# Patient Record
Sex: FEMALE | Race: WHITE | NOT HISPANIC OR LATINO | Employment: FULL TIME | URBAN - METROPOLITAN AREA
[De-identification: names, ages, dates, MRNs, and addresses within clinical notes are randomized per-mention and may not be internally consistent; named-entity substitution may affect disease eponyms.]

---

## 2017-02-02 ENCOUNTER — OFFICE VISIT (OUTPATIENT)
Dept: ORTHOPEDIC SURGERY | Facility: CLINIC | Age: 57
End: 2017-02-02

## 2017-02-02 VITALS — HEIGHT: 66 IN | TEMPERATURE: 98.1 F | WEIGHT: 150 LBS | BODY MASS INDEX: 24.11 KG/M2

## 2017-02-02 DIAGNOSIS — M25.551 HIP PAIN, BILATERAL: Primary | ICD-10-CM

## 2017-02-02 DIAGNOSIS — M70.70 BURSITIS, HIP, UNSPECIFIED LATERALITY: ICD-10-CM

## 2017-02-02 DIAGNOSIS — M25.552 HIP PAIN, BILATERAL: Primary | ICD-10-CM

## 2017-02-02 PROCEDURE — 73521 X-RAY EXAM HIPS BI 2 VIEWS: CPT | Performed by: ORTHOPAEDIC SURGERY

## 2017-02-02 PROCEDURE — 99203 OFFICE O/P NEW LOW 30 MIN: CPT | Performed by: ORTHOPAEDIC SURGERY

## 2017-02-02 NOTE — PROGRESS NOTES
Patient: Jess Razo  YOB: 1960 56 y.o. female  Medical Record Number: [id number]    Chief Complaints:   Chief Complaint   Patient presents with   • Left Hip - Pain   • Right Hip - Pain       History of Present Illness:Jess Razo is a 56 y.o. female who presents with  complaints of bilateral lateral hip pain.  This is been ongoing for a couple years.  She has been suffering from breast cancer and is undergone chemotherapy.  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  Other than that she doesn't do any specific exercise.  She denies any history of trauma to her hips.  She complains of an aching pain from the lateral aspect that hip to just above the knee worse in the left than the right side.    Allergies:   Allergies   Allergen Reactions   • Adhesive Tape    • Ampicillin    • Ceclor [Cefaclor]    • Ciprofloxacin    • Codeine Phosphate    • Erythromycin    • Ketek [Telithromycin]    • Levaquin [Levofloxacin]    • Penicillins    • Tetracyclines & Related    • Ultram [Tramadol]        Medications:   Current Outpatient Prescriptions   Medication Sig Dispense Refill   • albuterol (PROVENTIL HFA;VENTOLIN HFA) 108 (90 BASE) MCG/ACT inhaler Inhale 2 puffs Every 4 (Four) Hours As Needed for wheezing. 1 inhaler 11   • ALPRAZolam (XANAX) 1 MG tablet Take 1 tablet by mouth 3 (Three) Times a Day As Needed for anxiety. 60 tablet 2   • atorvastatin (LIPITOR) 20 MG tablet Take 1 tablet by mouth Daily. 30 tablet 5   • bisoprolol (ZEBeta) 5 MG tablet Take 1 tablet by mouth Daily. 30 tablet 5   • calcium carbonate (OS-YOON) 1250 (500 CA) MG tablet Take 1 tablet by mouth.     • cetirizine (ZyrTEC) 10 MG tablet Take 10 mg by mouth daily.     • Cholecalciferol (VITAMIN D3) 400 UNITS capsule Take  by mouth.     • digoxin (LANOXIN) 250 MCG tablet Take 250 mcg by mouth every day.     • diltiaZEM (TIAZAC) 360 MG 24 hr capsule Take 1 capsule by mouth Daily. 30 capsule 5   • fenofibrate 160  "MG tablet Take 1 tablet by mouth Daily. 30 tablet 5   • Flaxseed, Linseed, (FLAXSEED OIL) 1000 MG capsule Take  by mouth.     • lansoprazole (PREVACID) 15 MG capsule Take 15 mg by mouth.     • levothyroxine (SYNTHROID, LEVOTHROID) 75 MCG tablet Take 1 tablet by mouth Daily. 90 tablet 1   • metFORMIN (GLUCOPHAGE) 500 MG tablet Take 1 tablet by mouth 2 (Two) Times a Day With Meals. 180 tablet 1   • Multiple Vitamin (MULTIVITAMIN) tablet Take 1 tablet by mouth.     • Omega-3 Fatty Acids (FISH OIL CONCENTRATE) 300 MG capsule Take  by mouth 2 (two) times a day.     • tamoxifen (NOLVADEX) 20 MG chemo tablet Take 20 mg by mouth.     • traMADol (ULTRAM) 50 MG tablet Take 1 tablet by mouth Every 6 (Six) Hours As Needed for moderate pain (4-6). 60 tablet 5     No current facility-administered medications for this visit.          The following portions of the patient's history were reviewed and updated as appropriate: allergies, current medications, past family history, past medical history, past social history, past surgical history and problem list.    Review of Systems:   A 14 point review of systems was performed. All systems negative except pertinent positives/negative listed in HPI above    Physical Exam:   Vitals:    02/02/17 0917   Temp: 98.1 °F (36.7 °C)   Weight: 150 lb (68 kg)   Height: 66\" (167.6 cm)       General: A and O x 3, ASA, NAD    SCLERA:    Normal    DENTITION:   Normal  Hip:  bilateral    LEG ALIGNMENT:     Neutral   ,    equal leg lengths    GAIT:     Nonantalgic    SKIN:     No abnormality    RANGE OF MOTION:      Full without joint irritability    STRENGTH:     5 / 5    hip flexion and abduction    DISTAL PULSES:    Paplable    DISTAL SENSATION :   Intact    LYMPHATICS:     No   lymphadenopathy    OTHER:          - Negative Stinchfeld test      - Negative log roll      +Tenderness to palpation trochanteric bursa          Radiology:  Xrays 2views both hips (AP bilateral hips, and lateral of the hip) " ordered and reviewed for evaluation of hip pain  demonstrating  normal appearing bilateral hip joints without evidence of arthritis or any pathologic bone process.  On the AP view of the pelvis there is evidence of lumbar degenerative changes throughout the lower lumbar spine.  There are no previous films for comparison    Assessment/Plan: Bilateral hip trochanteric bursitis.  I will send her to physical therapy for hamstring and IT band stretching.  We'll also have them work on some lumbar core strengthening exercises.  If her pain should progress despite this we would consider cortisone injections into the hip bursa at a later date.      Antonio Taveras MD  2/2/2017

## 2017-03-13 ENCOUNTER — TELEPHONE (OUTPATIENT)
Dept: FAMILY MEDICINE CLINIC | Facility: CLINIC | Age: 57
End: 2017-03-13

## 2017-03-13 ENCOUNTER — OFFICE VISIT (OUTPATIENT)
Dept: FAMILY MEDICINE CLINIC | Facility: CLINIC | Age: 57
End: 2017-03-13

## 2017-03-13 VITALS
OXYGEN SATURATION: 97 % | BODY MASS INDEX: 24.27 KG/M2 | RESPIRATION RATE: 16 BRPM | WEIGHT: 151 LBS | TEMPERATURE: 98.4 F | HEART RATE: 74 BPM | DIASTOLIC BLOOD PRESSURE: 62 MMHG | HEIGHT: 66 IN | SYSTOLIC BLOOD PRESSURE: 134 MMHG

## 2017-03-13 DIAGNOSIS — E78.2 MIXED HYPERLIPIDEMIA: ICD-10-CM

## 2017-03-13 DIAGNOSIS — K21.9 GASTROESOPHAGEAL REFLUX DISEASE WITHOUT ESOPHAGITIS: ICD-10-CM

## 2017-03-13 DIAGNOSIS — E55.9 VITAMIN D DEFICIENCY: ICD-10-CM

## 2017-03-13 DIAGNOSIS — E78.5 HYPERLIPIDEMIA, UNSPECIFIED HYPERLIPIDEMIA TYPE: ICD-10-CM

## 2017-03-13 DIAGNOSIS — J45.30 MILD PERSISTENT ASTHMA WITHOUT COMPLICATION: ICD-10-CM

## 2017-03-13 DIAGNOSIS — M15.3 POST-TRAUMATIC OSTEOARTHRITIS OF MULTIPLE JOINTS: ICD-10-CM

## 2017-03-13 DIAGNOSIS — Z85.3 HISTORY OF BREAST CANCER: ICD-10-CM

## 2017-03-13 DIAGNOSIS — I10 HYPERTENSION, ESSENTIAL: ICD-10-CM

## 2017-03-13 DIAGNOSIS — E03.9 HYPOTHYROIDISM (ACQUIRED): ICD-10-CM

## 2017-03-13 DIAGNOSIS — E11.9 CONTROLLED TYPE 2 DIABETES MELLITUS WITHOUT COMPLICATION, WITHOUT LONG-TERM CURRENT USE OF INSULIN (HCC): Primary | ICD-10-CM

## 2017-03-13 DIAGNOSIS — I47.9 PAROXYSMAL TACHYCARDIA (HCC): ICD-10-CM

## 2017-03-13 DIAGNOSIS — F41.1 GENERALIZED ANXIETY DISORDER: ICD-10-CM

## 2017-03-13 PROCEDURE — 99214 OFFICE O/P EST MOD 30 MIN: CPT | Performed by: PHYSICIAN ASSISTANT

## 2017-03-13 RX ORDER — FENOFIBRATE 160 MG/1
160 TABLET ORAL DAILY
Qty: 30 TABLET | Refills: 11 | Status: SHIPPED | OUTPATIENT
Start: 2017-03-13 | End: 2018-03-15

## 2017-03-13 RX ORDER — ATORVASTATIN CALCIUM 20 MG/1
20 TABLET, FILM COATED ORAL DAILY
Qty: 30 TABLET | Refills: 11 | Status: SHIPPED | OUTPATIENT
Start: 2017-03-13 | End: 2018-03-15

## 2017-03-13 RX ORDER — LEVOTHYROXINE SODIUM 0.07 MG/1
75 TABLET ORAL DAILY
Qty: 90 TABLET | Refills: 1 | Status: SHIPPED | OUTPATIENT
Start: 2017-03-13 | End: 2018-03-08 | Stop reason: SDUPTHER

## 2017-03-13 RX ORDER — ALPRAZOLAM 1 MG/1
1 TABLET ORAL 2 TIMES DAILY PRN
Qty: 30 TABLET | Refills: 2
Start: 2017-03-13 | End: 2018-03-15

## 2017-03-13 RX ORDER — ALBUTEROL SULFATE 90 UG/1
2 AEROSOL, METERED RESPIRATORY (INHALATION) EVERY 4 HOURS PRN
Qty: 1 INHALER | Refills: 11 | Status: SHIPPED | OUTPATIENT
Start: 2017-03-13 | End: 2018-10-30 | Stop reason: SDUPTHER

## 2017-03-13 NOTE — PATIENT INSTRUCTIONS
Xanax is a controlled substance.  I only want you to take it as needed.  I do not want you to take it routinely.  Use the lowest effective dose.  It can be habit forming.  It can make you feel drowsy.   This could effect how you operate machinery, including a car.  Caution is advised.  I would avoid taking it and then driving.  Do not take this with alcohol.  Low glycemic index diet  Exercise 30 minutes most days of the week  Make sure you get results on any labs or tests we ordered today  We discussed medications and how to take them as prescribed  Sleep 6-8 hours each night if possible  If you have not signed up for [employer], please activate your code ASAP from your After Visit Summary today    LDL goal <100  LDL goal if heart disease <70  HDL goal >60  Triglyceride goal <150  BP goal =<130/80  Fasting glucose <100    Stop smoking

## 2017-03-13 NOTE — PROGRESS NOTES
Subjective   Jess Razo is a 56 y.o. female.     History of Present Illness   Jess Razo 56 y.o. female who presents today for routine follow up check and medication refills.  she has a history of   Patient Active Problem List   Diagnosis   • Asthma   • Paroxysmal tachycardia   • Osteoarthritis, multiple sites   • Malignant neoplasm of female breast   • Lymphedema   • Hyperlipidemia   • Generalized anxiety disorder   • Esophageal reflux   • Diabetes type 2, controlled   • History of breast cancer   .  Since the last visit, she has overall felt tired.  She has DMII and is well controlled on medication, GERD and is well controlled on PPI medication, Hyperlipidemia and is well controlled on medication and hypothyroidism and will need to update labs for continued treatment.  she has been compliant with current medications have reviewed them.  The patient has occas diarrhea and is on Metformin  She has lost 50lbs and no longer on bp med; on rate control meds from cardio'  I will update dig level and all labs;   Lab Results   Component Value Date    HGBA1C 5.9 (H) 11/19/2016     She does not use Albuterol but rarely  Itching all over if does not take Zyrtec.  The patient has read and signed the [facility] Controlled Substance Contract.  I will continue to see patient for regular follow up appointments.  They are well controlled on their medication.  LESLIE has been reviewed by me and is updated every 3 months. The patient is aware of the potential for addiction and dependence.    Jess Razo female 56 y.o. who presents today for follow up of Anxiety.  She reports medication is working well, patient desires to continue on Rx, and needs refill. Onset of symptoms was approximately several years ago.  She denies current suicidal and homicidal ideation. Risk factors are lifestyle of multiple roles.  Previous treatment includes current Rx and was on Zoloft in past.  She complains of the following medication side  effects: none.  The patient has had no previous counseling..    Patient comes in today to see me for the first time. Is a long term patient of Dr. Brand. Seeing me today due to Dr. Brand no longer working here. Discussed with pt today that I will not be writing pain medications or benzodiazepines for them past a 90 day window and that we will be aggressively decreasing doses along the way. They have a choice of being sent to pain management for any narcotics taken, if they wish. I will be getting them off of any benzos.they are taking, or they can see a psychiatrist to discuss this further (list of names and phone numbers given to pt today). They are also aware that they are free to transfer their care to a different facility before the 90 days are up if they wish. After that point, they are aware that they will no longer be seen in this facility by our doctors. Again, reiterated the fact that those types of medications will not be written here past 90 days, and pt voices understanding.    The following portions of the patient's history were reviewed and updated as appropriate: allergies, current medications, past family history, past medical history, past social history, past surgical history and problem list.    Review of Systems    Objective   Physical Exam   Constitutional: She is oriented to person, place, and time. She appears well-developed and well-nourished. No distress.   HENT:   Head: Normocephalic and atraumatic.   Eyes: Conjunctivae and EOM are normal. Pupils are equal, round, and reactive to light.   Neck: Normal range of motion. Neck supple. No thyromegaly present.   Cardiovascular: Normal rate, regular rhythm, normal heart sounds, intact distal pulses and normal pulses.  Exam reveals no gallop and no friction rub.    No murmur heard.  Pulmonary/Chest: Effort normal and breath sounds normal.   Musculoskeletal: Normal range of motion.    Jess had a diabetic foot exam performed today.   During the  foot exam she had a monofilament test performed.    Neurological Sensory Findings - Unaltered hot/cold right ankle/foot discrimination and unaltered hot/cold left ankle/foot discrimination. Unaltered sharp/dull right ankle/foot discrimination and unaltered sharp/dull left ankle/foot discrimination. No right ankle/foot altered proprioception and no left ankle/foot altered proprioception    Vascular Status -  Her exam exhibits right foot vasculature abnormal and no right foot edema. Her exam exhibits left foot vasculature abnormal and no left foot edema.   Skin Integrity  -  Her right foot skin is not intact.   Jess's left foot skin is not intact. .     Neurological: She is alert and oriented to person, place, and time.   Sensation intact but decreased on toes since she had chemo   Skin: Skin is warm. No rash noted.   Psychiatric: She has a normal mood and affect. Her behavior is normal. Judgment and thought content normal.   Nursing note and vitals reviewed.      Assessment/Plan   Problems Addressed this Visit        Cardiovascular and Mediastinum    Paroxysmal tachycardia    Relevant Orders    Comprehensive metabolic panel    Lipid panel    CBC and Differential    TSH    Hemoglobin A1c    MicroAlbumin, Urine, Random    T4, Free    Vitamin B12    Folate    Vitamin D 25 Hydroxy    Digoxin level    Hyperlipidemia    Relevant Medications    atorvastatin (LIPITOR) 20 MG tablet    fenofibrate 160 MG tablet    Other Relevant Orders    Comprehensive metabolic panel    Lipid panel    CBC and Differential    TSH    Hemoglobin A1c    MicroAlbumin, Urine, Random    T4, Free    Vitamin B12    Folate    Vitamin D 25 Hydroxy    Digoxin level    Comprehensive metabolic panel    Lipid panel    CBC and Differential    TSH    Hemoglobin A1c    MicroAlbumin, Urine, Random    T4, Free    Vitamin B12    Folate    Vitamin D 25 Hydroxy    Digoxin level    RESOLVED: Hypertension, essential    Relevant Orders    Comprehensive metabolic  panel    Lipid panel    CBC and Differential    TSH    Hemoglobin A1c    MicroAlbumin, Urine, Random    T4, Free    Vitamin B12    Folate    Vitamin D 25 Hydroxy    Digoxin level       Respiratory    Asthma    Relevant Medications    albuterol (PROVENTIL HFA;VENTOLIN HFA) 108 (90 BASE) MCG/ACT inhaler    Other Relevant Orders    Comprehensive metabolic panel    Lipid panel    CBC and Differential    TSH    Hemoglobin A1c    MicroAlbumin, Urine, Random    T4, Free    Vitamin B12    Folate    Vitamin D 25 Hydroxy    Digoxin level       Digestive    Esophageal reflux    Relevant Orders    Comprehensive metabolic panel    Lipid panel    CBC and Differential    TSH    Hemoglobin A1c    MicroAlbumin, Urine, Random    T4, Free    Vitamin B12    Folate    Vitamin D 25 Hydroxy    Digoxin level       Endocrine    Diabetes type 2, controlled - Primary    Relevant Medications    metFORMIN (GLUCOPHAGE) 500 MG tablet    Other Relevant Orders    Comprehensive metabolic panel    Lipid panel    CBC and Differential    TSH    Hemoglobin A1c    MicroAlbumin, Urine, Random    T4, Free    Vitamin B12    Folate    Vitamin D 25 Hydroxy    Digoxin level    RESOLVED: Hypothyroidism (acquired)    Relevant Medications    levothyroxine (SYNTHROID, LEVOTHROID) 75 MCG tablet    Other Relevant Orders    Comprehensive metabolic panel    Lipid panel    CBC and Differential    TSH    Hemoglobin A1c    MicroAlbumin, Urine, Random    T4, Free    Vitamin B12    Folate    Vitamin D 25 Hydroxy    Digoxin level       Musculoskeletal and Integument    Osteoarthritis, multiple sites    Relevant Orders    Comprehensive metabolic panel    Lipid panel    CBC and Differential    TSH    Hemoglobin A1c    MicroAlbumin, Urine, Random    T4, Free    Vitamin B12    Folate    Vitamin D 25 Hydroxy    Digoxin level       Other    Generalized anxiety disorder    Relevant Medications    ALPRAZolam (XANAX) 1 MG tablet    Other Relevant Orders    Comprehensive metabolic  panel    Lipid panel    CBC and Differential    TSH    Hemoglobin A1c    MicroAlbumin, Urine, Random    T4, Free    Vitamin B12    Folate    Vitamin D 25 Hydroxy    Digoxin level    History of breast cancer    Relevant Orders    Comprehensive metabolic panel    Lipid panel    CBC and Differential    TSH    Hemoglobin A1c    MicroAlbumin, Urine, Random    T4, Free    Vitamin B12    Folate    Vitamin D 25 Hydroxy    Digoxin level      Other Visit Diagnoses     Vitamin D deficiency        Relevant Orders    Comprehensive metabolic panel    Lipid panel    CBC and Differential    TSH    Hemoglobin A1c    MicroAlbumin, Urine, Random    T4, Free    Vitamin B12    Folate    Vitamin D 25 Hydroxy    Digoxin level                I have reviewed the notes, assessments, and/or procedures performed by Olamide Cobos PA-C, I concur with her/his documentation of Jess Razo.

## 2017-03-13 NOTE — TELEPHONE ENCOUNTER
[facility] is call about lipitor and fenofibrate. They are want to make sure it is okay to give both stains do to increase risk of muscle aches

## 2017-03-20 LAB
25(OH)D3+25(OH)D2 SERPL-MCNC: 48.8 NG/ML (ref 30–100)
ALBUMIN SERPL-MCNC: 4.4 G/DL (ref 3.5–5.5)
ALBUMIN/GLOB SERPL: 1.8 {RATIO} (ref 1.2–2.2)
ALP SERPL-CCNC: 48 IU/L (ref 39–117)
ALT SERPL-CCNC: 36 IU/L (ref 0–32)
AST SERPL-CCNC: 57 IU/L (ref 0–40)
BASOPHILS # BLD AUTO: 0 X10E3/UL (ref 0–0.2)
BASOPHILS NFR BLD AUTO: 1 %
BILIRUB SERPL-MCNC: 0.2 MG/DL (ref 0–1.2)
BUN SERPL-MCNC: 10 MG/DL (ref 6–24)
BUN/CREAT SERPL: 16 (ref 9–23)
CALCIUM SERPL-MCNC: 9.4 MG/DL (ref 8.7–10.2)
CHLORIDE SERPL-SCNC: 101 MMOL/L (ref 96–106)
CHOLEST SERPL-MCNC: 181 MG/DL (ref 100–199)
CO2 SERPL-SCNC: 23 MMOL/L (ref 18–29)
CREAT SERPL-MCNC: 0.61 MG/DL (ref 0.57–1)
DIGOXIN SERPL-MCNC: 0.6 NG/ML (ref 0.5–0.9)
EOSINOPHIL # BLD AUTO: 0.1 X10E3/UL (ref 0–0.4)
EOSINOPHIL NFR BLD AUTO: 2 %
ERYTHROCYTE [DISTWIDTH] IN BLOOD BY AUTOMATED COUNT: 13.4 % (ref 12.3–15.4)
FOLATE SERPL-MCNC: >20 NG/ML
GLOBULIN SER CALC-MCNC: 2.5 G/DL (ref 1.5–4.5)
GLUCOSE SERPL-MCNC: 92 MG/DL (ref 65–99)
HBA1C MFR BLD: 5.8 % (ref 4.8–5.6)
HCT VFR BLD AUTO: 41.9 % (ref 34–46.6)
HDLC SERPL-MCNC: 38 MG/DL
HGB BLD-MCNC: 13.8 G/DL (ref 11.1–15.9)
IMM GRANULOCYTES # BLD: 0 X10E3/UL (ref 0–0.1)
IMM GRANULOCYTES NFR BLD: 0 %
LDLC SERPL CALC-MCNC: 80 MG/DL (ref 0–99)
LYMPHOCYTES # BLD AUTO: 1.7 X10E3/UL (ref 0.7–3.1)
LYMPHOCYTES NFR BLD AUTO: 38 %
MCH RBC QN AUTO: 31.4 PG (ref 26.6–33)
MCHC RBC AUTO-ENTMCNC: 32.9 G/DL (ref 31.5–35.7)
MCV RBC AUTO: 95 FL (ref 79–97)
MICROALBUMIN UR-MCNC: <3 UG/ML
MONOCYTES # BLD AUTO: 0.3 X10E3/UL (ref 0.1–0.9)
MONOCYTES NFR BLD AUTO: 7 %
NEUTROPHILS # BLD AUTO: 2.4 X10E3/UL (ref 1.4–7)
NEUTROPHILS NFR BLD AUTO: 52 %
PLATELET # BLD AUTO: 204 X10E3/UL (ref 150–379)
POTASSIUM SERPL-SCNC: 4.3 MMOL/L (ref 3.5–5.2)
PROT SERPL-MCNC: 6.9 G/DL (ref 6–8.5)
RBC # BLD AUTO: 4.39 X10E6/UL (ref 3.77–5.28)
SODIUM SERPL-SCNC: 142 MMOL/L (ref 134–144)
T4 FREE SERPL-MCNC: 1.44 NG/DL (ref 0.82–1.77)
TRIGL SERPL-MCNC: 315 MG/DL (ref 0–149)
TSH SERPL DL<=0.005 MIU/L-ACNC: 1.55 UIU/ML (ref 0.45–4.5)
VIT B12 SERPL-MCNC: 573 PG/ML (ref 211–946)
VLDLC SERPL CALC-MCNC: 63 MG/DL (ref 5–40)
WBC # BLD AUTO: 4.6 X10E3/UL (ref 3.4–10.8)

## 2017-06-20 ENCOUNTER — RESULTS ENCOUNTER (OUTPATIENT)
Dept: FAMILY MEDICINE CLINIC | Facility: CLINIC | Age: 57
End: 2017-06-20

## 2017-06-20 DIAGNOSIS — Z85.3 HISTORY OF BREAST CANCER: ICD-10-CM

## 2017-06-20 DIAGNOSIS — E78.5 HYPERLIPIDEMIA, UNSPECIFIED HYPERLIPIDEMIA TYPE: ICD-10-CM

## 2017-06-20 DIAGNOSIS — E03.9 HYPOTHYROIDISM (ACQUIRED): ICD-10-CM

## 2017-06-20 DIAGNOSIS — E11.9 CONTROLLED TYPE 2 DIABETES MELLITUS WITHOUT COMPLICATION, WITHOUT LONG-TERM CURRENT USE OF INSULIN (HCC): ICD-10-CM

## 2017-06-20 DIAGNOSIS — E78.2 MIXED HYPERLIPIDEMIA: ICD-10-CM

## 2017-06-20 DIAGNOSIS — K21.9 GASTROESOPHAGEAL REFLUX DISEASE WITHOUT ESOPHAGITIS: ICD-10-CM

## 2017-06-20 DIAGNOSIS — F41.1 GENERALIZED ANXIETY DISORDER: ICD-10-CM

## 2017-06-20 DIAGNOSIS — M15.3 POST-TRAUMATIC OSTEOARTHRITIS OF MULTIPLE JOINTS: ICD-10-CM

## 2017-06-20 DIAGNOSIS — I47.9 PAROXYSMAL TACHYCARDIA (HCC): ICD-10-CM

## 2017-06-20 DIAGNOSIS — J45.30 MILD PERSISTENT ASTHMA WITHOUT COMPLICATION: ICD-10-CM

## 2017-06-20 DIAGNOSIS — E55.9 VITAMIN D DEFICIENCY: ICD-10-CM

## 2017-06-20 DIAGNOSIS — I10 HYPERTENSION, ESSENTIAL: ICD-10-CM

## 2017-08-31 DIAGNOSIS — I10 HYPERTENSION, ESSENTIAL: ICD-10-CM

## 2017-08-31 RX ORDER — DILTIAZEM HYDROCHLORIDE 360 MG/1
360 CAPSULE, EXTENDED RELEASE ORAL DAILY
Qty: 30 CAPSULE | Refills: 5 | Status: SHIPPED | OUTPATIENT
Start: 2017-08-31 | End: 2018-02-22 | Stop reason: SDUPTHER

## 2017-09-06 ENCOUNTER — OFFICE VISIT (OUTPATIENT)
Dept: FAMILY MEDICINE CLINIC | Facility: CLINIC | Age: 57
End: 2017-09-06

## 2017-09-06 VITALS
DIASTOLIC BLOOD PRESSURE: 70 MMHG | OXYGEN SATURATION: 97 % | BODY MASS INDEX: 24.91 KG/M2 | HEIGHT: 66 IN | SYSTOLIC BLOOD PRESSURE: 120 MMHG | TEMPERATURE: 98.6 F | RESPIRATION RATE: 16 BRPM | HEART RATE: 88 BPM | WEIGHT: 155 LBS

## 2017-09-06 DIAGNOSIS — Z78.0 POST-MENOPAUSAL: ICD-10-CM

## 2017-09-06 DIAGNOSIS — E78.2 MIXED HYPERLIPIDEMIA: ICD-10-CM

## 2017-09-06 DIAGNOSIS — K21.9 GASTROESOPHAGEAL REFLUX DISEASE WITHOUT ESOPHAGITIS: ICD-10-CM

## 2017-09-06 DIAGNOSIS — Z85.3 HISTORY OF BREAST CANCER: ICD-10-CM

## 2017-09-06 DIAGNOSIS — G47.62 NOCTURNAL LEG CRAMPS: ICD-10-CM

## 2017-09-06 DIAGNOSIS — E11.9 CONTROLLED TYPE 2 DIABETES MELLITUS WITHOUT COMPLICATION, WITHOUT LONG-TERM CURRENT USE OF INSULIN (HCC): Primary | ICD-10-CM

## 2017-09-06 PROCEDURE — 90471 IMMUNIZATION ADMIN: CPT | Performed by: PHYSICIAN ASSISTANT

## 2017-09-06 PROCEDURE — 90686 IIV4 VACC NO PRSV 0.5 ML IM: CPT | Performed by: PHYSICIAN ASSISTANT

## 2017-09-06 PROCEDURE — 99213 OFFICE O/P EST LOW 20 MIN: CPT | Performed by: PHYSICIAN ASSISTANT

## 2017-09-06 NOTE — PROGRESS NOTES
Subjective   Jess Razo is a 57 y.o. female.     History of Present Illness   Jess Razo 57 y.o. female who presents today for routine follow up check and medication refills.  she has a history of   Patient Active Problem List   Diagnosis   • Asthma   • Paroxysmal tachycardia   • Osteoarthritis, multiple sites   • Malignant neoplasm of female breast   • Lymphedema   • Hyperlipidemia   • Generalized anxiety disorder   • Esophageal reflux   • Diabetes type 2, controlled   • History of breast cancer   .  Since the last visit, she has overall felt tired.  She has DMII and is well controlled on medication, GERD and is well controlled on PPI medication, Hyperlipidemia and is well controlled on medication and hypothyroidism and is well controlled on Rx.  Labs are in desired treatment range.  she has been compliant with current medications have reviewed them.  The patient denies medication side effects.  Has been tired with low bp and cardio is adjusting dose Zebeta---hx of paroxysmal tachycardia  She is active 5 days a week  Sees oncologist; off Tamoxifen  Wears sleeve left upper ext for lymphadema  I will not be able to Rx Xanax and went over this last visit.  Needs DEXA  She is having leg cramps at night and will get Mg+  She has DDD L/s spine  115-120 fasting glucose at home  No results found for: CHOL  Lab Results   Component Value Date    TRIG 315 (H) 03/18/2017    TRIG 157 (H) 03/26/2016     Lab Results   Component Value Date    HDL 38 (L) 03/18/2017    HDL 36 (L) 03/26/2016     No results found for: LDLCALC  Lab Results   Component Value Date    LDL 80 03/18/2017    LDL 51 03/26/2016     No results found for: HDLLDLRATIO  No components found for: CHOLHDL  Lab Results   Component Value Date    BUN 10 03/18/2017    CREATININE 0.61 03/18/2017    EGFRIFNONA 102 03/18/2017    EGFRIFAFRI 118 03/18/2017    BCR 16 03/18/2017    K 4.3 03/18/2017    CO2 23 03/18/2017    CALCIUM 9.4 03/18/2017    PROTENTOTREF 6.9  03/18/2017    ALBUMIN 4.4 03/18/2017    LABIL2 1.8 03/18/2017    AST 57 (H) 03/18/2017    ALT 36 (H) 03/18/2017     Lab Results   Component Value Date    HGBA1C 5.8 (H) 03/18/2017   LFT elevation since chemo      The following portions of the patient's history were reviewed and updated as appropriate: allergies, current medications, past family history, past medical history, past social history, past surgical history and problem list.    Review of Systems   Constitutional: Negative for activity change, appetite change and unexpected weight change.   HENT: Negative for nosebleeds and trouble swallowing.    Eyes: Negative for pain and visual disturbance.   Respiratory: Positive for cough. Negative for chest tightness, shortness of breath and wheezing.    Cardiovascular: Positive for leg swelling. Negative for chest pain and palpitations.   Gastrointestinal: Negative for abdominal pain and blood in stool.   Endocrine: Negative.    Genitourinary: Negative for difficulty urinating and hematuria.   Musculoskeletal: Negative for joint swelling.   Skin: Negative for color change and rash.   Allergic/Immunologic: Negative.    Neurological: Positive for weakness. Negative for syncope and speech difficulty.   Hematological: Negative for adenopathy.   Psychiatric/Behavioral: Negative for agitation and confusion.   All other systems reviewed and are negative.      Objective   Physical Exam   Constitutional: She is oriented to person, place, and time. She appears well-developed and well-nourished. No distress.   HENT:   Head: Normocephalic and atraumatic.   Eyes: Conjunctivae and EOM are normal. Pupils are equal, round, and reactive to light. Right eye exhibits no discharge. Left eye exhibits no discharge. No scleral icterus.   Neck: Normal range of motion. Neck supple. No tracheal deviation present. No thyromegaly present.   Cardiovascular: Normal rate, regular rhythm, normal heart sounds, intact distal pulses and normal pulses.   Exam reveals no gallop.    No murmur heard.  Pulmonary/Chest: Effort normal and breath sounds normal. No respiratory distress. She has no wheezes. She has no rales.   Musculoskeletal: Normal range of motion.   Neurological: She is alert and oriented to person, place, and time. She exhibits normal muscle tone. Coordination normal.   Skin: Skin is warm. No rash noted. No erythema. No pallor.   Psychiatric: She has a normal mood and affect. Her behavior is normal. Judgment and thought content normal.   Nursing note and vitals reviewed.      Assessment/Plan   Problems Addressed this Visit        Cardiovascular and Mediastinum    Hyperlipidemia    Relevant Orders    Comprehensive metabolic panel    Lipid panel    Hemoglobin A1c    Magnesium    Flu Vaccine Quad PF 3YR+    DEXA Bone Density Axial       Digestive    Esophageal reflux    Relevant Orders    Comprehensive metabolic panel    Lipid panel    Hemoglobin A1c    Magnesium    Flu Vaccine Quad PF 3YR+    DEXA Bone Density Axial       Endocrine    Diabetes type 2, controlled - Primary    Relevant Orders    Comprehensive metabolic panel    Lipid panel    Hemoglobin A1c    Magnesium    Flu Vaccine Quad PF 3YR+    DEXA Bone Density Axial       Other    History of breast cancer    Relevant Orders    Comprehensive metabolic panel    Lipid panel    Hemoglobin A1c    Magnesium    Flu Vaccine Quad PF 3YR+    DEXA Bone Density Axial      Other Visit Diagnoses     Nocturnal leg cramps        Relevant Orders    Comprehensive metabolic panel    Lipid panel    Hemoglobin A1c    Magnesium    Flu Vaccine Quad PF 3YR+    DEXA Bone Density Axial    Post-menopausal        Relevant Orders    Flu Vaccine Quad PF 3YR+    DEXA Bone Density Axial

## 2017-09-06 NOTE — PATIENT INSTRUCTIONS
Low glycemic index diet  Exercise 30 minutes most days of the week  Make sure you get results on any labs or tests we ordered today  We discussed medications and how to take them as prescribed  Sleep 6-8 hours each night if possible  If you have not signed up for [employer], please activate your code ASAP from your After Visit Summary today    LDL goal <100  LDL goal if heart disease <70  HDL goal >60  Triglyceride goal <150  BP goal =<130/80  Fasting glucose <100    Labs due  See cardio  Get eye exam

## 2017-09-11 ENCOUNTER — CLINICAL SUPPORT (OUTPATIENT)
Dept: FAMILY MEDICINE CLINIC | Facility: CLINIC | Age: 57
End: 2017-09-11

## 2017-09-11 DIAGNOSIS — M85.80 OSTEOPENIA: Primary | ICD-10-CM

## 2017-09-11 DIAGNOSIS — Z78.0 POST-MENOPAUSAL: ICD-10-CM

## 2017-09-11 LAB
ALBUMIN SERPL-MCNC: 4.8 G/DL (ref 3.5–5.2)
ALBUMIN/GLOB SERPL: 1.7 G/DL
ALP SERPL-CCNC: 59 U/L (ref 39–117)
ALT SERPL-CCNC: 36 U/L (ref 1–33)
AST SERPL-CCNC: 30 U/L (ref 1–32)
BILIRUB SERPL-MCNC: 0.4 MG/DL (ref 0.1–1.2)
BUN SERPL-MCNC: 9 MG/DL (ref 6–20)
BUN/CREAT SERPL: 11.5 (ref 7–25)
CALCIUM SERPL-MCNC: 10.1 MG/DL (ref 8.6–10.5)
CHLORIDE SERPL-SCNC: 97 MMOL/L (ref 98–107)
CHOLEST SERPL-MCNC: 168 MG/DL (ref 0–200)
CO2 SERPL-SCNC: 26.1 MMOL/L (ref 22–29)
CREAT SERPL-MCNC: 0.78 MG/DL (ref 0.57–1)
GLOBULIN SER CALC-MCNC: 2.9 GM/DL
GLUCOSE SERPL-MCNC: 132 MG/DL (ref 65–99)
HBA1C MFR BLD: 5.87 % (ref 4.8–5.6)
HDLC SERPL-MCNC: 37 MG/DL (ref 40–60)
LDLC SERPL CALC-MCNC: 82 MG/DL (ref 0–100)
MAGNESIUM SERPL-MCNC: 1.8 MG/DL (ref 1.6–2.6)
POTASSIUM SERPL-SCNC: 4.1 MMOL/L (ref 3.5–5.2)
PROT SERPL-MCNC: 7.7 G/DL (ref 6–8.5)
SODIUM SERPL-SCNC: 140 MMOL/L (ref 136–145)
TRIGL SERPL-MCNC: 246 MG/DL (ref 0–150)
VLDLC SERPL CALC-MCNC: 49.2 MG/DL (ref 5–40)

## 2017-09-11 PROCEDURE — 77080 DXA BONE DENSITY AXIAL: CPT | Performed by: PHYSICIAN ASSISTANT

## 2017-11-28 ENCOUNTER — OFFICE VISIT (OUTPATIENT)
Dept: OBSTETRICS AND GYNECOLOGY | Facility: CLINIC | Age: 57
End: 2017-11-28

## 2017-11-28 VITALS
DIASTOLIC BLOOD PRESSURE: 72 MMHG | SYSTOLIC BLOOD PRESSURE: 110 MMHG | WEIGHT: 154 LBS | BODY MASS INDEX: 24.75 KG/M2 | HEIGHT: 66 IN

## 2017-11-28 DIAGNOSIS — Z01.419 GYNECOLOGIC EXAM NORMAL: Primary | ICD-10-CM

## 2017-11-28 PROCEDURE — 99396 PREV VISIT EST AGE 40-64: CPT | Performed by: NURSE PRACTITIONER

## 2017-11-28 RX ORDER — BISOPROLOL FUMARATE 10 MG/1
10 TABLET, FILM COATED ORAL DAILY
COMMUNITY
Start: 2017-11-27

## 2017-11-28 NOTE — PROGRESS NOTES
Jess Razo is a 57 y.o. female.   Chief Complaint   Patient presents with   • Gynecologic Exam     no problems      HPI:pt here for gyn exam  The following portions of the patient's history were reviewed and updated as appropriate: allergies, current medications, past family history, past medical history, past social history, past surgical history and problem list.    Review of Systems  Review of Systems   Constitutional: Negative.  Negative for unexpected weight change.   Respiratory: Negative for chest tightness and shortness of breath.    Cardiovascular: Negative for chest pain and palpitations.   Gastrointestinal: Negative for abdominal pain and blood in stool.   Endocrine: Negative.    Genitourinary: Negative for dyspareunia, dysuria, frequency, hematuria, menstrual problem, pelvic pain, vaginal bleeding, vaginal discharge and vaginal pain.   Musculoskeletal: Negative for joint swelling.   Skin: Negative for color change, rash and wound.   Allergic/Immunologic: Negative.    Psychiatric/Behavioral: Negative.    All other systems reviewed and are negative.      Objective   Physical Exam   Constitutional: She is oriented to person, place, and time. She appears well-developed and well-nourished.   HENT:   Head: Normocephalic.   Neck: Normal range of motion.   Cardiovascular: Normal rate and regular rhythm.    Pulmonary/Chest: Effort normal and breath sounds normal. Right breast exhibits no mass and no nipple discharge. Left breast exhibits no mass and no nipple discharge. There is no breast swelling.   Breasts soft without palpable masses. Left breast reconstructed   Abdominal: Soft. Bowel sounds are normal.   Genitourinary: Vagina normal and uterus normal. There is no rash or lesion on the right labia. There is no rash or lesion on the left labia. Cervix exhibits no friability. Right adnexum displays no mass, no tenderness and no fullness. Left adnexum displays no mass, no tenderness and no fullness.    Genitourinary Comments: Ovaries  Within normal limits. Cervix  Within normal limits   Neurological: She is alert and oriented to person, place, and time.   Skin: Skin is warm and dry.   Psychiatric: She has a normal mood and affect. Her behavior is normal.   Vitals reviewed.      Assessment/Plan   Patient Instructions   Pt. Counseled today on safe sex practices, pap smear performed, self breast examinations discussed, if positive family history mammogram starting at age 35 otherwise starting at age 40. Colonoscopy recommended.  Hormone replacement therapy discussed. Aspirin prophylaxis to reduce risk of stroke.  Calcium and Vitamin D requirements discussed.  Diet and exercise also counseled.       Jess was seen today for gynecologic exam.    Diagnoses and all orders for this visit:    Gynecologic exam normal  -     Pap IG, Rfx HPV ASCU - ThinPrep Vial, Cervix  -     conjugated estrogens (PREMARIN) vaginal cream 0.5 application; Insert 0.5 application into the vagina Daily.        Return in about 1 year (around 11/28/2018).

## 2017-11-29 LAB
CONV .: NORMAL
CYTOLOGIST CVX/VAG CYTO: NORMAL
CYTOLOGY CVX/VAG DOC THIN PREP: NORMAL
DX ICD CODE: NORMAL
HIV 1 & 2 AB SER-IMP: NORMAL
OTHER STN SPEC: NORMAL
PATH REPORT.FINAL DX SPEC: NORMAL
STAT OF ADQ CVX/VAG CYTO-IMP: NORMAL

## 2017-12-06 NOTE — TELEPHONE ENCOUNTER
----- Message from Kavitha Ramon sent at 12/6/2017 10:47 AM EST -----  AIDAN  AT [facility] CALLED WITH QUESTIONS REGARDING XANAX SCRIPT.  [phone]

## 2018-01-23 RX ORDER — ALPRAZOLAM 0.5 MG/1
TABLET ORAL
Qty: 30 TABLET | Refills: 0 | Status: SHIPPED | OUTPATIENT
Start: 2018-01-23 | End: 2018-03-15

## 2018-01-25 RX ORDER — ALPRAZOLAM 0.5 MG/1
TABLET ORAL
Qty: 30 TABLET | Refills: 0 | OUTPATIENT
Start: 2018-01-25

## 2018-02-22 DIAGNOSIS — I10 HYPERTENSION, ESSENTIAL: ICD-10-CM

## 2018-02-22 RX ORDER — DILTIAZEM HYDROCHLORIDE 360 MG/1
CAPSULE, EXTENDED RELEASE ORAL
Qty: 30 CAPSULE | Refills: 0 | Status: SHIPPED | OUTPATIENT
Start: 2018-02-22 | End: 2018-02-23

## 2018-02-23 ENCOUNTER — TELEPHONE (OUTPATIENT)
Dept: FAMILY MEDICINE CLINIC | Facility: CLINIC | Age: 58
End: 2018-02-23

## 2018-02-23 NOTE — TELEPHONE ENCOUNTER
Pharm called about ditiazem. Pt has not had a rx for this in the past. I looked in the chart and did not see the RX. Please advise

## 2018-02-28 DIAGNOSIS — Z78.0 POST-MENOPAUSAL: ICD-10-CM

## 2018-02-28 DIAGNOSIS — Z85.3 HISTORY OF BREAST CANCER: ICD-10-CM

## 2018-02-28 DIAGNOSIS — G47.62 NOCTURNAL LEG CRAMPS: ICD-10-CM

## 2018-02-28 DIAGNOSIS — E11.9 CONTROLLED TYPE 2 DIABETES MELLITUS WITHOUT COMPLICATION, WITHOUT LONG-TERM CURRENT USE OF INSULIN (HCC): ICD-10-CM

## 2018-02-28 DIAGNOSIS — E78.2 MIXED HYPERLIPIDEMIA: ICD-10-CM

## 2018-02-28 DIAGNOSIS — K21.9 GASTROESOPHAGEAL REFLUX DISEASE WITHOUT ESOPHAGITIS: ICD-10-CM

## 2018-03-08 DIAGNOSIS — E03.9 HYPOTHYROIDISM (ACQUIRED): ICD-10-CM

## 2018-03-08 LAB
ALBUMIN SERPL-MCNC: 4.8 G/DL (ref 3.5–5.2)
ALBUMIN/GLOB SERPL: 1.7 G/DL
ALP SERPL-CCNC: 65 U/L (ref 39–117)
ALT SERPL-CCNC: 38 U/L (ref 1–33)
AST SERPL-CCNC: 38 U/L (ref 1–32)
BASOPHILS # BLD AUTO: 0.04 10*3/MM3 (ref 0–0.2)
BASOPHILS NFR BLD AUTO: 0.6 % (ref 0–1.5)
BILIRUB SERPL-MCNC: 0.4 MG/DL (ref 0.1–1.2)
BUN SERPL-MCNC: 10 MG/DL (ref 6–20)
BUN/CREAT SERPL: 16.7 (ref 7–25)
CALCIUM SERPL-MCNC: 10.1 MG/DL (ref 8.6–10.5)
CHLORIDE SERPL-SCNC: 99 MMOL/L (ref 98–107)
CHOLEST SERPL-MCNC: 170 MG/DL (ref 0–200)
CO2 SERPL-SCNC: 30.1 MMOL/L (ref 22–29)
CREAT SERPL-MCNC: 0.6 MG/DL (ref 0.57–1)
EOSINOPHIL # BLD AUTO: 0.15 10*3/MM3 (ref 0–0.7)
EOSINOPHIL NFR BLD AUTO: 2.1 % (ref 0.3–6.2)
ERYTHROCYTE [DISTWIDTH] IN BLOOD BY AUTOMATED COUNT: 13.4 % (ref 11.7–13)
GFR SERPLBLD CREATININE-BSD FMLA CKD-EPI: 103 ML/MIN/1.73
GFR SERPLBLD CREATININE-BSD FMLA CKD-EPI: 125 ML/MIN/1.73
GLOBULIN SER CALC-MCNC: 2.8 GM/DL
GLUCOSE SERPL-MCNC: 120 MG/DL (ref 65–99)
HBA1C MFR BLD: 6.19 % (ref 4.8–5.6)
HCT VFR BLD AUTO: 44.7 % (ref 35.6–45.5)
HDLC SERPL-MCNC: 37 MG/DL (ref 40–60)
HGB BLD-MCNC: 14.5 G/DL (ref 11.9–15.5)
IMM GRANULOCYTES # BLD: 0 10*3/MM3 (ref 0–0.03)
IMM GRANULOCYTES NFR BLD: 0 % (ref 0–0.5)
LDLC SERPL CALC-MCNC: ABNORMAL MG/DL
LYMPHOCYTES # BLD AUTO: 2.55 10*3/MM3 (ref 0.9–4.8)
LYMPHOCYTES NFR BLD AUTO: 36.3 % (ref 19.6–45.3)
MCH RBC QN AUTO: 31.7 PG (ref 26.9–32)
MCHC RBC AUTO-ENTMCNC: 32.4 G/DL (ref 32.4–36.3)
MCV RBC AUTO: 97.8 FL (ref 80.5–98.2)
MICROALBUMIN UR-MCNC: <3 UG/ML
MONOCYTES # BLD AUTO: 0.46 10*3/MM3 (ref 0.2–1.2)
MONOCYTES NFR BLD AUTO: 6.6 % (ref 5–12)
NEUTROPHILS # BLD AUTO: 3.82 10*3/MM3 (ref 1.9–8.1)
NEUTROPHILS NFR BLD AUTO: 54.4 % (ref 42.7–76)
PLATELET # BLD AUTO: 220 10*3/MM3 (ref 140–500)
POTASSIUM SERPL-SCNC: 4.6 MMOL/L (ref 3.5–5.2)
PROT SERPL-MCNC: 7.6 G/DL (ref 6–8.5)
RBC # BLD AUTO: 4.57 10*6/MM3 (ref 3.9–5.2)
SODIUM SERPL-SCNC: 141 MMOL/L (ref 136–145)
T4 FREE SERPL-MCNC: 1.44 NG/DL (ref 0.93–1.7)
TRIGL SERPL-MCNC: 405 MG/DL (ref 0–150)
TSH SERPL DL<=0.005 MIU/L-ACNC: 1.83 MIU/ML (ref 0.27–4.2)
VLDLC SERPL CALC-MCNC: ABNORMAL MG/DL
WBC # BLD AUTO: 7.02 10*3/MM3 (ref 4.5–10.7)

## 2018-03-08 RX ORDER — LEVOTHYROXINE SODIUM 0.07 MG/1
TABLET ORAL
Qty: 90 TABLET | Refills: 1 | Status: SHIPPED | OUTPATIENT
Start: 2018-03-08 | End: 2018-09-06 | Stop reason: SDUPTHER

## 2018-03-15 ENCOUNTER — TELEPHONE (OUTPATIENT)
Dept: FAMILY MEDICINE CLINIC | Facility: CLINIC | Age: 58
End: 2018-03-15

## 2018-03-15 ENCOUNTER — OFFICE VISIT (OUTPATIENT)
Dept: FAMILY MEDICINE CLINIC | Facility: CLINIC | Age: 58
End: 2018-03-15

## 2018-03-15 VITALS
WEIGHT: 161 LBS | OXYGEN SATURATION: 96 % | SYSTOLIC BLOOD PRESSURE: 150 MMHG | HEART RATE: 86 BPM | RESPIRATION RATE: 16 BRPM | BODY MASS INDEX: 25.88 KG/M2 | HEIGHT: 66 IN | DIASTOLIC BLOOD PRESSURE: 86 MMHG | TEMPERATURE: 98 F

## 2018-03-15 DIAGNOSIS — I10 HYPERTENSION, ESSENTIAL: ICD-10-CM

## 2018-03-15 DIAGNOSIS — E11.9 CONTROLLED TYPE 2 DIABETES MELLITUS WITHOUT COMPLICATION, WITHOUT LONG-TERM CURRENT USE OF INSULIN (HCC): Primary | ICD-10-CM

## 2018-03-15 DIAGNOSIS — E78.2 MIXED HYPERLIPIDEMIA: ICD-10-CM

## 2018-03-15 DIAGNOSIS — Z85.3 HISTORY OF BREAST CANCER: ICD-10-CM

## 2018-03-15 DIAGNOSIS — K21.9 GASTROESOPHAGEAL REFLUX DISEASE WITHOUT ESOPHAGITIS: ICD-10-CM

## 2018-03-15 PROCEDURE — 99214 OFFICE O/P EST MOD 30 MIN: CPT | Performed by: PHYSICIAN ASSISTANT

## 2018-03-15 RX ORDER — ICOSAPENT ETHYL 1000 MG/1
2 CAPSULE ORAL 2 TIMES DAILY WITH MEALS
Qty: 120 CAPSULE | Refills: 11 | Status: SHIPPED | OUTPATIENT
Start: 2018-03-15 | End: 2018-03-19

## 2018-03-15 RX ORDER — PRAVASTATIN SODIUM 10 MG
10 TABLET ORAL DAILY
Qty: 30 TABLET | Refills: 11 | Status: SHIPPED | OUTPATIENT
Start: 2018-03-15 | End: 2018-03-19

## 2018-03-15 RX ORDER — SULFAMETHOXAZOLE AND TRIMETHOPRIM 800; 160 MG/1; MG/1
1 TABLET ORAL 2 TIMES DAILY
Qty: 20 TABLET | Refills: 0 | Status: SHIPPED | OUTPATIENT
Start: 2018-03-15 | End: 2018-10-30

## 2018-03-15 RX ORDER — ROSUVASTATIN CALCIUM 10 MG/1
10 TABLET, COATED ORAL DAILY
Qty: 30 TABLET | Refills: 11 | Status: SHIPPED | OUTPATIENT
Start: 2018-03-15 | End: 2018-03-15

## 2018-03-15 NOTE — PROGRESS NOTES
Subjective   Jess Razo is a 57 y.o. female.     History of Present Illness   Jess Razo 57 y.o. female who presents today for routine follow up check and medication refills.  she has a history of   Patient Active Problem List   Diagnosis   • Asthma   • Paroxysmal tachycardia   • Osteoarthritis, multiple sites   • Malignant neoplasm of female breast   • Lymphedema   • Hyperlipidemia   • Generalized anxiety disorder   • Esophageal reflux   • Diabetes type 2, controlled   • History of breast cancer   .  Since the last visit, she has overall felt fairly well.  She has Hypertenision and is well controlled on medication, GERD and is well controlled on PPI medication, Hyperlipidemia and here to discuss treatment, Hypothyroidism and is well controlled on Rx.  Labs are in desired treatment range and Vitamin D deficiency and well controlled on medication and labs at goal >30.  she has been compliant with current medications have reviewed them.  The patient has myalgias    Results for orders placed or performed in visit on 02/28/18   Comprehensive metabolic panel   Result Value Ref Range    Glucose 120 (H) 65 - 99 mg/dL    BUN 10 6 - 20 mg/dL    Creatinine 0.60 0.57 - 1.00 mg/dL    eGFR Non African Am 103 >60 mL/min/1.73    eGFR African Am 125 >60 mL/min/1.73    BUN/Creatinine Ratio 16.7 7.0 - 25.0    Sodium 141 136 - 145 mmol/L    Potassium 4.6 3.5 - 5.2 mmol/L    Chloride 99 98 - 107 mmol/L    Total CO2 30.1 (H) 22.0 - 29.0 mmol/L    Calcium 10.1 8.6 - 10.5 mg/dL    Total Protein 7.6 6.0 - 8.5 g/dL    Albumin 4.80 3.50 - 5.20 g/dL    Globulin 2.8 gm/dL    A/G Ratio 1.7 g/dL    Total Bilirubin 0.4 0.1 - 1.2 mg/dL    Alkaline Phosphatase 65 39 - 117 U/L    AST (SGOT) 38 (H) 1 - 32 U/L    ALT (SGPT) 38 (H) 1 - 33 U/L   Lipid panel   Result Value Ref Range    Total Cholesterol 170 0 - 200 mg/dL    Triglycerides 405 (H) 0 - 150 mg/dL    HDL Cholesterol 37 (L) 40 - 60 mg/dL    VLDL Cholesterol CANCELED mg/dL    LDL  Cholesterol  CANCELED mg/dL   TSH   Result Value Ref Range    TSH 1.830 0.270 - 4.200 mIU/mL   T4, Free   Result Value Ref Range    Free T4 1.44 0.93 - 1.70 ng/dL   Hemoglobin A1c   Result Value Ref Range    Hemoglobin A1C 6.19 (H) 4.80 - 5.60 %   MicroAlbumin, Urine, Random   Result Value Ref Range    Microalbumin, Urine <3.0 Not Estab. ug/mL   CBC and Differential   Result Value Ref Range    WBC 7.02 4.50 - 10.70 10*3/mm3    RBC 4.57 3.90 - 5.20 10*6/mm3    Hemoglobin 14.5 11.9 - 15.5 g/dL    Hematocrit 44.7 35.6 - 45.5 %    MCV 97.8 80.5 - 98.2 fL    MCH 31.7 26.9 - 32.0 pg    MCHC 32.4 32.4 - 36.3 g/dL    RDW 13.4 (H) 11.7 - 13.0 %    Platelets 220 140 - 500 10*3/mm3    Neutrophil Rel % 54.4 42.7 - 76.0 %    Lymphocyte Rel % 36.3 19.6 - 45.3 %    Monocyte Rel % 6.6 5.0 - 12.0 %    Eosinophil Rel % 2.1 0.3 - 6.2 %    Basophil Rel % 0.6 0.0 - 1.5 %    Neutrophils Absolute 3.82 1.90 - 8.10 10*3/mm3    Lymphocytes Absolute 2.55 0.90 - 4.80 10*3/mm3    Monocytes Absolute 0.46 0.20 - 1.20 10*3/mm3    Eosinophils Absolute 0.15 0.00 - 0.70 10*3/mm3    Basophils Absolute 0.04 0.00 - 0.20 10*3/mm3    Immature Granulocyte Rel % 0.0 0.0 - 0.5 %    Immature Grans Absolute 0.00 0.00 - 0.03 10*3/mm3       Urine microalbumin neg  She C/o muscle loss and now myalgias----has had chemo and radiation and she needs to asked oncologist about this first and if not from this, consider endocrine work up  She does exercise bike      She will consider looking into Evista d/t hx breast cancer and was on Tamoxifen;  Osteopenia on DEXA  Sees cardio Dr Trivedi:  Sinus tachycardia and mild cardiomyopathy  Sees oncologist; off Tamoxifen  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 female who presents for evaluation of upper respiratory congestion, wheezing. Symptoms include congestion, nasal blockage, post nasal drip and cough described as productive of green sputum.  Onset of symptoms was 1 month ago, unchanged since  that time. Patient denies fever.   Evaluation to date: none Treatment to date:  OTC cough suppressant.       The following portions of the patient's history were reviewed and updated as appropriate: allergies, current medications, past family history, past medical history, past social history, past surgical history and problem list.    Review of Systems   Constitutional: Negative for activity change, appetite change and unexpected weight change.   HENT: Positive for congestion and postnasal drip. Negative for nosebleeds and trouble swallowing.    Eyes: Negative for pain and visual disturbance.   Respiratory: Negative for chest tightness, shortness of breath and wheezing.    Cardiovascular: Negative for chest pain and palpitations.   Gastrointestinal: Negative for abdominal pain and blood in stool.   Endocrine: Negative.    Genitourinary: Negative for difficulty urinating and hematuria.   Musculoskeletal: Positive for myalgias. Negative for joint swelling.   Skin: Negative for color change and rash.   Allergic/Immunologic: Negative.    Neurological: Negative for syncope and speech difficulty.   Hematological: Negative for adenopathy.   Psychiatric/Behavioral: Negative for agitation and confusion.   All other systems reviewed and are negative.      Objective   Physical Exam   Constitutional: She is oriented to person, place, and time. She appears well-developed and well-nourished.  Non-toxic appearance. No distress.   HENT:   Head: Normocephalic and atraumatic. Hair is normal.   Right Ear: External ear normal. No drainage, swelling or tenderness. Tympanic membrane is retracted.   Left Ear: External ear normal. No drainage, swelling or tenderness. Tympanic membrane is retracted.   Nose: Mucosal edema present. No epistaxis.   Mouth/Throat: Uvula is midline and mucous membranes are normal. No oral lesions. No uvula swelling. Posterior oropharyngeal erythema present. No oropharyngeal exudate.   Eyes: Conjunctivae and EOM  are normal. Pupils are equal, round, and reactive to light. Right eye exhibits no discharge. Left eye exhibits no discharge. No scleral icterus.   Neck: Normal range of motion. Neck supple.   Cardiovascular: Normal rate, regular rhythm and normal heart sounds.  Exam reveals no gallop.    No murmur heard.  Pulmonary/Chest: Breath sounds normal. No stridor. No respiratory distress. She has no wheezes. She has no rales. She exhibits no tenderness.   Abdominal: Soft. There is no tenderness.   Lymphadenopathy:     She has no cervical adenopathy.   Neurological: She is alert and oriented to person, place, and time. She exhibits normal muscle tone.   Skin: Skin is warm and dry. No rash noted. She is not diaphoretic.   Psychiatric: She has a normal mood and affect. Her behavior is normal. Judgment and thought content normal.   Nursing note and vitals reviewed.      Assessment/Plan   Jess was seen today for diabetes, hypothyroidism and hyperlipidemia.    Diagnoses and all orders for this visit:    Controlled type 2 diabetes mellitus without complication, without long-term current use of insulin  -     Comprehensive metabolic panel; Future  -     Lipid panel; Future  -     Hemoglobin A1c; Future    Gastroesophageal reflux disease without esophagitis  -     Comprehensive metabolic panel; Future  -     Lipid panel; Future  -     Hemoglobin A1c; Future    History of breast cancer  -     Comprehensive metabolic panel; Future  -     Lipid panel; Future  -     Hemoglobin A1c; Future    Mixed hyperlipidemia  -     Comprehensive metabolic panel; Future  -     Lipid panel; Future  -     Hemoglobin A1c; Future    Hypertension, essential  -     Comprehensive metabolic panel; Future  -     Lipid panel; Future  -     Hemoglobin A1c; Future    Other orders  -     Discontinue: rosuvastatin (CRESTOR) 10 MG tablet; Take 1 tablet by mouth Daily. For cholesterol and this replaces Atorvastatin  -     Discontinue: icosapent ethyl (VASCEPA) 1 g  capsule capsule; Take 2 g by mouth 2 (Two) Times a Day With Meals. For trigs; this replaces Fenofibrate Rx and OTC fish oil  -     sulfamethoxazole-trimethoprim (BACTRIM DS) 800-160 MG per tablet; Take 1 tablet by mouth 2 (Two) Times a Day. For infection

## 2018-03-15 NOTE — PATIENT INSTRUCTIONS
Low glycemic index diet  Exercise 30 minutes most days of the week  Make sure you get results on any labs or tests we ordered today  We discussed medications and how to take them as prescribed  Sleep 6-8 hours each night if possible  If you have not signed up for [employer], please activate your code ASAP from your After Visit Summary today    LDL goal <100  LDL goal if heart disease <70  HDL goal >60  Triglyceride goal <150  BP goal =<130/80  Fasting glucose <100

## 2018-03-15 NOTE — TELEPHONE ENCOUNTER
Patient calling and scripts given today are over $500 in cost and she can not afford this. Please advise patient

## 2018-03-15 NOTE — TELEPHONE ENCOUNTER
I will change Crestor to Pravastatin that is in that class of better tolerated statins    ? Vascepa with copay card should be $9 a month

## 2018-03-15 NOTE — TELEPHONE ENCOUNTER
She said she was not given any coupons for either medication? Can one be placed up front for Vascepa

## 2018-03-19 RX ORDER — ATORVASTATIN CALCIUM 20 MG/1
20 TABLET, FILM COATED ORAL DAILY
Qty: 90 TABLET | Refills: 3 | Status: SHIPPED | OUTPATIENT
Start: 2018-03-19 | End: 2018-11-16

## 2018-03-19 NOTE — TELEPHONE ENCOUNTER
I must have her stay off Fenofibrate d/t taking statin and concern about with myalgias and no change, still do not want to Rx;  Want the Rx fish oil and tried copay card and generic and and $$$$$    She is still on Atorvastatin    I will do OTC fish oil for now and refer endocrine if trigs stay up;  Working on diet and exercise    I really need her to take this

## 2018-04-08 DIAGNOSIS — I10 HYPERTENSION, ESSENTIAL: ICD-10-CM

## 2018-04-08 RX ORDER — DILTIAZEM HYDROCHLORIDE 360 MG/1
CAPSULE, EXTENDED RELEASE ORAL
Qty: 30 CAPSULE | Refills: 0 | Status: SHIPPED | OUTPATIENT
Start: 2018-04-08 | End: 2018-05-07 | Stop reason: SDUPTHER

## 2018-04-23 DIAGNOSIS — E11.9 CONTROLLED TYPE 2 DIABETES MELLITUS WITHOUT COMPLICATION, WITHOUT LONG-TERM CURRENT USE OF INSULIN (HCC): ICD-10-CM

## 2018-05-07 DIAGNOSIS — I10 HYPERTENSION, ESSENTIAL: ICD-10-CM

## 2018-05-07 RX ORDER — DILTIAZEM HYDROCHLORIDE 360 MG/1
CAPSULE, EXTENDED RELEASE ORAL
Qty: 30 CAPSULE | Refills: 4 | Status: SHIPPED | OUTPATIENT
Start: 2018-05-07 | End: 2018-10-30 | Stop reason: SDUPTHER

## 2018-09-06 DIAGNOSIS — E03.9 HYPOTHYROIDISM (ACQUIRED): ICD-10-CM

## 2018-09-06 RX ORDER — LEVOTHYROXINE SODIUM 0.07 MG/1
75 TABLET ORAL DAILY
Qty: 30 TABLET | Refills: 0 | Status: SHIPPED | OUTPATIENT
Start: 2018-09-06 | End: 2018-10-07 | Stop reason: SDUPTHER

## 2018-09-15 ENCOUNTER — RESULTS ENCOUNTER (OUTPATIENT)
Dept: FAMILY MEDICINE CLINIC | Facility: CLINIC | Age: 58
End: 2018-09-15

## 2018-09-15 DIAGNOSIS — E78.2 MIXED HYPERLIPIDEMIA: ICD-10-CM

## 2018-09-15 DIAGNOSIS — Z85.3 HISTORY OF BREAST CANCER: ICD-10-CM

## 2018-09-15 DIAGNOSIS — I10 HYPERTENSION, ESSENTIAL: ICD-10-CM

## 2018-09-15 DIAGNOSIS — E11.9 CONTROLLED TYPE 2 DIABETES MELLITUS WITHOUT COMPLICATION, WITHOUT LONG-TERM CURRENT USE OF INSULIN (HCC): ICD-10-CM

## 2018-09-15 DIAGNOSIS — K21.9 GASTROESOPHAGEAL REFLUX DISEASE WITHOUT ESOPHAGITIS: ICD-10-CM

## 2018-10-07 DIAGNOSIS — E03.9 HYPOTHYROIDISM (ACQUIRED): ICD-10-CM

## 2018-10-07 RX ORDER — LEVOTHYROXINE SODIUM 0.07 MG/1
TABLET ORAL
Qty: 30 TABLET | Refills: 0 | Status: SHIPPED | OUTPATIENT
Start: 2018-10-07 | End: 2018-10-24 | Stop reason: SDUPTHER

## 2018-10-24 DIAGNOSIS — E03.9 HYPOTHYROIDISM (ACQUIRED): ICD-10-CM

## 2018-10-24 RX ORDER — LEVOTHYROXINE SODIUM 0.07 MG/1
TABLET ORAL
Qty: 30 TABLET | Refills: 0 | Status: SHIPPED | OUTPATIENT
Start: 2018-10-24 | End: 2018-10-30

## 2018-10-25 DIAGNOSIS — E03.9 HYPOTHYROIDISM (ACQUIRED): ICD-10-CM

## 2018-10-26 RX ORDER — LEVOTHYROXINE SODIUM 0.07 MG/1
TABLET ORAL
Qty: 30 TABLET | Refills: 11 | Status: SHIPPED | OUTPATIENT
Start: 2018-10-26 | End: 2018-10-30 | Stop reason: SDUPTHER

## 2018-10-30 ENCOUNTER — OFFICE VISIT (OUTPATIENT)
Dept: FAMILY MEDICINE CLINIC | Facility: CLINIC | Age: 58
End: 2018-10-30

## 2018-10-30 VITALS
RESPIRATION RATE: 16 BRPM | SYSTOLIC BLOOD PRESSURE: 122 MMHG | TEMPERATURE: 98.4 F | BODY MASS INDEX: 25.39 KG/M2 | HEIGHT: 66 IN | WEIGHT: 158 LBS | OXYGEN SATURATION: 95 % | DIASTOLIC BLOOD PRESSURE: 84 MMHG | HEART RATE: 83 BPM

## 2018-10-30 DIAGNOSIS — Z85.3 HISTORY OF BREAST CANCER: Primary | ICD-10-CM

## 2018-10-30 DIAGNOSIS — R25.2 LEG CRAMPS: ICD-10-CM

## 2018-10-30 DIAGNOSIS — E03.9 HYPOTHYROIDISM (ACQUIRED): ICD-10-CM

## 2018-10-30 DIAGNOSIS — R42 VERTIGO: ICD-10-CM

## 2018-10-30 DIAGNOSIS — E11.9 CONTROLLED TYPE 2 DIABETES MELLITUS WITHOUT COMPLICATION, WITHOUT LONG-TERM CURRENT USE OF INSULIN (HCC): ICD-10-CM

## 2018-10-30 DIAGNOSIS — I10 HYPERTENSION, ESSENTIAL: ICD-10-CM

## 2018-10-30 DIAGNOSIS — J45.30 MILD PERSISTENT ASTHMA WITHOUT COMPLICATION: ICD-10-CM

## 2018-10-30 PROBLEM — Z86.79 HISTORY OF CARDIOMYOPATHY: Status: ACTIVE | Noted: 2018-10-30

## 2018-10-30 PROCEDURE — 99214 OFFICE O/P EST MOD 30 MIN: CPT | Performed by: PHYSICIAN ASSISTANT

## 2018-10-30 RX ORDER — LEVOTHYROXINE SODIUM 0.07 MG/1
75 TABLET ORAL DAILY
Qty: 90 TABLET | Refills: 1 | Status: SHIPPED | OUTPATIENT
Start: 2018-10-30 | End: 2019-11-21 | Stop reason: SDUPTHER

## 2018-10-30 RX ORDER — DILTIAZEM HYDROCHLORIDE 360 MG/1
360 CAPSULE, EXTENDED RELEASE ORAL DAILY
Qty: 90 CAPSULE | Refills: 1 | Status: SHIPPED | OUTPATIENT
Start: 2018-10-30 | End: 2019-11-21 | Stop reason: SDUPTHER

## 2018-10-30 RX ORDER — ALBUTEROL SULFATE 90 UG/1
2 AEROSOL, METERED RESPIRATORY (INHALATION) EVERY 4 HOURS PRN
Qty: 1 INHALER | Refills: 11 | Status: SHIPPED | OUTPATIENT
Start: 2018-10-30 | End: 2019-11-21 | Stop reason: SDUPTHER

## 2018-10-30 NOTE — PROGRESS NOTES
Subjective   Jess Razo is a 58 y.o. female.     History of Present Illness   Jess Razo 58 y.o. female who presents today for routine follow up check and medication refills.  she has a history of   Patient Active Problem List   Diagnosis   • Asthma   • Paroxysmal tachycardia (CMS/HCC)   • Osteoarthritis, multiple sites   • Malignant neoplasm of female breast (CMS/HCC)   • Lymphedema   • Hyperlipidemia   • Generalized anxiety disorder   • Esophageal reflux   • Diabetes type 2, controlled (CMS/HCC)   • History of breast cancer   • History of cardiomyopathy   • Vertigo   .  Since the last visit, she has overall felt well.  She has Hypertenision and is well controlled on medication, DMII and is well controlled on medication, Hyperlipidemia and here to discuss treatment, Hypothyroidism and is well controlled on Rx.  Labs are in desired treatment range, Asthma and is well controlled on medication.  Patient is NOT using rescue Albuterol MDI >=2 times a week and Vitamin D deficiency and well controlled on medication and labs at goal >30.  she has been compliant with current medications have reviewed them.  The patient denies medication side effects.  Stop smoking  I will check on LFTs and get acute hep panel  Needs trigs done  Sees GYN  Results for orders placed or performed in visit on 02/28/18   Comprehensive metabolic panel   Result Value Ref Range    Glucose 120 (H) 65 - 99 mg/dL    BUN 10 6 - 20 mg/dL    Creatinine 0.60 0.57 - 1.00 mg/dL    eGFR Non African Am 103 >60 mL/min/1.73    eGFR African Am 125 >60 mL/min/1.73    BUN/Creatinine Ratio 16.7 7.0 - 25.0    Sodium 141 136 - 145 mmol/L    Potassium 4.6 3.5 - 5.2 mmol/L    Chloride 99 98 - 107 mmol/L    Total CO2 30.1 (H) 22.0 - 29.0 mmol/L    Calcium 10.1 8.6 - 10.5 mg/dL    Total Protein 7.6 6.0 - 8.5 g/dL    Albumin 4.80 3.50 - 5.20 g/dL    Globulin 2.8 gm/dL    A/G Ratio 1.7 g/dL    Total Bilirubin 0.4 0.1 - 1.2 mg/dL    Alkaline Phosphatase 65 39 - 117  U/L    AST (SGOT) 38 (H) 1 - 32 U/L    ALT (SGPT) 38 (H) 1 - 33 U/L   Lipid panel   Result Value Ref Range    Total Cholesterol 170 0 - 200 mg/dL    Triglycerides 405 (H) 0 - 150 mg/dL    HDL Cholesterol 37 (L) 40 - 60 mg/dL    VLDL Cholesterol CANCELED mg/dL    LDL Cholesterol  CANCELED mg/dL   TSH   Result Value Ref Range    TSH 1.830 0.270 - 4.200 mIU/mL   T4, Free   Result Value Ref Range    Free T4 1.44 0.93 - 1.70 ng/dL   Hemoglobin A1c   Result Value Ref Range    Hemoglobin A1C 6.19 (H) 4.80 - 5.60 %   MicroAlbumin, Urine, Random   Result Value Ref Range    Microalbumin, Urine <3.0 Not Estab. ug/mL   CBC and Differential   Result Value Ref Range    WBC 7.02 4.50 - 10.70 10*3/mm3    RBC 4.57 3.90 - 5.20 10*6/mm3    Hemoglobin 14.5 11.9 - 15.5 g/dL    Hematocrit 44.7 35.6 - 45.5 %    MCV 97.8 80.5 - 98.2 fL    MCH 31.7 26.9 - 32.0 pg    MCHC 32.4 32.4 - 36.3 g/dL    RDW 13.4 (H) 11.7 - 13.0 %    Platelets 220 140 - 500 10*3/mm3    Neutrophil Rel % 54.4 42.7 - 76.0 %    Lymphocyte Rel % 36.3 19.6 - 45.3 %    Monocyte Rel % 6.6 5.0 - 12.0 %    Eosinophil Rel % 2.1 0.3 - 6.2 %    Basophil Rel % 0.6 0.0 - 1.5 %    Neutrophils Absolute 3.82 1.90 - 8.10 10*3/mm3    Lymphocytes Absolute 2.55 0.90 - 4.80 10*3/mm3    Monocytes Absolute 0.46 0.20 - 1.20 10*3/mm3    Eosinophils Absolute 0.15 0.00 - 0.70 10*3/mm3    Basophils Absolute 0.04 0.00 - 0.20 10*3/mm3    Immature Granulocyte Rel % 0.0 0.0 - 0.5 %    Immature Grans Absolute 0.00 0.00 - 0.03 10*3/mm3       Notes from oncologist  5-3-18  Hematology / Oncology History   Stage IIA ([id number]), left breast cancer. moderately differentiated, receptor positive and HER2/salome negative, Two malignant lymph nodes.   On 15 August 2006 she had a concerning mass in the tail of her breast leading to an FNA. Pathology returned benign. It was recommended that she have short interval repeat imaging. On 20 October 2006 repeat digital mammogram and bilateral breast ultrasound for  persistent bilateral cyst but a mass in the axillary tail of the left breast described as being hypoechoic mass measuring 8.4 x 8.3 x 8.3 cm. It is unclear whether this represented the exact site of the prior FNA. The ultrasound identified no other findings in the axilla. An ultrasound guided core biopsy was acquired on 26 October 2006. Pathology returned moderately differentiated adenocarcinoma. The tumor was estrogen receptor positive (70%) and progesterone receptor positive (20%). The immunohistochemistry for the HER2/salome was equivocal leading to an FISH study. That study returned no amplification identified. With this information, a breast conserving surgical procedure with sentinel node sampling was planned and done on 08 November 2006 at [facility]. Pathology returned with residual moderately differentiated carcinoma. Margins of resection were close but were negative. The largest diameter of residual invasive cancer appeared to measure 0.7 cm. The sentinel node was a macrometastasis measuring up to 1.5 cm. Full axillary node dissection contained an additional lymph node which was malignant with 18 negative lymph nodes. The lymph node acquired in the axillary node dissection did show focal extracapsular extension. An additional medial margin was acquired which had and additional 0.3 cm component of cancer present, margins were close.      She was initially seen at [facility] on 17 November 2006. A CT scan done in late November was benign. She underwent MediPort placement 04 December 2006. Chemotherapy with doxorubicin/cyclophosphamide was initiated on 07 December 2006. Following four cycles of doxorubicin/ cyclophosphamide, she completed four cycles of paclitaxel 15 March 2007. Therapy overall was adequately tolerated. Late in her course, she was referred to [facility] for onset of lymphedema in her left upper extremity. She was subsequently seen at [facility]. She  has received from [employer], an elastic stocking for her hand and arm.      Postoperative radiation therapy per Dr. Lito Pean was completed in mid-May 2007.     In 2009, she had imaging for neck and back pain. She was referred to Dr. Vasquez to consider for surgery after no evidence of metastatic disease was seen. The neck and back pain have generally improved.      In 2011 she developed Type 2 diabetes. She went on aggressive weight loss and exercise plan, resulting in a 50 pound weight loss.      The patient initiated letrozole in May 2012 the switched to exemestane November 2013. She switched back to tamoxifen May 2014 due to debilitating arthralgias.    04/12/17, Discontinue tamoxifen, now has completed 10 years of antiestrogen therapy.          Bone scan  right knee likely represents patellofemoral osteoarthritis. There is intense uptake within the 1st metatarsophalangeal joints bilaterally and within the right midfoot, likely osteoarthritis.        IMPRESSION:     1. No definite scintigraphic evidence of metastatic disease. Increased uptake in the region of the right glenohumeral joint is likely degenerative in nature, although a lesion within the humeral head or glenoid cannot be definitively excluded. Right     shoulder radiographs are recommended for further evaluation.    2. Degenerative uptake within the right acromioclavicular sternoclavicular joints, the lower cervical spine, the right knee, and the feet.        Dictated by: Chi Bolden M.D.      Saw DR Trivedi, cardio;  8-8-18    PLAN:    1. Inappropriate sinus tachycardia: This is well-controlled with bisoprolol, digoxin and diltiazem. We will check a digoxin level.    2. Mild cardiomyopathy: We will continue bisoprolol. We will check a repeat echocardiogram at her convenience.    3. Vertigo: She has clear evidence of vertigo. I told her to talk to you about further evaluation and treatment.    I will plan to see her back in one year, or sooner if  necessary.    Sincerely,    HANNA Trivedi M.D.   [facility]  ---note today; vertigo is better!!!! Only on right and with ROM  I will still have her see ENT    Echo 8-23-18  Cardiology  Jump to Section ?  Discharge DispositionDocument InformationEncounter DetailsMedications at Time of DischargeMiscellaneous ResultsPatient DemographicsPlan of TreatmentProceduresReason for ReferralReason for VisitSocial HistoryVisit Diagnoses  Jess Razo  - 58 y.o. Female, born Aug. 18, 1960 Encounter Summary, generated on Oct. 30, 2018   Reason for Referral    Echocardiogram (Routine)  Echocardiogram (Routine)   Status Reason Specialty Diagnoses / Procedures Referred By Contact Referred To Contact   Closed     Diagnoses    Cardiomyopathy, unspecified type (CMS/HCC)       Procedures    Echo Doppler 2D Mmode Color Complete    HI ECHO HEART XTHORACIC,COMPLETE W DOPPLER    HI ECHO HEART XTHORACIC,COMPLETE W DOPPLER    HB ECHO MMODE 2D SPECTCOLOR 91548   HANNA Trivedi MD    [address] [place] [address]    Phone: [phone]    Fax: [phone]   [place] [address]    Phone: [phone]    Fax: [phone]            Echocardiogram (Routine)  Echocardiogram (Routine)   Status Reason Specialty Diagnoses / Procedures Referred By Contact Referred To Contact   Closed     Diagnoses    Cardiomyopathy, unspecified type (CMS/HCC)       Procedures    Echo Doppler 2D Mmode Color Complete    HI ECHO HEART XTHORACIC,COMPLETE W DOPPLER    HI ECHO HEART XTHORACIC,COMPLETE W DOPPLER    HB ECHO MMODE 2D SPECTCOLOR 07514   HANNA Trivedi MD    [address] [place] [address]    Phone: [phone]    Fax: [phone]   [facility] [place] [address]    Phone: [phone]    Fax: [phone]        Reason for  Visit    Echocardiogram (Routine)  Echocardiogram (Routine)   Status Reason Specialty Diagnoses / Procedures Referred By Contact Referred To Contact   Closed     Diagnoses    Cardiomyopathy, unspecified type (CMS/HCC)       Procedures    Echo Doppler 2D Mmode Color Complete    PA ECHO HEART XTHORACIC,COMPLETE W DOPPLER    PA ECHO HEART XTHORACIC,COMPLETE W DOPPLER    HB ECHO MMODE 2D SPECTCOLOR 46582   HANNA Trivedi MD    [address]    [place] [address]    Phone: [phone]    Fax: [phone]   [facility]    [address]    [place] [address]    Phone: [phone]    Fax: [phone]        Encounter Details    Date Type Department Care Team Description   08/23/2018 Hospital Encounter [facility]    [address] [place] [address] [address] [place]    [place] [address]    [phone]   HANNA Trivedi MD    [address]    [place] [address]    [phone] (Fax)   Cardiomyopathy, unspecified type (HCC)   Social History  - as of this encounter  Tobacco Use Types Packs/Day Years Used Date   Current Every Day Smoker Cigarettes 0.25 15     Smokeless Tobacco: Never Used           Comments: average 4 cigs daily     Alcohol Use Drinks/Week oz/Week Comments   Yes     OCCASIONALLY     Sex Assigned at Birth Date Recorded   Not on file     Medications at Time of Discharge  - as of this encounter  Medication Sig. Disp. Refills Start Date End Date   ALBUTEROL INHALER   Inhale into the lungs as needed           APPLE CIDER VINEGAR PO   Take by mouth daily           atorvastatin (LIPITOR) 20 MG tablet   Take 20 mg by mouth daily           bisoprolol (ZEBETA) 10 MG tablet    Indications: Inappropriate sinus tachycardia Take 1 tablet by mouth daily 90 tablet   3 08/08/2018     calcium carbonate (OS-YOON) 1250 MG tablet   Take 1 tablet by mouth 2 (two) times daily.            cetirizine (ZYRTEC) 10 MG  tablet   Take 10 mg by mouth daily.           Cholecalciferol (VITAMIN D3) 400 UNITS CAPS   Take by mouth daily            CINNAMON PO   Take by mouth 2 (two) times daily           digoxin (LANOXIN) 0.25 MG tablet    Indications: Inappropriate sinus tachycardia Take 1 tablet by mouth daily 90 tablet   3 08/08/2018     dilTIAZem ER beads (TIAZAC) 360 MG 24 hr capsule    Indications: Inappropriate sinus tachycardia Take 1 capsule by mouth daily 90 capsule   3 08/08/2018     esomeprazole (NEXIUM) 20 MG capsule   Take 20 mg by mouth every morning before breakfast           Flaxseed, Linseed, (FLAXSEED OIL) OIL   Take 1,000 mg by mouth daily            Garlic 1000 MG CAPS   Take by mouth daily           letrozole (FEMARA) 2.5 MG tablet   Take 2.5 mg by mouth daily           levothyroxine (SYNTHROID, LEVOTHROID) 75 MCG tablet   Take 75 mcg by mouth nightly.           metFORMIN (GLUCOPHAGE) 500 MG tablet   Take 500 mg by mouth 2 (two) times daily with meals           Multiple Vitamin (MULTIVITAMIN) tablet   Take 1 tablet by mouth daily.           Omega-3 Fatty Acids (FISH OIL) 1000 MG CAP DR   Take by mouth 2 (two) times daily           Discharge Disposition  - in this encounter  Disposition Code Departure Means Destination   Home or Self Care         Plan of Treatment  - as of this encounter  Upcoming Encounters  Upcoming Encounters   Date Type Specialty Care Team Description   05/02/2019 Office Visit Oncology William Arnold MD    [address] [place] [address]    [phone] (Fax)       Procedures  - in this encounter  Procedure Name Priority Date/Time Associated Diagnosis Comments   ECHO DOPPLER 2D MMODE SPECT COLOR COMPLETE Routine 08/23/2018 10:33 AM EDT Cardiomyopathy, unspecified type (HCC)   Results for this procedure are in the results section.     Miscellaneous Results  - in this encounter    Echo Doppler 2D Mmode Color Complete  Echo Doppler 2D Mmode Color Complete    Narrative Performed At   CARDIOLOGY REPORT    FACILITY: [facility]        PATIENT NAME/: MAUREEN DUQUE    1960    UNIT/AGE/GENDER:      AGE: 58 YR        GENDER: F    UNIT NUMBER: [id number]    ACCOUNT NUMBER: [id number]    ACCESSION NUMBER: [id number]        DATE OF EXAM: 2018  10:06    EXAMINATION(S): ECHO DOPPLER 2D MMODE SPECT COLOR COMPLETE        FINAL REPORT        Procedure    Type of Study        TTE procedure:ECHO DOPPLER 2D MMODE SPECT COLOR COMPLETE.        Clinical Indications:cardiomyopathy        Height: 168 cmWeight: 72 kgBSA: 1.82 m^2 BMI: 25.51 kg/m^2        BP: 160/84 mmHg        Conclusions        Summary    The left ventricular systolic function is at the lower limits of normal.    The estimated ejection fraction is 45-50%.    The ejection fraction biplane was calculated at 50%.        Any valve disease noted in the report is non-rheumatic unless otherwise    specifically noted.        Findings        Left Ventricle    Left ventricle size is normal.    Normal left ventricular wall thickness.    The left ventricular systolic function is at the lower limits of normal.    The estimated ejection fraction is 45-50%. The ejection fraction biplane was    calculated at 50%.    No regional wall motion abnormalites noted.    Abnormal left ventricular diastolic filling consistent with impaired    relaxation.    Mitral valve tissue Doppler E/E'' ratio consistent with normal left atrial    pressures.        Left Atrium    Left atrium is of normal size.    No evidence of atrial septal defect.        Right Ventricle    The right ventricular chamber size and systolic function are within normal    limits.        Right Atrium    The right atrial chamber size appears normal.        Aortic Valve    The aortic valve is trileaflet. The leaflets are thin with normal excursion.    There is no aortic stenosis or regurgitation present.        Mitral Valve     The mitral valve appears normal in structure and function. There is no    evidence of mitral regurgitation.        Tricuspid Valve    Tricuspid valve is structurally normal.    Trace tricuspid regurgitation.    The right ventricular systolic pressure is estimated to be 20-25 mmHg.        Pulmonic Valve    Pulmonic valve appears structurally normal. There is no evidence of pulmonic    regurgitation.        Pericardium    There is no evidence of pericardial effusion.        Great Vessels    The aortic root appears normal.    The Pulmonary artery is within normal limits.    IVC appears normal.    The aortic arch appears normal.        Pleural Effusion    No evidence of pleural effusion.        M-Mode/2D Measurements & Calculations        LV Diastolic Dimension:  LV Systolic Dimension:   LA Dimension: 3.4 cmAO    4.9 cm                   4.04 cm                  Root Dimension: 3.1 cm    LV PW Diastolic: 1.04 cm LV Volume Diastolic: 116    Septum Diastolic: 0.9 cm ml                              LV Volume Systolic: 58                              ml                       RV Diastolic Dimension:                              LV EDV/LV EDV Index: 116 2.32 cm    LV Area Diastolic: 33.1  ml/64 m^2LV ESV/LV ESV    cm^2                     Index: 58 ml/32 m^2      LA/Aorta: 1.1    LV Area Systolic: 21.8   EF Calculated: 50 %      Ascending Aorta: 3.3 cm    cm^2                                              LA volume: 41ml                              LV Length: 8.45 cm       LA volume/Index: 23ml/m^2                                                       RA Area: 8.8 cm^2                              LVOT: 2.1 cm             RA volume: 16 ml                                                       RA volume index: 8.8 ml/m2        Doppler Measurements & Calculations        MV Peak E-Wave: 0.71 m/s    MV Peak A-Wave: 1.08 m/s    MV E/A Ratio: 0.66                                           TV TAPSE:20 mm        MV E' Septal  Velocity: 0.06 m/s    MV E' Lateral Velocity: 0.08 m/s    MV E/E' septal: 11.21    MV E/E' lateral: 8.68        Signature        Electronically signed by Haile Trivedi M.D. (Interpreting Physician) on    2018 at 01:18 PM       [facility]       Echo Doppler 2D Mmode Color Complete   Procedure Note   Interface, Rad Results In - 2018 1:18 PM EDT    CARDIOLOGY REPORT  FACILITY: [facility]    PATIENT NAME/: MAUREEN DUQUE 1960  UNIT/AGE/GENDER: AGE: 58 YR GENDER: F  UNIT NUMBER: [id number]  ACCOUNT NUMBER: [id number]  ACCESSION NUMBER: [id number]    DATE OF EXAM: 2018 10:06  EXAMINATION(S): ECHO DOPPLER 2D MMODE SPECT COLOR COMPLETE    FINAL REPORT    Procedure  Type of Study    TTE procedure:ECHO DOPPLER 2D MMODE SPECT COLOR COMPLETE.    Clinical Indications:cardiomyopathy    Height: 168 cmWeight: 72 kgBSA: 1.82 m^2 BMI: 25.51 kg/m^2    BP: 160/84 mmHg    Conclusions    Summary  The left ventricular systolic function is at the lower limits of normal.  The estimated ejection fraction is 45-50%.  The ejection fraction biplane was calculated at 50%.    Any valve disease noted in the report is non-rheumatic unless otherwise  specifically noted.       Telephone Encounter - HANNA Trivedi MD - 2018 1:18 PM EDT  Paloma - call and tell her the echo looks fine. Pumping function is the same as the last time - a hair under 50%, where 50% is normal.        Had f/u on mammo and this is a simple cyst    Need to exercsie  Leg cramps at night and will check Mg    Needs to ask Dr Mendez about ? Start Evista for Osteopenia  The following portions of the patient's history were reviewed and updated as appropriate: allergies, current medications, past family history, past medical history, past social history, past surgical history and problem list.    Review of Systems   Constitutional: Negative for activity change, appetite change and unexpected weight  change.   HENT: Negative for nosebleeds and trouble swallowing.    Eyes: Negative for pain and visual disturbance.   Respiratory: Negative for chest tightness, shortness of breath and wheezing.    Cardiovascular: Negative for chest pain and palpitations.   Gastrointestinal: Negative for abdominal pain and blood in stool.   Endocrine: Negative.    Genitourinary: Negative for difficulty urinating and hematuria.   Musculoskeletal: Negative for joint swelling.   Skin: Negative for color change and rash.   Allergic/Immunologic: Negative.    Neurological: Positive for dizziness. Negative for syncope and speech difficulty.   Hematological: Negative for adenopathy.   Psychiatric/Behavioral: Negative for agitation and confusion.   All other systems reviewed and are negative.      Objective   Physical Exam   Constitutional: She is oriented to person, place, and time. She appears well-developed and well-nourished. No distress.   HENT:   Head: Normocephalic and atraumatic.   Right Ear: External ear normal.   Left Ear: External ear normal.   Eyes: Pupils are equal, round, and reactive to light. Conjunctivae and EOM are normal. Right eye exhibits no discharge. Left eye exhibits no discharge. No scleral icterus.   Neck: Normal range of motion. Neck supple. No tracheal deviation present. No thyromegaly present.   Cardiovascular: Normal rate, regular rhythm, normal heart sounds, intact distal pulses and normal pulses.  Exam reveals no gallop.    No murmur heard.  Pulmonary/Chest: Effort normal and breath sounds normal. No respiratory distress. She has no wheezes. She has no rales.   Musculoskeletal: Normal range of motion.   Lymphadenopathy:     She has no cervical adenopathy.   Neurological: She is alert and oriented to person, place, and time. She exhibits normal muscle tone. Coordination normal.   Skin: Skin is warm. No rash noted. No erythema. No pallor.   Sleeve left arm   Psychiatric: She has a normal mood and affect. Her  behavior is normal. Judgment and thought content normal.   Nursing note and vitals reviewed.      Assessment/Plan   Jess was seen today for diabetes.    Diagnoses and all orders for this visit:    History of breast cancer    Controlled type 2 diabetes mellitus without complication, without long-term current use of insulin (CMS/[facility])  -     metFORMIN (GLUCOPHAGE) 500 MG tablet; Take 1 tablet by mouth 2 (Two) Times a Day With Meals. With food and MVI  -     Comprehensive metabolic panel  -     Lipid panel  -     Hemoglobin A1c  -     Hepatitis Panel, Acute  -     Magnesium  -     Vitamin B12  -     Folate    Hypertension, essential  -     diltiaZEM CD (CARDIZEM CD) 360 MG 24 hr capsule; Take 1 capsule by mouth Daily. For BP  -     Comprehensive metabolic panel  -     Lipid panel  -     Hemoglobin A1c  -     Hepatitis Panel, Acute  -     Magnesium  -     Vitamin B12  -     Folate    Hypothyroidism (acquired)  -     levothyroxine (SYNTHROID, LEVOTHROID) 75 MCG tablet; Take 1 tablet by mouth Daily. Before breakfast for thyroid  -     Comprehensive metabolic panel  -     Lipid panel  -     Hemoglobin A1c  -     Hepatitis Panel, Acute  -     Magnesium  -     Vitamin B12  -     Folate    Mild persistent asthma without complication  -     Comprehensive metabolic panel  -     Lipid panel  -     Hemoglobin A1c  -     Hepatitis Panel, Acute  -     Magnesium  -     Vitamin B12  -     Folate  -     albuterol (PROVENTIL HFA;VENTOLIN HFA) 108 (90 Base) MCG/ACT inhaler; Inhale 2 puffs Every 4 (Four) Hours As Needed for Wheezing. (put on file)    Vertigo  -     Ambulatory Referral to ENT (Otolaryngology)    Leg cramps

## 2018-11-13 DIAGNOSIS — R79.89 LFT ELEVATION: Primary | ICD-10-CM

## 2018-11-13 LAB
ALBUMIN SERPL-MCNC: 4.7 G/DL (ref 3.5–5.5)
ALBUMIN/GLOB SERPL: 2 {RATIO} (ref 1.2–2.2)
ALP SERPL-CCNC: 80 IU/L (ref 39–117)
ALT SERPL-CCNC: 61 IU/L (ref 0–32)
AST SERPL-CCNC: 44 IU/L (ref 0–40)
BILIRUB SERPL-MCNC: 0.4 MG/DL (ref 0–1.2)
BUN SERPL-MCNC: 9 MG/DL (ref 6–24)
BUN/CREAT SERPL: 18 (ref 9–23)
CALCIUM SERPL-MCNC: 9.9 MG/DL (ref 8.7–10.2)
CHLORIDE SERPL-SCNC: 100 MMOL/L (ref 96–106)
CHOLEST SERPL-MCNC: 211 MG/DL (ref 100–199)
CO2 SERPL-SCNC: 27 MMOL/L (ref 20–29)
CREAT SERPL-MCNC: 0.51 MG/DL (ref 0.57–1)
FOLATE SERPL-MCNC: >20 NG/ML
GLOBULIN SER CALC-MCNC: 2.3 G/DL (ref 1.5–4.5)
GLUCOSE SERPL-MCNC: 108 MG/DL (ref 65–99)
HAV IGM SERPL QL IA: NEGATIVE
HBA1C MFR BLD: 6.5 % (ref 4.8–5.6)
HBV CORE IGM SERPL QL IA: NEGATIVE
HBV SURFACE AG SERPL QL IA: NEGATIVE
HCV AB S/CO SERPL IA: 0.2 S/CO RATIO (ref 0–0.9)
HDLC SERPL-MCNC: 44 MG/DL
LDLC SERPL CALC-MCNC: 121 MG/DL (ref 0–99)
MAGNESIUM SERPL-MCNC: 1.8 MG/DL (ref 1.6–2.3)
POTASSIUM SERPL-SCNC: 4.5 MMOL/L (ref 3.5–5.2)
PROT SERPL-MCNC: 7 G/DL (ref 6–8.5)
SODIUM SERPL-SCNC: 141 MMOL/L (ref 134–144)
TRIGL SERPL-MCNC: 228 MG/DL (ref 0–149)
VIT B12 SERPL-MCNC: 768 PG/ML (ref 232–1245)
VLDLC SERPL CALC-MCNC: 46 MG/DL (ref 5–40)

## 2018-11-16 DIAGNOSIS — R79.89 LFT ELEVATION: ICD-10-CM

## 2018-11-16 DIAGNOSIS — E78.2 MIXED HYPERLIPIDEMIA: Primary | ICD-10-CM

## 2018-11-16 RX ORDER — ATORVASTATIN CALCIUM 40 MG/1
40 TABLET, FILM COATED ORAL DAILY
Qty: 90 TABLET | Refills: 3 | Status: SHIPPED | OUTPATIENT
Start: 2018-11-16 | End: 2019-11-21

## 2018-12-28 ENCOUNTER — RESULTS ENCOUNTER (OUTPATIENT)
Dept: FAMILY MEDICINE CLINIC | Facility: CLINIC | Age: 58
End: 2018-12-28

## 2018-12-28 DIAGNOSIS — E78.2 MIXED HYPERLIPIDEMIA: ICD-10-CM

## 2018-12-28 DIAGNOSIS — R79.89 LFT ELEVATION: ICD-10-CM

## 2019-09-11 ENCOUNTER — TELEPHONE (OUTPATIENT)
Dept: FAMILY MEDICINE CLINIC | Facility: CLINIC | Age: 59
End: 2019-09-11

## 2019-09-11 DIAGNOSIS — E11.9 CONTROLLED TYPE 2 DIABETES MELLITUS WITHOUT COMPLICATION, WITHOUT LONG-TERM CURRENT USE OF INSULIN (HCC): ICD-10-CM

## 2019-09-11 DIAGNOSIS — I10 ESSENTIAL HYPERTENSION: ICD-10-CM

## 2019-09-11 DIAGNOSIS — Z85.3 HISTORY OF BREAST CANCER: ICD-10-CM

## 2019-09-11 DIAGNOSIS — Z13.29 SCREENING FOR THYROID DISORDER: ICD-10-CM

## 2019-09-11 DIAGNOSIS — E78.2 MIXED HYPERLIPIDEMIA: Primary | ICD-10-CM

## 2019-09-11 DIAGNOSIS — E03.9 HYPOTHYROIDISM, UNSPECIFIED TYPE: ICD-10-CM

## 2019-09-16 ENCOUNTER — RESULTS ENCOUNTER (OUTPATIENT)
Dept: FAMILY MEDICINE CLINIC | Facility: CLINIC | Age: 59
End: 2019-09-16

## 2019-09-16 DIAGNOSIS — E03.9 HYPOTHYROIDISM, UNSPECIFIED TYPE: ICD-10-CM

## 2019-11-10 DIAGNOSIS — E11.9 CONTROLLED TYPE 2 DIABETES MELLITUS WITHOUT COMPLICATION, WITHOUT LONG-TERM CURRENT USE OF INSULIN (HCC): ICD-10-CM

## 2019-11-10 LAB
ALBUMIN SERPL-MCNC: 4.9 G/DL (ref 3.5–5.5)
ALBUMIN/GLOB SERPL: 1.8 {RATIO} (ref 1.2–2.2)
ALP SERPL-CCNC: 82 IU/L (ref 39–117)
ALT SERPL-CCNC: 59 IU/L (ref 0–32)
AST SERPL-CCNC: 40 IU/L (ref 0–40)
BASOPHILS # BLD AUTO: 0 X10E3/UL (ref 0–0.2)
BASOPHILS NFR BLD AUTO: 1 %
BILIRUB SERPL-MCNC: 0.5 MG/DL (ref 0–1.2)
BUN SERPL-MCNC: 8 MG/DL (ref 6–24)
BUN/CREAT SERPL: 14 (ref 9–23)
CALCIUM SERPL-MCNC: 9.8 MG/DL (ref 8.7–10.2)
CHLORIDE SERPL-SCNC: 99 MMOL/L (ref 96–106)
CHOLEST SERPL-MCNC: 191 MG/DL (ref 100–199)
CO2 SERPL-SCNC: 26 MMOL/L (ref 20–29)
CREAT SERPL-MCNC: 0.58 MG/DL (ref 0.57–1)
EOSINOPHIL # BLD AUTO: 0.1 X10E3/UL (ref 0–0.4)
EOSINOPHIL NFR BLD AUTO: 2 %
ERYTHROCYTE [DISTWIDTH] IN BLOOD BY AUTOMATED COUNT: 13.7 % (ref 12.3–15.4)
GLOBULIN SER CALC-MCNC: 2.7 G/DL (ref 1.5–4.5)
GLUCOSE SERPL-MCNC: 121 MG/DL (ref 65–99)
HBA1C MFR BLD: 6.5 % (ref 4.8–5.6)
HCT VFR BLD AUTO: 45.7 % (ref 34–46.6)
HDLC SERPL-MCNC: 47 MG/DL
HGB BLD-MCNC: 15.5 G/DL (ref 11.1–15.9)
IMM GRANULOCYTES # BLD AUTO: 0 X10E3/UL (ref 0–0.1)
IMM GRANULOCYTES NFR BLD AUTO: 0 %
LDLC SERPL CALC-MCNC: 112 MG/DL (ref 0–99)
LYMPHOCYTES # BLD AUTO: 1.7 X10E3/UL (ref 0.7–3.1)
LYMPHOCYTES NFR BLD AUTO: 29 %
MCH RBC QN AUTO: 32.6 PG (ref 26.6–33)
MCHC RBC AUTO-ENTMCNC: 33.9 G/DL (ref 31.5–35.7)
MCV RBC AUTO: 96 FL (ref 79–97)
MONOCYTES # BLD AUTO: 0.4 X10E3/UL (ref 0.1–0.9)
MONOCYTES NFR BLD AUTO: 7 %
NEUTROPHILS # BLD AUTO: 3.5 X10E3/UL (ref 1.4–7)
NEUTROPHILS NFR BLD AUTO: 61 %
PLATELET # BLD AUTO: 218 X10E3/UL (ref 150–450)
POTASSIUM SERPL-SCNC: 4.6 MMOL/L (ref 3.5–5.2)
PROT SERPL-MCNC: 7.6 G/DL (ref 6–8.5)
RBC # BLD AUTO: 4.75 X10E6/UL (ref 3.77–5.28)
SODIUM SERPL-SCNC: 144 MMOL/L (ref 134–144)
T4 FREE SERPL-MCNC: 1.68 NG/DL (ref 0.82–1.77)
TRIGL SERPL-MCNC: 160 MG/DL (ref 0–149)
TSH SERPL DL<=0.005 MIU/L-ACNC: 1.36 UIU/ML (ref 0.45–4.5)
VLDLC SERPL CALC-MCNC: 32 MG/DL (ref 5–40)
WBC # BLD AUTO: 5.8 X10E3/UL (ref 3.4–10.8)

## 2019-11-21 ENCOUNTER — OFFICE VISIT (OUTPATIENT)
Dept: FAMILY MEDICINE CLINIC | Facility: CLINIC | Age: 59
End: 2019-11-21

## 2019-11-21 ENCOUNTER — TELEPHONE (OUTPATIENT)
Dept: FAMILY MEDICINE CLINIC | Facility: CLINIC | Age: 59
End: 2019-11-21

## 2019-11-21 VITALS
BODY MASS INDEX: 24.91 KG/M2 | TEMPERATURE: 98.3 F | DIASTOLIC BLOOD PRESSURE: 72 MMHG | WEIGHT: 155 LBS | SYSTOLIC BLOOD PRESSURE: 130 MMHG | RESPIRATION RATE: 16 BRPM | HEIGHT: 66 IN | OXYGEN SATURATION: 96 % | HEART RATE: 75 BPM

## 2019-11-21 DIAGNOSIS — E03.9 HYPOTHYROIDISM (ACQUIRED): ICD-10-CM

## 2019-11-21 DIAGNOSIS — Z78.0 POST-MENOPAUSAL: ICD-10-CM

## 2019-11-21 DIAGNOSIS — E78.2 MIXED HYPERLIPIDEMIA: ICD-10-CM

## 2019-11-21 DIAGNOSIS — K21.9 GASTROESOPHAGEAL REFLUX DISEASE WITHOUT ESOPHAGITIS: ICD-10-CM

## 2019-11-21 DIAGNOSIS — I10 HYPERTENSION, ESSENTIAL: ICD-10-CM

## 2019-11-21 DIAGNOSIS — I47.9 PAROXYSMAL TACHYCARDIA (HCC): ICD-10-CM

## 2019-11-21 DIAGNOSIS — Z86.79 HISTORY OF CARDIOMYOPATHY: ICD-10-CM

## 2019-11-21 DIAGNOSIS — Z85.3 HISTORY OF BREAST CANCER: ICD-10-CM

## 2019-11-21 DIAGNOSIS — J45.30 MILD PERSISTENT ASTHMA WITHOUT COMPLICATION: ICD-10-CM

## 2019-11-21 DIAGNOSIS — E11.9 CONTROLLED TYPE 2 DIABETES MELLITUS WITHOUT COMPLICATION, WITHOUT LONG-TERM CURRENT USE OF INSULIN (HCC): Primary | ICD-10-CM

## 2019-11-21 PROCEDURE — 99214 OFFICE O/P EST MOD 30 MIN: CPT | Performed by: PHYSICIAN ASSISTANT

## 2019-11-21 RX ORDER — CLARITHROMYCIN 500 MG/1
500 TABLET, COATED ORAL 2 TIMES DAILY
Qty: 20 TABLET | Refills: 1 | Status: SHIPPED | OUTPATIENT
Start: 2019-11-21 | End: 2019-11-21

## 2019-11-21 RX ORDER — BIMATOPROST 0.01 %
DROPS OPHTHALMIC (EYE)
COMMUNITY
Start: 2019-10-05 | End: 2020-05-21

## 2019-11-21 RX ORDER — LETROZOLE 2.5 MG/1
2.5 TABLET, FILM COATED ORAL DAILY
COMMUNITY
End: 2021-05-11

## 2019-11-21 RX ORDER — ALBUTEROL SULFATE 90 UG/1
2 AEROSOL, METERED RESPIRATORY (INHALATION) EVERY 4 HOURS PRN
Qty: 1 INHALER | Refills: 11 | Status: SHIPPED | OUTPATIENT
Start: 2019-11-21 | End: 2020-12-27 | Stop reason: SDUPTHER

## 2019-11-21 RX ORDER — PREDNISONE 10 MG/1
10 TABLET ORAL 2 TIMES DAILY
Qty: 10 TABLET | Refills: 0 | Status: SHIPPED | OUTPATIENT
Start: 2019-11-21 | End: 2020-05-21

## 2019-11-21 RX ORDER — LATANOPROST 50 UG/ML
SOLUTION/ DROPS OPHTHALMIC
COMMUNITY
Start: 2019-10-29 | End: 2020-05-21

## 2019-11-21 RX ORDER — DILTIAZEM HYDROCHLORIDE 360 MG/1
360 CAPSULE, EXTENDED RELEASE ORAL DAILY
Qty: 90 CAPSULE | Refills: 1 | Status: SHIPPED | OUTPATIENT
Start: 2019-11-21 | End: 2020-05-21 | Stop reason: SDUPTHER

## 2019-11-21 RX ORDER — SULFAMETHOXAZOLE AND TRIMETHOPRIM 800; 160 MG/1; MG/1
1 TABLET ORAL 2 TIMES DAILY
Qty: 20 TABLET | Refills: 0 | Status: SHIPPED | OUTPATIENT
Start: 2019-11-21 | End: 2020-05-21

## 2019-11-21 RX ORDER — ATORVASTATIN CALCIUM 80 MG/1
80 TABLET, FILM COATED ORAL DAILY
Qty: 90 TABLET | Refills: 3 | Status: SHIPPED | OUTPATIENT
Start: 2019-11-21 | End: 2020-11-04

## 2019-11-21 RX ORDER — AZITHROMYCIN 250 MG/1
TABLET, FILM COATED ORAL
Qty: 6 TABLET | Refills: 1 | Status: SHIPPED | OUTPATIENT
Start: 2019-11-21 | End: 2019-11-21

## 2019-11-21 RX ORDER — PREDNISOLONE ACETATE 10 MG/ML
SUSPENSION/ DROPS OPHTHALMIC
Refills: 0 | COMMUNITY
Start: 2019-10-30 | End: 2020-05-21

## 2019-11-21 RX ORDER — LEVOTHYROXINE SODIUM 0.07 MG/1
75 TABLET ORAL DAILY
Qty: 90 TABLET | Refills: 3 | Status: SHIPPED | OUTPATIENT
Start: 2019-11-21 | End: 2020-11-04

## 2019-11-21 NOTE — TELEPHONE ENCOUNTER
Alla with [facility] called stating that there is a contraindication with Zpack and Digoxin.  Please advise.

## 2019-11-21 NOTE — PROGRESS NOTES
"Subjective   Jess Razo is a 59 y.o. female.     History of Present Illness    Since the last visit, she has overall felt well until recent illness.  She has Essential Hypertension and well controlled on current medication, DMII well controlled on medication and will continue regimen, GERD well controlled on PPI and plan trial of step down therapy with H2 blocker, Hypothyroidism well controlled on current medication and will continue Rx, Asthma and remains under care of their specialist and Hyperlipidemia and go up on dose.  she has been compliant with current medications have reviewed them.  The patient denies medication side effects.  Will refill medications. /72 (BP Location: Left arm, Patient Position: Sitting, Cuff Size: Large Adult)   Pulse 75   Temp 98.3 °F (36.8 °C) (Oral)   Resp 16   Ht 167.6 cm (66\")   Wt 70.3 kg (155 lb)   LMP  (LMP Unknown)   SpO2 96%   BMI 25.02 kg/m²     Results for orders placed or performed in visit on 09/16/19   T4, Free   Result Value Ref Range    Free T4 1.68 0.82 - 1.77 ng/dL         DR Trivedi, cardio    Left hip pain and saw ortho June; DR Starks did hip injection    Dr Arnold oncology note    PATIENT IDENTIFICATION: 58 yr/o who underwent an 08 November 2006 breast conserving surgical procedure for a left breast cancer. The tumor was moderately differentiated, receptor positive and HER2/salome negative, Stage IIA ([id number]). Two malignant lymph nodes were identified in 19 that were available for review. She received four cycles of doxorubicin/cyclophosphamide followed by four cycles of paclitaxel from 18 January 2007 to 16 March 2007. Tamoxifen initiated in April 2007. Patient substituted letrozole in May 2012. In May 2014, tamoxifen substituted (reinitiated). Completed tamoxifen April 2017.     Postoperative radiation therapy per Dr. Lito Pena was completed in mid-May 2007.     In 2009, she had imaging for neck and back pain. She was referred to Dr. Vasquez to " consider for surgery after no evidence of metastatic disease was seen. The neck and back pain have generally improved.      In 2011 she developed Type 2 diabetes. She went on aggressive weight loss and exercise plan, resulting in a 50 pound weight loss.      The patient initiated letrozole in May 2012 the switched to exemestane November 2013. She switched back to tamoxifen May 2014 due to debilitating arthralgias.     IMPRESSION:  1. (Stage IIA)[id number] intermediate grade, infiltrating ductal carcinoma, estrogen and progesterone receptor positive, HER2/salome negative. The patient underwent a breast conserving surgical procedure with close surgical margins. The patient completed four cycles of doxorubicin/cyclophosphamide followed by four cycles of paclitaxel 15 March 2007. She completed five years of tamoxifen in May 2012. At that time she was switched to letrozole. Trial of exemestane started November 22, 2013 due to persistent hair thinning. Tamoxifen and substituted per patient request in May 2014. Completed 2017  2. Longstanding degenerative joint disease including back pain, right shoulder, right hip  3. Left upper extremity lymphedema - symptoms stable. Routinely wearing compression garment.  4. Ongoing tobacco use.  5. Tachycardia. Followed by Cardiology.  6. Hypertriglyceridemia   8. Lymphedema LUE; stable      Saw cardio 10-28-19    PLAN:    1. Continue current medications  2. Follow up in one year or sooner if needed    Please note that the summary of the plan was based on an established/existing care plan developed by the cardiologist.    Thank you for allowing us to participate in the care of this patient.       I will go up on the chol med; LDL goal not met    Jess Razo 59 y.o. female who presents for evaluation of upper respiratory congestion. Symptoms include congestion, post nasal drip and cough described as productive of yellow sputum.  Onset of symptoms was 2 months ago, unchanged since that  time. Patient denies shortness of breath, fever.   Evaluation to date: none Treatment to date:  Albuterol MDI.       The following portions of the patient's history were reviewed and updated as appropriate: allergies, current medications, past family history, past medical history, past social history, past surgical history and problem list.    Review of Systems    Objective   Physical Exam   Constitutional: She is oriented to person, place, and time. She appears well-developed and well-nourished. No distress.   HENT:   Head: Normocephalic and atraumatic.   Right Ear: External ear normal.   Left Ear: External ear normal.   Eyes: Conjunctivae and EOM are normal. Pupils are equal, round, and reactive to light. Right eye exhibits no discharge. Left eye exhibits no discharge. No scleral icterus.   Neck: Normal range of motion. Neck supple. No tracheal deviation present. No thyromegaly present.   Cardiovascular: Normal rate, regular rhythm, normal heart sounds, intact distal pulses and normal pulses. Exam reveals no gallop.   No murmur heard.  Pulmonary/Chest: Effort normal and breath sounds normal. No respiratory distress. She has no wheezes. She has no rales.   Musculoskeletal: Normal range of motion.    Jess had a diabetic foot exam performed today.   During the foot exam she had a monofilament test performed.    Neurological Sensory Findings - Unaltered hot/cold right ankle/foot discrimination and unaltered hot/cold left ankle/foot discrimination. Unaltered sharp/dull right ankle/foot discrimination and unaltered sharp/dull left ankle/foot discrimination. No right ankle/foot altered proprioception and no left ankle/foot altered proprioception  Vascular Status -  Her right foot exhibits normal foot vasculature  and no edema. Her left foot exhibits normal foot vasculature  and no edema.  Skin Integrity  -  Her right foot skin is intact. She has right foot onychomycosis.  She has no right foot ulcer, non-callous right  foot, no ingrown toenail on right foot, right heel is not dry and cracked, no right foot warmth, no right foot blister and no right foot gangrenous changes.Her left foot skin is intact. She has no left foot onychomycosis, no left foot ulcer, non-callous left foot, no left ingrown toenail, no left heel dry and cracked, no left foot warmth, no left foot blister and no left foot gangrenous changes..     Lymphadenopathy:     She has no cervical adenopathy.   Neurological: She is alert and oriented to person, place, and time. She exhibits normal muscle tone. Coordination normal.   Skin: Skin is warm. No rash noted. No erythema. No pallor.   Sleeve left arm   Psychiatric: She has a normal mood and affect. Her behavior is normal. Judgment and thought content normal.   Nursing note and vitals reviewed.      Assessment/Plan   Problems Addressed this Visit        Cardiovascular and Mediastinum    Paroxysmal tachycardia (CMS/[facility])    Relevant Medications    dilTIAZem CD (CARDIZEM CD) 360 MG 24 hr capsule    Other Relevant Orders    Comprehensive metabolic panel    Lipid panel    Hemoglobin A1c    Hyperlipidemia    Relevant Medications    atorvastatin (LIPITOR) 80 MG tablet    Other Relevant Orders    Comprehensive metabolic panel    Lipid panel    Hemoglobin A1c       Respiratory    Asthma    Relevant Medications    ALBUTEROL IN    albuterol sulfate  (90 Base) MCG/ACT inhaler    Other Relevant Orders    Comprehensive metabolic panel    Lipid panel    Hemoglobin A1c       Digestive    Esophageal reflux    Relevant Orders    Comprehensive metabolic panel    Lipid panel    Hemoglobin A1c       Endocrine    Diabetes type 2, controlled (CMS/[facility]) - Primary    Relevant Medications    metFORMIN (GLUCOPHAGE) 500 MG tablet    Other Relevant Orders    Comprehensive metabolic panel    Lipid panel    Hemoglobin A1c       Other    History of breast cancer    Relevant Orders    Comprehensive metabolic panel    Lipid panel    Hemoglobin  A1c    History of cardiomyopathy    Relevant Orders    Comprehensive metabolic panel    Lipid panel    Hemoglobin A1c      Other Visit Diagnoses     Hypothyroidism (acquired)        Relevant Medications    levothyroxine (SYNTHROID, LEVOTHROID) 75 MCG tablet    Other Relevant Orders    Comprehensive metabolic panel    Lipid panel    Hemoglobin A1c    Hypertension, essential        Relevant Medications    dilTIAZem CD (CARDIZEM CD) 360 MG 24 hr capsule    Other Relevant Orders    Comprehensive metabolic panel    Lipid panel    Hemoglobin A1c            Plan, Jess BRUCE Razo, was seen today.  she was seen for DMII and refilled medications, GERD and has been well controlled on PPI and will do trial of H2 blocker , Hyperlipidemia with new medication plan, Hypothyroidism well controlled, continue medication and Vitamin D deficiency and supplemented.    Treat sinus infx also today; only can take Biaxin and hold statin; has taken with Dilitazem in past; lower dose pred and stop if heart races  Labs 3 mos

## 2019-11-21 NOTE — PATIENT INSTRUCTIONS

## 2020-01-09 ENCOUNTER — HOSPITAL ENCOUNTER (OUTPATIENT)
Dept: BONE DENSITY | Facility: HOSPITAL | Age: 60
Discharge: HOME | End: 2020-01-09
Admitting: PHYSICIAN ASSISTANT

## 2020-01-09 DIAGNOSIS — Z78.0 POST-MENOPAUSAL: ICD-10-CM

## 2020-01-09 PROCEDURE — 77080 DXA BONE DENSITY AXIAL: CPT

## 2020-02-13 ENCOUNTER — RESULTS ENCOUNTER (OUTPATIENT)
Dept: FAMILY MEDICINE CLINIC | Facility: CLINIC | Age: 60
End: 2020-02-13

## 2020-02-13 DIAGNOSIS — Z85.3 HISTORY OF BREAST CANCER: ICD-10-CM

## 2020-02-13 DIAGNOSIS — I47.9 PAROXYSMAL TACHYCARDIA (HCC): ICD-10-CM

## 2020-02-13 DIAGNOSIS — E03.9 HYPOTHYROIDISM (ACQUIRED): ICD-10-CM

## 2020-02-13 DIAGNOSIS — I10 HYPERTENSION, ESSENTIAL: ICD-10-CM

## 2020-02-13 DIAGNOSIS — E78.2 MIXED HYPERLIPIDEMIA: ICD-10-CM

## 2020-02-13 DIAGNOSIS — Z86.79 HISTORY OF CARDIOMYOPATHY: ICD-10-CM

## 2020-02-13 DIAGNOSIS — K21.9 GASTROESOPHAGEAL REFLUX DISEASE WITHOUT ESOPHAGITIS: ICD-10-CM

## 2020-02-13 DIAGNOSIS — J45.30 MILD PERSISTENT ASTHMA WITHOUT COMPLICATION: ICD-10-CM

## 2020-02-13 DIAGNOSIS — E11.9 CONTROLLED TYPE 2 DIABETES MELLITUS WITHOUT COMPLICATION, WITHOUT LONG-TERM CURRENT USE OF INSULIN (HCC): ICD-10-CM

## 2020-05-15 ENCOUNTER — TELEPHONE (OUTPATIENT)
Dept: FAMILY MEDICINE CLINIC | Facility: CLINIC | Age: 60
End: 2020-05-15

## 2020-05-21 ENCOUNTER — RESULTS ENCOUNTER (OUTPATIENT)
Dept: FAMILY MEDICINE CLINIC | Facility: CLINIC | Age: 60
End: 2020-05-21

## 2020-05-21 ENCOUNTER — OFFICE VISIT (OUTPATIENT)
Dept: FAMILY MEDICINE CLINIC | Facility: CLINIC | Age: 60
End: 2020-05-21

## 2020-05-21 DIAGNOSIS — R19.5 POSITIVE COLORECTAL CANCER SCREENING USING COLOGUARD TEST: ICD-10-CM

## 2020-05-21 DIAGNOSIS — I47.9 PAROXYSMAL TACHYCARDIA (HCC): ICD-10-CM

## 2020-05-21 DIAGNOSIS — E78.2 MIXED HYPERLIPIDEMIA: ICD-10-CM

## 2020-05-21 DIAGNOSIS — Z12.11 SCREEN FOR COLON CANCER: ICD-10-CM

## 2020-05-21 DIAGNOSIS — I10 HYPERTENSION, ESSENTIAL: ICD-10-CM

## 2020-05-21 DIAGNOSIS — K21.9 GASTROESOPHAGEAL REFLUX DISEASE WITHOUT ESOPHAGITIS: ICD-10-CM

## 2020-05-21 DIAGNOSIS — R79.89 LFT ELEVATION: Primary | ICD-10-CM

## 2020-05-21 DIAGNOSIS — E11.9 CONTROLLED TYPE 2 DIABETES MELLITUS WITHOUT COMPLICATION, WITHOUT LONG-TERM CURRENT USE OF INSULIN (HCC): Primary | ICD-10-CM

## 2020-05-21 DIAGNOSIS — Z86.79 HISTORY OF CARDIOMYOPATHY: ICD-10-CM

## 2020-05-21 DIAGNOSIS — Z85.3 HISTORY OF BREAST CANCER: ICD-10-CM

## 2020-05-21 LAB
ALBUMIN SERPL-MCNC: 5 G/DL (ref 3.5–5.2)
ALBUMIN/CREAT UR: 15 MG/G CREAT (ref 0–29)
ALBUMIN/GLOB SERPL: 1.8 G/DL
ALP SERPL-CCNC: 80 U/L (ref 39–117)
ALT SERPL-CCNC: 64 U/L (ref 1–33)
AST SERPL-CCNC: 65 U/L (ref 1–32)
BILIRUB SERPL-MCNC: 0.5 MG/DL (ref 0.2–1.2)
BUN SERPL-MCNC: 9 MG/DL (ref 6–20)
BUN/CREAT SERPL: 14.8 (ref 7–25)
CALCIUM SERPL-MCNC: 10.1 MG/DL (ref 8.6–10.5)
CHLORIDE SERPL-SCNC: 96 MMOL/L (ref 98–107)
CHOLEST SERPL-MCNC: 143 MG/DL (ref 0–200)
CO2 SERPL-SCNC: 29.3 MMOL/L (ref 22–29)
CREAT SERPL-MCNC: 0.61 MG/DL (ref 0.57–1)
CREAT UR-MCNC: 76.3 MG/DL
GLOBULIN SER CALC-MCNC: 2.8 GM/DL
GLUCOSE SERPL-MCNC: 134 MG/DL (ref 65–99)
HBA1C MFR BLD: 6.9 % (ref 4.8–5.6)
HDLC SERPL-MCNC: 38 MG/DL (ref 40–60)
LDLC SERPL CALC-MCNC: 53 MG/DL (ref 0–100)
MICROALBUMIN UR-MCNC: 11.5 UG/ML
POTASSIUM SERPL-SCNC: 4.3 MMOL/L (ref 3.5–5.2)
PROT SERPL-MCNC: 7.8 G/DL (ref 6–8.5)
SODIUM SERPL-SCNC: 138 MMOL/L (ref 136–145)
TRIGL SERPL-MCNC: 260 MG/DL (ref 0–150)
VLDLC SERPL CALC-MCNC: 52 MG/DL

## 2020-05-21 PROCEDURE — 99214 OFFICE O/P EST MOD 30 MIN: CPT | Performed by: PHYSICIAN ASSISTANT

## 2020-05-21 RX ORDER — ESCITALOPRAM OXALATE 5 MG/1
TABLET ORAL
Qty: 30 TABLET | Refills: 1 | Status: SHIPPED | OUTPATIENT
Start: 2020-05-21 | End: 2021-04-27

## 2020-05-21 RX ORDER — DILTIAZEM HYDROCHLORIDE 360 MG/1
360 CAPSULE, EXTENDED RELEASE ORAL DAILY
Qty: 90 CAPSULE | Refills: 1 | Status: SHIPPED | OUTPATIENT
Start: 2020-05-21 | End: 2020-11-04

## 2020-05-21 RX ORDER — MULTIVIT WITH MINERALS/LUTEIN
1000 TABLET ORAL DAILY
COMMUNITY

## 2020-05-21 NOTE — PATIENT INSTRUCTIONS

## 2020-05-21 NOTE — PROGRESS NOTES
Subjective   Jess Razo is a 59 y.o. female.   You have chosen to receive care through a telehealth visit.  Do you consent to use a video/audio connection for your medical care today? Yes  Connected by duo  History of Present Illness    Since the last visit, she has overall felt anxious.  She has Essential Hypertension and well controlled on current medication, DMII well controlled on medication and will continue regimen, GERD controlled on PPI Rx, Hypothyroidism well controlled on current medication and will continue Rx, Asthma well controlled and not requiring rescue Albuterol > 2 times a week, Vitamin D deficiency and will update labs for continued management and Mixed hyperlipidemia need to confirm LDL met new goal of less than 70 with dose change last visit.  Sees cardiology for cardiomyopathy in also has her on medication.  Sees Dr. Arnold oncologist for history of breast cancer with chemo and radiation yearly. .  she has been compliant with current medications have reviewed them.  The patient denies medication side effects.  Will refill medications. LMP  (LMP Unknown)     Results for orders placed or performed in visit on 09/16/19   T4, Free   Result Value Ref Range    Free T4 1.68 0.82 - 1.77 ng/dL   failed H2 blocker    Will do cologuard; one uncle had colon cancer  Need to work up LFTs if high again on this lab  Need to exercise  Some bp 140---most in 130  Went up on statin dose last visit.  Jess Razo female 59 y.o., LMP  (LMP Unknown)   who presents today for new evaluation and treatment of Anxiety.  She reports anxiety, impaired concentration, excessive worry and unable to relax. Onset of symptoms was approximately several years ago.  She denies current suicidal and homicidal ideation. Risk factors are lifestyle of multiple roles and several chronic conditions including history of cancer.  Previous treatment includes Has been on Xanax in the past and Zoloft that made her flat.  With this in mind  I am going to start Lexapro and keep the dose low with follow-up close in 1 month to make sure she is not flat. .  She complains of the following medication side effects: Zoloft made her not care and flat.  The patient declines to go to counseling..      The following portions of the patient's history were reviewed and updated as appropriate: allergies, current medications, past family history, past medical history, past social history, past surgical history and problem list.    Review of Systems   Constitutional: Negative for activity change, appetite change and unexpected weight change.   HENT: Negative for nosebleeds and trouble swallowing.    Eyes: Negative for pain and visual disturbance.   Respiratory: Negative for chest tightness, shortness of breath and wheezing.    Cardiovascular: Negative for chest pain and palpitations.   Gastrointestinal: Negative for abdominal pain and blood in stool.   Endocrine: Negative.    Genitourinary: Negative for difficulty urinating and hematuria.   Musculoskeletal: Negative for joint swelling.   Skin: Negative for color change and rash.   Allergic/Immunologic: Negative.    Neurological: Negative for syncope and speech difficulty.   Hematological: Negative for adenopathy.   Psychiatric/Behavioral: Negative for agitation, confusion, dysphoric mood and suicidal ideas. The patient is nervous/anxious.    All other systems reviewed and are negative.      Objective   Physical Exam   Constitutional: She is oriented to person, place, and time. She appears well-developed and well-nourished. No distress.   HENT:   Head: Normocephalic and atraumatic.   Right Ear: External ear normal.   Left Ear: External ear normal.   Eyes: Conjunctivae are normal. Right eye exhibits no discharge. Left eye exhibits no discharge. No scleral icterus.   Neck: Normal range of motion.   Pulmonary/Chest: Effort normal. No stridor. No respiratory distress.   Abdominal: Soft. There is no guarding.    Musculoskeletal: Normal range of motion.   Neurological: She is alert and oriented to person, place, and time. Coordination normal.   Skin: Skin is dry. She is not diaphoretic.   Psychiatric: She has a normal mood and affect. Her behavior is normal. Judgment and thought content normal.       Assessment/Plan   Jess was seen today for diabetes.    Diagnoses and all orders for this visit:    Controlled type 2 diabetes mellitus without complication, without long-term current use of insulin (CMS/HCC)    History of cardiomyopathy    Gastroesophageal reflux disease without esophagitis    Mixed hyperlipidemia    History of breast cancer    Paroxysmal tachycardia (CMS/HCC)    Hypertension, essential  -     dilTIAZem CD (CARDIZEM CD) 360 MG 24 hr capsule; Take 1 capsule by mouth Daily. For BP    Screen for colon cancer  -     Cologuard - Stool, Per Rectum; Future    Other orders  -     escitalopram (Lexapro) 5 MG tablet; Start with 1/2 tab PO daily for 4 days then one po daily for stress      Need to add Vit D to labs  Trial Lexapro ---f/u 1 mo for anxiety  Ordering Cologuard for colon cancer screening  Follow-up with cardiology for the cardiomyopathy  Follow-up with oncologist for history of breast cancer  Plan, Jess BRUCE BarajasRazo, was seen today.  she was seen for HTN and continue medication, DMII and refilled medications, GERD and will continue on PPI medication, Hyperlipidemia and will continue current medication, Hypothyroidism well controlled, continue medication, Asthma and is well controlled on medication.   and Vitamin D deficiency and will update labs .  Time spent 25 minutes

## 2020-06-16 ENCOUNTER — OFFICE VISIT (OUTPATIENT)
Dept: SURGERY | Facility: CLINIC | Age: 60
End: 2020-06-16

## 2020-06-16 VITALS — HEIGHT: 65 IN | BODY MASS INDEX: 25.49 KG/M2 | WEIGHT: 153 LBS

## 2020-06-16 DIAGNOSIS — R19.5 POSITIVE COLORECTAL CANCER SCREENING USING COLOGUARD TEST: Primary | ICD-10-CM

## 2020-06-16 PROCEDURE — 99203 OFFICE O/P NEW LOW 30 MIN: CPT | Performed by: SURGERY

## 2020-06-16 RX ORDER — BIMATOPROST 3 UG/ML
SOLUTION TOPICAL NIGHTLY
COMMUNITY

## 2020-06-16 NOTE — PROGRESS NOTES
General Surgery  Initial Office Visit    CC: Positive Cologuard DNA based stool test for colon cancer screening    HPI: The patient is a pleasant 59 y.o. year-old lady who presents today for discussion of a recent positive result she received when performing a Cologuard stool test for colon cancer screening.  She has never undergone colonoscopic evaluation and has no family history of colon cancer.  She denies any blood in her stool and has had no unintentional weight loss or constipation.    Past Medical History:   Hypertension  Hyperlipidemia  Type 2 diabetes  Hypothyroidism  GERD  Generalized anxiety disorder  History of breast cancer  Lymphedema of upper extremity  Asthma  Osteoarthritis    Past Surgical History:   Left breast lumpectomy with sentinel lymph node biopsy  Hysterectomy  Tonsillectomy  Diagnostic laparoscopy due to endometriosis  Bladder repair with hysterectomy    Medications:   Albuterol inhaler every 4 hours as needed  Apple cider vinegar 450 mg once daily  Atorvastatin 80 mg daily  Bimatoprost ophthalmic solution daily  Bisoprolol 10 mg daily  Calcium carbonate (Os-Diomedes) 1250 mg daily  Vitamin D3 2000 units daily  Digoxin 250 mcg daily  Diltiazem 360 mg daily  Esomeprazole 20 mg daily  Fexofenadine once daily  Flaxseed/linseed oil 1000 mg once daily  Garlic 1000 mg daily  Prevacid 15 mg daily  Letrozole 2.5 mg daily  Levothyroxine 75 mcg daily  Metformin 500 mg twice daily with meals  Multivitamin once daily  Vitamin D 1000 units daily  Escitalopram 5 mg daily    Allergies: Morphine, levofloxacin/ciprofloxacin, penicillins, tetracyclines, tramadol, erythromycin, Ceclor, codeine, adhesive tape    Family History: Father with history of pancreatic cancer and colon polyps, mother and sister both had history of breast cancer, no family history of gastrointestinal malignancy    Social History: Non-smoker, social alcohol use, , 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[employer]    ROS:    Constitutional: Negative for fevers or chills  HENT: Positive for sneezing; negative for hearing loss or runny nose  Eyes: Negative for vision changes or scleral icterus  Respiratory: Negative for cough or shortness of breath  Cardiovascular: Negative for chest pain or heart palpitations  Gastrointestinal: Positive for diarrhea; negative for abdominal pain, nausea, vomiting, constipation, melena, or hematochezia  Genitourinary: Negative for hematuria or dysuria  Musculoskeletal: Negative for joint swelling or gait instability  Neurologic: Negative for tremors or seizures  Psychiatric: Negative for suicidal ideations or depression  All other systems reviewed and negative    Physical Exam:  Height: 165 cm  Weight: 69 kg  BMI: 25.5  General: No acute distress, well-nourished & well-developed  HEAD: normocephalic, atraumatic  EYES: normal conjunctiva, sclera anicteric  EARS: grossly normal hearing  NECK: supple, no thyromegaly  CARDIOVASCULAR: regular rate and rhythm  RESPIRATORY: clear to auscultation bilaterally  GASTROINTESTINAL: soft, nontender, non-distended  MUSCULOSKELETAL: normal gait and station. No gross extremity abnormalities  PSYCHIATRIC: oriented x3, normal mood and affect    LABS:  Cologuard (stool): Positive    ASSESSMENT & PLAN  Mrs. Razo is a 51-year-old lady with a positive Cologuard test.  I reassured her that 96% of the positive Cologuard test results are due to adenomatous polyps and not necessarily due to a colonic malignancy.  I have recommended we proceed with flexible colonoscopy to assess for adenomatous polyps versus colon cancer.  She understands that should any polyps be identified, they will be removed.  I discussed the risks of the procedure to include bleeding, possible colon perforation, and possible missed pathology.  Despite all these risks, she has consented to proceed.    Sabi Mars MD  General and Endoscopic Surgery  [facility], operated by [facility]    [address], [address]  [place], [place], [address]  P: [phone]  F: [phone]

## 2020-06-16 NOTE — H&P (VIEW-ONLY)
General Surgery  Initial Office Visit    CC: Positive Cologuard DNA based stool test for colon cancer screening    HPI: The patient is a pleasant 59 y.o. year-old lady who presents today for discussion of a recent positive result she received when performing a Cologuard stool test for colon cancer screening.  She has never undergone colonoscopic evaluation and has no family history of colon cancer.  She denies any blood in her stool and has had no unintentional weight loss or constipation.    Past Medical History:   Hypertension  Hyperlipidemia  Type 2 diabetes  Hypothyroidism  GERD  Generalized anxiety disorder  History of breast cancer  Lymphedema of upper extremity  Asthma  Osteoarthritis    Past Surgical History:   Left breast lumpectomy with sentinel lymph node biopsy  Hysterectomy  Tonsillectomy  Diagnostic laparoscopy due to endometriosis  Bladder repair with hysterectomy    Medications:   Albuterol inhaler every 4 hours as needed  Apple cider vinegar 450 mg once daily  Atorvastatin 80 mg daily  Bimatoprost ophthalmic solution daily  Bisoprolol 10 mg daily  Calcium carbonate (Os-Diomedes) 1250 mg daily  Vitamin D3 2000 units daily  Digoxin 250 mcg daily  Diltiazem 360 mg daily  Esomeprazole 20 mg daily  Fexofenadine once daily  Flaxseed/linseed oil 1000 mg once daily  Garlic 1000 mg daily  Prevacid 15 mg daily  Letrozole 2.5 mg daily  Levothyroxine 75 mcg daily  Metformin 500 mg twice daily with meals  Multivitamin once daily  Vitamin D 1000 units daily  Escitalopram 5 mg daily    Allergies: Morphine, levofloxacin/ciprofloxacin, penicillins, tetracyclines, tramadol, erythromycin, Ceclor, codeine, adhesive tape    Family History: Father with history of pancreatic cancer and colon polyps, mother and sister both had history of breast cancer, no family history of gastrointestinal malignancy    Social History: Non-smoker, social alcohol use, , 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[employer]    ROS:    Constitutional: Negative for fevers or chills  HENT: Positive for sneezing; negative for hearing loss or runny nose  Eyes: Negative for vision changes or scleral icterus  Respiratory: Negative for cough or shortness of breath  Cardiovascular: Negative for chest pain or heart palpitations  Gastrointestinal: Positive for diarrhea; negative for abdominal pain, nausea, vomiting, constipation, melena, or hematochezia  Genitourinary: Negative for hematuria or dysuria  Musculoskeletal: Negative for joint swelling or gait instability  Neurologic: Negative for tremors or seizures  Psychiatric: Negative for suicidal ideations or depression  All other systems reviewed and negative    Physical Exam:  Height: 165 cm  Weight: 69 kg  BMI: 25.5  General: No acute distress, well-nourished & well-developed  HEAD: normocephalic, atraumatic  EYES: normal conjunctiva, sclera anicteric  EARS: grossly normal hearing  NECK: supple, no thyromegaly  CARDIOVASCULAR: regular rate and rhythm  RESPIRATORY: clear to auscultation bilaterally  GASTROINTESTINAL: soft, nontender, non-distended  MUSCULOSKELETAL: normal gait and station. No gross extremity abnormalities  PSYCHIATRIC: oriented x3, normal mood and affect    LABS:  Cologuard (stool): Positive    ASSESSMENT & PLAN  Mrs. Razo is a 51-year-old lady with a positive Cologuard test.  I reassured her that 96% of the positive Cologuard test results are due to adenomatous polyps and not necessarily due to a colonic malignancy.  I have recommended we proceed with flexible colonoscopy to assess for adenomatous polyps versus colon cancer.  She understands that should any polyps be identified, they will be removed.  I discussed the risks of the procedure to include bleeding, possible colon perforation, and possible missed pathology.  Despite all these risks, she has consented to proceed.    Sabi Mars MD  General and Endoscopic Surgery  [facility]    [address], [address]  [place], [place], [address]  P: [phone]  F: [phone]

## 2020-06-18 ENCOUNTER — TELEPHONE (OUTPATIENT)
Dept: FAMILY MEDICINE CLINIC | Facility: CLINIC | Age: 60
End: 2020-06-18

## 2020-06-18 NOTE — TELEPHONE ENCOUNTER
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  ----- Message -----  From: Olamide Cobos PA-C  Sent: 6/18/2020  7:17 AM EDT  To: Jess Razo  Subject: RE: Non-Urgent Medical Question  Do you mean a face shield?     ----- Message -----     From: Jess Razo     Sent: 6/17/2020  7:30 PM EDT       To: Olamide Cobos PA-C  Subject: Non-Urgent Medical Question    I was wondering if I could get a note for work stating I need to wear the clear mask instead of the one across the face because of my asthma. If so please let me know .  or email to [email or web address] to me or text to my number. Thank you so much.

## 2020-06-22 ENCOUNTER — TRANSCRIBE ORDERS (OUTPATIENT)
Dept: SLEEP MEDICINE | Facility: HOSPITAL | Age: 60
End: 2020-06-22

## 2020-06-22 DIAGNOSIS — Z01.818 OTHER SPECIFIED PRE-OPERATIVE EXAMINATION: Primary | ICD-10-CM

## 2020-06-23 ENCOUNTER — LAB (OUTPATIENT)
Dept: LAB | Facility: HOSPITAL | Age: 60
End: 2020-06-23

## 2020-06-23 DIAGNOSIS — Z01.818 OTHER SPECIFIED PRE-OPERATIVE EXAMINATION: ICD-10-CM

## 2020-06-23 PROCEDURE — U0004 COV-19 TEST NON-CDC HGH THRU: HCPCS

## 2020-06-24 LAB
REF LAB TEST METHOD: NORMAL
SARS-COV-2 RNA RESP QL NAA+PROBE: NOT DETECTED

## 2020-06-25 ENCOUNTER — ANESTHESIA EVENT (OUTPATIENT)
Dept: GASTROENTEROLOGY | Facility: HOSPITAL | Age: 60
End: 2020-06-25

## 2020-06-25 ENCOUNTER — HOSPITAL ENCOUNTER (OUTPATIENT)
Facility: HOSPITAL | Age: 60
Setting detail: HOSPITAL OUTPATIENT SURGERY
Discharge: HOME | End: 2020-06-25
Attending: SURGERY | Admitting: SURGERY

## 2020-06-25 ENCOUNTER — ANESTHESIA (OUTPATIENT)
Dept: GASTROENTEROLOGY | Facility: HOSPITAL | Age: 60
End: 2020-06-25

## 2020-06-25 VITALS
HEART RATE: 74 BPM | OXYGEN SATURATION: 97 % | BODY MASS INDEX: 24.96 KG/M2 | TEMPERATURE: 98.2 F | WEIGHT: 150 LBS | SYSTOLIC BLOOD PRESSURE: 140 MMHG | DIASTOLIC BLOOD PRESSURE: 84 MMHG | RESPIRATION RATE: 17 BRPM

## 2020-06-25 DIAGNOSIS — R19.5 POSITIVE COLORECTAL CANCER SCREENING USING COLOGUARD TEST: ICD-10-CM

## 2020-06-25 PROCEDURE — 45380 COLONOSCOPY AND BIOPSY: CPT | Performed by: SURGERY

## 2020-06-25 PROCEDURE — 25010000002 PROPOFOL 10 MG/ML EMULSION: Performed by: ANESTHESIOLOGY

## 2020-06-25 PROCEDURE — 88305 TISSUE EXAM BY PATHOLOGIST: CPT | Performed by: SURGERY

## 2020-06-25 PROCEDURE — 45385 COLONOSCOPY W/LESION REMOVAL: CPT | Performed by: SURGERY

## 2020-06-25 RX ORDER — PROMETHAZINE HYDROCHLORIDE 25 MG/1
25 SUPPOSITORY RECTAL ONCE AS NEEDED
Status: DISCONTINUED | OUTPATIENT
Start: 2020-06-25 | End: 2020-06-25 | Stop reason: HOSPADM

## 2020-06-25 RX ORDER — LIDOCAINE HYDROCHLORIDE 20 MG/ML
INJECTION, SOLUTION INFILTRATION; PERINEURAL AS NEEDED
Status: DISCONTINUED | OUTPATIENT
Start: 2020-06-25 | End: 2020-06-25 | Stop reason: SURG

## 2020-06-25 RX ORDER — PROMETHAZINE HYDROCHLORIDE 25 MG/ML
12.5 INJECTION, SOLUTION INTRAMUSCULAR; INTRAVENOUS ONCE AS NEEDED
Status: DISCONTINUED | OUTPATIENT
Start: 2020-06-25 | End: 2020-06-25 | Stop reason: HOSPADM

## 2020-06-25 RX ORDER — PROPOFOL 10 MG/ML
VIAL (ML) INTRAVENOUS CONTINUOUS PRN
Status: DISCONTINUED | OUTPATIENT
Start: 2020-06-25 | End: 2020-06-25 | Stop reason: SURG

## 2020-06-25 RX ORDER — PROMETHAZINE HYDROCHLORIDE 25 MG/1
25 TABLET ORAL ONCE AS NEEDED
Status: DISCONTINUED | OUTPATIENT
Start: 2020-06-25 | End: 2020-06-25 | Stop reason: HOSPADM

## 2020-06-25 RX ORDER — PROPOFOL 10 MG/ML
VIAL (ML) INTRAVENOUS AS NEEDED
Status: DISCONTINUED | OUTPATIENT
Start: 2020-06-25 | End: 2020-06-25 | Stop reason: SURG

## 2020-06-25 RX ORDER — SODIUM CHLORIDE, SODIUM LACTATE, POTASSIUM CHLORIDE, CALCIUM CHLORIDE 600; 310; 30; 20 MG/100ML; MG/100ML; MG/100ML; MG/100ML
1000 INJECTION, SOLUTION INTRAVENOUS CONTINUOUS
Status: DISCONTINUED | OUTPATIENT
Start: 2020-06-25 | End: 2020-06-25 | Stop reason: HOSPADM

## 2020-06-25 RX ADMIN — PROPOFOL 100 MG: 10 INJECTION, EMULSION INTRAVENOUS at 11:20

## 2020-06-25 RX ADMIN — PROPOFOL 160 MCG/KG/MIN: 10 INJECTION, EMULSION INTRAVENOUS at 10:56

## 2020-06-25 RX ADMIN — PROPOFOL 50 MG: 10 INJECTION, EMULSION INTRAVENOUS at 11:03

## 2020-06-25 RX ADMIN — LIDOCAINE HYDROCHLORIDE 40 MG: 20 INJECTION, SOLUTION INFILTRATION; PERINEURAL at 10:56

## 2020-06-25 RX ADMIN — SODIUM CHLORIDE, POTASSIUM CHLORIDE, SODIUM LACTATE AND CALCIUM CHLORIDE 1000 ML: 600; 310; 30; 20 INJECTION, SOLUTION INTRAVENOUS at 10:33

## 2020-06-25 RX ADMIN — PROPOFOL 100 MG: 10 INJECTION, EMULSION INTRAVENOUS at 10:56

## 2020-06-25 NOTE — ANESTHESIA POSTPROCEDURE EVALUATION
Patient: Jess Razo    Procedure Summary     Date:  06/25/20 Room / Location:   [facility]    Anesthesia Start:  1055 Anesthesia Stop:  1146    Procedure:  COLONOSCOPY TO CECUM WITH HOT SNARE AND COLD BIOPSY POLYPECTOMIES (N/A ) Diagnosis:       Positive colorectal cancer screening using Cologuard test      (Positive colorectal cancer screening using Cologuard test [R19.5])    Surgeon:  Sabi Mars MD Provider:  Raji Delong MD    Anesthesia Type:  MAC ASA Status:  3          Anesthesia Type: MAC    Vitals  No vitals data found for the desired time range.          Post Anesthesia Care and Evaluation    Patient location during evaluation: bedside  Patient participation: complete - patient participated  Level of consciousness: awake  Pain management: adequate  Airway patency: patent  Anesthetic complications: No anesthetic complications  PONV Status: none  Cardiovascular status: acceptable  Respiratory status: acceptable  Hydration status: acceptable  Post Neuraxial Block status: Motor and sensory function returned to baseline

## 2020-06-25 NOTE — OP NOTE
Operative Note :  Sabi Mars MD      Jess Razo  1960    Procedure Date: 06/25/20    Pre-op Diagnosis:  Positive colorectal cancer screening using Cologuard test [R19.5]    Post-Operative Diagnosis:  Colon polyps  Diverticulosis    Procedure:   Flexible colonoscopy to the cecum with hot snare polypectomy x1 and cold forcep polypectomy x4    Surgeon: Sabi Mars MD    Assistant: None    Anesthesia:  MAC (monitored anesthetic care)    Estimated Blood Loss: Minimal    Specimens:   Hepatic flexure polyp (hot snare, 7 mm)  Sigmoid colon polyps x2  Rectal polyps x2    Complications: None    Indications:  Mrs. Razo is a 59-year-old lady here for screening colonoscopy in light of her recent positive result she received when performing a Cologuard stool test for colon cancer screening.  She understands the risks of colonoscopy include bleeding, possible colon perforation, and possible missed pathology.  Despite these risks, she has consented to proceed.    Findings: 5 sessile colon polyps removed, sigmoid diverticulosis    Description of procedure:  The patient was brought to the endoscopy suite and nesha in the left lateral decubitus position.  Continuous propofol anesthesia was administered.  A surgical timeout was completed.  A digital rectal exam was performed, revealing no abnormalities.  An adult colonoscope was then inserted through the anus and passed under direct visualization to the level of the cecum.  The cecum was identified via the ileocecal valve as well as the appendiceal orifice.  The scope was then slowly withdrawn, examining all circumferential walls of the ascending, transverse, descending, and sigmoid colon.  There was scattered diverticulosis of the sigmoid colon without fecal impaction or bleeding.  There were 5 sessile colon polyps removed from the colon, the largest of which measured 7 mm within the hepatic flexure.  The hepatic flexure polyp was removed using the hot snare.   There were 2 separate polyps of the sigmoid colon, 1 of them measuring 2 mm and the other measuring 4 mm.  These were removed using the cold biopsy forceps.  There were 2 separate polyps of the rectum each measuring 2 mm and removed using the cold biopsy forceps.  The scope was retroflexed within the rectum, showing no significant internal hemorrhoids.  The scope was then withdrawn and the colon desufflated.  The patient had a very good bowel prep and was transferred to the recovery area in stable condition.     Recommendations:  I will call the patient in 1 week or less with the pathology results of the 5 polyps removed as this will determine when the next screening colonoscopy will be due.    Sabi Mars MD  General and Endoscopic Surgery  [facility]    [address], [address]  [place], [place], [address]  P: [phone]  F: [phone]

## 2020-06-25 NOTE — DISCHARGE INSTRUCTIONS
For the next 24 hours patient needs to be with a responsible adult.    For 24 hours DO NOT drive, operate machinery, appliances, drink alcohol, make important decisions or sign legal documents.    Start with a light or bland diet if you are feeling sick to your stomach otherwise advance to regular diet as tolerated.    Follow recommendations on procedure report if provided by your doctor.    Call Dr Mars for problems [phone]    Problems may include but not limited to: large amounts of bleeding, trouble breathing, repeated vomiting, severe unrelieved pain, fever or chills.

## 2020-06-26 LAB
CYTO UR: NORMAL
LAB AP CASE REPORT: NORMAL
PATH REPORT.FINAL DX SPEC: NORMAL
PATH REPORT.GROSS SPEC: NORMAL

## 2020-06-29 ENCOUNTER — TELEPHONE (OUTPATIENT)
Dept: SURGERY | Facility: CLINIC | Age: 60
End: 2020-06-29

## 2020-06-29 PROBLEM — Z71.2 ENCOUNTER TO DISCUSS COLONOSCOPY RESULTS: Status: ACTIVE | Noted: 2020-06-29

## 2020-06-29 NOTE — TELEPHONE ENCOUNTER
I called Jess and discussed the benign pathology findings of 3 tubular adenomas and 2 hyperplastic polyps.  All of these are considered benign in nature.  Because there were 3 adenomatous polyps, she will require follow-up colonoscopy in 3 years.  We also discussed diverticulosis, and the fact that no dietary changes are necessary as there is simply not enough scientific data to support that.  She is interested in starting a probiotic, to see how it might affect her IBS diffuse crampy pains.    [place], could you please update her health maintenance tab and recall pool to reflect a 3-year interval for her next screening colonoscopy?  Thanks!

## 2020-07-16 DIAGNOSIS — E11.9 CONTROLLED TYPE 2 DIABETES MELLITUS WITHOUT COMPLICATION, WITHOUT LONG-TERM CURRENT USE OF INSULIN (HCC): ICD-10-CM

## 2020-07-16 NOTE — TELEPHONE ENCOUNTER
Pt asking for new Rx for Metformin 500 mg - pt states that she takes 1 tab in the a.m. and 2 tabs p.m.  Please send to [facility].

## 2020-11-04 DIAGNOSIS — I10 HYPERTENSION, ESSENTIAL: ICD-10-CM

## 2020-11-04 DIAGNOSIS — E03.9 HYPOTHYROIDISM (ACQUIRED): ICD-10-CM

## 2020-11-04 RX ORDER — ATORVASTATIN CALCIUM 80 MG/1
TABLET, FILM COATED ORAL
Qty: 90 TABLET | Refills: 0 | Status: SHIPPED | OUTPATIENT
Start: 2020-11-04 | End: 2021-02-04 | Stop reason: SDUPTHER

## 2020-11-04 RX ORDER — LEVOTHYROXINE SODIUM 0.07 MG/1
TABLET ORAL
Qty: 90 TABLET | Refills: 0 | Status: SHIPPED | OUTPATIENT
Start: 2020-11-04 | End: 2021-02-07

## 2020-11-04 RX ORDER — DILTIAZEM HYDROCHLORIDE 360 MG/1
CAPSULE, EXTENDED RELEASE ORAL
Qty: 90 CAPSULE | Refills: 0 | Status: SHIPPED | OUTPATIENT
Start: 2020-11-04 | End: 2021-04-18

## 2020-12-27 DIAGNOSIS — J45.30 MILD PERSISTENT ASTHMA WITHOUT COMPLICATION: ICD-10-CM

## 2020-12-27 RX ORDER — ALBUTEROL SULFATE 90 UG/1
AEROSOL, METERED RESPIRATORY (INHALATION)
Qty: 18 G | Refills: 0 | Status: SHIPPED | OUTPATIENT
Start: 2020-12-27 | End: 2021-01-30

## 2021-01-29 DIAGNOSIS — J45.30 MILD PERSISTENT ASTHMA WITHOUT COMPLICATION: ICD-10-CM

## 2021-01-30 RX ORDER — ALBUTEROL SULFATE 90 UG/1
AEROSOL, METERED RESPIRATORY (INHALATION)
Qty: 18 G | Refills: 11 | Status: SHIPPED | OUTPATIENT
Start: 2021-01-30 | End: 2021-04-27

## 2021-02-03 DIAGNOSIS — E03.9 HYPOTHYROIDISM (ACQUIRED): ICD-10-CM

## 2021-02-03 RX ORDER — ATORVASTATIN CALCIUM 80 MG/1
TABLET, FILM COATED ORAL
Qty: 90 TABLET | Refills: 0 | OUTPATIENT
Start: 2021-02-03

## 2021-02-03 RX ORDER — LEVOTHYROXINE SODIUM 0.07 MG/1
TABLET ORAL
Qty: 90 TABLET | Refills: 0 | OUTPATIENT
Start: 2021-02-03

## 2021-02-04 RX ORDER — ATORVASTATIN CALCIUM 80 MG/1
80 TABLET, FILM COATED ORAL DAILY
Qty: 90 TABLET | Refills: 0 | Status: SHIPPED | OUTPATIENT
Start: 2021-02-04 | End: 2021-04-27 | Stop reason: SDUPTHER

## 2021-02-06 DIAGNOSIS — E03.9 HYPOTHYROIDISM (ACQUIRED): ICD-10-CM

## 2021-02-07 RX ORDER — LEVOTHYROXINE SODIUM 0.07 MG/1
TABLET ORAL
Qty: 90 TABLET | Refills: 0 | Status: SHIPPED | OUTPATIENT
Start: 2021-02-07 | End: 2021-04-27 | Stop reason: SDUPTHER

## 2021-04-18 DIAGNOSIS — I10 HYPERTENSION, ESSENTIAL: ICD-10-CM

## 2021-04-18 RX ORDER — DILTIAZEM HYDROCHLORIDE 360 MG/1
CAPSULE, EXTENDED RELEASE ORAL
Qty: 90 CAPSULE | Refills: 0 | Status: SHIPPED | OUTPATIENT
Start: 2021-04-18 | End: 2021-07-25

## 2021-04-27 ENCOUNTER — OFFICE VISIT (OUTPATIENT)
Dept: FAMILY MEDICINE CLINIC | Facility: CLINIC | Age: 61
End: 2021-04-27

## 2021-04-27 VITALS
BODY MASS INDEX: 25.99 KG/M2 | HEART RATE: 78 BPM | SYSTOLIC BLOOD PRESSURE: 158 MMHG | RESPIRATION RATE: 16 BRPM | TEMPERATURE: 97.5 F | WEIGHT: 156 LBS | DIASTOLIC BLOOD PRESSURE: 86 MMHG | HEIGHT: 65 IN | OXYGEN SATURATION: 98 %

## 2021-04-27 DIAGNOSIS — K21.9 GASTROESOPHAGEAL REFLUX DISEASE WITHOUT ESOPHAGITIS: ICD-10-CM

## 2021-04-27 DIAGNOSIS — E11.9 CONTROLLED TYPE 2 DIABETES MELLITUS WITHOUT COMPLICATION, WITHOUT LONG-TERM CURRENT USE OF INSULIN (HCC): Primary | ICD-10-CM

## 2021-04-27 DIAGNOSIS — I10 HYPERTENSION, ESSENTIAL: ICD-10-CM

## 2021-04-27 DIAGNOSIS — Z86.79 HISTORY OF CARDIOMYOPATHY: ICD-10-CM

## 2021-04-27 DIAGNOSIS — E03.9 HYPOTHYROIDISM (ACQUIRED): ICD-10-CM

## 2021-04-27 DIAGNOSIS — F41.1 GENERALIZED ANXIETY DISORDER: ICD-10-CM

## 2021-04-27 DIAGNOSIS — J45.30 MILD PERSISTENT ASTHMA WITHOUT COMPLICATION: ICD-10-CM

## 2021-04-27 DIAGNOSIS — E78.2 MIXED HYPERLIPIDEMIA: ICD-10-CM

## 2021-04-27 DIAGNOSIS — R42 VERTIGO: ICD-10-CM

## 2021-04-27 DIAGNOSIS — S16.1XXA STRAIN OF NECK MUSCLE, INITIAL ENCOUNTER: ICD-10-CM

## 2021-04-27 PROCEDURE — 99214 OFFICE O/P EST MOD 30 MIN: CPT | Performed by: PHYSICIAN ASSISTANT

## 2021-04-27 RX ORDER — TIZANIDINE 4 MG/1
TABLET ORAL
Qty: 45 TABLET | Refills: 1 | Status: SHIPPED | OUTPATIENT
Start: 2021-04-27 | End: 2022-03-10

## 2021-04-27 RX ORDER — ATORVASTATIN CALCIUM 80 MG/1
80 TABLET, FILM COATED ORAL DAILY
Qty: 90 TABLET | Refills: 3 | Status: SHIPPED | OUTPATIENT
Start: 2021-04-27 | End: 2021-05-17

## 2021-04-27 RX ORDER — LISINOPRIL 10 MG/1
10 TABLET ORAL DAILY
Qty: 30 TABLET | Refills: 0 | Status: SHIPPED | OUTPATIENT
Start: 2021-04-27 | End: 2021-05-11

## 2021-04-27 RX ORDER — ALBUTEROL SULFATE 90 UG/1
2 AEROSOL, METERED RESPIRATORY (INHALATION) EVERY 4 HOURS PRN
Qty: 18 G | Refills: 11 | Status: SHIPPED | OUTPATIENT
Start: 2021-04-27 | End: 2022-02-15 | Stop reason: SDUPTHER

## 2021-04-27 RX ORDER — LEVOTHYROXINE SODIUM 0.07 MG/1
75 TABLET ORAL
Qty: 90 TABLET | Refills: 3 | Status: SHIPPED | OUTPATIENT
Start: 2021-04-27 | End: 2021-05-11

## 2021-04-27 NOTE — PROGRESS NOTES
"Subjective   Jess Razo is a 60 y.o. female.     History of Present Illness    Since the last visit, she has overall felt depressed.  She has Essential Hypertension not at goal, plan to add/adjust medication, DMII well controlled on medication and will continue regimen, GERD controlled on PPI Rx, Hyperlipidemia with goals met with current Rx, Hypothyroidism and must update labs to continue treatment, Vitamin D deficiency and will update labs for continued management and Mild persistent asthma not controlled will add Qvar daily and continue albuterol as needed.  she has been compliant with current medications have reviewed them.  The patient denies medication side effects.  Will refill medications. /78 (BP Location: Left arm, Patient Position: Sitting, Cuff Size: Adult)   Pulse 78   Temp 97.5 °F (36.4 °C)   Resp 16   Ht 165.1 cm (65\")   Wt 70.8 kg (156 lb)   LMP  (LMP Unknown)   SpO2 98%   BMI 25.96 kg/m²   BP not controlled-----add ACE  Update labs  Home glucose---160s   Results for orders placed or performed during the hospital encounter of 06/25/20   Tissue Pathology Exam    Specimen: A: Large Intestine; Polyp    B: Large Intestine, Sigmoid Colon; Polyp    C: Large Intestine, Rectum; Polyp   Result Value Ref Range    Case Report       Surgical Pathology Report                         Case: [id number]                                  Authorizing Provider:  Sabi Mars MD     Collected:           06/25/2020 11:16 AM          Ordering Location:     [facility]  Received:            06/25/2020 01:05 PM                                 ENDO SUITES                                                                  Pathologist:           Danette Duarte MD                                                    Specimens:   1) - Large Intestine, hepatic flexure polyp                                                         2) - Large Intestine, Sigmoid Colon, sigmoid colon polyps x2  "                                       3) - Large Intestine, Rectum, rectal polyps x2                                             Final Diagnosis       1. Colon, Hepatic Flexure, Biopsy:   A. Tubular adenoma.   B. Negative for high-grade dysplasia.    2. Sigmoid Colon, Biopsies:   A. Tubular adenoma.   B. Hyperplastic polyp.   C. Negative for high-grade dysplasia.    3. Rectum, Biopsies:   A. Tubular adenoma.   B. Hyperplastic polyp.   C. Negative for high-grade dysplasia.    swm/pkm       Gross Description       1.  The specimen is received in formalin labeled with the patient's name and further designated 'hepatic flexure polyp biopsy' is a small fragment of gray-tan tissue. The specimen is bisected and submitted for embedding.    2.  The specimen is received in formalin labeled with the patient's name and further designated 'sigmoid colon polyps biopsy' are multiple small fragments of gray-tan tissue. The specimen is submitted for embedding as received.    3.  The specimen is received in formalin labeled with the patient's name and further designated 'rectal polyps biopsy' are multiple small fragments of gray-tan tissue. The specimen is submitted for embedding as received.          Microscopic Description       Performed, incorporated in diagnosis.       Also in addition to everything I am discussing today were good talk about neck pain and muscle spasms on the right side of her neck and just started after she has been mopping.  Onset was Sunday no pain down her right arm.  She saw cardiology 12/11/2020 to follow-up on her sinus tachycardia noted well-controlled on beta-blocker, calcium channel blocker, digoxin.  Also seen for mild nonischemic cardiomyopathy and did have a follow-up echo after this appointment.  No changes to her medications after echo.  Sees Dr Trivedi.  DR Arnold yearly for breast cancer    No help Zoloft----tired; Lexapro---no help  I do want to try fluoxetine before I do not SR NI due to her  "history of tachycardia and nonischemic cardiomyopathy.  Jess Razo female 60 y.o., /78 (BP Location: Left arm, Patient Position: Sitting, Cuff Size: Adult)   Pulse 78   Temp 97.5 °F (36.4 °C)   Resp 16   Ht 165.1 cm (65\")   Wt 70.8 kg (156 lb)   LMP  (LMP Unknown)   SpO2 98%   BMI 25.96 kg/m²   who presents today for follow up of Depression and Anxiety.  She reports depressed mood, anxiety, anhedonia, impaired concentration, excessive worry, unable to relax and sleep disturbance. Onset of symptoms was approximately several years ago.  She denies current suicidal and homicidal ideation. Risk factors are family history of anxiety and or depression and lifestyle of multiple roles.  Previous treatment includes current Rx.  She complains of the following medication side effects: none. The patient declines to go to counseling..    The following portions of the patient's history were reviewed and updated as appropriate: allergies, current medications, past family history, past medical history, past social history, past surgical history and problem list.    Review of Systems   Constitutional: Negative for activity change, appetite change and unexpected weight change.   HENT: Positive for ear pain. Negative for nosebleeds and trouble swallowing.    Eyes: Negative for pain and visual disturbance.   Respiratory: Negative for chest tightness, shortness of breath and wheezing.    Cardiovascular: Negative for chest pain and palpitations.   Gastrointestinal: Negative for abdominal pain and blood in stool.   Endocrine: Negative.    Genitourinary: Negative for difficulty urinating and hematuria.   Musculoskeletal: Positive for neck pain. Negative for joint swelling.   Skin: Negative for color change and rash.   Allergic/Immunologic: Negative.    Neurological: Negative for syncope and speech difficulty.   Hematological: Negative for adenopathy.   Psychiatric/Behavioral: Negative for agitation and confusion.   All " other systems reviewed and are negative.      Objective   Physical Exam  Vitals and nursing note reviewed.   Constitutional:       General: She is not in acute distress.     Appearance: She is well-developed. She is not ill-appearing or toxic-appearing.   HENT:      Head: Normocephalic.      Right Ear: External ear normal.      Left Ear: External ear normal.      Nose: Nose normal.      Mouth/Throat:      Pharynx: Oropharynx is clear.   Eyes:      General: No scleral icterus.     Conjunctiva/sclera: Conjunctivae normal.      Pupils: Pupils are equal, round, and reactive to light.   Neck:      Thyroid: No thyromegaly.      Vascular: No carotid bruit.   Cardiovascular:      Rate and Rhythm: Normal rate and regular rhythm.      Heart sounds: Normal heart sounds. No murmur heard.     Pulmonary:      Effort: Pulmonary effort is normal. No respiratory distress.      Breath sounds: Normal breath sounds.   Musculoskeletal:         General: No tenderness or deformity. Normal range of motion.      Cervical back: Normal range of motion and neck supple.      Comments: Sore right trapezius with ROM right   Skin:     General: Skin is warm and dry.      Findings: No rash.   Neurological:      General: No focal deficit present.      Mental Status: She is alert and oriented to person, place, and time. Mental status is at baseline.   Psychiatric:         Mood and Affect: Mood normal.         Behavior: Behavior normal.         Thought Content: Thought content normal.         Judgment: Judgment normal.         Assessment/Plan   Diagnoses and all orders for this visit:    1. Controlled type 2 diabetes mellitus without complication, without long-term current use of insulin (CMS/[facility]) (Primary)    2. Hypertension, essential    3. Hypothyroidism (acquired)    4. Mild persistent asthma without complication    5. Mixed hyperlipidemia    6. History of cardiomyopathy    7. Gastroesophageal reflux disease without esophagitis    8. Generalized  anxiety disorder    9. Lymphedema    10. Vertigo  -     Ambulatory Referral to ENT (Otolaryngology)      Dr Trivedi--monitors her mild nonischemic cardiomyopathy and sinus tachycardia closely sees her yearly  Dr Arnold--oncologist yearly follow-up for history of breast cancer  Talked about patient needs to see me at least every 6 months  Also labs every 6 months  Plan, Jess Razo, was seen today.  she was seen for HTN and add/adjust medication, DMII and refilled medications, GERD and will continue on PPI medication, Hyperlipidemia and will continue current medication, Hypothyroidism and will need to update labs for continued treatment, Vitamin D deficiency and will update labs  and Mild persistent asthma not controlled will add Qvar daily and keep albuterol as needed.  Stop smoking.  iqra pap  Try Zanaflex as needed muscle spasms     Answers for HPI/ROS submitted by the patient on 4/27/2021  What is the primary reason for your visit?: Ear Pain

## 2021-05-11 ENCOUNTER — OFFICE VISIT (OUTPATIENT)
Dept: FAMILY MEDICINE CLINIC | Facility: CLINIC | Age: 61
End: 2021-05-11

## 2021-05-11 VITALS
HEIGHT: 65 IN | TEMPERATURE: 97.5 F | OXYGEN SATURATION: 98 % | HEART RATE: 80 BPM | BODY MASS INDEX: 26.02 KG/M2 | DIASTOLIC BLOOD PRESSURE: 70 MMHG | SYSTOLIC BLOOD PRESSURE: 110 MMHG | RESPIRATION RATE: 16 BRPM | WEIGHT: 156.2 LBS

## 2021-05-11 DIAGNOSIS — Z86.79 HISTORY OF CARDIOMYOPATHY: ICD-10-CM

## 2021-05-11 DIAGNOSIS — Z85.3 HISTORY OF BREAST CANCER: ICD-10-CM

## 2021-05-11 DIAGNOSIS — E78.2 MIXED HYPERLIPIDEMIA: ICD-10-CM

## 2021-05-11 DIAGNOSIS — K21.9 GASTROESOPHAGEAL REFLUX DISEASE WITHOUT ESOPHAGITIS: ICD-10-CM

## 2021-05-11 DIAGNOSIS — J45.40 MODERATE PERSISTENT ASTHMA WITHOUT COMPLICATION: ICD-10-CM

## 2021-05-11 DIAGNOSIS — F41.1 GENERALIZED ANXIETY DISORDER: ICD-10-CM

## 2021-05-11 DIAGNOSIS — E11.9 CONTROLLED TYPE 2 DIABETES MELLITUS WITHOUT COMPLICATION, WITHOUT LONG-TERM CURRENT USE OF INSULIN (HCC): Primary | ICD-10-CM

## 2021-05-11 DIAGNOSIS — E83.52 SERUM CALCIUM ELEVATED: ICD-10-CM

## 2021-05-11 DIAGNOSIS — F17.200 TOBACCO USE DISORDER: ICD-10-CM

## 2021-05-11 LAB
25(OH)D3+25(OH)D2 SERPL-MCNC: 94.1 NG/ML (ref 30–100)
ALBUMIN SERPL-MCNC: 5 G/DL (ref 3.5–5.2)
ALBUMIN/CREAT UR: 15 MG/G CREAT (ref 0–29)
ALBUMIN/GLOB SERPL: 2.2 G/DL
ALP SERPL-CCNC: 84 U/L (ref 39–117)
ALT SERPL-CCNC: 50 U/L (ref 1–33)
AST SERPL-CCNC: 37 U/L (ref 1–32)
BASOPHILS # BLD AUTO: 0.04 10*3/MM3 (ref 0–0.2)
BASOPHILS NFR BLD AUTO: 0.6 % (ref 0–1.5)
BILIRUB SERPL-MCNC: 0.6 MG/DL (ref 0–1.2)
BUN SERPL-MCNC: 11 MG/DL (ref 6–20)
BUN/CREAT SERPL: 18 (ref 7–25)
CALCIUM SERPL-MCNC: 10.6 MG/DL (ref 8.6–10.5)
CHLORIDE SERPL-SCNC: 97 MMOL/L (ref 98–107)
CHOLEST SERPL-MCNC: 144 MG/DL (ref 0–200)
CO2 SERPL-SCNC: 30.6 MMOL/L (ref 22–29)
CREAT SERPL-MCNC: 0.61 MG/DL (ref 0.57–1)
CREAT UR-MCNC: 37.8 MG/DL
EOSINOPHIL # BLD AUTO: 0.08 10*3/MM3 (ref 0–0.4)
EOSINOPHIL NFR BLD AUTO: 1.3 % (ref 0.3–6.2)
ERYTHROCYTE [DISTWIDTH] IN BLOOD BY AUTOMATED COUNT: 12.6 % (ref 12.3–15.4)
FOLATE SERPL-MCNC: >20 NG/ML (ref 4.78–24.2)
GLOBULIN SER CALC-MCNC: 2.3 GM/DL
GLUCOSE SERPL-MCNC: 131 MG/DL (ref 65–99)
HBA1C MFR BLD: 6.5 % (ref 4.8–5.6)
HCT VFR BLD AUTO: 42.6 % (ref 34–46.6)
HDLC SERPL-MCNC: 42 MG/DL (ref 40–60)
HGB BLD-MCNC: 14.2 G/DL (ref 12–15.9)
IMM GRANULOCYTES # BLD AUTO: 0.01 10*3/MM3 (ref 0–0.05)
IMM GRANULOCYTES NFR BLD AUTO: 0.2 % (ref 0–0.5)
LDLC SERPL CALC-MCNC: 58 MG/DL (ref 0–100)
LYMPHOCYTES # BLD AUTO: 1.7 10*3/MM3 (ref 0.7–3.1)
LYMPHOCYTES NFR BLD AUTO: 27.3 % (ref 19.6–45.3)
MCH RBC QN AUTO: 30.2 PG (ref 26.6–33)
MCHC RBC AUTO-ENTMCNC: 33.3 G/DL (ref 31.5–35.7)
MCV RBC AUTO: 90.6 FL (ref 79–97)
MICROALBUMIN UR-MCNC: 5.5 UG/ML
MONOCYTES # BLD AUTO: 0.45 10*3/MM3 (ref 0.1–0.9)
MONOCYTES NFR BLD AUTO: 7.2 % (ref 5–12)
NEUTROPHILS # BLD AUTO: 3.94 10*3/MM3 (ref 1.7–7)
NEUTROPHILS NFR BLD AUTO: 63.4 % (ref 42.7–76)
NRBC BLD AUTO-RTO: 0 /100 WBC (ref 0–0.2)
PLATELET # BLD AUTO: 217 10*3/MM3 (ref 140–450)
POTASSIUM SERPL-SCNC: 4.6 MMOL/L (ref 3.5–5.2)
PROT SERPL-MCNC: 7.3 G/DL (ref 6–8.5)
RBC # BLD AUTO: 4.7 10*6/MM3 (ref 3.77–5.28)
SODIUM SERPL-SCNC: 139 MMOL/L (ref 136–145)
T3FREE SERPL-MCNC: 2.8 PG/ML (ref 2–4.4)
T4 FREE SERPL-MCNC: 1.86 NG/DL (ref 0.93–1.7)
TRIGL SERPL-MCNC: 282 MG/DL (ref 0–150)
TSH SERPL DL<=0.005 MIU/L-ACNC: 1.38 UIU/ML (ref 0.27–4.2)
VIT B12 SERPL-MCNC: 541 PG/ML (ref 211–946)
VLDLC SERPL CALC-MCNC: 44 MG/DL (ref 5–40)
WBC # BLD AUTO: 6.22 10*3/MM3 (ref 3.4–10.8)

## 2021-05-11 PROCEDURE — 99214 OFFICE O/P EST MOD 30 MIN: CPT | Performed by: PHYSICIAN ASSISTANT

## 2021-05-11 RX ORDER — LEVOTHYROXINE SODIUM 0.05 MG/1
50 TABLET ORAL DAILY
Qty: 90 TABLET | Refills: 3 | Status: SHIPPED | OUTPATIENT
Start: 2021-05-11 | End: 2022-02-15 | Stop reason: SDUPTHER

## 2021-05-11 RX ORDER — FLUOXETINE HYDROCHLORIDE 20 MG/1
20 CAPSULE ORAL DAILY
Qty: 90 CAPSULE | Refills: 0 | Status: SHIPPED | OUTPATIENT
Start: 2021-05-11 | End: 2021-06-22

## 2021-05-11 RX ORDER — BECLOMETHASONE DIPROPIONATE HFA 40 UG/1
AEROSOL, METERED RESPIRATORY (INHALATION)
COMMUNITY
Start: 2021-05-03 | End: 2021-06-22

## 2021-05-11 RX ORDER — VALSARTAN 160 MG/1
TABLET ORAL
Qty: 90 TABLET | Refills: 1 | Status: SHIPPED | OUTPATIENT
Start: 2021-05-11 | End: 2021-06-22 | Stop reason: SDUPTHER

## 2021-05-11 RX ORDER — BIMATOPROST 0.3 MG/ML
SOLUTION/ DROPS OPHTHALMIC
COMMUNITY
Start: 2021-04-29

## 2021-05-11 NOTE — PROGRESS NOTES
"Subjective   Jess Razo is a 60 y.o. female.     History of Present Illness   Jess Razo 60 y.o. female /58 (BP Location: Left arm, Patient Position: Sitting, Cuff Size: Adult)   Pulse 80   Temp 97.5 °F (36.4 °C)   Resp 16   Ht 165.1 cm (65\")   Wt 70.9 kg (156 lb 3.2 oz)   LMP  (LMP Unknown)   SpO2 98%   BMI 25.99 kg/m²  who presents today for essential hypertension.  Saw her 4/27/2021 and blood pressure not controlled added ACE inhibitor today is follow-up.  She is followed by cardiology for nonischemic cardiomyopathy last visit December with cardiology. She is having ACE cough---change to ARB.  Today is also follow-up for anxiety and depression with medication changed to fluoxetine.---she has not filled Rx and will send again.  Failed Lexapro and Zoloft prior.  she has a history of   Patient Active Problem List   Diagnosis   • Asthma   • Paroxysmal tachycardia (CMS/HCC)   • Osteoarthritis, multiple sites   • Malignant neoplasm of female breast (CMS/HCC)   • Lymphedema   • Hyperlipidemia   • Generalized anxiety disorder   • Esophageal reflux   • Diabetes type 2, controlled (CMS/HCC)   • History of breast cancer   • History of cardiomyopathy   • Vertigo   • Cardiomyopathy (CMS/HCC)   • Inappropriate sinus tachycardia   • Lymphedema of upper extremity   • Tobacco use disorder   • Positive colorectal cancer screening using Cologuard test   • Encounter to discuss colonoscopy results   • Osteoarthritis   .  Also at last visit added Qvar for mild persistent asthma symptoms. .  Also has type 2 diabetes and need to make sure A1c goal is has been met of less than 7.  Patient also has GERD controlled with PPI, mixed hyperlipidemia with labs pending.  Also hypothyroidism needing labs to update to make sure medication is at goal.    Patient did have labs yesterday--- labs are not in chart but on looking on [facility] I can see you later CBC is in range, glucose 131, creatinine normal at 0.61, calcium is " elevated at 10.6 and will need follow-up on this, AST 37, ALT 50, potassium 4.6, lipid panel with cholesterol 144, triglycerides 282, HDL 42, LDL 58, A1c was 6.5-----goal met free T4 slightly elevated 1.86, TSH 1.38  Vitamin D at goal at 94, B12 541, free T3 2.8.  Folic acid greater than 20  She does see oncology yearly for her history of breast cancer.  She is now off tamoxifen    Need pap   The following portions of the patient's history were reviewed and updated as appropriate: allergies, current medications, past family history, past medical history, past social history, past surgical history and problem list.    Review of Systems   Constitutional: Negative for activity change, appetite change and unexpected weight change.   HENT: Positive for ear pain. Negative for nosebleeds and trouble swallowing.    Eyes: Negative for pain and visual disturbance.   Respiratory: Negative for chest tightness, shortness of breath and wheezing.    Cardiovascular: Negative for chest pain and palpitations.   Gastrointestinal: Negative for abdominal pain and blood in stool.   Endocrine: Negative.    Genitourinary: Negative for difficulty urinating and hematuria.   Musculoskeletal: Positive for neck pain. Negative for joint swelling.   Skin: Negative for color change and rash.   Allergic/Immunologic: Negative.    Neurological: Positive for dizziness. Negative for syncope and speech difficulty.   Hematological: Negative for adenopathy.   Psychiatric/Behavioral: Negative for agitation and confusion. The patient is nervous/anxious.    All other systems reviewed and are negative.      Objective   Physical Exam  Vitals and nursing note reviewed.   Constitutional:       General: She is not in acute distress.     Appearance: She is well-developed. She is not ill-appearing or toxic-appearing.   HENT:      Head: Normocephalic.      Right Ear: External ear normal.      Left Ear: External ear normal.      Nose: Nose normal.      Mouth/Throat:       Pharynx: Oropharynx is clear.   Eyes:      General: No scleral icterus.     Conjunctiva/sclera: Conjunctivae normal.      Pupils: Pupils are equal, round, and reactive to light.   Neck:      Thyroid: No thyromegaly.      Vascular: No carotid bruit.   Cardiovascular:      Rate and Rhythm: Normal rate and regular rhythm.      Heart sounds: Normal heart sounds. No murmur heard.     Pulmonary:      Effort: Pulmonary effort is normal. No respiratory distress.      Breath sounds: Normal breath sounds.   Musculoskeletal:         General: No tenderness or deformity. Normal range of motion.      Cervical back: Normal range of motion and neck supple.      Comments: Sore right trapezius with ROM right   Skin:     General: Skin is warm and dry.      Findings: No rash.   Neurological:      General: No focal deficit present.      Mental Status: She is alert and oriented to person, place, and time. Mental status is at baseline.   Psychiatric:         Mood and Affect: Mood normal.         Behavior: Behavior normal.         Thought Content: Thought content normal.         Judgment: Judgment normal.         Assessment/Plan   Diagnoses and all orders for this visit:    1. Controlled type 2 diabetes mellitus without complication, without long-term current use of insulin (CMS/[facility]) (Primary)    2. Mixed hyperlipidemia    3. History of cardiomyopathy    4. Generalized anxiety disorder    5. Moderate persistent asthma without complication    6. History of breast cancer    7. Gastroesophageal reflux disease without esophagitis    8. Lymphedema    9. Tobacco use disorder    Other orders  -     FLUoxetine (PROzac) 20 MG capsule; Take 1 capsule by mouth Daily. For stress  Dispense: 90 capsule; Refill: 0  -     levothyroxine (Synthroid) 50 MCG tablet; Take 1 tablet by mouth Daily. For thyroid before breakfast--new dose  Dispense: 90 tablet; Refill: 3      Recent fluoxetine at 20 mg and have her start this for anxiety and history of  depression  ACE cough will change to ARB  Will need follow-up labs due to calcium was slightly elevated and have her hydrate and repeat lab along with ordering PTH and ionized calcium in the next couple of weeks, stop over-the-counter calcium  Also get Mg d/t PPI  Plan, Jess Razo, was seen today.  she was seen for HTN and continue medication, DMII and refilled medications, GERD and will continue on PPI medication, Hyperlipidemia and will continue current medication, Hypothyroidism with abnormal labs and new medication regimen and Vitamin D deficiency and supplemented.  Still need her to add Q marlene--using Albuterol daily  F.u 6 weeks--Prozac, labs, BP  Stop smoking

## 2021-05-17 RX ORDER — ATORVASTATIN CALCIUM 80 MG/1
TABLET, FILM COATED ORAL
Qty: 90 TABLET | Refills: 0 | Status: SHIPPED | OUTPATIENT
Start: 2021-05-17 | End: 2021-06-22 | Stop reason: SDUPTHER

## 2021-06-11 DIAGNOSIS — E83.52 SERUM CALCIUM ELEVATED: ICD-10-CM

## 2021-06-11 DIAGNOSIS — E11.9 CONTROLLED TYPE 2 DIABETES MELLITUS WITHOUT COMPLICATION, WITHOUT LONG-TERM CURRENT USE OF INSULIN (HCC): ICD-10-CM

## 2021-06-11 DIAGNOSIS — K21.9 GASTROESOPHAGEAL REFLUX DISEASE WITHOUT ESOPHAGITIS: ICD-10-CM

## 2021-06-11 DIAGNOSIS — E78.2 MIXED HYPERLIPIDEMIA: ICD-10-CM

## 2021-06-11 DIAGNOSIS — F41.1 GENERALIZED ANXIETY DISORDER: ICD-10-CM

## 2021-06-11 DIAGNOSIS — Z86.79 HISTORY OF CARDIOMYOPATHY: ICD-10-CM

## 2021-06-11 DIAGNOSIS — J45.40 MODERATE PERSISTENT ASTHMA WITHOUT COMPLICATION: ICD-10-CM

## 2021-06-15 LAB
ALBUMIN SERPL-MCNC: 4.9 G/DL (ref 3.5–5.2)
ALBUMIN/GLOB SERPL: 2.1 G/DL
ALP SERPL-CCNC: 82 U/L (ref 39–117)
ALT SERPL-CCNC: 52 U/L (ref 1–33)
AST SERPL-CCNC: 40 U/L (ref 1–32)
BILIRUB SERPL-MCNC: 0.6 MG/DL (ref 0–1.2)
BUN SERPL-MCNC: 10 MG/DL (ref 6–20)
BUN/CREAT SERPL: 16.9 (ref 7–25)
CA-I SERPL ISE-MCNC: 5.1 MG/DL (ref 4.5–5.6)
CALCIUM SERPL-MCNC: 9.6 MG/DL (ref 8.6–10.5)
CHLORIDE SERPL-SCNC: 98 MMOL/L (ref 98–107)
CO2 SERPL-SCNC: 27.2 MMOL/L (ref 22–29)
CREAT SERPL-MCNC: 0.59 MG/DL (ref 0.57–1)
DIGOXIN SERPL-MCNC: 1.2 NG/ML (ref 0.6–1.2)
GLOBULIN SER CALC-MCNC: 2.3 GM/DL
GLUCOSE SERPL-MCNC: 117 MG/DL (ref 65–99)
POTASSIUM SERPL-SCNC: 4.9 MMOL/L (ref 3.5–5.2)
PROT SERPL-MCNC: 7.2 G/DL (ref 6–8.5)
PTH-INTACT SERPL-MCNC: 16 PG/ML (ref 15–65)
SODIUM SERPL-SCNC: 139 MMOL/L (ref 136–145)
T3FREE SERPL-MCNC: 2.8 PG/ML (ref 2–4.4)
T4 FREE SERPL-MCNC: 1.58 NG/DL (ref 0.93–1.7)
TSH SERPL DL<=0.005 MIU/L-ACNC: 1.51 UIU/ML (ref 0.27–4.2)

## 2021-06-22 ENCOUNTER — OFFICE VISIT (OUTPATIENT)
Dept: FAMILY MEDICINE CLINIC | Facility: CLINIC | Age: 61
End: 2021-06-22

## 2021-06-22 ENCOUNTER — TELEPHONE (OUTPATIENT)
Dept: FAMILY MEDICINE CLINIC | Facility: CLINIC | Age: 61
End: 2021-06-22

## 2021-06-22 VITALS
TEMPERATURE: 97.5 F | OXYGEN SATURATION: 99 % | SYSTOLIC BLOOD PRESSURE: 125 MMHG | DIASTOLIC BLOOD PRESSURE: 61 MMHG | HEART RATE: 82 BPM | WEIGHT: 154 LBS | HEIGHT: 65 IN | BODY MASS INDEX: 25.66 KG/M2 | RESPIRATION RATE: 16 BRPM

## 2021-06-22 DIAGNOSIS — K21.9 GASTROESOPHAGEAL REFLUX DISEASE WITHOUT ESOPHAGITIS: ICD-10-CM

## 2021-06-22 DIAGNOSIS — F33.41 RECURRENT MAJOR DEPRESSIVE DISORDER, IN PARTIAL REMISSION (HCC): ICD-10-CM

## 2021-06-22 DIAGNOSIS — Z85.3 HISTORY OF BREAST CANCER: ICD-10-CM

## 2021-06-22 DIAGNOSIS — F41.1 GENERALIZED ANXIETY DISORDER: Primary | ICD-10-CM

## 2021-06-22 DIAGNOSIS — E55.9 VITAMIN D DEFICIENCY: ICD-10-CM

## 2021-06-22 DIAGNOSIS — E78.2 MIXED HYPERLIPIDEMIA: ICD-10-CM

## 2021-06-22 DIAGNOSIS — E11.9 CONTROLLED TYPE 2 DIABETES MELLITUS WITHOUT COMPLICATION, WITHOUT LONG-TERM CURRENT USE OF INSULIN (HCC): ICD-10-CM

## 2021-06-22 DIAGNOSIS — Z86.79 HISTORY OF CARDIOMYOPATHY: ICD-10-CM

## 2021-06-22 PROCEDURE — 99214 OFFICE O/P EST MOD 30 MIN: CPT | Performed by: PHYSICIAN ASSISTANT

## 2021-06-22 RX ORDER — VALSARTAN 160 MG/1
TABLET ORAL
Qty: 90 TABLET | Refills: 1 | Status: SHIPPED | OUTPATIENT
Start: 2021-06-22 | End: 2022-02-15 | Stop reason: SDUPTHER

## 2021-06-22 RX ORDER — VALSARTAN 160 MG/1
160 TABLET ORAL DAILY
COMMUNITY
End: 2021-06-22 | Stop reason: SDDI

## 2021-06-22 RX ORDER — LEVOTHYROXINE SODIUM 0.05 MG/1
50 TABLET ORAL DAILY
COMMUNITY
End: 2021-06-22 | Stop reason: SDDI

## 2021-06-22 RX ORDER — ATORVASTATIN CALCIUM 80 MG/1
80 TABLET, FILM COATED ORAL DAILY
Qty: 90 TABLET | Refills: 3 | Status: SHIPPED | OUTPATIENT
Start: 2021-06-22 | End: 2022-05-01

## 2021-06-22 RX ORDER — MONTELUKAST SODIUM 10 MG/1
10 TABLET ORAL NIGHTLY
Qty: 90 TABLET | Refills: 3 | Status: SHIPPED | OUTPATIENT
Start: 2021-06-22 | End: 2022-04-25

## 2021-06-22 RX ORDER — FLUOXETINE 10 MG/1
10 CAPSULE ORAL DAILY
Qty: 90 CAPSULE | Refills: 3 | Status: SHIPPED | OUTPATIENT
Start: 2021-06-22 | End: 2022-04-25

## 2021-06-22 NOTE — TELEPHONE ENCOUNTER
The patient needs to ask the pharmacist about what replacement medication can be prescribed.  I wrote a note on the prescription asking the pharmacist to let me know if not formulary and what is?

## 2021-06-22 NOTE — PATIENT INSTRUCTIONS
Dyslipidemia  Dyslipidemia is an imbalance of waxy, fat-like substances (lipids) in the blood. The body needs lipids in small amounts. Dyslipidemia often involves a high level of cholesterol or triglycerides, which are types of lipids.  Common forms of dyslipidemia include:  · High levels of LDL cholesterol. LDL is the type of cholesterol that causes fatty deposits (plaques) to build up in the blood vessels that carry blood away from your heart (arteries).  · Low levels of HDL cholesterol. HDL cholesterol is the type of cholesterol that protects against heart disease. High levels of HDL remove the LDL buildup from arteries.  · High levels of triglycerides. Triglycerides are a fatty substance in the blood that is linked to a buildup of plaques in the arteries.  What are the causes?  Primary dyslipidemia is caused by changes (mutations) in genes that are passed down through families (inherited). These mutations cause several types of dyslipidemia.  Secondary dyslipidemia is caused by lifestyle choices and diseases that lead to dyslipidemia, such as:  · Eating a diet that is high in animal fat.  · Not getting enough exercise.  · Having diabetes, kidney disease, liver disease, or thyroid disease.  · Drinking large amounts of alcohol.  · Using certain medicines.  What increases the risk?  You are more likely to develop this condition if you are an older man or if you are a woman who has gone through menopause. Other risk factors include:  · Having a family history of dyslipidemia.  · Taking certain medicines, including birth control pills, steroids, some diuretics, and beta-blockers.  · Smoking cigarettes.  · Eating a high-fat diet.  · Having certain medical conditions such as diabetes, polycystic ovary syndrome (PCOS), kidney disease, liver disease, or hypothyroidism.  · Not exercising regularly.  · Being overweight or obese with too much belly fat.  What are the signs or symptoms?  In most cases, dyslipidemia does not  usually cause any symptoms.  In severe cases, very high lipid levels can cause:  · Fatty bumps under the skin (xanthomas).  · White or gray ring around the black center (pupil) of the eye.  Very high triglyceride levels can cause inflammation of the pancreas (pancreatitis).  How is this diagnosed?  Your health care provider may diagnose dyslipidemia based on a routine blood test (fasting blood test). Because most people do not have symptoms of the condition, this blood testing (lipid profile) is done on adults age 20 and older and is repeated every 5 years. This test checks:  · Total cholesterol. This measures the total amount of cholesterol in your blood, including LDL cholesterol, HDL cholesterol, and triglycerides. A healthy number is below 200.  · LDL cholesterol. The target number for LDL cholesterol is different for each person, depending on individual risk factors. Ask your health care provider what your LDL cholesterol should be.  · HDL cholesterol. An HDL level of 60 or higher is best because it helps to protect against heart disease. A number below 40 for men or below 50 for women increases the risk for heart disease.  · Triglycerides. A healthy triglyceride number is below 150.  If your lipid profile is abnormal, your health care provider may do other blood tests.  How is this treated?  Treatment depends on the type of dyslipidemia that you have and your other risk factors for heart disease and stroke. Your health care provider will have a target range for your lipid levels based on this information.  For many people, this condition may be treated by lifestyle changes, such as diet and exercise. Your health care provider may recommend that you:  · Get regular exercise.  · Make changes to your diet.  · Quit smoking if you smoke.  If diet changes and exercise do not help you reach your goals, your health care provider may also prescribe medicine to lower lipids. The most commonly prescribed type of medicine  lowers your LDL cholesterol (statin drug). If you have a high triglyceride level, your provider may prescribe another type of drug (fibrate) or an omega-3 fish oil supplement, or both.  Follow these instructions at home:    Eating and drinking  · Follow instructions from your health care provider or dietitian about eating or drinking restrictions.  · Eat a healthy diet as told by your health care provider. This can help you reach and maintain a healthy weight, lower your LDL cholesterol, and raise your HDL cholesterol. This may include:  ? Limiting your calories, if you are overweight.  ? Eating more fruits, vegetables, whole grains, fish, and lean meats.  ? Limiting saturated fat, trans fat, and cholesterol.  · If you drink alcohol:  ? Limit how much you use.  ? Be aware of how much alcohol is in your drink. In the [place] one drink equals one 12 oz bottle of beer (355 mL), one 5 oz glass of wine (148 mL), or one 1½ oz glass of hard liquor (44 mL).  · Do not drink alcohol if:  ? Your health care provider tells you not to drink.  ? You are pregnant, may be pregnant, or are planning to become pregnant.  Activity  · Get regular exercise. Start an exercise and strength training program as told by your health care provider. Ask your health care provider what activities are safe for you. Your health care provider may recommend:  ? 30 minutes of aerobic activity 4-6 days a week. Brisk walking is an example of aerobic activity.  ? Strength training 2 days a week.  General instructions  · Do not use any products that contain nicotine or tobacco, such as cigarettes, e-cigarettes, and chewing tobacco. If you need help quitting, ask your health care provider.  · Take over-the-counter and prescription medicines only as told by your health care provider. This includes supplements.  · Keep all follow-up visits as told by your health care provider.  Contact a health care provider if:  · You are:  ? Having trouble sticking to your  exercise or diet plan.  ? Struggling to quit smoking or control your use of alcohol.  Summary  · Dyslipidemia often involves a high level of cholesterol or triglycerides, which are types of lipids.  · Treatment depends on the type of dyslipidemia that you have and your other risk factors for heart disease and stroke.  · For many people, treatment starts with lifestyle changes, such as diet and exercise.  · Your health care provider may prescribe medicine to lower lipids.  This information is not intended to replace advice given to you by your health care provider. Make sure you discuss any questions you have with your health care provider.  Document Revised: 08/12/2019 Document Reviewed: 07/19/2019  [employer] Patient Education © 2021 [employer]

## 2021-06-22 NOTE — PROGRESS NOTES
"Subjective   Jess Razo is a 60 y.o. female.     History of Present Illness    Since the last visit, she has overall felt tired.  She has Essential Hypertension and well controlled on current medication, DMII well controlled on medication and will continue regimen, GERD controlled on PPI Rx, Hyperlipidemia with goals met with current Rx, Hypothyroidism well controlled on current medication and will continue Rx and Vitamin D deficiency and labs are at goal >30 ng/mL.  she has been compliant with current medications have reviewed them.  The patient denies medication side effects.  Will refill medications. /61 (BP Location: Right arm, Patient Position: Sitting, Cuff Size: Adult)   Pulse 82   Temp 97.5 °F (36.4 °C)   Resp 16   Ht 165.1 cm (65\")   Wt 69.9 kg (154 lb)   LMP  (LMP Unknown)   SpO2 99%   BMI 25.63 kg/m²   Can take Ca+ once daily again.  Results for orders placed or performed in visit on 06/11/21   PTH, Intact    Specimen: Blood   Result Value Ref Range    PTH, Intact 16 15 - 65 pg/mL   Calcium, Ionized    Specimen: Blood   Result Value Ref Range    Ionized Calcium 5.1 4.5 - 5.6 mg/dL   Digoxin level    Specimen: Blood   Result Value Ref Range    Digoxin 1.20 0.60 - 1.20 ng/mL   T3, Free    Specimen: Blood   Result Value Ref Range    T3, Free 2.8 2.0 - 4.4 pg/mL   T4, Free    Specimen: Blood   Result Value Ref Range    Free T4 1.58 0.93 - 1.70 ng/dL   TSH    Specimen: Blood   Result Value Ref Range    TSH 1.510 0.270 - 4.200 uIU/mL   Comprehensive Metabolic Panel    Specimen: Blood   Result Value Ref Range    Glucose 117 (H) 65 - 99 mg/dL    BUN 10 6 - 20 mg/dL    Creatinine 0.59 0.57 - 1.00 mg/dL    eGFR Non African Am 104 >60 mL/min/1.73    eGFR African Am 126 >60 mL/min/1.73    BUN/Creatinine Ratio 16.9 7.0 - 25.0    Sodium 139 136 - 145 mmol/L    Potassium 4.9 3.5 - 5.2 mmol/L    Chloride 98 98 - 107 mmol/L    Total CO2 27.2 22.0 - 29.0 mmol/L    Calcium 9.6 8.6 - 10.5 mg/dL    Total " "Protein 7.2 6.0 - 8.5 g/dL    Albumin 4.90 3.50 - 5.20 g/dL    Globulin 2.3 gm/dL    A/G Ratio 2.1 g/dL    Total Bilirubin 0.6 0.0 - 1.2 mg/dL    Alkaline Phosphatase 82 39 - 117 U/L    AST (SGOT) 40 (H) 1 - 32 U/L    ALT (SGPT) 52 (H) 1 - 33 U/L     Dig level fine    F/u on calcium and thyroid    ACE changed to ARB---cough resolved; has HTN  cardiology for nonischemic cardiomyopathy  I treat her asthma    Jess Razo female 60 y.o., /61 (BP Location: Right arm, Patient Position: Sitting, Cuff Size: Adult)   Pulse 82   Temp 97.5 °F (36.4 °C)   Resp 16   Ht 165.1 cm (65\")   Wt 69.9 kg (154 lb)   LMP  (LMP Unknown)   SpO2 99%   BMI 25.63 kg/m²   who presents today for follow up of Depression and Anxiety.  She reports has helped but makes her flat.  Overall helped---not taking daily d/t flat. I will go down on dose. . Onset of symptoms was approximately several years ago.  She denies current suicidal and homicidal ideation. Risk factors are family history of anxiety and or depression, lifestyle of multiple roles and chronic illness.  Previous treatment includes current Rx.  She complains of the following medication side effects: flat. The patient declines to go to counseling..    Failed Lexapro and Zoloft--flat  She does see oncology yearly for her history of breast cancer.  She is now off tamoxifen.  US and bx left breast Friday    The following portions of the patient's history were reviewed and updated as appropriate: allergies, current medications, past family history, past medical history, past social history, past surgical history and problem list.    Review of Systems   Constitutional: Negative for activity change, appetite change and unexpected weight change.   HENT: Positive for congestion. Negative for nosebleeds and trouble swallowing.    Eyes: Negative for pain and visual disturbance.   Respiratory: Negative for chest tightness, shortness of breath and wheezing.    Cardiovascular: Negative " for chest pain and palpitations.   Gastrointestinal: Negative for abdominal pain and blood in stool.   Endocrine: Negative.    Genitourinary: Negative for difficulty urinating and hematuria.   Musculoskeletal: Negative for joint swelling.   Skin: Negative for color change and rash.   Allergic/Immunologic: Negative.    Neurological: Negative for syncope and speech difficulty.   Hematological: Negative for adenopathy.   Psychiatric/Behavioral: Positive for dysphoric mood and sleep disturbance. Negative for agitation and confusion. The patient is nervous/anxious.    All other systems reviewed and are negative.      Objective   Physical Exam  Vitals and nursing note reviewed.   Constitutional:       General: She is not in acute distress.     Appearance: She is well-developed. She is not ill-appearing or toxic-appearing.   HENT:      Head: Normocephalic.      Right Ear: External ear normal.      Left Ear: External ear normal.      Nose: Nose normal.      Mouth/Throat:      Pharynx: Oropharynx is clear.   Eyes:      General: No scleral icterus.     Conjunctiva/sclera: Conjunctivae normal.      Pupils: Pupils are equal, round, and reactive to light.   Neck:      Thyroid: No thyromegaly.      Vascular: No carotid bruit.   Cardiovascular:      Rate and Rhythm: Normal rate and regular rhythm.      Pulses: Normal pulses.      Heart sounds: Normal heart sounds. No murmur heard.     Pulmonary:      Effort: Pulmonary effort is normal. No respiratory distress.      Breath sounds: Normal breath sounds.   Musculoskeletal:         General: No tenderness or deformity. Normal range of motion.      Cervical back: Normal range of motion and neck supple.      Right lower leg: No edema.      Left lower leg: No edema.      Comments: Sore right trapezius with ROM right   Skin:     General: Skin is warm and dry.      Coloration: Skin is not jaundiced.      Findings: No rash.   Neurological:      General: No focal deficit present.      Mental  Status: She is alert and oriented to person, place, and time. Mental status is at baseline.   Psychiatric:         Mood and Affect: Mood normal.         Behavior: Behavior normal.         Thought Content: Thought content normal.         Judgment: Judgment normal.         Assessment/Plan   Diagnoses and all orders for this visit:    1. Generalized anxiety disorder (Primary)  -     Comprehensive metabolic panel; Future  -     Lipid panel; Future  -     Hemoglobin A1c; Future    2. Recurrent major depressive disorder, in partial remission (CMS/HCC)  -     Comprehensive metabolic panel; Future  -     Lipid panel; Future  -     Hemoglobin A1c; Future    3. Mixed hyperlipidemia  -     Comprehensive metabolic panel; Future  -     Lipid panel; Future  -     Hemoglobin A1c; Future    4. History of cardiomyopathy  -     Comprehensive metabolic panel; Future  -     Lipid panel; Future  -     Hemoglobin A1c; Future    5. Controlled type 2 diabetes mellitus without complication, without long-term current use of insulin (CMS/[facility])  -     Comprehensive metabolic panel; Future  -     Lipid panel; Future  -     Hemoglobin A1c; Future    6. History of breast cancer  -     Comprehensive metabolic panel; Future  -     Lipid panel; Future  -     Hemoglobin A1c; Future    7. Gastroesophageal reflux disease without esophagitis  -     Comprehensive metabolic panel; Future  -     Lipid panel; Future  -     Hemoglobin A1c; Future    8. Vitamin D deficiency  -     Comprehensive metabolic panel; Future  -     Lipid panel; Future  -     Hemoglobin A1c; Future    Other orders  -     beclomethasone (Qvar) 40 MCG/ACT inhaler; Inhale 2 puffs 2 (Two) Times a Day. Rinse mouth after use  Dispense: 8.7 g; Refill: 11  -     atorvastatin (LIPITOR) 80 MG tablet; Take 1 tablet by mouth Daily. for cholesterol  Dispense: 90 tablet; Refill: 3  -     montelukast (Singulair) 10 MG tablet; Take 1 tablet by mouth Every Night. For allergies  Dispense: 90 tablet;  Refill: 3  -     valsartan (DIOVAN) 160 MG tablet; One PO daily for BP, stop Lisinopril---ACE cough  Dispense: 90 tablet; Refill: 1  -     FLUoxetine (PROzac) 10 MG capsule; Take 1 capsule by mouth Daily. New lower dose for anxiety and depression  Dispense: 90 capsule; Refill: 3      Still want liver us  Need pap  Plan, Jess BRUCE BarajasRazo, was seen today.  she was seen for HTN and continue medication, DMII and refilled medications, GERD and will continue on PPI medication, Hyperlipidemia and will continue current medication, Hypothyroidism well controlled, continue medication and Vitamin D deficiency and supplemented.  Lower dose Prozac to 10mg and see if less flat  F/u 1-2 mos  Can add Victorir  Labs Nov     Answers for HPI/ROS submitted by the patient on 6/22/2021  What is the primary reason for your visit?: High Blood Pressure

## 2021-06-22 NOTE — TELEPHONE ENCOUNTER
----- Message from Jess Razo sent at 6/22/2021 12:35 PM EDT -----  Regarding: Prescription Question  Contact: [phone]  the medicine you called in qvar redihal 40mg. is sthe o ne that is 198.62 after insurance. is there something else i can use.      Thank you    Jess

## 2021-07-25 DIAGNOSIS — I10 HYPERTENSION, ESSENTIAL: ICD-10-CM

## 2021-07-25 RX ORDER — DILTIAZEM HYDROCHLORIDE 360 MG/1
CAPSULE, EXTENDED RELEASE ORAL
Qty: 90 CAPSULE | Refills: 0 | Status: SHIPPED | OUTPATIENT
Start: 2021-07-25 | End: 2021-10-17

## 2021-10-17 DIAGNOSIS — I10 HYPERTENSION, ESSENTIAL: ICD-10-CM

## 2021-10-17 RX ORDER — DILTIAZEM HYDROCHLORIDE 360 MG/1
CAPSULE, EXTENDED RELEASE ORAL
Qty: 90 CAPSULE | Refills: 0 | Status: SHIPPED | OUTPATIENT
Start: 2021-10-17 | End: 2022-01-23

## 2022-01-23 DIAGNOSIS — I10 HYPERTENSION, ESSENTIAL: ICD-10-CM

## 2022-01-23 RX ORDER — DILTIAZEM HYDROCHLORIDE 360 MG/1
CAPSULE, EXTENDED RELEASE ORAL
Qty: 90 CAPSULE | Refills: 0 | Status: SHIPPED | OUTPATIENT
Start: 2022-01-23 | End: 2022-02-15 | Stop reason: SDUPTHER

## 2022-02-15 ENCOUNTER — OFFICE VISIT (OUTPATIENT)
Dept: FAMILY MEDICINE CLINIC | Facility: CLINIC | Age: 62
End: 2022-02-15

## 2022-02-15 VITALS
RESPIRATION RATE: 16 BRPM | WEIGHT: 150.4 LBS | BODY MASS INDEX: 25.06 KG/M2 | HEART RATE: 95 BPM | OXYGEN SATURATION: 98 % | TEMPERATURE: 97.5 F | SYSTOLIC BLOOD PRESSURE: 122 MMHG | HEIGHT: 65 IN | DIASTOLIC BLOOD PRESSURE: 60 MMHG

## 2022-02-15 DIAGNOSIS — Z86.79 HISTORY OF CARDIOMYOPATHY: ICD-10-CM

## 2022-02-15 DIAGNOSIS — F41.1 GENERALIZED ANXIETY DISORDER: ICD-10-CM

## 2022-02-15 DIAGNOSIS — Z85.3 HISTORY OF BREAST CANCER: ICD-10-CM

## 2022-02-15 DIAGNOSIS — E78.2 MIXED HYPERLIPIDEMIA: ICD-10-CM

## 2022-02-15 DIAGNOSIS — J45.40 MODERATE PERSISTENT ASTHMA WITHOUT COMPLICATION: ICD-10-CM

## 2022-02-15 DIAGNOSIS — R05.9 COUGH: ICD-10-CM

## 2022-02-15 DIAGNOSIS — E11.9 CONTROLLED TYPE 2 DIABETES MELLITUS WITHOUT COMPLICATION, WITHOUT LONG-TERM CURRENT USE OF INSULIN: ICD-10-CM

## 2022-02-15 DIAGNOSIS — R79.89 LFT ELEVATION: ICD-10-CM

## 2022-02-15 DIAGNOSIS — I10 HYPERTENSION, ESSENTIAL: Primary | ICD-10-CM

## 2022-02-15 DIAGNOSIS — Z78.0 POST-MENOPAUSAL: ICD-10-CM

## 2022-02-15 DIAGNOSIS — E03.9 HYPOTHYROIDISM (ACQUIRED): ICD-10-CM

## 2022-02-15 DIAGNOSIS — K21.9 GASTROESOPHAGEAL REFLUX DISEASE WITHOUT ESOPHAGITIS: ICD-10-CM

## 2022-02-15 DIAGNOSIS — N30.00 ACUTE CYSTITIS WITHOUT HEMATURIA: ICD-10-CM

## 2022-02-15 DIAGNOSIS — K75.81 NASH (NONALCOHOLIC STEATOHEPATITIS): ICD-10-CM

## 2022-02-15 PROCEDURE — 99214 OFFICE O/P EST MOD 30 MIN: CPT | Performed by: PHYSICIAN ASSISTANT

## 2022-02-15 RX ORDER — LEVOTHYROXINE SODIUM 0.05 MG/1
50 TABLET ORAL DAILY
Qty: 90 TABLET | Refills: 3 | Status: SHIPPED | OUTPATIENT
Start: 2022-02-15

## 2022-02-15 RX ORDER — ALBUTEROL SULFATE 90 UG/1
2 AEROSOL, METERED RESPIRATORY (INHALATION) EVERY 4 HOURS PRN
Qty: 18 G | Refills: 11 | Status: SHIPPED | OUTPATIENT
Start: 2022-02-15 | End: 2022-04-25

## 2022-02-15 RX ORDER — VALSARTAN 160 MG/1
TABLET ORAL
Qty: 90 TABLET | Refills: 1 | Status: SHIPPED | OUTPATIENT
Start: 2022-02-15 | End: 2022-10-07

## 2022-02-15 RX ORDER — DILTIAZEM HYDROCHLORIDE 360 MG/1
360 CAPSULE, EXTENDED RELEASE ORAL DAILY
Qty: 90 CAPSULE | Refills: 0 | Status: SHIPPED | OUTPATIENT
Start: 2022-02-15 | End: 2022-07-10

## 2022-02-15 RX ORDER — NITROFURANTOIN 25; 75 MG/1; MG/1
100 CAPSULE ORAL EVERY 12 HOURS SCHEDULED
Qty: 14 CAPSULE | Refills: 0 | Status: SHIPPED | OUTPATIENT
Start: 2022-02-15 | End: 2022-03-10

## 2022-02-15 NOTE — PATIENT INSTRUCTIONS
Diabetes Mellitus Basics    Diabetes mellitus, or diabetes, is a long-term (chronic) disease. It occurs when the body does not properly use sugar (glucose) that is released from food after you eat.  Diabetes mellitus may be caused by one or both of these problems:  · Your pancreas does not make enough of a hormone called insulin.  · Your body does not react in a normal way to the insulin that it makes.  Insulin lets glucose enter cells in your body. This gives you energy. If you have diabetes, glucose cannot get into cells. This causes high blood glucose (hyperglycemia).  How to treat and manage diabetes  You may need to take insulin or other diabetes medicines daily to keep your glucose in balance. If you are prescribed insulin, you will learn how to give yourself insulin by injection. You may need to adjust the amount of insulin you take based on the foods that you eat.  You will need to check your blood glucose levels using a glucose monitor as told by your health care provider. The readings can help determine if you have low or high blood glucose.  Generally, you should have these blood glucose levels:  · Before meals (preprandial):  mg/dL (4.4-7.2 mmol/L).  · After meals (postprandial): below 180 mg/dL (10 mmol/L).  · Hemoglobin A1c (HbA1c) level: less than 7%.  Your health care provider will set treatment goals for you.  Keep all follow-up visits. This is important.  Follow these instructions at home:  Diabetes medicines  Take your diabetes medicines every day as told by your health care provider. List your diabetes medicines here:  · Name of medicine: ______________________________  ? Amount (dose): _______________ Time (a.m./p.m.): _______________ Notes: ___________________________________  · Name of medicine: ______________________________  ? Amount (dose): _______________ Time (a.m./p.m.): _______________ Notes: ___________________________________  · Name of medicine:  ______________________________  ? Amount (dose): _______________ Time (a.m./p.m.): _______________ Notes: ___________________________________  Insulin  If you use insulin, list the types of insulin you use here:  · Insulin type: ______________________________  ? Amount (dose): _______________ Time (a.m./p.m.): _______________Notes: ___________________________________  · Insulin type: ______________________________  ? Amount (dose): _______________ Time (a.m./p.m.): _______________ Notes: ___________________________________  · Insulin type: ______________________________  ? Amount (dose): _______________ Time (a.m./p.m.): _______________ Notes: ___________________________________  · Insulin type: ______________________________  ? Amount (dose): _______________ Time (a.m./p.m.): _______________ Notes: ___________________________________  · Insulin type: ______________________________  ? Amount (dose): _______________ Time (a.m./p.m.): _______________ Notes: ___________________________________  Managing blood glucose    Check your blood glucose levels using a glucose monitor as told by your health care provider.  Write down the times that you check your glucose levels here:  · Time: _______________ Notes: ___________________________________  · Time: _______________ Notes: ___________________________________  · Time: _______________ Notes: ___________________________________  · Time: _______________ Notes: ___________________________________  · Time: _______________ Notes: ___________________________________  · Time: _______________ Notes: ___________________________________    Low blood glucose  Low blood glucose (hypoglycemia) is when glucose is at or below 70 mg/dL (3.9 mmol/L). Symptoms may include:  · Feeling:  ? Hungry.  ? Sweaty and clammy.  ? Irritable or easily upset.  ? Dizzy.  ? Sleepy.  · Having:  ? A fast heartbeat.  ? A headache.  ? A change in your vision.  ? Numbness around the mouth, lips, or  tongue.  · Having trouble with:  ? Moving (coordination).  ? Sleeping.  Treating low blood glucose  To treat low blood glucose, eat or drink something containing sugar right away. If you can think clearly and swallow safely, follow the 15:15 rule:  · Take 15 grams of a fast-acting carb (carbohydrate), as told by your health care provider.  · Some fast-acting carbs are:  ? Glucose tablets: take 3-4 tablets.  ? Hard candy: eat 3-5 pieces.  ? Fruit juice: drink 4 oz (120 mL).  ? Regular (not diet) soda: drink 4-6 oz (120-180 mL).  ? Honey or sugar: eat 1 Tbsp (15 mL).  · Check your blood glucose levels 15 minutes after you take the carb.  · If your glucose is still at or below 70 mg/dL (3.9 mmol/L), take 15 grams of a carb again.  · If your glucose does not go above 70 mg/dL (3.9 mmol/L) after 3 tries, get help right away.  · After your glucose goes back to normal, eat a meal or a snack within 1 hour.  Treating very low blood glucose  If your glucose is at or below 54 mg/dL (3 mmol/L), you have very low blood glucose (severe hypoglycemia).  This is an emergency. Do not wait to see if the symptoms will go away. Get medical help right away. Call your local emergency services (911 in the [place] Do not drive yourself to the hospital.  Questions to ask your health care provider  · Should I talk with a diabetes educator?  · What equipment will I need to care for myself at home?  · What diabetes medicines do I need? When should I take them?  · How often do I need to check my blood glucose levels?  · What number can I call if I have questions?  · When is my follow-up visit?  · Where can I find a support group for people with diabetes?  Where to find more information  · American Diabetes Association: www.diabetes.org  · Association of Diabetes Care and Education Specialists: www.diabeteseducator.org  Contact a health care provider if:  · Your blood glucose is at or above 240 mg/dL (13.3 mmol/L) for 2 days in a row.  · You have  been sick or have had a fever for 2 days or more, and you are not getting better.  · You have any of these problems for more than 6 hours:  ? You cannot eat or drink.  ? You feel nauseous.  ? You vomit.  ? You have diarrhea.  Get help right away if:  · Your blood glucose is lower than 54 mg/dL (3 mmol/L).  · You get confused.  · You have trouble thinking clearly.  · You have trouble breathing.  These symptoms may represent a serious problem that is an emergency. Do not wait to see if the symptoms will go away. Get medical help right away. Call your local emergency services (911 in the [place] Do not drive yourself to the hospital.  Summary  · Diabetes mellitus is a chronic disease that occurs when the body does not properly use sugar (glucose) that is released from food after you eat.  · Take insulin and diabetes medicines as told.  · Check your blood glucose every day, as often as told.  · Keep all follow-up visits. This is important.  This information is not intended to replace advice given to you by your health care provider. Make sure you discuss any questions you have with your health care provider.  Document Revised: 04/20/2021 Document Reviewed: 04/20/2021  [employer] Patient Education © 2021 [employer]

## 2022-02-15 NOTE — PROGRESS NOTES
"Subjective   Jess Razo is a 61 y.o. female.     History of Present Illness    Since the last visit, she has overall felt fairly well.  She has Primary Hypertension and well controlled on current medication, DMII well controlled on medication and will continue regimen, Impaired fasting glucose and will monitor labs to watch for DMII, GERD controlled on PPI Rx, GERD well controlled on PPI and plan trial of step down therapy with H2 blocker, Hyperlipidemia with goals met with current Rx, Hypothyroidism well controlled on current medication and will continue Rx and Vitamin D deficiency and labs are at goal >30 ng/mL.  she has been compliant with current medications have reviewed them.  The patient denies medication side effects.  Will refill medications. /60 (BP Location: Left arm, Patient Position: Lying, Cuff Size: Adult)   Pulse 95   Temp 97.5 °F (36.4 °C)   Resp 16   Ht 165.1 cm (65\")   Wt 68.2 kg (150 lb 6.4 oz)   LMP  (LMP Unknown)   SpO2 98%   BMI 25.03 kg/m²   LFTs high;  Need liver US---COVID at time of labs  Has cough----still improving  Some voiding C/o's on and off for few weeks\  Stopped smoking  She does have asthma moderate persistent and want her to use Qvar or what ever his formulary daily because she is using her rescue inhaler daily--- want better control of asthma.  Has ENT appt 3-1-22    Sees opthal    Results for orders placed or performed in visit on 11/01/21   Comprehensive metabolic panel    Specimen: Blood   Result Value Ref Range    Glucose 119 (H) 65 - 99 mg/dL    BUN 8 8 - 27 mg/dL    Creatinine 0.60 0.57 - 1.00 mg/dL    eGFR Non African Am 99 >59 mL/min/1.73    eGFR African Am 115 >59 mL/min/1.73    BUN/Creatinine Ratio 13 12 - 28    Sodium 140 134 - 144 mmol/L    Potassium 4.9 3.5 - 5.2 mmol/L    Chloride 98 96 - 106 mmol/L    Total CO2 25 20 - 29 mmol/L    Calcium 10.3 8.7 - 10.3 mg/dL    Total Protein 7.3 6.0 - 8.5 g/dL    Albumin 4.8 3.8 - 4.9 g/dL    Globulin 2.5 1.5 " - 4.5 g/dL    A/G Ratio 1.9 1.2 - 2.2    Total Bilirubin 0.5 0.0 - 1.2 mg/dL    Alkaline Phosphatase 87 44 - 121 IU/L    AST (SGOT) 90 (H) 0 - 40 IU/L    ALT (SGPT) 77 (H) 0 - 32 IU/L   Lipid panel    Specimen: Blood   Result Value Ref Range    Total Cholesterol 132 100 - 199 mg/dL    Triglycerides 254 (H) 0 - 149 mg/dL    HDL Cholesterol 32 (L) >39 mg/dL    VLDL Cholesterol Diomedes 41 (H) 5 - 40 mg/dL    LDL Chol Calc (NIH) 59 0 - 99 mg/dL   Hemoglobin A1c    Specimen: Blood   Result Value Ref Range    Hemoglobin A1C 6.1 (H) 4.8 - 5.6 %     ACE changed to ARB---cough resolved; has HTN  cardiology for nonischemic cardiomyopathy  I treat her asthma  Failed Lexapro and Zoloft--flat  She does see oncology yearly for her history of breast cancer.---had bx last year ---neg  Off Tamoxifen    Ortho Dr Starks; TENS unit -----helps neck pain  Failed Lexapro and Zoloft--flat on higher dose---Prozac----lowered it last visit and has helped;       Saw cardio 12-13-21  1. Cardiomyopathy - patient's been feeling well over the course the last year and denies any functional decline. She denies any chest pain or signs of decompensated heart failure. Echocardiogram from last year showed normal LV function with an EF of 50 to 55%. Continue atorvastatin, valsartan, bisoprolol and digoxin.    2. Inappropriate sinus tachycardia -patient reports good control of her heart rate. She denies any sensation of rapid heartbeat or heart palpitations. Her rate is controlled with diltiazem, bisoprolol and digoxin.    Saw oncologist 6-16-21 11/20/2020: BILATERAL SCREENING lissy MAMMOGRAM [facility] There is no mammographic evidence of malignancy.    IMPRESSION:  1. (Stage IIA)G6oC3ha intermediate grade, infiltrating ductal carcinoma, estrogen and progesterone receptor positive, HER2/salome negative. The patient underwent a breast conserving surgical procedure November 2006.Four cycles of doxorubicin/cyclophosphamide followed by four cycles of paclitaxel 15  March 2007. She completed five years of tamoxifen in May 2012. At that time she was switched to letrozole. Trial of exemestane started November 22, 2013 due to persistent hair thinning. Tamoxifen and substituted per patient request in May 2014. Completed 2017.     2. Longstanding degenerative joint disease including back pain, right shoulder, right hip  3. Left upper extremity lymphedema - symptoms stable. Routinely wearing compression garment.  4. Ongoing tobacco use.  6. Hypertriglyceridemia     PLAN:  Left breast diagnostic studies; cannot exclude palpable lesion of the left breast new primary or recurrent       The following portions of the patient's history were reviewed and updated as appropriate: allergies, current medications, past family history, past medical history, past social history, past surgical history and problem list.    Review of Systems   Constitutional: Negative for activity change, appetite change and unexpected weight change.   HENT: Negative for nosebleeds and trouble swallowing.    Eyes: Negative for pain and visual disturbance.   Respiratory: Positive for cough. Negative for chest tightness, shortness of breath and wheezing.    Cardiovascular: Negative for chest pain and palpitations.   Gastrointestinal: Negative for abdominal pain and blood in stool.   Endocrine: Negative.    Genitourinary: Positive for dysuria. Negative for difficulty urinating and hematuria.   Musculoskeletal: Negative for joint swelling.   Skin: Negative for color change and rash.   Allergic/Immunologic: Negative.    Neurological: Negative for syncope and speech difficulty.   Hematological: Negative for adenopathy.   Psychiatric/Behavioral: Positive for sleep disturbance. Negative for agitation and confusion.   All other systems reviewed and are negative.      Objective   Physical Exam  Vitals and nursing note reviewed.   Constitutional:       General: She is not in acute distress.     Appearance: She is  well-developed. She is not ill-appearing or toxic-appearing.   HENT:      Head: Normocephalic.      Right Ear: External ear normal.      Left Ear: External ear normal.      Nose: Nose normal.      Mouth/Throat:      Pharynx: Oropharynx is clear.   Eyes:      General: No scleral icterus.     Conjunctiva/sclera: Conjunctivae normal.      Pupils: Pupils are equal, round, and reactive to light.   Neck:      Thyroid: No thyromegaly.   Cardiovascular:      Rate and Rhythm: Normal rate and regular rhythm.      Heart sounds: Normal heart sounds. No murmur heard.      Pulmonary:      Effort: Pulmonary effort is normal. No respiratory distress.      Breath sounds: Normal breath sounds.   Musculoskeletal:         General: No deformity. Normal range of motion.      Cervical back: Normal range of motion and neck supple.   Skin:     General: Skin is warm and dry.      Findings: No rash.   Neurological:      General: No focal deficit present.      Mental Status: She is alert and oriented to person, place, and time. Mental status is at baseline.   Psychiatric:         Mood and Affect: Mood normal.         Behavior: Behavior normal.         Thought Content: Thought content normal.         Judgment: Judgment normal.         Assessment/Plan   Diagnoses and all orders for this visit:    1. Hypertension, essential (Primary)    2. Mixed hyperlipidemia    3. Hypothyroidism (acquired)    4. LFT elevation  -     US Liver; Future    5. Gastroesophageal reflux disease without esophagitis    6. Generalized anxiety disorder    7. History of cardiomyopathy    8. Moderate persistent asthma without complication  -     albuterol sulfate  (90 Base) MCG/ACT inhaler; Inhale 2 puffs Every 4 (Four) Hours As Needed for Wheezing or Shortness of Air.  Dispense: 18 g; Refill: 11    9. Controlled type 2 diabetes mellitus without complication, without long-term current use of insulin ([facility])    10. History of breast cancer    11. Cough  -     XR Chest PA  & Lateral; Future    12. Post-menopausal  -     DEXA Bone Density Axial; Future    13. Acute cystitis without hematuria  -     Urinalysis With Microscopic - Urine, Clean Catch  -     Urine Culture - Urine, Urine, Clean Catch    Other orders  -     beclomethasone (Qvar) 40 MCG/ACT inhaler; Inhale 2 puffs 2 (Two) Times a Day. Rinse mouth after use  Dispense: 8.7 g; Refill: 11        She is to get CXR for work------I do want CXR d/t recent COVID and cough  Hx asthma; want her on daily steroid inhaler  Sees cardio----for cardiomyopathy sees cardiologist--Dr Trivedi  Turnabout elevated LFTs although COVID likely raised her last labs.  Do want liver ultrasound to evaluate for fatty liver persistent elevation over the last 2 years  Followed by [facility] for her history of breast cancer  Continue Prozac at 10 mg working for anxiety and depression and no longer flat at the 10 mg dose  Seeing Ortho and now has TENS unit for neck pain and helping also has as needed Zanaflex muscle spasms and does help  Also has voiding complaints intermittently few weeks and concerned about UTI will send for urine culture and micro today---.  Point-of-care urine did show small leukocytes and will start Rx while waiting for culture

## 2022-02-17 ENCOUNTER — PRIOR AUTHORIZATION (OUTPATIENT)
Dept: FAMILY MEDICINE CLINIC | Facility: CLINIC | Age: 62
End: 2022-02-17

## 2022-02-17 NOTE — TELEPHONE ENCOUNTER
APPROVED    PA Case: [id number], Status: Approved, Coverage Starts on: 1/1/2022 12:00:00 AM, Coverage Ends on: 12/31/2022 12:00:00 AM. Questions? Contact [phone].        Marly

## 2022-02-19 LAB
APPEARANCE UR: CLEAR
BACTERIA #/AREA URNS HPF: ABNORMAL /[HPF]
BACTERIA UR CULT: ABNORMAL
BACTERIA UR CULT: ABNORMAL
BILIRUB UR QL STRIP: NEGATIVE
CASTS URNS QL MICRO: ABNORMAL /LPF
COLOR UR: YELLOW
EPI CELLS #/AREA URNS HPF: ABNORMAL /HPF (ref 0–10)
GLUCOSE UR QL STRIP: NEGATIVE
HGB UR QL STRIP: NEGATIVE
KETONES UR QL STRIP: NEGATIVE
LEUKOCYTE ESTERASE UR QL STRIP: ABNORMAL
MICRO URNS: ABNORMAL
NITRITE UR QL STRIP: NEGATIVE
OTHER ANTIBIOTIC SUSC ISLT: ABNORMAL
PH UR STRIP: 6.5 [PH] (ref 5–7.5)
PROT UR QL STRIP: NEGATIVE
RBC #/AREA URNS HPF: ABNORMAL /HPF (ref 0–2)
SP GR UR STRIP: 1.01 (ref 1–1.03)
UROBILINOGEN UR STRIP-MCNC: 0.2 MG/DL (ref 0.2–1)
WBC #/AREA URNS HPF: ABNORMAL /HPF (ref 0–5)

## 2022-03-10 ENCOUNTER — HOSPITAL ENCOUNTER (OUTPATIENT)
Dept: ULTRASOUND IMAGING | Facility: HOSPITAL | Age: 62
Discharge: HOME | End: 2022-03-10
Admitting: PHYSICIAN ASSISTANT

## 2022-03-10 DIAGNOSIS — R79.89 LFT ELEVATION: ICD-10-CM

## 2022-03-10 PROBLEM — K75.81 NASH (NONALCOHOLIC STEATOHEPATITIS): Status: ACTIVE | Noted: 2022-03-10

## 2022-03-10 PROCEDURE — 76705 ECHO EXAM OF ABDOMEN: CPT

## 2022-04-25 ENCOUNTER — OFFICE VISIT (OUTPATIENT)
Dept: GASTROENTEROLOGY | Facility: CLINIC | Age: 62
End: 2022-04-25

## 2022-04-25 VITALS
HEIGHT: 65 IN | WEIGHT: 147.4 LBS | DIASTOLIC BLOOD PRESSURE: 78 MMHG | BODY MASS INDEX: 24.56 KG/M2 | TEMPERATURE: 97.1 F | SYSTOLIC BLOOD PRESSURE: 128 MMHG

## 2022-04-25 DIAGNOSIS — K76.0 FATTY LIVER: Chronic | ICD-10-CM

## 2022-04-25 DIAGNOSIS — Z85.3 HISTORY OF BREAST CANCER: Chronic | ICD-10-CM

## 2022-04-25 DIAGNOSIS — R74.8 ELEVATED LIVER ENZYMES: Primary | Chronic | ICD-10-CM

## 2022-04-25 PROCEDURE — 99204 OFFICE O/P NEW MOD 45 MIN: CPT | Performed by: INTERNAL MEDICINE

## 2022-04-25 NOTE — PROGRESS NOTES
Chief Complaint   Patient presents with   • Elevated Hepatic Enzymes   • Heartburn       Subjective     HPI    Jess Razo is a 61 y.o. female with a past medical history noted below who presents for elevated liver enzymes.  She reports this has been an issue since chemo for breast CA about 15 years ago.  Labs in the [facility] system notable for AST and ALT 2-3 x ULN going back to 2017.  She denies right upper quadrant pain.  No family history of liver disease.  She does not recall what chemotherapy she took for her breast cancer though does state that it caused some cardiac issues that she follows with cardiology and is considered stable.  Imaging has shown diffuse hepatic steatosis.  She reports that her labs from January were taken at a time when she later found out she was positive for COVID.    She reports previously weighing 200#.  She lost 50# pretty rapidly and has stayed at her current weight for many years.      She has a history of T2DM, she is controlled on metformin.      Colonoscopy 6/2020 with Dr Mars, 5 polyps, will repeat next year.    No family history of GI malignancies.    She takes vitamin E as per her oncologist    She works as a .    Smokes 4 cigarettes daily.  No excess ETOH.      Today's visit was in the office.  Both the patient and I were wearing face masks and proper hand hygiene was performed before and after the physical exam.           Current Outpatient Medications:   •  atorvastatin (LIPITOR) 80 MG tablet, Take 1 tablet by mouth Daily. for cholesterol, Disp: 90 tablet, Rfl: 3  •  beclomethasone (Qvar) 40 MCG/ACT inhaler, Inhale 2 puffs 2 (Two) Times a Day. Rinse mouth after use, Disp: 8.7 g, Rfl: 11  •  bimatoprost (LATISSE) 0.03 % ophthalmic solution, Administer  to both eyes Every Night., Disp: , Rfl:   •  bimatoprost (LUMIGAN) 0.03 % ophthalmic drops, INSTILL 1 DROP INTO EACH EYE AT BEDTIME, Disp: , Rfl:   •  bisoprolol (ZEBeta) 10 MG tablet, Take 10 mg by  mouth Daily., Disp: , Rfl:   •  calcium carbonate (OS-YOON) 1250 (500 CA) MG tablet, Take 1 tablet by mouth Daily., Disp: , Rfl:   •  Cholecalciferol (VITAMIN D3) 400 UNITS capsule, Take 1 capsule by mouth Daily., Disp: , Rfl:   •  digoxin (LANOXIN) 250 MCG tablet, Take 250 mcg by mouth every day., Disp: , Rfl:   •  dilTIAZem CD (CARDIZEM CD) 360 MG 24 hr capsule, Take 1 capsule by mouth Daily. For heart, Disp: 90 capsule, Rfl: 0  •  esomeprazole (nexIUM) 20 MG capsule, Take 20 mg by mouth Every Morning Before Breakfast., Disp: , Rfl:   •  Fexofenadine HCl (ALLERGY 24-HR PO), Take 1 tablet by mouth Daily., Disp: , Rfl:   •  Garlic 1000 MG capsule, Take 1,000 mg by mouth Daily., Disp: , Rfl:   •  levothyroxine (Synthroid) 50 MCG tablet, Take 1 tablet by mouth Daily. For thyroid before breakfast--new dose, Disp: 90 tablet, Rfl: 3  •  metFORMIN (GLUCOPHAGE) 500 MG tablet, 3 PO in PM For DMII, Disp: 270 tablet, Rfl: 3  •  Multiple Vitamin (MULTIVITAMIN) tablet, Take 1 tablet by mouth Daily., Disp: , Rfl:   •  valsartan (DIOVAN) 160 MG tablet, One PO daily for BP, stop Lisinopril---ACE cough, Disp: 90 tablet, Rfl: 1  •  vitamin E 1000 UNIT capsule, Take 1,000 Units by mouth Daily., Disp: , Rfl:       Objective     Vitals:    04/25/22 0913   BP: 128/78   Temp: 97.1 °F (36.2 °C)         04/25/22 0913   Weight: 66.9 kg (147 lb 6.4 oz)     Body mass index is 24.53 kg/m².    Physical Exam  Constitutional:       General: She is not in acute distress.  Pulmonary:      Effort: Pulmonary effort is normal.   Neurological:      Mental Status: She is alert and oriented to person, place, and time.   Psychiatric:         Mood and Affect: Mood normal.         Behavior: Behavior normal.         Thought Content: Thought content normal.         Judgment: Judgment normal.             WBC   Date Value Ref Range Status   05/10/2021 6.22 3.40 - 10.80 10*3/mm3 Final     RBC   Date Value Ref Range Status   05/10/2021 4.70 3.77 - 5.28 10*6/mm3  Final     Hemoglobin   Date Value Ref Range Status   05/10/2021 14.2 12.0 - 15.9 g/dL Final     Hematocrit   Date Value Ref Range Status   05/10/2021 42.6 34.0 - 46.6 % Final     MCV   Date Value Ref Range Status   05/10/2021 90.6 79.0 - 97.0 fL Final     MCH   Date Value Ref Range Status   05/10/2021 30.2 26.6 - 33.0 pg Final     MCHC   Date Value Ref Range Status   05/10/2021 33.3 31.5 - 35.7 g/dL Final     RDW   Date Value Ref Range Status   05/10/2021 12.6 12.3 - 15.4 % Final     Platelets   Date Value Ref Range Status   05/10/2021 217 140 - 450 10*3/mm3 Final     Neutrophil Rel %   Date Value Ref Range Status   05/10/2021 63.4 42.7 - 76.0 % Final     Lymphocyte Rel %   Date Value Ref Range Status   05/10/2021 27.3 19.6 - 45.3 % Final     Monocyte Rel %   Date Value Ref Range Status   05/10/2021 7.2 5.0 - 12.0 % Final     Eosinophil Rel %   Date Value Ref Range Status   05/10/2021 1.3 0.3 - 6.2 % Final     Basophil Rel %   Date Value Ref Range Status   05/10/2021 0.6 0.0 - 1.5 % Final     Neutrophils Absolute   Date Value Ref Range Status   05/10/2021 3.94 1.70 - 7.00 10*3/mm3 Final     Lymphocytes Absolute   Date Value Ref Range Status   05/10/2021 1.70 0.70 - 3.10 10*3/mm3 Final     Monocytes Absolute   Date Value Ref Range Status   05/10/2021 0.45 0.10 - 0.90 10*3/mm3 Final     Eosinophils Absolute   Date Value Ref Range Status   05/10/2021 0.08 0.00 - 0.40 10*3/mm3 Final     Basophils Absolute   Date Value Ref Range Status   05/10/2021 0.04 0.00 - 0.20 10*3/mm3 Final     nRBC   Date Value Ref Range Status   05/10/2021 0.0 0.0 - 0.2 /100 WBC Final       Lab Results   Component Value Date    GLUCOSE 119 (H) 01/18/2022    BUN 8 01/18/2022    CREATININE 0.60 01/18/2022    EGFRIFNONA 99 01/18/2022    EGFRIFAFRI 115 01/18/2022    BCR 13 01/18/2022    CO2 25 01/18/2022    CALCIUM 10.3 01/18/2022    PROTENTOTREF 7.3 01/18/2022    ALBUMIN 4.8 01/18/2022    LABIL2 1.9 01/18/2022    AST 90 (H) 01/18/2022    ALT 77 (H)  01/18/2022       ULTRASOUND LIVER     CLINICAL INFORMATION: Elevated liver enzymes, history of breast  carcinoma.     FINDINGS: There is diffuse fatty infiltration of the liver, no focal  liver lesion.     Several shadowing small gallstones within the dependent portion of the  gallbladder. No pericholecystic fluid or wall thickening. No biliary  duct dilatation.  CBD is 6 mm. The right kidney is 13 cm in length. It  is normal in appearance. No calculus or obstructive uropathy. Visualized  portions of the pancreatic head and body within normal limits. Tail  obscured. No free fluid is demonstrated within the right upper quadrant.     CONCLUSION:  1. Fatty infiltration of the liver.  2. Gallstones.  3. Limited evaluation of the pancreas.     This report was finalized on 3/10/2022 3:27 PM by Dr. Yunier Mchugh M.D.         I personally reviewed data as detailed below:     The labs listed above.    The radiology studies listed above.    Office notes from: 2/2022 pcp    Endoscopy procedures and pathology from: 6/25/20 colonoscopy           Assessment/Plan   1.  Elevated liver enzymes: Chronic issue.  Suspect a component of fatty liver, however she has had weight loss and is at a healthy body weight.  I am concerned that there may be some chemotherapy associated steatosis/liver disease    2.  Fatty liver: As per imaging    3.  History of breast cancer: She is in remission    Plan  Liver serologic workup today  Discussed that she may need a liver biopsy given the chronic elevation as well as history of chemotherapy  Continue healthy lifestyle with maintaining healthy body weight and diabetes control  Further recommendations after labs    Diagnoses and all orders for this visit:    1. Elevated liver enzymes (Primary)  -     Gliadin Antibody, IgG  -     IgA  -     Tissue Transglutaminase, IgA  -     Tissue Transglutaminase, IgG  -     Ferritin  -     Iron Profile  -     Alpha - 1 - Antitrypsin Phenotype  -     LORRAINE  -      Anti-Smooth Muscle Antibody Titer  -     Ceruloplasmin  -     Gamma GT  -     Mitochondrial Antibodies, M2  -     IgG  -     Hepatitis Panel, Acute  -     Soluble Liver Ag (IgG Ab)  -     Comprehensive Metabolic Panel    2. Fatty liver  -     Gliadin Antibody, IgG  -     IgA  -     Tissue Transglutaminase, IgA  -     Tissue Transglutaminase, IgG  -     Ferritin  -     Iron Profile  -     Alpha - 1 - Antitrypsin Phenotype  -     LORRAINE  -     Anti-Smooth Muscle Antibody Titer  -     Ceruloplasmin  -     Gamma GT  -     Mitochondrial Antibodies, M2  -     IgG  -     Hepatitis Panel, Acute  -     Soluble Liver Ag (IgG Ab)  -     Comprehensive Metabolic Panel    3. History of breast cancer        I have discussed the above plan with the patient.  They verbalize understanding and are in agreement with the plan.  They have been advised to contact the office for any questions, concerns, or changes related to their health.    Dictated utilizing Dragon dictation

## 2022-04-26 LAB
ALBUMIN SERPL-MCNC: 4.7 G/DL (ref 3.8–4.9)
ALBUMIN/GLOB SERPL: 2 {RATIO} (ref 1.2–2.2)
ALP SERPL-CCNC: 86 IU/L (ref 44–121)
ALT SERPL-CCNC: 35 IU/L (ref 0–32)
ANA SER QL: NEGATIVE
AST SERPL-CCNC: 25 IU/L (ref 0–40)
BILIRUB SERPL-MCNC: 0.5 MG/DL (ref 0–1.2)
BUN SERPL-MCNC: 10 MG/DL (ref 8–27)
BUN/CREAT SERPL: 16 (ref 12–28)
CALCIUM SERPL-MCNC: 9.9 MG/DL (ref 8.7–10.3)
CERULOPLASMIN SERPL-MCNC: 22.9 MG/DL (ref 19–39)
CHLORIDE SERPL-SCNC: 97 MMOL/L (ref 96–106)
CO2 SERPL-SCNC: 21 MMOL/L (ref 20–29)
CREAT SERPL-MCNC: 0.63 MG/DL (ref 0.57–1)
EGFRCR SERPLBLD CKD-EPI 2021: 101 ML/MIN/1.73
FERRITIN SERPL-MCNC: 58 NG/ML (ref 15–150)
GGT SERPL-CCNC: 80 IU/L (ref 0–60)
GLIADIN PEPTIDE IGG SER-ACNC: 1 UNITS (ref 0–19)
GLOBULIN SER CALC-MCNC: 2.4 G/DL (ref 1.5–4.5)
GLUCOSE SERPL-MCNC: ABNORMAL MG/DL
HAV IGM SERPL QL IA: NEGATIVE
HBV CORE IGM SERPL QL IA: NEGATIVE
HBV SURFACE AG SERPL QL IA: NEGATIVE
HCV AB S/CO SERPL IA: <0.1 S/CO RATIO (ref 0–0.9)
HCV AB SERPL QL IA: NORMAL
IGA SERPL-MCNC: 239 MG/DL (ref 87–352)
IGG SERPL-MCNC: 1047 MG/DL (ref 586–1602)
IRON SATN MFR SERPL: 18 % (ref 15–55)
IRON SERPL-MCNC: 79 UG/DL (ref 27–159)
MITOCHONDRIA M2 IGG SER-ACNC: <20 UNITS (ref 0–20)
POTASSIUM SERPL-SCNC: ABNORMAL MMOL/L
PROT SERPL-MCNC: 7.1 G/DL (ref 6–8.5)
SMA IGG SER-ACNC: 8 UNITS (ref 0–19)
SODIUM SERPL-SCNC: 137 MMOL/L (ref 134–144)
TIBC SERPL-MCNC: 430 UG/DL (ref 250–450)
TTG IGA SER-ACNC: <2 U/ML (ref 0–3)
TTG IGG SER-ACNC: 3 U/ML (ref 0–5)
UIBC SERPL-MCNC: 351 UG/DL (ref 131–425)

## 2022-04-28 LAB — SOLUBLE LIVER IGG SER IA-ACNC: 1.8 UNITS (ref 0–20)

## 2022-05-01 RX ORDER — ATORVASTATIN CALCIUM 80 MG/1
TABLET, FILM COATED ORAL
Qty: 90 TABLET | Refills: 0 | Status: SHIPPED | OUTPATIENT
Start: 2022-05-01 | End: 2022-08-05

## 2022-05-02 ENCOUNTER — TELEPHONE (OUTPATIENT)
Dept: GASTROENTEROLOGY | Facility: CLINIC | Age: 62
End: 2022-05-02

## 2022-05-02 LAB
A1AT PHENOTYP SERPL IFE: NORMAL
A1AT SERPL-MCNC: 125 MG/DL (ref 101–187)

## 2022-05-02 NOTE — TELEPHONE ENCOUNTER
----- Message from Marly Valencia MD sent at 5/2/2022 12:35 PM EDT -----  Her lab testing for genetic and causes is normal.    Her liver numbers are still slightly elevated but improved from her prior check.    Will continue to monitor.

## 2022-05-04 ENCOUNTER — TELEPHONE (OUTPATIENT)
Dept: GASTROENTEROLOGY | Facility: CLINIC | Age: 62
End: 2022-05-04

## 2022-05-04 ENCOUNTER — OFFICE VISIT (OUTPATIENT)
Dept: FAMILY MEDICINE CLINIC | Facility: CLINIC | Age: 62
End: 2022-05-04

## 2022-05-04 VITALS
HEIGHT: 65 IN | RESPIRATION RATE: 16 BRPM | SYSTOLIC BLOOD PRESSURE: 110 MMHG | WEIGHT: 148.2 LBS | BODY MASS INDEX: 24.69 KG/M2 | TEMPERATURE: 97.5 F | OXYGEN SATURATION: 99 % | DIASTOLIC BLOOD PRESSURE: 75 MMHG | HEART RATE: 96 BPM

## 2022-05-04 DIAGNOSIS — E11.9 CONTROLLED TYPE 2 DIABETES MELLITUS WITHOUT COMPLICATION, WITHOUT LONG-TERM CURRENT USE OF INSULIN: Primary | ICD-10-CM

## 2022-05-04 DIAGNOSIS — F17.201 TOBACCO ABUSE, IN REMISSION: ICD-10-CM

## 2022-05-04 DIAGNOSIS — J45.30 MILD PERSISTENT ASTHMA WITHOUT COMPLICATION: ICD-10-CM

## 2022-05-04 PROCEDURE — 90471 IMMUNIZATION ADMIN: CPT | Performed by: PHYSICIAN ASSISTANT

## 2022-05-04 PROCEDURE — 90677 PCV20 VACCINE IM: CPT | Performed by: PHYSICIAN ASSISTANT

## 2022-05-04 PROCEDURE — 99214 OFFICE O/P EST MOD 30 MIN: CPT | Performed by: PHYSICIAN ASSISTANT

## 2022-05-04 RX ORDER — FLUTICASONE PROPIONATE AND SALMETEROL 250; 50 UG/1; UG/1
1 POWDER RESPIRATORY (INHALATION)
Qty: 60 EACH | Refills: 11 | Status: SHIPPED | OUTPATIENT
Start: 2022-05-04 | End: 2022-09-15

## 2022-05-04 NOTE — TELEPHONE ENCOUNTER
----- Message from Marly Valencia MD sent at 5/4/2022 10:43 AM EDT -----  Her alpha 1 antitrypsin testing for liver disease is negative.

## 2022-05-04 NOTE — PROGRESS NOTES
"Subjective   Jess Razo is a 61 y.o. female.     History of Present Illness    Since the last visit, she has overall felt fairly well.  She has DMII here to go over med.  she has been compliant with current medications have reviewed them.  The patient denies medication side effects.  . /75 (BP Location: Left arm, Patient Position: Sitting, Cuff Size: Adult)   Pulse 96   Temp 97.5 °F (36.4 °C)   Resp 16   Ht 165.1 cm (65\")   Wt 67.2 kg (148 lb 3.2 oz)   LMP  (LMP Unknown)   SpO2 99%   BMI 24.66 kg/m²     Results for orders placed or performed in visit on 04/25/22   Gliadin Antibody, IgG    Specimen: Blood   Result Value Ref Range    Deaminated Gliadin Ab IgG 1 0 - 19 units   IgA    Specimen: Blood   Result Value Ref Range    IgA 239 87 - 352 mg/dL   Tissue Transglutaminase, IgA    Specimen: Blood   Result Value Ref Range    Tissue Transglutaminase IgA <2 0 - 3 U/mL   Tissue Transglutaminase, IgG    Specimen: Blood   Result Value Ref Range    Tissue Transglutaminase IgG 3 0 - 5 U/mL   Ferritin    Specimen: Blood   Result Value Ref Range    Ferritin 58 15 - 150 ng/mL   Iron Profile    Specimen: Blood   Result Value Ref Range    TIBC 430 250 - 450 ug/dL    UIBC 351 131 - 425 ug/dL    Iron 79 27 - 159 ug/dL    Iron Saturation 18 15 - 55 %   Alpha - 1 - Antitrypsin Phenotype    Specimen: Blood   Result Value Ref Range    A-1 Antitrypsin 125 101 - 187 mg/dL    Phenotype MM    LORRAINE    Specimen: Blood   Result Value Ref Range    LORRAINE Direct Negative Negative   Anti-Smooth Muscle Antibody Titer    Specimen: Blood   Result Value Ref Range    Smooth Muscle Ab 8 0 - 19 Units   Ceruloplasmin    Specimen: Blood   Result Value Ref Range    Ceruloplasmin 22.9 19.0 - 39.0 mg/dL   Gamma GT    Specimen: Blood   Result Value Ref Range    GGT 80 (H) 0 - 60 IU/L   Mitochondrial Antibodies, M2    Specimen: Blood   Result Value Ref Range    Mitochondrial Ab <20.0 0.0 - 20.0 Units   IgG    Specimen: Blood   Result Value Ref " Range    IgG 1,047 586 - 1,602 mg/dL   Hepatitis Panel, Acute    Specimen: Blood   Result Value Ref Range    Hep A IgM Negative Negative    Hepatitis B Surface Ag Negative Negative    Hep B Core IgM Negative Negative    Hepatitis C Ab <0.1 0.0 - 0.9 s/co ratio   Soluble Liver Ag (IgG Ab)    Specimen: Blood   Result Value Ref Range    Soluble Liver Antigen, IgG 1.8 0.0 - 20.0 units   Comprehensive Metabolic Panel   Result Value Ref Range    Glucose CANCELED mg/dL    BUN 10 8 - 27 mg/dL    Creatinine 0.63 0.57 - 1.00 mg/dL    EGFR Result 101 >59 mL/min/1.73    BUN/Creatinine Ratio 16 12 - 28    Sodium 137 134 - 144 mmol/L    Potassium CANCELED mmol/L    Chloride 97 96 - 106 mmol/L    Total CO2 21 20 - 29 mmol/L    Calcium 9.9 8.7 - 10.3 mg/dL    Total Protein 7.1 6.0 - 8.5 g/dL    Albumin 4.7 3.8 - 4.9 g/dL    Globulin 2.4 1.5 - 4.5 g/dL    A/G Ratio 2.0 1.2 - 2.2    Total Bilirubin 0.5 0.0 - 1.2 mg/dL    Alkaline Phosphatase 86 44 - 121 IU/L    AST (SGOT) 25 0 - 40 IU/L    ALT (SGPT) 35 (H) 0 - 32 IU/L   Interpretation:   Result Value Ref Range    Interpretation Comment    I have labs ordered for July  She does see oncology yearly for her history of breast cancer.---had bx last year ---neg  Off Tamoxifen  Liver labs negative for genetic cause  GI Dr Valencia advised stopping Metformin d/t LFTs;  Need change Rx    Still asthma Dx;  Was on Qvar---need resend  Failed Lexapro and Zoloft--flat      Saw cardio 12-13-21  1. Cardiomyopathy - patient's been feeling well over the course the last year and denies any functional decline. She denies any chest pain or signs of decompensated heart failure. Echocardiogram from last year showed normal LV function with an EF of 50 to 55%. Continue atorvastatin, valsartan, bisoprolol and digoxin.    2. Inappropriate sinus tachycardia -patient reports good control of her heart rate. She denies any sensation of rapid heartbeat or heart palpitations. Her rate is controlled with diltiazem,  bisoprolol and digoxin.     Saw oncologist 6-16-21 11/20/2020: BILATERAL SCREENING lissy MAMMOGRAM [facility] There is no mammographic evidence of malignancy.    IMPRESSION:  1. (Stage IIA)[id number] intermediate grade, infiltrating ductal carcinoma, estrogen and progesterone receptor positive, HER2/salome negative. The patient underwent a breast conserving surgical procedure November 2006.Four cycles of doxorubicin/cyclophosphamide followed by four cycles of paclitaxel 15 March 2007. She completed five years of tamoxifen in May 2012. At that time she was switched to letrozole. Trial of exemestane started November 22, 2013 due to persistent hair thinning. Tamoxifen and substituted per patient request in May 2014. Completed 2017.     2. Longstanding degenerative joint disease including back pain, right shoulder, right hip  3. Left upper extremity lymphedema - symptoms stable. Routinely wearing compression garment.  4. Ongoing tobacco use.  6. Hypertriglyceridemia     PLAN:  Left breast diagnostic studies; cannot exclude palpable lesion of the left breast new primary or recurrent      Sees ortho---recent hip pain left--injected  She stopped smoking ---stopped 3 mos  20 pk yr hx  The following portions of the patient's history were reviewed and updated as appropriate: allergies, current medications, past family history, past medical history, past social history, past surgical history and problem list.    Review of Systems   Constitutional: Positive for fatigue. Negative for activity change, appetite change and unexpected weight change.   HENT: Negative for nosebleeds and trouble swallowing.    Eyes: Negative for pain and visual disturbance.   Respiratory: Negative for chest tightness, shortness of breath and wheezing.    Cardiovascular: Negative for chest pain and palpitations.   Gastrointestinal: Negative for abdominal pain and blood in stool.   Endocrine: Negative for polydipsia and polyphagia.   Genitourinary: Negative for  difficulty urinating, dysuria and hematuria.   Musculoskeletal: Negative for joint swelling.   Skin: Negative for color change, pallor and rash.   Allergic/Immunologic: Negative.    Neurological: Negative for dizziness, tremors, seizures, syncope, speech difficulty, weakness and headaches.   Hematological: Negative for adenopathy.   Psychiatric/Behavioral: Negative for agitation and confusion. The patient is not nervous/anxious.    All other systems reviewed and are negative.      Objective   Physical Exam  Vitals and nursing note reviewed.   Constitutional:       General: She is not in acute distress.     Appearance: She is well-developed. She is not ill-appearing or toxic-appearing.   HENT:      Head: Normocephalic.      Right Ear: External ear normal.      Left Ear: External ear normal.      Nose: Nose normal.      Mouth/Throat:      Pharynx: Oropharynx is clear.   Eyes:      General: No scleral icterus.     Conjunctiva/sclera: Conjunctivae normal.      Pupils: Pupils are equal, round, and reactive to light.   Neck:      Thyroid: No thyromegaly.   Cardiovascular:      Rate and Rhythm: Normal rate and regular rhythm.      Heart sounds: Normal heart sounds. No murmur heard.  Pulmonary:      Effort: Pulmonary effort is normal. No respiratory distress.      Breath sounds: Normal breath sounds.   Musculoskeletal:         General: No deformity. Normal range of motion.      Cervical back: Normal range of motion and neck supple.   Skin:     General: Skin is warm and dry.      Findings: No rash.   Neurological:      General: No focal deficit present.      Mental Status: She is alert and oriented to person, place, and time. Mental status is at baseline.   Psychiatric:         Mood and Affect: Mood normal.         Behavior: Behavior normal.         Thought Content: Thought content normal.         Judgment: Judgment normal.         Assessment/Plan   Diagnoses and all orders for this visit:    1. Controlled type 2 diabetes  mellitus without complication, without long-term current use of insulin (HCC) (Primary)    2. Mild persistent asthma without complication    3. Tobacco abuse, in remission  -      CT Chest Low Dose Cancer Screening WO; Future    Other orders  -     Discontinue: beclomethasone (Qvar) 40 MCG/ACT inhaler; Inhale 2 puffs 2 (Two) Times a Day. Rinse mouth after use  Dispense: 8.7 g; Refill: 11  -     Fluticasone-Salmeterol (ADVAIR) 250-50 MCG/ACT DISKUS; Inhale 1 puff 2 (Two) Times a Day. For asthma--rinse mouth after use  Dispense: 60 each; Refill: 11  -     Pneumococcal Conjugate Vaccine 20-Valent All      Labs due July  Sent note to GI about stopping Metformin XR d/t LFTs--need ok to Rx Januvia  Avoid Actos d/t cardiomyopathy   Low dose CT chest screen  She did quit smoking 3 months ago  DMII--controlled on metformin but stopped due to liver enzyme elevation per GI awaiting discussion with GI on new Rx to replace  Sent Advair for him her asthma apparently Qvar is not covered she will rinse mouth after use has not needed albuterol rescue very much in the last month.  We will continue to monitor and is stable  Marly Valencia MD Davis, Amy F., PA-C  Just got her message about the other medication.  I saw your note that pioglitazone/Actos is not an option for her due to the cardiomyopathy.  This was the medication I had in mind as there is some data that it can help reduce fibrosis from fatty liver disease but clearly cannot be used with her history.  The metformin is actually safe, I said nothing about the metformin, I think she misunderstood.     PRERNA Perdue  I called pt         Answers for HPI/ROS submitted by the patient on 4/27/2022  What is the primary reason for your visit?: Diabetes

## 2022-05-04 NOTE — TELEPHONE ENCOUNTER
Called pt and advised of Dr Valencia note. Verb understanding.     Pt reports that Dr Valencia was going to speak with her pcp about her taking a different diabetic medication.   Pt currently off of metformin.

## 2022-05-04 NOTE — PROGRESS NOTES
Immunization  Immunization performed in [facility] by Paula Yip MA. Patient tolerated the procedure well without complications.  05/04/22   Paula Yip MA    Answers for HPI/ROS submitted by the patient on 4/27/2022  What is the primary reason for your visit?: Diabetes  Diabetes type: type 2  MedicAlert ID: [id number]  Disease duration: 2011 years  blurred vision: No  chest pain: No  fatigue: Yes  foot ulcerations: No  polydipsia: Yes  polyphagia: No  polyuria: Yes  visual change: No  weakness: No  weight loss: No  Symptom course: stable  confusion: No  dizziness: No  headaches: No  hunger: No  mood changes: No  nervous/anxious: No  pallor: No  seizures: No  sleepiness: No  speech difficulty: No  sweats: No  tremors: No  blackouts: No  hospitalization: No  nocturnal hypoglycemia: No  required assistance: No  required glucagon: No  CVA: No  heart disease: No  impotence: No  nephropathy: No  peripheral neuropathy: No  PVD: No  CAD risks: dyslipidemia, hypertension  Treatment compliance: all of the time  Home blood tests: 1-2 x per day  Monitoring compliance: fair  breakfast time: 6-7 am  breakfast glucose level: 110-130  lunch time: 12-1 pm  lunch glucose level: 180-200  High score: 140-180  Overall: 180-200  Weight trend: fluctuating minimally  Current diet: generally healthy  Meal planning: none  Exercise: never  Dietitian visit: No  Eye exam current: Yes  Sees podiatrist: No

## 2022-05-05 NOTE — TELEPHONE ENCOUNTER
The other medication is Actos/pioglitazone.  Olamide Papito says she cannot take this due to her history of cardiomyopathy.    She should go back on the metformin.  This is safe and fine to use.

## 2022-07-10 DIAGNOSIS — I10 HYPERTENSION, ESSENTIAL: ICD-10-CM

## 2022-07-10 RX ORDER — DILTIAZEM HYDROCHLORIDE 360 MG/1
CAPSULE, EXTENDED RELEASE ORAL
Qty: 90 CAPSULE | Refills: 0 | Status: SHIPPED | OUTPATIENT
Start: 2022-07-10 | End: 2022-10-19

## 2022-07-13 ENCOUNTER — TELEPHONE (OUTPATIENT)
Dept: FAMILY MEDICINE CLINIC | Facility: CLINIC | Age: 62
End: 2022-07-13

## 2022-07-13 DIAGNOSIS — R82.90 ABNORMAL URINALYSIS: Primary | ICD-10-CM

## 2022-07-13 NOTE — TELEPHONE ENCOUNTER
----- Message from Jess Razo sent at 7/13/2022  6:33 PM EDT -----  Regarding: Blood work  I am still just doing 500mg of metformin. Should I increase it.  Will start b12.  Yes every now then feels like a uti then goes away.  Thank you

## 2022-07-18 ENCOUNTER — LAB (OUTPATIENT)
Dept: FAMILY MEDICINE CLINIC | Facility: CLINIC | Age: 62
End: 2022-07-18

## 2022-07-22 LAB
BACTERIA UR CULT: ABNORMAL
BACTERIA UR CULT: ABNORMAL
OTHER ANTIBIOTIC SUSC ISLT: ABNORMAL

## 2022-07-22 RX ORDER — NITROFURANTOIN 25; 75 MG/1; MG/1
100 CAPSULE ORAL EVERY 12 HOURS SCHEDULED
Qty: 14 CAPSULE | Refills: 0 | Status: SHIPPED | OUTPATIENT
Start: 2022-07-22 | End: 2022-09-15

## 2022-08-05 RX ORDER — ATORVASTATIN CALCIUM 80 MG/1
TABLET, FILM COATED ORAL
Qty: 30 TABLET | Refills: 0 | Status: SHIPPED | OUTPATIENT
Start: 2022-08-05 | End: 2022-09-04

## 2022-08-12 ENCOUNTER — OFFICE VISIT (OUTPATIENT)
Dept: FAMILY MEDICINE CLINIC | Facility: CLINIC | Age: 62
End: 2022-08-12

## 2022-08-12 VITALS
OXYGEN SATURATION: 99 % | HEIGHT: 65 IN | WEIGHT: 152.8 LBS | HEART RATE: 92 BPM | TEMPERATURE: 97.5 F | DIASTOLIC BLOOD PRESSURE: 62 MMHG | RESPIRATION RATE: 16 BRPM | BODY MASS INDEX: 25.46 KG/M2 | SYSTOLIC BLOOD PRESSURE: 110 MMHG

## 2022-08-12 DIAGNOSIS — Z86.79 HISTORY OF CARDIOMYOPATHY: ICD-10-CM

## 2022-08-12 DIAGNOSIS — R25.2 BILATERAL LEG CRAMPS: ICD-10-CM

## 2022-08-12 DIAGNOSIS — R35.0 URINARY FREQUENCY: Primary | ICD-10-CM

## 2022-08-12 LAB
BILIRUB BLD-MCNC: NEGATIVE MG/DL
CLARITY, POC: CLEAR
COLOR UR: YELLOW
EXPIRATION DATE: ABNORMAL
GLUCOSE UR STRIP-MCNC: NEGATIVE MG/DL
KETONES UR QL: NEGATIVE
LEUKOCYTE EST, POC: NEGATIVE
Lab: ABNORMAL
NITRITE UR-MCNC: NEGATIVE MG/ML
PH UR: 7 [PH] (ref 5–8)
PROT UR STRIP-MCNC: NEGATIVE MG/DL
RBC # UR STRIP: ABNORMAL /UL
SP GR UR: 1.02 (ref 1–1.03)
UROBILINOGEN UR QL: NORMAL

## 2022-08-12 PROCEDURE — 81003 URINALYSIS AUTO W/O SCOPE: CPT | Performed by: PHYSICIAN ASSISTANT

## 2022-08-12 PROCEDURE — 99213 OFFICE O/P EST LOW 20 MIN: CPT | Performed by: PHYSICIAN ASSISTANT

## 2022-08-12 RX ORDER — TIZANIDINE 4 MG/1
TABLET ORAL
Qty: 90 TABLET | Refills: 1 | Status: SHIPPED | OUTPATIENT
Start: 2022-08-12 | End: 2023-03-06

## 2022-08-12 NOTE — PROGRESS NOTES
Subjective   Jess Razo is a 61 y.o. female.     History of Present Illness     Jess Razo 61 y.o.female complains of urinary symptoms. she complains of urgency and frequency. She has had symptoms for several weeks. The symptoms are moderate.  Patient denies gross blood in urine, vomiting and abdominal pain.  Patient has tried  increasing fluids for relief of symptoms.  Patient does not have a history of recurrent UTI. Patient does not have a history of pyelonephritis.  Last lab notes from 7-12-22 labs  Has DMII  Liver enzymes much improved, glucose and A1c are elevated compared to last labs but A1c is still less than 7 and note that goal was met.  No microalbumin in urine.  LDL cholesterol at goal but triglycerides are above 300 and need to work on low glycemic index diet and exercise.  Also low saturated fat diet.  Thyroid labs are at goal and continue same dose. Vitamin B12 is in lower range.  Start over the counter B12 1,000 mcg daily.  Other labs okay and note patient had nitrates noted in her urinalysis report and need to know if she has any symptoms of UTI at this time?  She was sent for additional labs to check urine.  Urine micro and culture was performed no microscopic blood and urine culture positive for E. coli note sensitivity for nitrofurantoin and patient was treated for 7 days with Rx.        She has urine odor for last few weeks; urinary frequency for a month  Having leg cramps last few weeks---taking OTC supp--little help    The following portions of the patient's history were reviewed and updated as appropriate: allergies, current medications, past family history, past medical history, past social history, past surgical history and problem list.    Review of Systems   Constitutional: Positive for fatigue. Negative for activity change, appetite change and unexpected weight change.   HENT: Negative for nosebleeds and trouble swallowing.    Eyes: Negative for pain and visual disturbance.    Respiratory: Negative for chest tightness, shortness of breath and wheezing.    Cardiovascular: Negative for chest pain and palpitations.   Gastrointestinal: Negative for abdominal pain and blood in stool.   Endocrine: Negative for polydipsia and polyphagia.   Genitourinary: Positive for frequency and urgency. Negative for difficulty urinating, dysuria and hematuria.   Musculoskeletal: Positive for myalgias.   Skin: Negative for color change, pallor and rash.   Allergic/Immunologic: Negative.    Neurological: Negative for dizziness, tremors, seizures, syncope, speech difficulty, weakness and headaches.   Hematological: Negative for adenopathy.   Psychiatric/Behavioral: Negative for agitation and confusion. The patient is not nervous/anxious.    All other systems reviewed and are negative.      Objective   Physical Exam  Vitals and nursing note reviewed.   Constitutional:       General: She is not in acute distress.     Appearance: She is well-developed. She is not ill-appearing or toxic-appearing.   HENT:      Head: Normocephalic.      Right Ear: External ear normal.      Left Ear: External ear normal.      Nose: Nose normal.      Mouth/Throat:      Pharynx: Oropharynx is clear.   Eyes:      General: No scleral icterus.     Conjunctiva/sclera: Conjunctivae normal.      Pupils: Pupils are equal, round, and reactive to light.   Neck:      Thyroid: No thyromegaly.   Cardiovascular:      Rate and Rhythm: Normal rate and regular rhythm.      Heart sounds: Normal heart sounds. No murmur heard.  Pulmonary:      Effort: Pulmonary effort is normal. No respiratory distress.      Breath sounds: Normal breath sounds.   Musculoskeletal:         General: No deformity. Normal range of motion.      Cervical back: Normal range of motion and neck supple.   Lymphadenopathy:      Cervical: No cervical adenopathy.   Skin:     General: Skin is warm and dry.      Findings: No rash.   Neurological:      General: No focal deficit present.       Mental Status: She is alert and oriented to person, place, and time. Mental status is at baseline.   Psychiatric:         Mood and Affect: Mood normal.         Behavior: Behavior normal.         Thought Content: Thought content normal.         Judgment: Judgment normal.         Assessment & Plan   Diagnoses and all orders for this visit:    1. Urinary frequency (Primary)  -     Urinalysis With Microscopic - Urine, Clean Catch  -     Urine Culture - Urine, Urine, Clean Catch  -     POC Urinalysis Dipstick, Automated    2. Bilateral leg cramps    Stop smoking,  Urine odor----get urine cx  Leg cramps-night---update labs--- check electrolytes including magnesium  Tolerating the higher dose of metformin and do want labs again in January  Also obtain a creatinine kinase level due to statin use  Update dig level for cardiology appointment

## 2022-08-13 LAB
ALBUMIN SERPL-MCNC: 4.6 G/DL (ref 3.8–4.9)
ALBUMIN/GLOB SERPL: 1.8 {RATIO} (ref 1.2–2.2)
ALP SERPL-CCNC: 85 IU/L (ref 44–121)
ALT SERPL-CCNC: 41 IU/L (ref 0–32)
AST SERPL-CCNC: 39 IU/L (ref 0–40)
BILIRUB SERPL-MCNC: 0.4 MG/DL (ref 0–1.2)
BUN SERPL-MCNC: 10 MG/DL (ref 8–27)
BUN/CREAT SERPL: 15 (ref 12–28)
CALCIUM SERPL-MCNC: 9.7 MG/DL (ref 8.7–10.3)
CHLORIDE SERPL-SCNC: 97 MMOL/L (ref 96–106)
CO2 SERPL-SCNC: 29 MMOL/L (ref 20–29)
CREAT SERPL-MCNC: 0.66 MG/DL (ref 0.57–1)
DIGOXIN SERPL-MCNC: 0.9 NG/ML (ref 0.5–0.9)
EGFRCR SERPLBLD CKD-EPI 2021: 100 ML/MIN/1.73
GLOBULIN SER CALC-MCNC: 2.6 G/DL (ref 1.5–4.5)
GLUCOSE SERPL-MCNC: 130 MG/DL (ref 65–99)
MAGNESIUM SERPL-MCNC: 1.8 MG/DL (ref 1.6–2.3)
POTASSIUM SERPL-SCNC: 4.6 MMOL/L (ref 3.5–5.2)
PROT SERPL-MCNC: 7.2 G/DL (ref 6–8.5)
SODIUM SERPL-SCNC: 137 MMOL/L (ref 134–144)

## 2022-08-15 LAB
APPEARANCE UR: CLEAR
BACTERIA #/AREA URNS HPF: NORMAL /[HPF]
BACTERIA UR CULT: NORMAL
BACTERIA UR CULT: NORMAL
BILIRUB UR QL STRIP: NEGATIVE
CASTS URNS QL MICRO: NORMAL /LPF
COLOR UR: YELLOW
EPI CELLS #/AREA URNS HPF: NORMAL /HPF (ref 0–10)
GLUCOSE UR QL STRIP: NEGATIVE
HGB UR QL STRIP: NEGATIVE
KETONES UR QL STRIP: NEGATIVE
LEUKOCYTE ESTERASE UR QL STRIP: NEGATIVE
MICRO URNS: NORMAL
MICRO URNS: NORMAL
NITRITE UR QL STRIP: NEGATIVE
PH UR STRIP: 7 [PH] (ref 5–7.5)
PROT UR QL STRIP: NEGATIVE
RBC #/AREA URNS HPF: NORMAL /HPF (ref 0–2)
SP GR UR STRIP: 1.01 (ref 1–1.03)
UROBILINOGEN UR STRIP-MCNC: 0.2 MG/DL (ref 0.2–1)
WBC #/AREA URNS HPF: NORMAL /HPF (ref 0–5)

## 2022-08-26 ENCOUNTER — HOSPITAL ENCOUNTER (OUTPATIENT)
Dept: BONE DENSITY | Facility: HOSPITAL | Age: 62
Discharge: HOME | End: 2022-08-26
Admitting: PHYSICIAN ASSISTANT

## 2022-08-26 ENCOUNTER — APPOINTMENT (OUTPATIENT)
Dept: CT IMAGING | Facility: HOSPITAL | Age: 62
End: 2022-08-26

## 2022-08-26 DIAGNOSIS — Z78.0 POST-MENOPAUSAL: ICD-10-CM

## 2022-08-26 PROCEDURE — 77080 DXA BONE DENSITY AXIAL: CPT

## 2022-09-04 RX ORDER — ATORVASTATIN CALCIUM 80 MG/1
TABLET, FILM COATED ORAL
Qty: 30 TABLET | Refills: 0 | Status: SHIPPED | OUTPATIENT
Start: 2022-09-04 | End: 2022-10-07

## 2022-09-15 RX ORDER — FLUTICASONE PROPIONATE AND SALMETEROL XINAFOATE 230; 21 UG/1; UG/1
2 AEROSOL, METERED RESPIRATORY (INHALATION)
Qty: 12 G | Refills: 11 | Status: SHIPPED | OUTPATIENT
Start: 2022-09-15 | End: 2022-09-17

## 2022-09-17 RX ORDER — BUDESONIDE AND FORMOTEROL FUMARATE DIHYDRATE 160; 4.5 UG/1; UG/1
2 AEROSOL RESPIRATORY (INHALATION)
Qty: 10 G | Refills: 12 | Status: SHIPPED | OUTPATIENT
Start: 2022-09-17 | End: 2023-03-06

## 2022-10-07 RX ORDER — ATORVASTATIN CALCIUM 80 MG/1
TABLET, FILM COATED ORAL
Qty: 30 TABLET | Refills: 0 | Status: SHIPPED | OUTPATIENT
Start: 2022-10-07 | End: 2022-10-16

## 2022-10-07 RX ORDER — VALSARTAN 160 MG/1
TABLET ORAL
Qty: 90 TABLET | Refills: 0 | Status: SHIPPED | OUTPATIENT
Start: 2022-10-07 | End: 2022-12-25

## 2022-10-16 RX ORDER — ATORVASTATIN CALCIUM 80 MG/1
TABLET, FILM COATED ORAL
Qty: 30 TABLET | Refills: 0 | Status: SHIPPED | OUTPATIENT
Start: 2022-10-16 | End: 2022-11-28

## 2022-10-19 DIAGNOSIS — I10 HYPERTENSION, ESSENTIAL: ICD-10-CM

## 2022-10-19 RX ORDER — DILTIAZEM HYDROCHLORIDE 360 MG/1
CAPSULE, EXTENDED RELEASE ORAL
Qty: 90 CAPSULE | Refills: 0 | Status: SHIPPED | OUTPATIENT
Start: 2022-10-19 | End: 2022-12-25

## 2022-11-28 RX ORDER — ATORVASTATIN CALCIUM 80 MG/1
TABLET, FILM COATED ORAL
Qty: 90 TABLET | Refills: 1 | Status: SHIPPED | OUTPATIENT
Start: 2022-11-28 | End: 2023-03-06 | Stop reason: SDUPTHER

## 2022-12-25 DIAGNOSIS — I10 HYPERTENSION, ESSENTIAL: ICD-10-CM

## 2022-12-25 RX ORDER — VALSARTAN 160 MG/1
TABLET ORAL
Qty: 90 TABLET | Refills: 0 | Status: SHIPPED | OUTPATIENT
Start: 2022-12-25 | End: 2023-03-06 | Stop reason: SDUPTHER

## 2022-12-25 RX ORDER — DILTIAZEM HYDROCHLORIDE 360 MG/1
CAPSULE, EXTENDED RELEASE ORAL
Qty: 90 CAPSULE | Refills: 0 | Status: SHIPPED | OUTPATIENT
Start: 2022-12-25 | End: 2023-01-13

## 2023-01-05 ENCOUNTER — PATIENT MESSAGE (OUTPATIENT)
Dept: FAMILY MEDICINE CLINIC | Facility: CLINIC | Age: 63
End: 2023-01-05
Payer: COMMERCIAL

## 2023-01-05 DIAGNOSIS — R76.11 PPD POSITIVE: Primary | ICD-10-CM

## 2023-01-05 NOTE — TELEPHONE ENCOUNTER
From: Jess Razo  To: Olamide Cobos  Sent: 1/5/2023 9:32 AM EST  Subject: Tb xray    [facility]. Since I have not had a xray for a few years, now they say I need the xray done. Because of the positive test 20 plus years ago. Do you all do these there any more. Thank you

## 2023-01-13 DIAGNOSIS — I10 HYPERTENSION, ESSENTIAL: ICD-10-CM

## 2023-01-13 RX ORDER — DILTIAZEM HYDROCHLORIDE 360 MG/1
CAPSULE, EXTENDED RELEASE ORAL
Qty: 90 CAPSULE | Refills: 0 | Status: SHIPPED | OUTPATIENT
Start: 2023-01-13 | End: 2023-03-06 | Stop reason: SDUPTHER

## 2023-02-15 ENCOUNTER — PATIENT MESSAGE (OUTPATIENT)
Dept: FAMILY MEDICINE CLINIC | Facility: CLINIC | Age: 63
End: 2023-02-15
Payer: COMMERCIAL

## 2023-02-15 DIAGNOSIS — R76.11 PPD POSITIVE: Primary | ICD-10-CM

## 2023-02-15 NOTE — TELEPHONE ENCOUNTER
From: Jess Razo  To: Olamide Cobos  Sent: 2/15/2023 11:28 AM EST  Subject: tests    I have schedule a appt. for March 6th. it was as soon as I could get in. but was wondering if I could come in and get a x-ray for my TB test for work and go on and get my blood work for you. Been to immediate care for bad cough in [facility] I still have, taking last prednisone 50mg today.   Thank You

## 2023-03-06 ENCOUNTER — OFFICE VISIT (OUTPATIENT)
Dept: FAMILY MEDICINE CLINIC | Facility: CLINIC | Age: 63
End: 2023-03-06
Payer: COMMERCIAL

## 2023-03-06 VITALS
RESPIRATION RATE: 16 BRPM | DIASTOLIC BLOOD PRESSURE: 68 MMHG | TEMPERATURE: 97.2 F | HEART RATE: 82 BPM | OXYGEN SATURATION: 94 % | BODY MASS INDEX: 26.8 KG/M2 | HEIGHT: 64 IN | SYSTOLIC BLOOD PRESSURE: 120 MMHG | WEIGHT: 157 LBS

## 2023-03-06 DIAGNOSIS — Z12.11 SCREEN FOR COLON CANCER: ICD-10-CM

## 2023-03-06 DIAGNOSIS — E11.9 DIABETES MELLITUS WITHOUT COMPLICATION: Primary | ICD-10-CM

## 2023-03-06 DIAGNOSIS — Z86.79 HISTORY OF CARDIOMYOPATHY: ICD-10-CM

## 2023-03-06 DIAGNOSIS — E78.2 MIXED HYPERLIPIDEMIA: ICD-10-CM

## 2023-03-06 DIAGNOSIS — E11.9 CONTROLLED TYPE 2 DIABETES MELLITUS WITHOUT COMPLICATION, WITHOUT LONG-TERM CURRENT USE OF INSULIN: ICD-10-CM

## 2023-03-06 DIAGNOSIS — E55.9 VITAMIN D DEFICIENCY: ICD-10-CM

## 2023-03-06 DIAGNOSIS — I10 ESSENTIAL HYPERTENSION: ICD-10-CM

## 2023-03-06 DIAGNOSIS — K75.81 NASH (NONALCOHOLIC STEATOHEPATITIS): ICD-10-CM

## 2023-03-06 DIAGNOSIS — I10 HYPERTENSION, ESSENTIAL: ICD-10-CM

## 2023-03-06 DIAGNOSIS — J45.40 MODERATE PERSISTENT ASTHMA WITHOUT COMPLICATION: ICD-10-CM

## 2023-03-06 PROCEDURE — 99214 OFFICE O/P EST MOD 30 MIN: CPT | Performed by: PHYSICIAN ASSISTANT

## 2023-03-06 RX ORDER — ATORVASTATIN CALCIUM 80 MG/1
80 TABLET, FILM COATED ORAL DAILY
Qty: 90 TABLET | Refills: 1 | Status: SHIPPED | OUTPATIENT
Start: 2023-03-06

## 2023-03-06 RX ORDER — DILTIAZEM HYDROCHLORIDE 360 MG/1
360 CAPSULE, EXTENDED RELEASE ORAL DAILY
Qty: 90 CAPSULE | Refills: 3 | Status: SHIPPED | OUTPATIENT
Start: 2023-03-06

## 2023-03-06 RX ORDER — ALBUTEROL SULFATE 90 UG/1
AEROSOL, METERED RESPIRATORY (INHALATION)
COMMUNITY
Start: 2023-02-12

## 2023-03-06 RX ORDER — VALSARTAN 160 MG/1
TABLET ORAL
Qty: 90 TABLET | Refills: 1 | Status: SHIPPED | OUTPATIENT
Start: 2023-03-06

## 2023-03-06 RX ORDER — FLUTICASONE PROPIONATE AND SALMETEROL 250; 50 UG/1; UG/1
1 POWDER RESPIRATORY (INHALATION)
Qty: 180 EACH | Refills: 3 | Status: SHIPPED | OUTPATIENT
Start: 2023-03-06

## 2023-03-06 NOTE — PROGRESS NOTES
"Subjective   Jess Razo is a 62 y.o. female.     History of Present Illness    Since the last visit, she has overall felt fairly well.  She has Primary Hypertension and well controlled on current medication, DMII well controlled on medication and will continue regimen, GERD controlled on PPI Rx, Hyperlipidemia with goals met with current Rx, Hypothyroidism well controlled on current medication and will continue Rx, Asthma well controlled and not requiring rescue Albuterol > 2 times a week and Vitamin D deficiency and labs are at goal >30 ng/mL.  she has been compliant with current medications have reviewed them.  The patient does get loose stool on Metformin. Asthma controlled.  Will refill medications. /62   Pulse 82   Temp 97.2 °F (36.2 °C)   Resp 16   Ht 162.6 cm (64\")   Wt 71.2 kg (157 lb)   LMP  (LMP Unknown)   SpO2 94%   BMI 26.95 kg/m²   Very little exercise   Results for orders placed or performed in visit on 08/12/22   Urine Culture - Urine, Urine, Clean Catch    Specimen: Urine, Clean Catch    Urine  Release to [facility]   Result Value Ref Range    Urine Culture Final report     Result 1 Comment    Digoxin level    Specimen: Blood   Result Value Ref Range    Digoxin 0.9 0.5 - 0.9 ng/mL   Comprehensive Metabolic Panel    Specimen: Blood   Result Value Ref Range    Glucose 130 (H) 65 - 99 mg/dL    BUN 10 8 - 27 mg/dL    Creatinine 0.66 0.57 - 1.00 mg/dL    EGFR Result 100 >59 mL/min/1.73    BUN/Creatinine Ratio 15 12 - 28    Sodium 137 134 - 144 mmol/L    Potassium 4.6 3.5 - 5.2 mmol/L    Chloride 97 96 - 106 mmol/L    Total CO2 29 20 - 29 mmol/L    Calcium 9.7 8.7 - 10.3 mg/dL    Total Protein 7.2 6.0 - 8.5 g/dL    Albumin 4.6 3.8 - 4.9 g/dL    Globulin 2.6 1.5 - 4.5 g/dL    A/G Ratio 1.8 1.2 - 2.2    Total Bilirubin 0.4 0.0 - 1.2 mg/dL    Alkaline Phosphatase 85 44 - 121 IU/L    AST (SGOT) 39 0 - 40 IU/L    ALT (SGPT) 41 (H) 0 - 32 IU/L   Magnesium    Specimen: Blood   Result Value Ref Range "    Magnesium 1.8 1.6 - 2.3 mg/dL   Microscopic Examination -   Result Value Ref Range    WBC, UA None seen 0 - 5 /hpf    RBC, UA None seen 0 - 2 /hpf    Epithelial Cells (non renal) 0-10 0 - 10 /hpf    Casts None seen None seen /lpf    Bacteria, UA None seen None seen/Few   POC Urinalysis Dipstick, Automated    Specimen: Urine   Result Value Ref Range    Color Yellow Yellow, Straw, Dark Yellow, Fina    Clarity, UA Clear Clear    Specific Gravity  1.020 1.005 - 1.030    pH, Urine 7.0 5.0 - 8.0    Leukocytes Negative Negative    Nitrite, UA Negative Negative    Protein, POC Negative Negative mg/dL    Glucose, UA Negative Negative mg/dL    Ketones, UA Negative Negative    Urobilinogen, UA Normal Normal    Bilirubin Negative Negative    Blood, UA Trace (A) Negative    Lot Number [id number]     Expiration Date 05/01/2023    Urinalysis With Microscopic - Urine, Clean Catch    Specimen: Urine, Clean Catch   Result Value Ref Range    Specific Gravity, UA 1.009 1.005 - 1.030    pH, UA 7.0 5.0 - 7.5    Color, UA Yellow Yellow    Appearance, UA Clear Clear    Leukocytes, UA Negative Negative    Protein Negative Negative/Trace    Glucose, UA Negative Negative    Ketones Negative Negative    Blood, UA Negative Negative    Bilirubin, UA Negative Negative    Urobilinogen, UA 0.2 0.2 - 1.0 mg/dL    Nitrite, UA Negative Negative    Microscopic Examination Comment     Microscopic Examination See below:       Urine in office negative leukocytes---infection. Still waiting on culture.  Dig level ok.  Mg ok.  ALT back up=--liver lab and lose weight and exercise to help lower.  Fatty liver per last live ultrasound.   Urine micro urine culture are all negative and can refer to urology if any continued urinary complaints.   Written by Olamide Cobos PA-C on 8/15/2022  5:42 PM EDT    Diabetic eye exam 8/31/2022 and noted glaucoma. Had visit with the ENT 3/1/2022 saw Dr Charlton for evaluation of tinnitus.  8-26-22  World Health Organization  fracture risk assessment tool (FRAX Score)  calculates a 10 year risk of major osteoporotic fracture of 18% and a 10  year risk of hip fracture of 1.9%.     IMPRESSION:  Osteopenia both hips..     This report was finalized on 8/26/2022 2:35 PM by Dr. Chi Palacios M.D.    She was seen in urgent care 2/11/2022 for asthmatic bronchitis and treated with prednisone and clindamycin---does best with Medrol ember  Last visit with Dr. Trivedi cardiologist was 1/24/2023 following up on her inappropriate sinus tachycardia, mild cardiomyopathy.  Noted that her last echo showed normal LV function and continued bisoprolol and valsartan.  Heart rate was controlled with long-acting diltiazem and digoxin.  Recommended better control of triglycerides  Last visit with oncologist Dr. Arnold was 6/16/2022  PATIENT IDENTIFICATION: 61 yr/o who underwent an 08 November 2006 breast conserving surgical procedure for a left breast cancer. The tumor was moderately differentiated, receptor positive and HER2/salome negative, Stage IIA ([id number]). Two malignant lymph nodes were identified in 19 that were available for review. She received four cycles of doxorubicin/cyclophosphamide followed by four cycles of paclitaxel from 18 January 2007 to 16 March 2007. Tamoxifen initiated in April 2007. Patient substituted letrozole in May 2012. In May 2014, tamoxifen substituted (reinitiated). Completed tamoxifen April 2017.   At this visit bilateral mammogram was ordered for December 2022 and referral to lymphedema clinic was made with follow-up noted 1 year  Failed Lexapro and Zoloft--flat  Liver labs negative for genetic cause per work-up with Dr. Valencia GI specialist  Also note 20-pack-year history smoking stopped 2019  The following portions of the patient's history were reviewed and updated as appropriate: allergies, current medications, past family history, past medical history, past social history, past surgical history and problem list.    Review of  Systems   Constitutional: Negative for fever.   HENT: Negative for nosebleeds and trouble swallowing.    Eyes: Negative for visual disturbance.   Respiratory: Negative for choking and stridor.    Cardiovascular: Negative for chest pain.   Gastrointestinal: Negative for blood in stool.   Endocrine: Negative for polydipsia.   Genitourinary: Negative for genital sores and hematuria.   Musculoskeletal: Positive for arthralgias and joint swelling.   Skin: Negative for color change and rash.   Allergic/Immunologic: Negative for immunocompromised state.   Neurological: Negative for seizures, facial asymmetry and speech difficulty.   Hematological: Negative for adenopathy.   Psychiatric/Behavioral: Positive for dysphoric mood. Negative for behavioral problems, self-injury and suicidal ideas.       Objective   Physical Exam  Vitals and nursing note reviewed.   Constitutional:       General: She is not in acute distress.     Appearance: Normal appearance. She is well-developed. She is not ill-appearing or toxic-appearing.   HENT:      Head: Normocephalic.      Right Ear: External ear normal.      Left Ear: External ear normal.      Nose: Nose normal.      Mouth/Throat:      Pharynx: Oropharynx is clear.   Eyes:      General: No scleral icterus.     Conjunctiva/sclera: Conjunctivae normal.      Pupils: Pupils are equal, round, and reactive to light.   Neck:      Thyroid: No thyromegaly.   Cardiovascular:      Rate and Rhythm: Normal rate and regular rhythm.      Pulses: Normal pulses.      Heart sounds: Normal heart sounds. No murmur heard.  Pulmonary:      Effort: Pulmonary effort is normal. No respiratory distress.      Breath sounds: Normal breath sounds.   Musculoskeletal:         General: No deformity. Normal range of motion.      Cervical back: Normal range of motion and neck supple.   Skin:     General: Skin is warm and dry.      Findings: No rash.   Neurological:      General: No focal deficit present.      Mental  Status: She is alert and oriented to person, place, and time. Mental status is at baseline.   Psychiatric:         Mood and Affect: Mood normal.         Behavior: Behavior normal.         Thought Content: Thought content normal.         Judgment: Judgment normal.         Assessment & Plan   Diagnoses and all orders for this visit:    1. Diabetes mellitus without complication (HCC) (Primary)  -     Comprehensive metabolic panel  -     Lipid panel  -     CBC and Differential  -     TSH  -     Hemoglobin A1c  -     T4, Free  -     T3, Free  -     Vitamin D,25-Hydroxy  -     Vitamin B12  -     Folate  -     Urinalysis With Microscopic - Urine, Clean Catch  -     Microalbumin / Creatinine Urine Ratio - Urine, Clean Catch  -     Magnesium    2. Screen for colon cancer  Comments:  due in June   Orders:  -     Comprehensive metabolic panel  -     Lipid panel  -     CBC and Differential  -     TSH  -     Hemoglobin A1c  -     T4, Free  -     T3, Free  -     Vitamin D,25-Hydroxy  -     Vitamin B12  -     Folate  -     Urinalysis With Microscopic - Urine, Clean Catch  -     Microalbumin / Creatinine Urine Ratio - Urine, Clean Catch  -     Magnesium    3. HARTLEY (nonalcoholic steatohepatitis)  -     Comprehensive metabolic panel  -     Lipid panel  -     CBC and Differential  -     TSH  -     Hemoglobin A1c  -     T4, Free  -     T3, Free  -     Vitamin D,25-Hydroxy  -     Vitamin B12  -     Folate  -     Urinalysis With Microscopic - Urine, Clean Catch  -     Microalbumin / Creatinine Urine Ratio - Urine, Clean Catch  -     Magnesium    4. Essential hypertension  -     Comprehensive metabolic panel  -     Lipid panel  -     CBC and Differential  -     TSH  -     Hemoglobin A1c  -     T4, Free  -     T3, Free  -     Vitamin D,25-Hydroxy  -     Vitamin B12  -     Folate  -     Urinalysis With Microscopic - Urine, Clean Catch  -     Microalbumin / Creatinine Urine Ratio - Urine, Clean Catch  -     Magnesium    5. Mixed  hyperlipidemia  -     Comprehensive metabolic panel  -     Lipid panel  -     CBC and Differential  -     TSH  -     Hemoglobin A1c  -     T4, Free  -     T3, Free  -     Vitamin D,25-Hydroxy  -     Vitamin B12  -     Folate  -     Urinalysis With Microscopic - Urine, Clean Catch  -     Microalbumin / Creatinine Urine Ratio - Urine, Clean Catch  -     Magnesium    6. History of cardiomyopathy  Comments:  sees cardio  Orders:  -     Comprehensive metabolic panel  -     Lipid panel  -     CBC and Differential  -     TSH  -     Hemoglobin A1c  -     T4, Free  -     T3, Free  -     Vitamin D,25-Hydroxy  -     Vitamin B12  -     Folate  -     Urinalysis With Microscopic - Urine, Clean Catch  -     Microalbumin / Creatinine Urine Ratio - Urine, Clean Catch  -     Magnesium    7. Vitamin D deficiency  -     Comprehensive metabolic panel  -     Lipid panel  -     CBC and Differential  -     TSH  -     Hemoglobin A1c  -     T4, Free  -     T3, Free  -     Vitamin D,25-Hydroxy  -     Vitamin B12  -     Folate  -     Urinalysis With Microscopic - Urine, Clean Catch  -     Microalbumin / Creatinine Urine Ratio - Urine, Clean Catch  -     Magnesium    8. Moderate persistent asthma without complication  -     Comprehensive metabolic panel  -     Lipid panel  -     CBC and Differential  -     TSH  -     Hemoglobin A1c  -     T4, Free  -     T3, Free  -     Vitamin D,25-Hydroxy  -     Vitamin B12  -     Folate  -     Urinalysis With Microscopic - Urine, Clean Catch  -     Microalbumin / Creatinine Urine Ratio - Urine, Clean Catch  -     Magnesium    9. Hypertension, essential  -     dilTIAZem (TIAZAC) 360 MG 24 hr capsule; Take 1 capsule by mouth Daily. For heart rate  Dispense: 90 capsule; Refill: 3  -     Comprehensive metabolic panel  -     Lipid panel  -     CBC and Differential  -     TSH  -     Hemoglobin A1c  -     T4, Free  -     T3, Free  -     Vitamin D,25-Hydroxy  -     Vitamin B12  -     Folate  -     Urinalysis With  Microscopic - Urine, Clean Catch  -     Microalbumin / Creatinine Urine Ratio - Urine, Clean Catch  -     Magnesium    10. Controlled type 2 diabetes mellitus without complication, without long-term current use of insulin (HCC)  -     metFORMIN (GLUCOPHAGE) 500 MG tablet; 3 PO in PM For DMII  Dispense: 270 tablet; Refill: 3  -     Comprehensive metabolic panel  -     Lipid panel  -     CBC and Differential  -     TSH  -     Hemoglobin A1c  -     T4, Free  -     T3, Free  -     Vitamin D,25-Hydroxy  -     Vitamin B12  -     Folate  -     Urinalysis With Microscopic - Urine, Clean Catch  -     Microalbumin / Creatinine Urine Ratio - Urine, Clean Catch  -     Magnesium    Other orders  -     nystatin (MYCOSTATIN) 100,000 unit/mL suspension; Take 5 mL by mouth 4 (Four) Times a Day.  Dispense: 280 mL; Refill: 1  -     atorvastatin (LIPITOR) 80 MG tablet; Take 1 tablet by mouth Daily. for cholesterol  Dispense: 90 tablet; Refill: 1  -     Fluticasone-Salmeterol (ADVAIR/WIXELA) 250-50 MCG/ACT DISKUS; Inhale 1 puff 2 (Two) Times a Day. For asthma and rinse mouth after use  Dispense: 180 each; Refill: 3  -     valsartan (DIOVAN) 160 MG tablet; One PO daily for bp  Dispense: 90 tablet; Refill: 1    Plan, Jess Razo, was seen today.  she was seen for HTN and continue medication, DMII and refilled medications, GERD and will continue on PPI medication, Hypothyroidism well controlled, continue medication, Asthma and is well controlled on medication.   and Vitamin D deficiency and will update labs .  Xray for + PPD today  Sees Dr Trivedi---cardio--her inappropriate sinus tachycardia, mild cardiomyopathy.   Has seen GI for HARTLEY  Stay on Advair  Update labs  I know she has anxiety and depression---meds no help----can refer psych       Answers for HPI/ROS submitted by the patient on 2/27/2023  Please describe your symptoms.: Medicine check  Have you had these symptoms before?: No  How long have you been having these symptoms?:  Greater than 2 weeks  Please describe any probable cause for these symptoms. : Medicine check  What is the primary reason for your visit?: Other

## 2023-03-16 DIAGNOSIS — R76.11 PPD POSITIVE: ICD-10-CM

## 2023-04-23 RX ORDER — LEVOTHYROXINE SODIUM 0.05 MG/1
TABLET ORAL
Qty: 90 TABLET | Refills: 0 | Status: SHIPPED | OUTPATIENT
Start: 2023-04-23

## 2023-06-19 ENCOUNTER — PREP FOR SURGERY (OUTPATIENT)
Dept: SURGERY | Facility: CLINIC | Age: 63
End: 2023-06-19
Payer: COMMERCIAL

## 2023-06-19 DIAGNOSIS — Z12.11 SCREENING FOR COLON CANCER: Primary | ICD-10-CM

## 2023-06-19 DIAGNOSIS — Z80.0 FAMILY HISTORY OF COLON CANCER: ICD-10-CM

## 2023-06-19 DIAGNOSIS — Z86.010 HISTORY OF ADENOMATOUS POLYP OF COLON: ICD-10-CM

## 2023-06-28 LAB
25(OH)D3+25(OH)D2 SERPL-MCNC: 56 NG/ML (ref 30–100)
ALBUMIN SERPL-MCNC: 4.6 G/DL (ref 3.5–5.2)
ALBUMIN/CREAT UR: 10 MG/G CREAT (ref 0–29)
ALBUMIN/GLOB SERPL: 2 G/DL
ALP SERPL-CCNC: 84 U/L (ref 39–117)
ALT SERPL-CCNC: 39 U/L (ref 1–33)
APPEARANCE UR: CLEAR
AST SERPL-CCNC: 29 U/L (ref 1–32)
BACTERIA #/AREA URNS HPF: ABNORMAL /HPF
BASOPHILS # BLD AUTO: 0.05 10*3/MM3 (ref 0–0.2)
BASOPHILS NFR BLD AUTO: 0.9 % (ref 0–1.5)
BILIRUB SERPL-MCNC: 0.6 MG/DL (ref 0–1.2)
BILIRUB UR QL STRIP: NEGATIVE
BUN SERPL-MCNC: 8 MG/DL (ref 8–23)
BUN/CREAT SERPL: 11.9 (ref 7–25)
CALCIUM SERPL-MCNC: 10 MG/DL (ref 8.6–10.5)
CASTS URNS MICRO: ABNORMAL
CHLORIDE SERPL-SCNC: 98 MMOL/L (ref 98–107)
CHOLEST SERPL-MCNC: 137 MG/DL (ref 0–200)
CO2 SERPL-SCNC: 30.6 MMOL/L (ref 22–29)
COLOR UR: YELLOW
CREAT SERPL-MCNC: 0.67 MG/DL (ref 0.57–1)
CREAT UR-MCNC: 58.6 MG/DL
EGFRCR SERPLBLD CKD-EPI 2021: 99.6 ML/MIN/1.73
EOSINOPHIL # BLD AUTO: 0.1 10*3/MM3 (ref 0–0.4)
EOSINOPHIL NFR BLD AUTO: 1.7 % (ref 0.3–6.2)
EPI CELLS #/AREA URNS HPF: ABNORMAL /HPF
ERYTHROCYTE [DISTWIDTH] IN BLOOD BY AUTOMATED COUNT: 12.9 % (ref 12.3–15.4)
FOLATE SERPL-MCNC: >20 NG/ML (ref 4.78–24.2)
GLOBULIN SER CALC-MCNC: 2.3 GM/DL
GLUCOSE SERPL-MCNC: 144 MG/DL (ref 65–99)
GLUCOSE UR QL STRIP: NEGATIVE
HBA1C MFR BLD: 7.2 % (ref 4.8–5.6)
HCT VFR BLD AUTO: 39.3 % (ref 34–46.6)
HDLC SERPL-MCNC: 35 MG/DL (ref 40–60)
HGB BLD-MCNC: 13.3 G/DL (ref 12–15.9)
HGB UR QL STRIP: NEGATIVE
IMM GRANULOCYTES # BLD AUTO: 0.02 10*3/MM3 (ref 0–0.05)
IMM GRANULOCYTES NFR BLD AUTO: 0.3 % (ref 0–0.5)
KETONES UR QL STRIP: ABNORMAL
LDLC SERPL CALC-MCNC: 56 MG/DL (ref 0–100)
LEUKOCYTE ESTERASE UR QL STRIP: NEGATIVE
LYMPHOCYTES # BLD AUTO: 1.75 10*3/MM3 (ref 0.7–3.1)
LYMPHOCYTES NFR BLD AUTO: 29.9 % (ref 19.6–45.3)
MAGNESIUM SERPL-MCNC: 1.8 MG/DL (ref 1.6–2.4)
MCH RBC QN AUTO: 31 PG (ref 26.6–33)
MCHC RBC AUTO-ENTMCNC: 33.8 G/DL (ref 31.5–35.7)
MCV RBC AUTO: 91.6 FL (ref 79–97)
MICROALBUMIN UR-MCNC: 6 UG/ML
MONOCYTES # BLD AUTO: 0.52 10*3/MM3 (ref 0.1–0.9)
MONOCYTES NFR BLD AUTO: 8.9 % (ref 5–12)
NEUTROPHILS # BLD AUTO: 3.41 10*3/MM3 (ref 1.7–7)
NEUTROPHILS NFR BLD AUTO: 58.3 % (ref 42.7–76)
NITRITE UR QL STRIP: NEGATIVE
NRBC BLD AUTO-RTO: 0 /100 WBC (ref 0–0.2)
PH UR STRIP: 6.5 [PH] (ref 5–8)
PLATELET # BLD AUTO: 212 10*3/MM3 (ref 140–450)
POTASSIUM SERPL-SCNC: 4.9 MMOL/L (ref 3.5–5.2)
PROT SERPL-MCNC: 6.9 G/DL (ref 6–8.5)
PROT UR QL STRIP: NEGATIVE
RBC # BLD AUTO: 4.29 10*6/MM3 (ref 3.77–5.28)
RBC #/AREA URNS HPF: ABNORMAL /HPF
SODIUM SERPL-SCNC: 138 MMOL/L (ref 136–145)
SP GR UR STRIP: 1.01 (ref 1–1.03)
T3FREE SERPL-MCNC: 2.8 PG/ML (ref 2–4.4)
T4 FREE SERPL-MCNC: 1.74 NG/DL (ref 0.93–1.7)
TRIGL SERPL-MCNC: 293 MG/DL (ref 0–150)
TSH SERPL DL<=0.005 MIU/L-ACNC: 1.91 UIU/ML (ref 0.27–4.2)
UROBILINOGEN UR STRIP-MCNC: ABNORMAL MG/DL
VIT B12 SERPL-MCNC: 1418 PG/ML (ref 211–946)
VLDLC SERPL CALC-MCNC: 46 MG/DL (ref 5–40)
WBC # BLD AUTO: 5.85 10*3/MM3 (ref 3.4–10.8)
WBC #/AREA URNS HPF: ABNORMAL /HPF

## 2023-07-26 ENCOUNTER — OFFICE VISIT (OUTPATIENT)
Dept: FAMILY MEDICINE CLINIC | Facility: CLINIC | Age: 63
End: 2023-07-26
Payer: COMMERCIAL

## 2023-07-26 VITALS
RESPIRATION RATE: 16 BRPM | BODY MASS INDEX: 27.31 KG/M2 | OXYGEN SATURATION: 97 % | SYSTOLIC BLOOD PRESSURE: 130 MMHG | WEIGHT: 160 LBS | TEMPERATURE: 97.1 F | HEART RATE: 85 BPM | HEIGHT: 64 IN | DIASTOLIC BLOOD PRESSURE: 64 MMHG

## 2023-07-26 DIAGNOSIS — F17.200 TOBACCO USE DISORDER: ICD-10-CM

## 2023-07-26 DIAGNOSIS — Z86.79 HISTORY OF CARDIOMYOPATHY: ICD-10-CM

## 2023-07-26 DIAGNOSIS — I10 ESSENTIAL HYPERTENSION: ICD-10-CM

## 2023-07-26 DIAGNOSIS — F33.0 MILD EPISODE OF RECURRENT MAJOR DEPRESSIVE DISORDER: ICD-10-CM

## 2023-07-26 DIAGNOSIS — E78.2 MIXED HYPERLIPIDEMIA: ICD-10-CM

## 2023-07-26 DIAGNOSIS — E11.65 TYPE 2 DIABETES MELLITUS WITH HYPERGLYCEMIA, WITHOUT LONG-TERM CURRENT USE OF INSULIN: Primary | ICD-10-CM

## 2023-07-26 DIAGNOSIS — Z85.3 HISTORY OF BREAST CANCER: ICD-10-CM

## 2023-07-26 DIAGNOSIS — J45.40 MODERATE PERSISTENT ASTHMA WITHOUT COMPLICATION: ICD-10-CM

## 2023-07-26 DIAGNOSIS — K21.9 GASTROESOPHAGEAL REFLUX DISEASE WITHOUT ESOPHAGITIS: ICD-10-CM

## 2023-07-26 DIAGNOSIS — K75.81 NASH (NONALCOHOLIC STEATOHEPATITIS): ICD-10-CM

## 2023-07-26 DIAGNOSIS — F41.1 GENERALIZED ANXIETY DISORDER: ICD-10-CM

## 2023-07-26 PROCEDURE — 99214 OFFICE O/P EST MOD 30 MIN: CPT | Performed by: PHYSICIAN ASSISTANT

## 2023-07-26 RX ORDER — TIRZEPATIDE 2.5 MG/.5ML
2.5 INJECTION, SOLUTION SUBCUTANEOUS WEEKLY
Qty: 2 ML | Refills: 5 | Status: SHIPPED | OUTPATIENT
Start: 2023-07-26 | End: 2023-08-25

## 2023-07-26 RX ORDER — FLUOXETINE 10 MG/1
10 CAPSULE ORAL DAILY
Qty: 90 CAPSULE | Refills: 1 | Status: SHIPPED | OUTPATIENT
Start: 2023-07-26

## 2023-07-26 NOTE — PROGRESS NOTES
"Molina Razo is a 62 y.o. female.     Hypertension  Associated symptoms include anxiety. Pertinent negatives include no blurred vision.   Hyperlipidemia    Heartburn  She reports no choking.   Diabetes  Hypoglycemia symptoms include nervousness/anxiousness. Pertinent negatives for hypoglycemia include no speech difficulty. Pertinent negatives for diabetes include no blurred vision.   DepressionPatient presents with the following symptoms: nervousness/anxiety.  Patient is not experiencing: choking sensation and suicidal ideas.    Patient has a history of: asthma    Anxiety  Symptoms include nervous/anxious behavior. Patient reports no suicidal ideas.     Her past medical history is significant for asthma and depression.   Asthma  Pertinent negatives include no trouble swallowing. Her past medical history is significant for asthma.     Since the last visit, she has overall felt fairly well.  She has Primary Hypertension and well controlled on current medication, DMII not controlled on current regimen and will add/adjust medication, work on diet and exercise, get follow up labs, GERD controlled on PPI Rx, Hyperlipidemia with goals met with current Rx, Hypothyroidism well controlled on current medication and will continue Rx, Asthma well controlled and not requiring rescue Albuterol > 2 times a week, and Vitamin D deficiency and labs are at goal >30 ng/mL.  she has been compliant with current medications have reviewed them.  The patient denies medication side effects.  Will refill medications. /64   Pulse 85   Temp 97.1 °F (36.2 °C)   Resp 16   Ht 162.6 cm (64\")   Wt 72.6 kg (160 lb)   LMP  (LMP Unknown)   SpO2 97%   BMI 27.46 kg/m²   Trigs improved some;  fasting glucose 176----ave.  A1C went up.  Still has elevated triglycerides and has fatty liver.  Getting her weight down to decrease her risk of breast cancer recurrence  Results for orders placed or performed in visit on 03/06/23 "   Comprehensive metabolic panel    Specimen: Blood   Result Value Ref Range    Glucose 144 (H) 65 - 99 mg/dL    BUN 8 8 - 23 mg/dL    Creatinine 0.67 0.57 - 1.00 mg/dL    EGFR Result 99.6 >60.0 mL/min/1.73    BUN/Creatinine Ratio 11.9 7.0 - 25.0    Sodium 138 136 - 145 mmol/L    Potassium 4.9 3.5 - 5.2 mmol/L    Chloride 98 98 - 107 mmol/L    Total CO2 30.6 (H) 22.0 - 29.0 mmol/L    Calcium 10.0 8.6 - 10.5 mg/dL    Total Protein 6.9 6.0 - 8.5 g/dL    Albumin 4.6 3.5 - 5.2 g/dL    Globulin 2.3 gm/dL    A/G Ratio 2.0 g/dL    Total Bilirubin 0.6 0.0 - 1.2 mg/dL    Alkaline Phosphatase 84 39 - 117 U/L    AST (SGOT) 29 1 - 32 U/L    ALT (SGPT) 39 (H) 1 - 33 U/L   Lipid panel    Specimen: Blood   Result Value Ref Range    Total Cholesterol 137 0 - 200 mg/dL    Triglycerides 293 (H) 0 - 150 mg/dL    HDL Cholesterol 35 (L) 40 - 60 mg/dL    VLDL Cholesterol Diomedes 46 (H) 5 - 40 mg/dL    LDL Chol Calc (NIH) 56 0 - 100 mg/dL   TSH    Specimen: Blood   Result Value Ref Range    TSH 1.910 0.270 - 4.200 uIU/mL   Hemoglobin A1c    Specimen: Blood   Result Value Ref Range    Hemoglobin A1C 7.20 (H) 4.80 - 5.60 %   T4, Free    Specimen: Blood   Result Value Ref Range    Free T4 1.74 (H) 0.93 - 1.70 ng/dL   T3, Free    Specimen: Blood   Result Value Ref Range    T3, Free 2.8 2.0 - 4.4 pg/mL   Vitamin D,25-Hydroxy    Specimen: Blood   Result Value Ref Range    25 Hydroxy, Vitamin D 56.0 30.0 - 100.0 ng/mL   Vitamin B12    Specimen: Blood   Result Value Ref Range    Vitamin B-12 1,418 (H) 211 - 946 pg/mL   Folate    Specimen: Blood   Result Value Ref Range    Folate >20.00 4.78 - 24.20 ng/mL   Microalbumin / Creatinine Urine Ratio - Urine, Clean Catch    Specimen: Urine, Clean Catch   Result Value Ref Range    Creatinine, Urine 58.6 Not Estab. mg/dL    Microalbumin, Urine 6.0 Not Estab. ug/mL    Microalbumin/Creatinine Ratio 10 0 - 29 mg/g creat   Magnesium    Specimen: Blood   Result Value Ref Range    Magnesium 1.8 1.6 - 2.4 mg/dL    Microscopic Examination -   Result Value Ref Range    WBC, UA 0-2 /HPF    RBC, UA 0-2 /HPF    Epithelial Cells (non renal) 3-6 (A) /HPF    Cast Type Comment     Bacteria, UA 1+ (A) None Seen /HPF   CBC and Differential    Specimen: Blood   Result Value Ref Range    WBC 5.85 3.40 - 10.80 10*3/mm3    RBC 4.29 3.77 - 5.28 10*6/mm3    Hemoglobin 13.3 12.0 - 15.9 g/dL    Hematocrit 39.3 34.0 - 46.6 %    MCV 91.6 79.0 - 97.0 fL    MCH 31.0 26.6 - 33.0 pg    MCHC 33.8 31.5 - 35.7 g/dL    RDW 12.9 12.3 - 15.4 %    Platelets 212 140 - 450 10*3/mm3    Neutrophil Rel % 58.3 42.7 - 76.0 %    Lymphocyte Rel % 29.9 19.6 - 45.3 %    Monocyte Rel % 8.9 5.0 - 12.0 %    Eosinophil Rel % 1.7 0.3 - 6.2 %    Basophil Rel % 0.9 0.0 - 1.5 %    Neutrophils Absolute 3.41 1.70 - 7.00 10*3/mm3    Lymphocytes Absolute 1.75 0.70 - 3.10 10*3/mm3    Monocytes Absolute 0.52 0.10 - 0.90 10*3/mm3    Eosinophils Absolute 0.10 0.00 - 0.40 10*3/mm3    Basophils Absolute 0.05 0.00 - 0.20 10*3/mm3    Immature Granulocyte Rel % 0.3 0.0 - 0.5 %    Immature Grans Absolute 0.02 0.00 - 0.05 10*3/mm3    nRBC 0.0 0.0 - 0.2 /100 WBC   Urinalysis With Microscopic - Urine, Clean Catch    Specimen: Urine, Clean Catch   Result Value Ref Range    Specific Gravity, UA 1.011 1.005 - 1.030    pH, UA 6.5 5.0 - 8.0    Color, UA Yellow     Appearance, UA Clear Clear    Leukocytes, UA Negative Negative    Protein Negative Negative    Glucose, UA Negative Negative    Ketones Trace (A) Negative    Blood, UA Negative Negative    Bilirubin, UA Negative Negative    Urobilinogen, UA Comment     Nitrite, UA Negative Negative           Saw her 3-6-23 noted primary hypertension, type 2 diabetes, GERD, mixed hyperlipidemia all controlled on current medication also asthma controlled on inhalers       Diabetic eye exam 8/31/2022 and noted glaucoma. Had visit with the ENT 3/1/2022 saw Dr Charlton for evaluation of tinnitus.  8-26-22  World Health Organization fracture risk assessment  tool (FRAX Score)  calculates a 10 year risk of major osteoporotic fracture of 18% and a 10  year risk of hip fracture of 1.9%.     IMPRESSION:  Osteopenia both hips..     This report was finalized on 8/26/2022 2:35 PM by Dr. Chi Palacios M.D.     Last visit with Dr. Trivedi cardiologist was 1/24/2023 following up on her inappropriate sinus tachycardia, mild cardiomyopathy.  Noted that her last echo showed normal LV function and continued bisoprolol and valsartan.  Heart rate was controlled with long-acting diltiazem and digoxin.  Recommended better control of triglycerides  Last visit with oncologist Dr. Arnold was 6/16/2022  PATIENT IDENTIFICATION: 61 yr/o who underwent an 08 November 2006 breast conserving surgical procedure for a left breast cancer. The tumor was moderately differentiated, receptor positive and HER2/salome negative, Stage IIA ([id number]). Two malignant lymph nodes were identified in 19 that were available for review. She received four cycles of doxorubicin/cyclophosphamide followed by four cycles of paclitaxel from 18 January 2007 to 16 March 2007. Tamoxifen initiated in April 2007. Patient substituted letrozole in May 2012. In May 2014, tamoxifen substituted (reinitiated). Completed tamoxifen April 2017.   referral to lymphedema clinic was made with follow-up noted 1 year  Saw oncologist Dr. Arnold 6/15/2023 noting patient had left diagnostic mammogram and ultrasound scheduled for further work-up 7/7/2023----did not require biopsy and is to follow-up with more imaging in December  Failed Lexapro and Zoloft--flat  Liver labs negative for genetic cause per work-up with Dr. Valencia GI specialist  Also note 20-pack-year history smoking stopped 2019    To have colonoscopy     Note Free T4 was mildly elevated at one 1.74 patient is not having any palpitations and wants to continue same dose of levothyroxine.    She has never had pancreatitis or gastroparesis and no family history of thyroid cancer or M  "ARTURO Razo female 62 y.o., /64   Pulse 85   Temp 97.1 °F (36.2 °C)   Resp 16   Ht 162.6 cm (64\")   Wt 72.6 kg (160 lb)   LMP  (LMP Unknown)   SpO2 97%   BMI 27.46 kg/m²   who presents today for follow up of Depression and Anxiety.  She reports depressed mood, fatigue, anxiety, anhedonia, impaired concentration, excessive worry, unable to relax, irritable, and sleep disturbance. Onset of symptoms was approximately several years ago. She denies current suicidal and homicidal ideation. Risk factors are family history of anxiety and or depression and lifestyle of multiple roles.  Previous treatment includes  was on Zoloft--flat, Lexapro--flat . She complains of the following medication side effects: flat . The patient declines to go to counseling..    The following portions of the patient's history were reviewed and updated as appropriate: allergies, current medications, past family history, past medical history, past social history, past surgical history, and problem list.    Review of Systems   Constitutional:  Negative for diaphoresis.   HENT:  Negative for nosebleeds and trouble swallowing.    Eyes:  Negative for blurred vision and visual disturbance.   Respiratory:  Negative for choking.    Gastrointestinal:  Negative for blood in stool.   Allergic/Immunologic: Negative for immunocompromised state.   Neurological:  Negative for facial asymmetry and speech difficulty.   Psychiatric/Behavioral:  Positive for agitation and dysphoric mood. Negative for self-injury and suicidal ideas. The patient is nervous/anxious.      Objective   Physical Exam  Vitals and nursing note reviewed.   Constitutional:       General: She is not in acute distress.     Appearance: Normal appearance. She is well-developed. She is not ill-appearing or toxic-appearing.   HENT:      Head: Normocephalic.      Right Ear: External ear normal.      Left Ear: External ear normal.      Nose: Nose normal.      Mouth/Throat:      " Pharynx: Oropharynx is clear.   Eyes:      General: No scleral icterus.     Conjunctiva/sclera: Conjunctivae normal.      Pupils: Pupils are equal, round, and reactive to light.   Neck:      Thyroid: No thyromegaly.   Cardiovascular:      Rate and Rhythm: Normal rate and regular rhythm.      Pulses: Normal pulses.      Heart sounds: Normal heart sounds. No murmur heard.  Pulmonary:      Effort: Pulmonary effort is normal. No respiratory distress.      Breath sounds: Normal breath sounds. No rales.   Musculoskeletal:         General: No deformity. Normal range of motion.      Cervical back: Normal range of motion and neck supple.      Right lower leg: Edema present.      Left lower leg: Edema present.      Comments: trace   Skin:     General: Skin is warm and dry.      Findings: No rash.   Neurological:      General: No focal deficit present.      Mental Status: She is alert and oriented to person, place, and time. Mental status is at baseline.   Psychiatric:         Behavior: Behavior normal.         Thought Content: Thought content normal.         Judgment: Judgment normal.         Assessment & Plan   Diagnoses and all orders for this visit:    1. Type 2 diabetes mellitus with hyperglycemia, without long-term current use of insulin (Primary)    2. Tobacco use disorder    3. Mild episode of recurrent major depressive disorder    4. Mixed hyperlipidemia    5. Essential hypertension    6. Gastroesophageal reflux disease without esophagitis    7. Generalized anxiety disorder    8. HARTLEY (nonalcoholic steatohepatitis)    9. History of cardiomyopathy    10. History of breast cancer    11. Moderate persistent asthma without complication    Other orders  -     Tirzepatide (Mounjaro) 2.5 MG/0.5ML solution pen-injector; Inject 0.5 mL under the skin into the appropriate area as directed 1 (One) Time Per Week for 30 days. Inject 2.5mg SQ once weekly  Dispense: 2 mL; Refill: 5      Plan, Jess Razo, was seen today.  she was  seen for HTN and continue medication, DMII and adjusted/added medication, GERD and will continue on PPI medication, Hyperlipidemia and will continue current medication, Hypothyroidism well controlled, continue medication, Seasonal allergies and is doing well on their medication PRN, Asthma and is well controlled on medication.  , and Vitamin D deficiency and supplemented.    Sees Dr Trivedi---cardio--her inappropriate sinus tachycardia, mild cardiomyopathy.---this in [place]   Has seen GI for HARTLEY  Lower B12 to twice week  Start Mounjaro due to A1c is not at goal----need to get diabetes back under control will also help if she lost weight with her fatty liver disease  She can message me about going up on dose if tolerating this well in 1 month  Stop smoking       Answers submitted by the patient for this visit:  Other (Submitted on 7/19/2023)  Please describe your symptoms.: Blood work.  Blood pressure been low  Have you had these symptoms before?: No  How long have you been having these symptoms?: 1-4 days  Primary Reason for Visit (Submitted on 7/19/2023)  What is the primary reason for your visit?: Other

## 2023-08-30 PROBLEM — Z80.0 FAMILY HISTORY OF COLON CANCER: Status: ACTIVE | Noted: 2023-08-30

## 2023-08-30 PROBLEM — Z86.010 HISTORY OF ADENOMATOUS POLYP OF COLON: Status: ACTIVE | Noted: 2023-08-30

## 2023-08-30 PROBLEM — Z86.0101 HISTORY OF ADENOMATOUS POLYP OF COLON: Status: ACTIVE | Noted: 2023-08-30

## 2023-09-01 RX ORDER — VALSARTAN 160 MG/1
TABLET ORAL
Qty: 90 TABLET | Refills: 0 | Status: SHIPPED | OUTPATIENT
Start: 2023-09-01

## 2023-09-01 NOTE — TELEPHONE ENCOUNTER
Rx Refill Note  Requested Prescriptions     Pending Prescriptions Disp Refills    valsartan (DIOVAN) 160 MG tablet [Pharmacy Med Name: Valsartan 160 MG Oral Tablet] 90 tablet 0     Sig: Take 1 tablet by mouth once daily for blood pressure      Last office visit with prescribing clinician: 7/26/2023   Last telemedicine visit with prescribing clinician: Visit date not found   Next office visit with prescribing clinician: Visit date not found   {T

## 2023-09-13 ENCOUNTER — OFFICE VISIT (OUTPATIENT)
Dept: FAMILY MEDICINE CLINIC | Facility: CLINIC | Age: 63
End: 2023-09-13
Payer: COMMERCIAL

## 2023-09-13 VITALS
HEIGHT: 64 IN | HEART RATE: 84 BPM | DIASTOLIC BLOOD PRESSURE: 77 MMHG | BODY MASS INDEX: 26.46 KG/M2 | OXYGEN SATURATION: 99 % | SYSTOLIC BLOOD PRESSURE: 134 MMHG | WEIGHT: 155 LBS

## 2023-09-13 DIAGNOSIS — E11.65 TYPE 2 DIABETES MELLITUS WITH HYPERGLYCEMIA, WITHOUT LONG-TERM CURRENT USE OF INSULIN: Primary | ICD-10-CM

## 2023-09-13 DIAGNOSIS — I10 ESSENTIAL HYPERTENSION: ICD-10-CM

## 2023-09-13 DIAGNOSIS — F33.0 MILD EPISODE OF RECURRENT MAJOR DEPRESSIVE DISORDER: ICD-10-CM

## 2023-09-13 DIAGNOSIS — E78.2 MIXED HYPERLIPIDEMIA: ICD-10-CM

## 2023-09-13 PROCEDURE — 99214 OFFICE O/P EST MOD 30 MIN: CPT | Performed by: NURSE PRACTITIONER

## 2023-09-13 RX ORDER — TIRZEPATIDE 2.5 MG/.5ML
2.5 INJECTION, SOLUTION SUBCUTANEOUS WEEKLY
Qty: 2 ML | Refills: 5 | Status: SHIPPED | OUTPATIENT
Start: 2023-09-13 | End: 2023-10-13

## 2023-09-13 RX ORDER — ONDANSETRON 4 MG/1
4 TABLET, ORALLY DISINTEGRATING ORAL EVERY 8 HOURS PRN
Qty: 30 TABLET | Refills: 2 | Status: SHIPPED | OUTPATIENT
Start: 2023-09-13 | End: 2023-09-23

## 2023-09-13 RX ORDER — BRIMONIDINE TARTRATE, TIMOLOL MALEATE 2; 5 MG/ML; MG/ML
SOLUTION/ DROPS OPHTHALMIC
COMMUNITY
Start: 2023-08-04

## 2023-09-13 RX ORDER — BIMATOPROST 0.1 MG/ML
SOLUTION/ DROPS OPHTHALMIC
COMMUNITY
Start: 2023-08-25

## 2023-09-13 NOTE — PROGRESS NOTES
Chief Complaint  Med Management (Mounjaro ) and Hypotension    Subjective          Jess presents to [facility]   as a 63-year-old female to follow-up on hypertension and type 2 diabetes, medication refills.  Overall doing okay    History of hypertension has been compliant with all medications.  She has been monitoring her blood pressure at home she is worried that has decreased slightly but it has been running 110s to 120s over 70s.  She states that she has been checking it regularly but did not bring the log with her today.  She is also concerned that her heart rate is a little high in the morning but has been running around 90-92 she states.  It usually is normal when she checks in the afternoon.  She does have a cardiologist and her next follow-up appointment is 1/2024.  She denies any increase or changes in headaches no blurred vision no dizziness no flushing no chest pain or pressure    She is type II diabetic   Since the last visit, she has overall felt well.  she has been compliant with current meds.  she denies medication side effects.  Glucose control with medication is borderline controlled   The last HgbA1C result was   Lab Results   Component Value Date    HGBA1C 7.20 (H) 06/27/2023   .  The last dilated eye exam was 2023.  She did start Mounjaro back in July.  She is still having some increased fatigue and slight GI upset occasionally.  She does plan to stay on the same dose.  She does state that her urinary frequency has decreased she has no polydipsia or polyphagia.  She has no open ulcerations no changes in numbness and tingling.  She does take her blood sugar at home and has been running around 120s fasting.    She does have a history of depression/anxiety.  She did increase her fluoxetine to 20 mg on last visit she has been feeling a little bit more fatigued but is doing better anxiety/depression was.  She states that she is sleeping better she usually falls asleep  "around 9-10 and does wake up around 330 but this is better than it was previously.  she denies any SI or HI.  She plans to continue current dose at this time.  She is not currently in counseling    PHQ-2 Depression Screening  Little interest or pleasure in doing things? 0-->not at all   Feeling down, depressed, or hopeless? 0-->not at all   PHQ-2 Total Score 0       She has no other acute complaints of today      Review of Systems   Constitutional:  Positive for fatigue. Negative for chills and fever.   Eyes:  Negative for visual disturbance.   Respiratory:  Negative for cough, shortness of breath and wheezing.    Cardiovascular:  Negative for chest pain, palpitations and leg swelling.   Gastrointestinal:  Negative for abdominal pain, diarrhea, nausea and vomiting.   Endocrine: Negative for polydipsia, polyphagia and polyuria.   Neurological:  Negative for dizziness and light-headedness.   Psychiatric/Behavioral:  Positive for sleep disturbance. Negative for self-injury and suicidal ideas. The patient is not nervous/anxious.       Objective   Vital Signs:   Vitals:    09/13/23 0757   BP: 134/77   Pulse: 84   SpO2: 99%   Weight: 70.3 kg (155 lb)   Height: 162.6 cm (64.02\")        BMI is >= 25 and <30. (Overweight) The following options were offered after discussion;: weight loss educational material (shared in after visit summary)        Physical Exam  Vitals reviewed.   Constitutional:       General: She is not in acute distress.  Eyes:      Conjunctiva/sclera: Conjunctivae normal.   Neck:      Thyroid: No thyromegaly.      Vascular: No carotid bruit.   Cardiovascular:      Rate and Rhythm: Normal rate and regular rhythm.      Heart sounds: Normal heart sounds.   Pulmonary:      Effort: Pulmonary effort is normal. No respiratory distress.      Breath sounds: Normal breath sounds. No stridor. No wheezing, rhonchi or rales.   Chest:      Chest wall: No tenderness.   Lymphadenopathy:      Cervical: No cervical " adenopathy.   Neurological:      Mental Status: She is alert.   Psychiatric:         Attention and Perception: Attention normal.         Mood and Affect: Mood normal.        Result Review :     The following data was reviewed by: MARILYN Moore on 09/13/2023:      Comprehensive metabolic panel (06/27/2023 08:55)  Lipid panel (06/27/2023 08:55)  CBC and Differential (06/27/2023 08:55)  TSH (06/27/2023 08:55)  Hemoglobin A1c (06/27/2023 08:55)  T4, Free (06/27/2023 08:55)  T3, Free (06/27/2023 08:55)  Vitamin D,25-Hydroxy (06/27/2023 08:55)  Vitamin B12 (06/27/2023 08:55)  Folate (06/27/2023 08:55)    Hemoglobin A1c was 7.2 up from 6.8 added Mounjaro  T4 slightly elevated 1.74 normal TSH     Assessment and Plan    Diagnoses and all orders for this visit:    1. Type 2 diabetes mellitus with hyperglycemia, without long-term current use of insulin (Primary)  Comments:  Recheck hemoglobin A1c  Continue to monitor blood sugar closely at home  Orders:  -     Tirzepatide (Mounjaro) 2.5 MG/0.5ML solution pen-injector; Inject 0.5 mL under the skin into the appropriate area as directed 1 (One) Time Per Week for 30 days. Inject 2.5mg SQ once weekly  Dispense: 2 mL; Refill: 5  -     ondansetron ODT (ZOFRAN-ODT) 4 MG disintegrating tablet; Place 1 tablet on the tongue Every 8 (Eight) Hours As Needed for Nausea or Vomiting for up to 10 days.  Dispense: 30 tablet; Refill: 2  -     Comprehensive Metabolic Panel  -     CBC & Differential  -     Hemoglobin A1c  -     Urinalysis With Microscopic - Urine, Clean Catch  -     TSH  -     T4, Free    2. Mixed hyperlipidemia  Comments:  Recheck lipid panel with next labs continue to work on lower cholesterol diet and exercise as tolerated  Orders:  -     Comprehensive Metabolic Panel  -     CBC & Differential  -     Hemoglobin A1c  -     Urinalysis With Microscopic - Urine, Clean Catch  -     TSH  -     T4, Free    3. Essential hypertension  Comments:  Continue to monitor blood  pressure at home bring log to next visit  Orders:  -     Comprehensive Metabolic Panel  -     CBC & Differential  -     Hemoglobin A1c  -     Urinalysis With Microscopic - Urine, Clean Catch  -     TSH  -     T4, Free    4. Mild episode of recurrent major depressive disorder  Comments:  Improved continue fluoxetine at 20 mg daily  Increased fatigue  May adjust to taking at nighttime if symptoms continue  Orders:  -     Comprehensive Metabolic Panel  -     CBC & Differential  -     Hemoglobin A1c  -     Urinalysis With Microscopic - Urine, Clean Catch  -     TSH  -     T4, Free        Follow Up   Return in about 4 weeks (around 10/11/2023) for Recheck blood pressure and fatigue/anxiety.  Patient was given instructions and counseling regarding her condition or for health maintenance advice. Please see specific information pulled into the AVS if appropriate.

## 2023-09-14 LAB
ALBUMIN SERPL-MCNC: 4.9 G/DL (ref 3.5–5.2)
ALBUMIN/GLOB SERPL: 1.8 G/DL
ALP SERPL-CCNC: 81 U/L (ref 39–117)
ALT SERPL-CCNC: 53 U/L (ref 1–33)
APPEARANCE UR: CLEAR
AST SERPL-CCNC: 49 U/L (ref 1–32)
BACTERIA #/AREA URNS HPF: ABNORMAL /HPF
BASOPHILS # BLD AUTO: 0.05 10*3/MM3 (ref 0–0.2)
BASOPHILS NFR BLD AUTO: 0.9 % (ref 0–1.5)
BILIRUB SERPL-MCNC: 0.5 MG/DL (ref 0–1.2)
BILIRUB UR QL STRIP: NEGATIVE
BUN SERPL-MCNC: 8 MG/DL (ref 8–23)
BUN/CREAT SERPL: 13.3 (ref 7–25)
CALCIUM SERPL-MCNC: 10.1 MG/DL (ref 8.6–10.5)
CASTS URNS MICRO: ABNORMAL
CHLORIDE SERPL-SCNC: 97 MMOL/L (ref 98–107)
CO2 SERPL-SCNC: 28.9 MMOL/L (ref 22–29)
COLOR UR: YELLOW
CREAT SERPL-MCNC: 0.6 MG/DL (ref 0.57–1)
EGFRCR SERPLBLD CKD-EPI 2021: 101.6 ML/MIN/1.73
EOSINOPHIL # BLD AUTO: 0.1 10*3/MM3 (ref 0–0.4)
EOSINOPHIL NFR BLD AUTO: 1.8 % (ref 0.3–6.2)
EPI CELLS #/AREA URNS HPF: ABNORMAL /HPF
ERYTHROCYTE [DISTWIDTH] IN BLOOD BY AUTOMATED COUNT: 12.5 % (ref 12.3–15.4)
GLOBULIN SER CALC-MCNC: 2.8 GM/DL
GLUCOSE SERPL-MCNC: 136 MG/DL (ref 65–99)
GLUCOSE UR QL STRIP: NEGATIVE
HBA1C MFR BLD: 6.3 % (ref 4.8–5.6)
HCT VFR BLD AUTO: 42.3 % (ref 34–46.6)
HGB BLD-MCNC: 14.1 G/DL (ref 12–15.9)
HGB UR QL STRIP: NEGATIVE
IMM GRANULOCYTES # BLD AUTO: 0.01 10*3/MM3 (ref 0–0.05)
IMM GRANULOCYTES NFR BLD AUTO: 0.2 % (ref 0–0.5)
KETONES UR QL STRIP: NEGATIVE
LEUKOCYTE ESTERASE UR QL STRIP: NEGATIVE
LYMPHOCYTES # BLD AUTO: 1.39 10*3/MM3 (ref 0.7–3.1)
LYMPHOCYTES NFR BLD AUTO: 25.2 % (ref 19.6–45.3)
MCH RBC QN AUTO: 30.6 PG (ref 26.6–33)
MCHC RBC AUTO-ENTMCNC: 33.3 G/DL (ref 31.5–35.7)
MCV RBC AUTO: 91.8 FL (ref 79–97)
MONOCYTES # BLD AUTO: 0.48 10*3/MM3 (ref 0.1–0.9)
MONOCYTES NFR BLD AUTO: 8.7 % (ref 5–12)
NEUTROPHILS # BLD AUTO: 3.48 10*3/MM3 (ref 1.7–7)
NEUTROPHILS NFR BLD AUTO: 63.2 % (ref 42.7–76)
NITRITE UR QL STRIP: NEGATIVE
NRBC BLD AUTO-RTO: 0 /100 WBC (ref 0–0.2)
PH UR STRIP: 6.5 [PH] (ref 5–8)
PLATELET # BLD AUTO: 234 10*3/MM3 (ref 140–450)
POTASSIUM SERPL-SCNC: 4.5 MMOL/L (ref 3.5–5.2)
PROT SERPL-MCNC: 7.7 G/DL (ref 6–8.5)
PROT UR QL STRIP: NEGATIVE
RBC # BLD AUTO: 4.61 10*6/MM3 (ref 3.77–5.28)
RBC #/AREA URNS HPF: ABNORMAL /HPF
SODIUM SERPL-SCNC: 136 MMOL/L (ref 136–145)
SP GR UR STRIP: 1.01 (ref 1–1.03)
T4 FREE SERPL-MCNC: 1.71 NG/DL (ref 0.93–1.7)
TSH SERPL DL<=0.005 MIU/L-ACNC: 1.75 UIU/ML (ref 0.27–4.2)
UROBILINOGEN UR STRIP-MCNC: NORMAL MG/DL
WBC # BLD AUTO: 5.51 10*3/MM3 (ref 3.4–10.8)
WBC #/AREA URNS HPF: ABNORMAL /HPF

## 2023-09-17 RX ORDER — FLUOXETINE 10 MG/1
20 CAPSULE ORAL DAILY
Qty: 180 CAPSULE | Refills: 1 | Status: SHIPPED | OUTPATIENT
Start: 2023-09-17

## 2023-10-10 ENCOUNTER — OFFICE VISIT (OUTPATIENT)
Dept: FAMILY MEDICINE CLINIC | Facility: CLINIC | Age: 63
End: 2023-10-10
Payer: COMMERCIAL

## 2023-10-10 VITALS
BODY MASS INDEX: 26.23 KG/M2 | OXYGEN SATURATION: 95 % | HEIGHT: 65 IN | DIASTOLIC BLOOD PRESSURE: 72 MMHG | RESPIRATION RATE: 16 BRPM | SYSTOLIC BLOOD PRESSURE: 138 MMHG | TEMPERATURE: 98 F | HEART RATE: 83 BPM | WEIGHT: 157.4 LBS

## 2023-10-10 DIAGNOSIS — F41.1 GENERALIZED ANXIETY DISORDER: ICD-10-CM

## 2023-10-10 DIAGNOSIS — F33.0 MILD EPISODE OF RECURRENT MAJOR DEPRESSIVE DISORDER: ICD-10-CM

## 2023-10-10 DIAGNOSIS — E11.65 TYPE 2 DIABETES MELLITUS WITH HYPERGLYCEMIA, WITHOUT LONG-TERM CURRENT USE OF INSULIN: ICD-10-CM

## 2023-10-10 DIAGNOSIS — J01.00 ACUTE NON-RECURRENT MAXILLARY SINUSITIS: ICD-10-CM

## 2023-10-10 DIAGNOSIS — E78.2 MIXED HYPERLIPIDEMIA: ICD-10-CM

## 2023-10-10 DIAGNOSIS — I10 ESSENTIAL HYPERTENSION: Primary | ICD-10-CM

## 2023-10-10 DIAGNOSIS — K21.9 GASTROESOPHAGEAL REFLUX DISEASE WITHOUT ESOPHAGITIS: ICD-10-CM

## 2023-10-10 PROCEDURE — 99214 OFFICE O/P EST MOD 30 MIN: CPT | Performed by: NURSE PRACTITIONER

## 2023-10-10 RX ORDER — LEVOTHYROXINE SODIUM 0.05 MG/1
TABLET ORAL
Qty: 90 TABLET | Refills: 0 | Status: SHIPPED | OUTPATIENT
Start: 2023-10-10

## 2023-10-10 RX ORDER — CLINDAMYCIN HYDROCHLORIDE 300 MG/1
300 CAPSULE ORAL 4 TIMES DAILY
Qty: 40 CAPSULE | Refills: 0 | Status: SHIPPED | OUTPATIENT
Start: 2023-10-10 | End: 2023-10-20

## 2023-10-10 NOTE — PROGRESS NOTES
Chief Complaint  Hypertension and Follow-up    Subjective          Jess presents to [facility] as a 63-year-old female for follow-up on hypertension and type 2 diabetes, to review labs, refill medications, overall doing well.    History of hypertension.  She has been compliant on all medications.  She does have a cardiologist and has a follow-up next month.  She has been monitoring her blood pressure closely at home.  It has been consistently 110s to 120s over 70 to 80s.  Her heart rate has been consistently 70-90's  She denies any increase or changes in headaches no blurred vision no dizziness no flushing no chest pain or pressure      She is type II diabetic  Since the last visit, she has overall felt well.  she has been compliant with current meds.  she denies medication side effects.  Glucose control with medication is well controlled   The last HgbA1C result was   Lab Results   Component Value Date    HGBA1C 6.30 (H) 09/13/2023   .  The last dilated eye exam was 2023.  She did start Mounjaro back in July.  She is still having some increased fatigue but GI seems to be doing much better.  She does plan to stay on the same dose.  She does state that her urinary frequency has decreased.  She has no poly tipsy or polyphasia.  She has no open respirations.  No numbness or tingling.  She does take her blood sugar at home and did bring a log she is consistently running around 120s fasting.    She does have a history of depression anxiety.  She is doing well on her fluoxetine.  She is taking 20 mg.  She is sleeping well but slightly fatigued.  She states that she usually falls asleep around 9 to 10 PM and does wake up around 330 but is better than it was previously.  She denies any SI or HI.  She plans to continue current dose at this time.  She is not currently in counseling.    PHQ-2 Depression Screening  Little interest or pleasure in doing things?  0   Feeling down, depressed, or  "hopeless?  0   PHQ-2 Total Score  0       She has had some increased sinus pain and pressure and congestion over the past couple of weeks.  She is taking over-the-counter cold medicine and Coricidin HBP.  She has a mild nonproductive cough and some thick yellow nasal drainage.  She denies any sick contacts.  She declines any testing today.  She denies any shortness of breath or wheezing.  No abdominal pain no N/V/D/C.  She is able to eat and keep down fluids.  She did take clindamycin when she had this problem in the past she did tolerate this medication well with her allergies and would like a refill.  She has had no recent antibiotic use since 2/2023.      She has no other acute complaints of today    The following portions of the patient's history were reviewed and updated as appropriate: allergies, current medications, past family history, past medical history, past social history, past surgical history, and problem list      Review of Systems   Constitutional:  Positive for fatigue. Negative for chills and fever.   HENT:  Positive for congestion, postnasal drip and sinus pressure.    Eyes:  Negative for visual disturbance.   Respiratory:  Negative for cough, shortness of breath and wheezing.    Cardiovascular:  Negative for chest pain, palpitations and leg swelling.   Gastrointestinal:  Negative for abdominal pain, constipation, diarrhea, nausea and vomiting.   Neurological:  Negative for dizziness and light-headedness.   Psychiatric/Behavioral:  Negative for self-injury and suicidal ideas.         Objective   Vital Signs:   Vitals:    10/10/23 0844   BP: 138/72   BP Location: Right arm   Patient Position: Sitting   Pulse: 83   Resp: 16   Temp: 98 øF (36.7 øC)   TempSrc: Oral   SpO2: 95%   Weight: 71.4 kg (157 lb 6.4 oz)   Height: 165.1 cm (65\")                  Physical Exam  Vitals reviewed.   Constitutional:       General: She is not in acute distress.  HENT:      Nose: Congestion present.      Right " Turbinates: Not swollen.      Left Turbinates: Not swollen.      Right Sinus: Maxillary sinus tenderness present. No frontal sinus tenderness.      Left Sinus: Maxillary sinus tenderness present. No frontal sinus tenderness.   Eyes:      Conjunctiva/sclera: Conjunctivae normal.   Neck:      Thyroid: No thyromegaly.      Vascular: No carotid bruit.   Cardiovascular:      Rate and Rhythm: Normal rate and regular rhythm.      Heart sounds: Normal heart sounds.   Pulmonary:      Effort: Pulmonary effort is normal. No respiratory distress.      Breath sounds: Normal breath sounds. No stridor. No wheezing, rhonchi or rales.   Chest:      Chest wall: No tenderness.   Lymphadenopathy:      Cervical: No cervical adenopathy.   Neurological:      Mental Status: She is alert.   Psychiatric:         Attention and Perception: Attention normal.         Mood and Affect: Mood normal.          Result Review :     The following data was reviewed by: MARILYN Moore on 10/10/2023:  Microscopic Examination - (09/13/2023 08:54)  T4, Free (09/13/2023 08:54)  TSH (09/13/2023 08:54)  Urinalysis With Microscopic - Urine, Clean Catch (09/13/2023 08:54)  Hemoglobin A1c (09/13/2023 08:54)  CBC & Differential (09/13/2023 08:54)  Comprehensive Metabolic Panel (09/13/2023 08:54)       Hemoglobin A1c down to 6.3  Thyroid looks good  Not anemic  Liver and kidney enzymes look good    Assessment and Plan    Diagnoses and all orders for this visit:    1. Essential hypertension (Primary)  Comments:  Controlled continue current management  Monitor blood pressure at home bring log to next visit  Keep follow-up with cardiology as directed    2. Type 2 diabetes mellitus with hyperglycemia, without long-term current use of insulin  Comments:  Hemoglobin A1c down to 6.3  Continue to monitor blood sugars at home  Continue current management    3. Mixed hyperlipidemia  Comments:  Continue to work on lower cholesterol diet and exercise  Goal is greater  than 150 minutes moderate intensity weekly    4. Mild episode of recurrent major depressive disorder  Comments:  Controlled continue current management  Denies any SI or HI    5. Generalized anxiety disorder  Comments:  Controlled continue current management    6. Gastroesophageal reflux disease without esophagitis  Comments:  Avoid triggers including caffeine, nicotine, alcohol, spicy foods, red sauce    7. Acute non-recurrent maxillary sinusitis  Comments:  Clindamycin only if symptoms worsen  Discussed possible side effects  Continue over-the-counter cold medicine and Coricidin HBP  Orders:  -     clindamycin (CLEOCIN) 300 MG capsule; Take 1 capsule by mouth 4 (Four) Times a Day for 10 days.  Dispense: 40 capsule; Refill: 0    Clindamycin  was sent to the patient's pharmacy per patient request. Has taken this in the past for sinusitis and she reports that it works well.  Discussed potential side effects-hold for worsening symptoms only  -Continue Flonase nasal spray as needed  -Take all medications as prescribed and until completed.  -Monitor for fever and take Tylenol as needed.  Drink plenty of fluids and get plenty of rest.  -Use cool-mist humidifier  -Seek immediate medical attention for fever unrelieved by Tylenol, chest pain, shortness of breath, sharp back pain, or any other worsening signs or symptoms.  -The patient verbalized understanding of all instructions given today.      Follow Up   Return in about 5 months (around 3/10/2024), or if symptoms worsen or fail to improve, for Annual physical, Next scheduled follow up.  Patient was given instructions and counseling regarding her condition or for health maintenance advice. Please see specific information pulled into the AVS if appropriate.

## 2023-10-18 ENCOUNTER — TELEPHONE (OUTPATIENT)
Dept: SURGERY | Facility: CLINIC | Age: 63
End: 2023-10-18
Payer: COMMERCIAL

## 2023-10-18 PROBLEM — Z12.11 SCREENING FOR COLON CANCER: Status: ACTIVE | Noted: 2023-06-19

## 2023-10-18 NOTE — TELEPHONE ENCOUNTER
Resched colonoscopy to 10/26 holding weight loss injections the week before the procedure. New instructions uploaded to [employer].

## 2023-10-26 ENCOUNTER — ANESTHESIA (OUTPATIENT)
Dept: GASTROENTEROLOGY | Facility: HOSPITAL | Age: 63
End: 2023-10-26
Payer: COMMERCIAL

## 2023-10-26 ENCOUNTER — ANESTHESIA EVENT (OUTPATIENT)
Dept: GASTROENTEROLOGY | Facility: HOSPITAL | Age: 63
End: 2023-10-26
Payer: COMMERCIAL

## 2023-10-26 ENCOUNTER — HOSPITAL ENCOUNTER (OUTPATIENT)
Facility: HOSPITAL | Age: 63
Setting detail: HOSPITAL OUTPATIENT SURGERY
Discharge: HOME | End: 2023-10-26
Attending: SURGERY | Admitting: SURGERY
Payer: COMMERCIAL

## 2023-10-26 VITALS
WEIGHT: 151 LBS | HEART RATE: 72 BPM | OXYGEN SATURATION: 95 % | SYSTOLIC BLOOD PRESSURE: 168 MMHG | DIASTOLIC BLOOD PRESSURE: 88 MMHG | BODY MASS INDEX: 25.78 KG/M2 | HEIGHT: 64 IN | RESPIRATION RATE: 18 BRPM

## 2023-10-26 DIAGNOSIS — Z12.11 SCREENING FOR COLON CANCER: ICD-10-CM

## 2023-10-26 DIAGNOSIS — Z80.0 FAMILY HISTORY OF COLON CANCER: ICD-10-CM

## 2023-10-26 DIAGNOSIS — Z86.010 HISTORY OF ADENOMATOUS POLYP OF COLON: ICD-10-CM

## 2023-10-26 LAB — GLUCOSE BLDC GLUCOMTR-MCNC: 112 MG/DL (ref 70–130)

## 2023-10-26 PROCEDURE — 45380 COLONOSCOPY AND BIOPSY: CPT | Performed by: SURGERY

## 2023-10-26 PROCEDURE — 25010000002 PROPOFOL 10 MG/ML EMULSION: Performed by: ANESTHESIOLOGY

## 2023-10-26 PROCEDURE — 82948 REAGENT STRIP/BLOOD GLUCOSE: CPT

## 2023-10-26 PROCEDURE — S0260 H&P FOR SURGERY: HCPCS | Performed by: SURGERY

## 2023-10-26 PROCEDURE — 25810000003 LACTATED RINGERS PER 1000 ML: Performed by: SURGERY

## 2023-10-26 PROCEDURE — 88305 TISSUE EXAM BY PATHOLOGIST: CPT | Performed by: SURGERY

## 2023-10-26 PROCEDURE — 45385 COLONOSCOPY W/LESION REMOVAL: CPT | Performed by: SURGERY

## 2023-10-26 PROCEDURE — 45381 COLONOSCOPY SUBMUCOUS NJX: CPT | Performed by: SURGERY

## 2023-10-26 RX ORDER — LIDOCAINE HYDROCHLORIDE 10 MG/ML
0.5 INJECTION, SOLUTION INFILTRATION; PERINEURAL ONCE AS NEEDED
Status: DISCONTINUED | OUTPATIENT
Start: 2023-10-26 | End: 2023-10-26 | Stop reason: HOSPADM

## 2023-10-26 RX ORDER — SODIUM CHLORIDE, SODIUM LACTATE, POTASSIUM CHLORIDE, CALCIUM CHLORIDE 600; 310; 30; 20 MG/100ML; MG/100ML; MG/100ML; MG/100ML
1000 INJECTION, SOLUTION INTRAVENOUS CONTINUOUS
Status: DISCONTINUED | OUTPATIENT
Start: 2023-10-26 | End: 2023-10-26 | Stop reason: HOSPADM

## 2023-10-26 RX ORDER — LIDOCAINE HYDROCHLORIDE 20 MG/ML
INJECTION, SOLUTION INFILTRATION; PERINEURAL AS NEEDED
Status: DISCONTINUED | OUTPATIENT
Start: 2023-10-26 | End: 2023-10-26 | Stop reason: SURG

## 2023-10-26 RX ORDER — GLYCOPYRROLATE 0.2 MG/ML
INJECTION INTRAMUSCULAR; INTRAVENOUS AS NEEDED
Status: DISCONTINUED | OUTPATIENT
Start: 2023-10-26 | End: 2023-10-26 | Stop reason: SURG

## 2023-10-26 RX ORDER — PROPOFOL 10 MG/ML
VIAL (ML) INTRAVENOUS AS NEEDED
Status: DISCONTINUED | OUTPATIENT
Start: 2023-10-26 | End: 2023-10-26 | Stop reason: SURG

## 2023-10-26 RX ORDER — SODIUM CHLORIDE 0.9 % (FLUSH) 0.9 %
10 SYRINGE (ML) INJECTION AS NEEDED
Status: DISCONTINUED | OUTPATIENT
Start: 2023-10-26 | End: 2023-10-26 | Stop reason: HOSPADM

## 2023-10-26 RX ADMIN — PROPOFOL 160 MCG/KG/MIN: 10 INJECTION, EMULSION INTRAVENOUS at 11:43

## 2023-10-26 RX ADMIN — SODIUM CHLORIDE, POTASSIUM CHLORIDE, SODIUM LACTATE AND CALCIUM CHLORIDE: 600; 310; 30; 20 INJECTION, SOLUTION INTRAVENOUS at 11:22

## 2023-10-26 RX ADMIN — PROPOFOL 40 MG: 10 INJECTION, EMULSION INTRAVENOUS at 11:49

## 2023-10-26 RX ADMIN — LIDOCAINE HYDROCHLORIDE 60 MG: 20 INJECTION, SOLUTION INFILTRATION; PERINEURAL at 11:43

## 2023-10-26 RX ADMIN — GLYCOPYRROLATE 0.1 MG: 0.2 INJECTION INTRAMUSCULAR; INTRAVENOUS at 11:40

## 2023-10-26 RX ADMIN — PROPOFOL 110 MG: 10 INJECTION, EMULSION INTRAVENOUS at 11:43

## 2023-10-26 RX ADMIN — PROPOFOL 40 MG: 10 INJECTION, EMULSION INTRAVENOUS at 11:56

## 2023-10-26 NOTE — ANESTHESIA POSTPROCEDURE EVALUATION
Patient: Jess Razo    Procedure Summary       Date: 10/26/23 Room / Location:  [facility]    Anesthesia Start: 1136 Anesthesia Stop: 1246    Procedure: COLONOSCOPY to cecum with hot snare polypectomies and cold biopsy polypectomy with tattoo Diagnosis:       Screening for colon cancer      History of adenomatous polyp of colon      Family history of colon cancer      (Screening for colon cancer [Z12.11])      (History of adenomatous polyp of colon [Z86.010])      (Family history of colon cancer [Z80.0])    Surgeons: Sabi Mars MD Provider: Paulo Wood MD    Anesthesia Type: MAC ASA Status: 3            Anesthesia Type: MAC    Vitals  Vitals Value Taken Time   BP     Temp     Pulse 77 10/26/23 1247   Resp     SpO2 98 % 10/26/23 1247   Vitals shown include unfiled device data.        Post Anesthesia Care and Evaluation    Patient location during evaluation: PHASE II  Patient participation: waiting for patient participation  Level of consciousness: sleepy but conscious  Pain management: adequate    Airway patency: patent    Cardiovascular status: acceptable  Respiratory status: acceptable  Hydration status: acceptable

## 2023-10-26 NOTE — DISCHARGE INSTRUCTIONS
For the next 24 hours patient needs to be with a responsible adult.    For 24 hours DO NOT drive, operate machinery, appliances, drink alcohol, make important decisions or sign legal documents.    Start with a light or bland diet if you are feeling sick to your stomach otherwise advance to regular diet as tolerated.    Follow recommendations on procedure report if provided by your doctor.    Call Dr Mars for problems .     Problems may include but not limited to: large amounts of bleeding, trouble breathing, repeated vomiting, severe unrelieved pain, fever or chills.

## 2023-10-26 NOTE — OP NOTE
Operative Note :  Sabi Mars MD      Jess Razo  1960    Procedure Date: 10/26/23    Pre-op Diagnosis:  Screening for colon cancer [Z12.11]  History of adenomatous polyp of colon [Z86.010]  Family history of colon cancer [Z80.0]    Post-Operative Diagnosis:  Colon polyps  Diverticulosis    Procedure:   Flexible colonoscopy to the cecum with hot snare polypectomy x6, cold forcep polypectomy x2, and Criselda ink tattoo 5 large ascending colon polyp    Surgeon: Sabi Mars MD    Assistant: None    Anesthesia:  MAC (monitored anesthetic care)    Estimated Blood Loss: Minimal    Specimens:   Sigmoid colon polyp #1  Rectal polyp #1  Sigmoid colon polyp #2  Ascending colon polyp #1  Ascending colon polyps #2 and 3 (polyp #2 was more of a sessile mass removed piecemeal and tattooed)  Hepatic flexure polyp  Transverse colon polyp  Sigmoid colon polyp #3  Rectal polyp #2    Complications: None    Indications:  Mrs. Razo is a 63-year-old lady with a history of adenomatous colon polyps and family history of colon cancer in her uncle who presents today for repeat screening colonoscopy.  She has been counseled on the risks of the procedure to include bleeding, colon perforation, and missed pathology.  Despite these risks, she has consented to proceed.    Findings: 10 sessile colon polyps removed, the largest of which was removed piecemeal and marked with 4 cc of Criselda ink along ascending colon    Description of procedure:  The patient was brought to the endoscopy suite and nesha in the left lateral decubitus position.  Continuous propofol anesthesia was administered.  A surgical timeout was completed.  A digital rectal exam was performed, revealing no abnormalities.  An adult colonoscope was then inserted through the anus and passed under direct visualization to the level of the cecum.  The cecum was identified via the ileocecal valve as well as the appendiceal orifice.  The scope was then slowly withdrawn,  examining all circumferential walls of the ascending, transverse, descending, and sigmoid colon.  There were 10 sessile colon polyps identified and removed throughout the colon.  The largest was a 1.5 cm x 5 mm oblong polyp of the ascending colon that was removed piecemeal and felt somewhat firm.  This was tattooed with 4 cc of Criselda ink and is at high risk for regrowing.  All the other polyps were mentioned above within the sigmoid colon, rectum, hepatic flexure, and transverse colon.  The other polyps measured anywhere between 2 and 4 mm in diameter and were removed using a combination of hot snare and cold biopsy forceps.  She had sigmoid diverticulosis.  Within the rectum I attempted to retroflex the scope but she had very poor rectal tone and I could not maintain insufflation.  The scope was then withdrawn and the colon desufflated.  The patient had a very good bowel prep and was transferred to the recovery area in stable condition.     Recommendations:  I will call the patient in 1 week or less with the pathology results of the 10 polyps removed as this will determine when the next screening colonoscopy will be due.    Sabi Mars MD  General, Robotic, and Endoscopic Surgery  [facility]    [address], [address]  [place] [address]  P: [phone]  F: [phone]

## 2023-10-26 NOTE — ANESTHESIA PREPROCEDURE EVALUATION
Anesthesia Evaluation     Patient summary reviewed and Nursing notes reviewed   NPO Solid Status: > 8 hours  NPO Liquid Status: > 2 hours           Airway   Mallampati: I  TM distance: >3 FB  Neck ROM: full  No difficulty expected  Dental - normal exam     Pulmonary - negative pulmonary ROS and normal exam    breath sounds clear to auscultation  (-) asthma  Cardiovascular - normal exam  Exercise tolerance: good (4-7 METS)    ECG reviewed  Rhythm: regular  Rate: normal    (+) hypertension, hyperlipidemia  (-) angina, orthopnea, PND, MESSINA      Neuro/Psych  (+) dizziness/light headedness, psychiatric history  GI/Hepatic/Renal/Endo    (+) GERD, diabetes mellitus    Musculoskeletal (-) negative ROS    Abdominal  - normal exam    Bowel sounds: normal.   Substance History - negative use     OB/GYN negative ob/gyn ROS         Other - negative ROS       ROS/Med Hx Other: H/O SVT and cardiomyopathy. Patient is on Digoxin, HR has since been well controlled.  Patient has good functional capacity and f/u with cardiologist once a year .                    Anesthesia Plan    ASA 3     MAC   total IV anesthesia    Anesthetic plan, risks, benefits, and alternatives have been provided, discussed and informed consent has been obtained with: patient.

## 2023-10-26 NOTE — H&P
General Surgery  History and Physical    CC: Screening for colon cancer    HPI: The patient is a pleasant 63 y.o. year-old lady who presents today for repeat screening colonoscopy.  Her last colonoscopy was done in June 2020 by me and significant for diverticulosis, 3 tubular adenomas, and 2 hyperplastic polyps.  She has a family history of colon cancer in her maternal uncle.  She denies any melena or hematochezia.    Past Medical History:   Hypertension  Hyperlipidemia  Type 2 diabetes  Hypothyroidism  GERD  Anxiety  History of breast cancer  History of adenomatous colon polyps  Lymphedema of upper extremity  Asthma  Osteoarthritis    Past Surgical History:   Colonoscopy (2020 by me-3 tubular adenomas, 2 hyperplastic polyps, diverticulosis)  Left breast lumpectomy with sentinel lymph node biopsy  Hysterectomy  Tonsillectomy  Diagnostic laparoscopy due to endometriosis  Bladder repair with hysterectomy    Medications:   Medications Prior to Admission   Medication Sig Dispense Refill Last Dose    albuterol sulfate  (90 Base) MCG/ACT inhaler INHALE 2 PUFFS BY MOUTH EVERY 4 HOURS AS NEEDED FOR WHEEZING FOR SHORTNESS OF BREATH       FLUoxetine (PROzac) 10 MG capsule Take 2 capsules by mouth Daily. For stress 180 capsule 1     Fluticasone-Salmeterol (ADVAIR/WIXELA) 250-50 MCG/ACT DISKUS Inhale 1 puff 2 (Two) Times a Day. For asthma and rinse mouth after use 180 each 3     Garlic 1000 MG capsule Take 1 capsule by mouth Daily.       metFORMIN (GLUCOPHAGE) 500 MG tablet 3 PO in PM For DMII (Patient taking differently: Take 1 tablet by mouth 3 times a day. 3 PO in PM For DMII) 270 tablet 3     atorvastatin (LIPITOR) 80 MG tablet Take 1 tablet by mouth Daily. for cholesterol 90 tablet 1     bimatoprost (LATISSE) 0.03 % ophthalmic solution Administer  to both eyes Every Night.       bimatoprost (LUMIGAN) 0.03 % ophthalmic drops INSTILL 1 DROP INTO EACH EYE AT BEDTIME       bisoprolol (ZEBeta) 10 MG tablet Take 1 tablet  by mouth Daily.       calcium carbonate (OS-YOON) 1250 (500 CA) MG tablet Take 1 tablet by mouth Daily.       Cholecalciferol (VITAMIN D3) 400 UNITS capsule Take 1 capsule by mouth Daily.       Combigan 0.2-0.5 % ophthalmic solution        digoxin (LANOXIN) 250 MCG tablet Take 1 tablet by mouth Daily.       dilTIAZem (TIAZAC) 360 MG 24 hr capsule Take 1 capsule by mouth Daily. For heart rate 90 capsule 3     esomeprazole (nexIUM) 20 MG capsule Take 1 capsule by mouth Every Morning Before Breakfast.       levothyroxine (SYNTHROID, LEVOTHROID) 50 MCG tablet TAKE 1 TABLET BY MOUTH ONCE DAILY BEFORE BREAKFAST FOR THYROID 90 tablet 0     Lumigan 0.01 % ophthalmic drops        Multiple Vitamin (MULTIVITAMIN) tablet Take 1 tablet by mouth Daily.       nystatin (MYCOSTATIN) 100,000 unit/mL suspension Take 5 mL by mouth 4 (Four) Times a Day. 280 mL 1     Probiotic Product (UP4 PROBIOTICS ADULT PO) Take  by mouth.       Tirzepatide (Mounjaro) 2.5 MG/0.5ML solution pen-injector Inject 0.5 mL under the skin into the appropriate area as directed 1 (One) Time Per Week for 30 days. Inject 2.5mg SQ once weekly 2 mL 5     valsartan (DIOVAN) 160 MG tablet Take 1 tablet by mouth once daily for blood pressure 90 tablet 0     vitamin E 1000 UNIT capsule Take 1 capsule by mouth Daily.          Allergies: Multiple allergies including morphine, Levaquin, ampicillin, Ceclor, ciprofloxacin, codeine, erythromycin, telithromycin, penicillin, tetracycline, and Ultram    Family History: Maternal uncle with history of colon cancer, father with history of pancreatic cancer and colon polyps, mother and sister both had breast cancer    Social History: , non-smoker, social alcohol use    ROS: A comprehensive review of systems was conducted and negative for melena or hematochezia  All other systems reviewed and negative    Physical Exam:  Vitals:    10/26/23 1047   BP: 168/99   Pulse: 80   Resp: 20     Height: 162 cm  Weight: 68 kg  BMI:  25.9  General: No acute distress, well-nourished & well-developed  HEAD: normocephalic, atraumatic  EYES: normal conjunctiva, sclera anicteric  EARS: grossly normal hearing  NECK: supple, no thyromegaly  CARDIOVASCULAR: regular rate and rhythm  RESPIRATORY: clear to auscultation bilaterally  GASTROINTESTINAL: soft, nontender, non-distended  PSYCHIATRIC: oriented x3, normal mood and affect    ASSESSMENT & PLAN  Mrs. Razo is a 63-year-old lady with a history of adenomatous colon polyps and family history of colon cancer in her uncle who presents today for repeat screening colonoscopy.  She has been counseled on the risks of the procedure to include bleeding, colon perforation, and missed pathology.  Despite these risks, she has consented to proceed.    Sabi Mars MD  General, Robotic, and Endoscopic Surgery  [facility]    [address], [address]  [place] [address]  P: [phone]  F: [phone]

## 2023-10-27 LAB
LAB AP CASE REPORT: NORMAL
PATH REPORT.FINAL DX SPEC: NORMAL
PATH REPORT.GROSS SPEC: NORMAL

## 2023-10-30 ENCOUNTER — TELEPHONE (OUTPATIENT)
Dept: SURGERY | Facility: CLINIC | Age: 63
End: 2023-10-30
Payer: COMMERCIAL

## 2023-10-30 NOTE — TELEPHONE ENCOUNTER
I called the patient and discussed the benign pathology findings from her colonoscopy last week.  3 of the 8 polyps returned as adenomatous, with 1 showing evidence of tubulovillous adenoma growth with no high-grade dysplasia.  The rest of the polyps were benign/hyperplastic.  I explained that adenomatous polyps have precancerous potential to them, and in particular the tubulovillous adenoma is highly likely to regrow.  I would recommend we need to do another colonoscopy on her in 6 months to recheck that ascending colon location that was tattooed.  She expressed understanding.    Danette, can you update her health maintenance tab and recall pool to reflect a 6-month interval for her next colonoscopy?    Thanks,  JOE

## 2023-11-07 DIAGNOSIS — E11.65 TYPE 2 DIABETES MELLITUS WITH HYPERGLYCEMIA, WITHOUT LONG-TERM CURRENT USE OF INSULIN: Primary | ICD-10-CM

## 2023-11-21 ENCOUNTER — TELEPHONE (OUTPATIENT)
Dept: FAMILY MEDICINE CLINIC | Facility: CLINIC | Age: 63
End: 2023-11-21
Payer: COMMERCIAL

## 2023-11-21 NOTE — TELEPHONE ENCOUNTER
Caller: MARY    Relationship:     Best call back number: [phone]  REF # [id number]    What test/procedure requested:      When is it needed:      Where is the test/procedure going to be performed:      Additional information or concerns: MARY WITH [employer] CALLED ABOUT THE MEDICATION Semaglutide,0.25 or 0.5MG/DOS, (OZEMPIC) 2 MG/1.5ML solution pen-injector  NEEDS PRIOR AUTHORIZATION AND THE FORM OFFICE SENT WITH [employer] FOR[employer] IS INACTIVE.  PLEASE CALL HER BACK AND GET A SUMMARY AS TO WHAT THEY WILL NEED TO GET THE PRIOR AUTHORIZATION FOR THIS MEDICATION IF THEY PURSUE.

## 2023-11-22 DIAGNOSIS — E11.65 TYPE 2 DIABETES MELLITUS WITH HYPERGLYCEMIA, WITHOUT LONG-TERM CURRENT USE OF INSULIN: ICD-10-CM

## 2023-11-22 NOTE — TELEPHONE ENCOUNTER
Called [employer].  Was told that the Ref# was not correct because it should be 8 letters.  States that they will call back if they still need info.  She could not help without correct Ref#

## 2023-11-29 RX ORDER — ATORVASTATIN CALCIUM 80 MG/1
80 TABLET, FILM COATED ORAL DAILY
Qty: 90 TABLET | Refills: 0 | Status: SHIPPED | OUTPATIENT
Start: 2023-11-29

## 2023-12-03 RX ORDER — VALSARTAN 160 MG/1
TABLET ORAL
Qty: 30 TABLET | Refills: 0 | Status: SHIPPED | OUTPATIENT
Start: 2023-12-03

## 2023-12-12 ENCOUNTER — TELEPHONE (OUTPATIENT)
Dept: FAMILY MEDICINE CLINIC | Facility: CLINIC | Age: 63
End: 2023-12-12
Payer: COMMERCIAL

## 2023-12-12 NOTE — TELEPHONE ENCOUNTER
----- Message from Jess Razo sent at 12/8/2023 10:57 PM EST -----  Regarding: Appointment Request  Contact: [phone]  Appointment Request From: Jess Razo    With Provider: Olamide Cobos [[facility]]    Preferred Date Range: 12/14/2023 – 12/14/2023    Preferred Times: Any Time    Reason for visit: Medicare Wellness Visit - Initial    Comments:  Knot on my leg

## 2023-12-28 RX ORDER — VALSARTAN 160 MG/1
TABLET ORAL
Qty: 90 TABLET | Refills: 0 | Status: SHIPPED | OUTPATIENT
Start: 2023-12-28

## 2024-01-02 ENCOUNTER — PATIENT MESSAGE (OUTPATIENT)
Dept: FAMILY MEDICINE CLINIC | Facility: CLINIC | Age: 64
End: 2024-01-02
Payer: COMMERCIAL

## 2024-01-02 RX ORDER — ORAL SEMAGLUTIDE 7 MG/1
7 TABLET ORAL DAILY
Qty: 30 TABLET | Refills: 5 | Status: SHIPPED | OUTPATIENT
Start: 2024-01-02 | End: 2024-01-03

## 2024-01-02 NOTE — TELEPHONE ENCOUNTER
From: Jess Razo  Sent: 1/2/2024 4:15 PM EST  To: [facility] Clinical Pool  Subject: Omezic shot    So insurance said trulicity 285. Vitaza 95.00 and rebellious 21.45

## 2024-01-06 RX ORDER — LEVOTHYROXINE SODIUM 0.05 MG/1
TABLET ORAL
Qty: 90 TABLET | Refills: 0 | Status: SHIPPED | OUTPATIENT
Start: 2024-01-06

## 2024-01-13 ENCOUNTER — PRIOR AUTHORIZATION (OUTPATIENT)
Dept: FAMILY MEDICINE CLINIC | Facility: CLINIC | Age: 64
End: 2024-01-13
Payer: COMMERCIAL

## 2024-01-13 NOTE — TELEPHONE ENCOUNTER
PA Case ID #: [id number]  Rx #: [id number]  Need Help? Call us at [phone]  Outcome  Denied on January 11  PA Case: [id number], Status: Denied. Notification: Completed.

## 2024-02-05 ENCOUNTER — PREP FOR SURGERY (OUTPATIENT)
Dept: OTHER | Facility: HOSPITAL | Age: 64
End: 2024-02-05
Payer: COMMERCIAL

## 2024-02-05 DIAGNOSIS — Z86.010 HISTORY OF ADENOMATOUS POLYP OF COLON: ICD-10-CM

## 2024-02-05 DIAGNOSIS — D12.6 TUBULOVILLOUS ADENOMA OF COLON: ICD-10-CM

## 2024-02-05 DIAGNOSIS — Z12.11 SCREENING FOR COLON CANCER: Primary | ICD-10-CM

## 2024-02-05 DIAGNOSIS — Z80.0 FAMILY HISTORY OF COLON CANCER: ICD-10-CM

## 2024-02-05 RX ORDER — FLUOXETINE 10 MG/1
CAPSULE ORAL
Qty: 180 CAPSULE | Refills: 0 | Status: SHIPPED | OUTPATIENT
Start: 2024-02-05

## 2024-02-06 PROBLEM — D12.6 TUBULOVILLOUS ADENOMA OF COLON: Status: ACTIVE | Noted: 2024-02-05

## 2024-02-25 RX ORDER — ATORVASTATIN CALCIUM 80 MG/1
80 TABLET, FILM COATED ORAL DAILY
Qty: 90 TABLET | Refills: 0 | Status: SHIPPED | OUTPATIENT
Start: 2024-02-25

## 2024-02-27 ENCOUNTER — TELEPHONE (OUTPATIENT)
Dept: FAMILY MEDICINE CLINIC | Facility: CLINIC | Age: 64
End: 2024-02-27
Payer: COMMERCIAL

## 2024-02-27 DIAGNOSIS — E11.9 CONTROLLED TYPE 2 DIABETES MELLITUS WITHOUT COMPLICATION, WITHOUT LONG-TERM CURRENT USE OF INSULIN: ICD-10-CM

## 2024-02-27 NOTE — TELEPHONE ENCOUNTER
Caller: [facility] [address] [place], [place] - [address] [facility] [phone]  - [phone]    Relationship: Pharmacy    Best call back number: [phone]     Do you know the name of the person who called: BEBA    What was the call regarding: THEY HAVE QUESTIONS REGARDING METFORMIN. THERE ARE TWO SETS OF DIRECTIONS AND THEY NEED TO KNOW WHICH ONE IS CORRECT.

## 2024-02-29 ENCOUNTER — PATIENT MESSAGE (OUTPATIENT)
Dept: FAMILY MEDICINE CLINIC | Facility: CLINIC | Age: 64
End: 2024-02-29

## 2024-02-29 ENCOUNTER — OFFICE VISIT (OUTPATIENT)
Dept: FAMILY MEDICINE CLINIC | Facility: CLINIC | Age: 64
End: 2024-02-29
Payer: COMMERCIAL

## 2024-02-29 VITALS
HEART RATE: 80 BPM | OXYGEN SATURATION: 96 % | SYSTOLIC BLOOD PRESSURE: 144 MMHG | WEIGHT: 155 LBS | DIASTOLIC BLOOD PRESSURE: 42 MMHG | BODY MASS INDEX: 26.46 KG/M2 | RESPIRATION RATE: 16 BRPM | TEMPERATURE: 97.4 F | HEIGHT: 64 IN

## 2024-02-29 DIAGNOSIS — K75.81 NASH (NONALCOHOLIC STEATOHEPATITIS): ICD-10-CM

## 2024-02-29 DIAGNOSIS — E11.9 CONTROLLED TYPE 2 DIABETES MELLITUS WITHOUT COMPLICATION, WITHOUT LONG-TERM CURRENT USE OF INSULIN: ICD-10-CM

## 2024-02-29 DIAGNOSIS — E11.65 CONTROLLED TYPE 2 DIABETES MELLITUS WITH HYPERGLYCEMIA, WITHOUT LONG-TERM CURRENT USE OF INSULIN: Primary | ICD-10-CM

## 2024-02-29 DIAGNOSIS — K21.9 GASTROESOPHAGEAL REFLUX DISEASE WITHOUT ESOPHAGITIS: ICD-10-CM

## 2024-02-29 DIAGNOSIS — I10 ESSENTIAL HYPERTENSION: ICD-10-CM

## 2024-02-29 DIAGNOSIS — Z85.3 HISTORY OF BREAST CANCER: ICD-10-CM

## 2024-02-29 DIAGNOSIS — E78.2 MIXED HYPERLIPIDEMIA: ICD-10-CM

## 2024-02-29 DIAGNOSIS — F33.41 RECURRENT MAJOR DEPRESSIVE DISORDER, IN PARTIAL REMISSION: ICD-10-CM

## 2024-02-29 DIAGNOSIS — F41.1 GENERALIZED ANXIETY DISORDER: ICD-10-CM

## 2024-02-29 DIAGNOSIS — Z86.79 HISTORY OF CARDIOMYOPATHY: ICD-10-CM

## 2024-02-29 RX ORDER — AZITHROMYCIN 250 MG/1
TABLET, FILM COATED ORAL
Qty: 6 TABLET | Refills: 1 | Status: SHIPPED | OUTPATIENT
Start: 2024-02-29 | End: 2024-03-01

## 2024-02-29 RX ORDER — FLUTICASONE PROPIONATE AND SALMETEROL XINAFOATE 115; 21 UG/1; UG/1
2 AEROSOL, METERED RESPIRATORY (INHALATION)
Qty: 36 G | Refills: 3 | Status: SHIPPED | OUTPATIENT
Start: 2024-02-29

## 2024-02-29 RX ORDER — ATORVASTATIN CALCIUM 80 MG/1
80 TABLET, FILM COATED ORAL DAILY
Qty: 90 TABLET | Refills: 3 | Status: SHIPPED | OUTPATIENT
Start: 2024-02-29

## 2024-02-29 RX ORDER — METHYLPREDNISOLONE 4 MG/1
TABLET ORAL
Qty: 21 TABLET | Refills: 0 | Status: SHIPPED | OUTPATIENT
Start: 2024-02-29

## 2024-02-29 RX ORDER — ALBUTEROL SULFATE 90 UG/1
2 AEROSOL, METERED RESPIRATORY (INHALATION) EVERY 4 HOURS PRN
Qty: 24 G | Refills: 3 | Status: SHIPPED | OUTPATIENT
Start: 2024-02-29

## 2024-02-29 RX ORDER — VALSARTAN 320 MG/1
320 TABLET ORAL DAILY
Qty: 90 TABLET | Refills: 1 | Status: SHIPPED | OUTPATIENT
Start: 2024-02-29

## 2024-02-29 RX ORDER — FLUOXETINE HYDROCHLORIDE 40 MG/1
40 CAPSULE ORAL DAILY
Qty: 90 CAPSULE | Refills: 1 | Status: SHIPPED | OUTPATIENT
Start: 2024-02-29

## 2024-02-29 RX ORDER — AZITHROMYCIN 250 MG/1
TABLET, FILM COATED ORAL
Qty: 6 TABLET | Refills: 1 | Status: SHIPPED | OUTPATIENT
Start: 2024-02-29 | End: 2024-02-29 | Stop reason: SDUPTHER

## 2024-02-29 NOTE — PROGRESS NOTES
"Subjective   Jess Razo is a 63 y.o. female.     History of Present Illness    Since the last visit, she has overall felt anxious.  She has Primary Hypertension not at goal, plan to add/adjust medication, DMII not controlled on current regimen and will add/adjust medication, work on diet and exercise, get follow up labs, GERD controlled on PPI Rx, Hyperlipidemia with goals met with current Rx, Hypothyroidism and must update labs to continue treatment, and Vitamin D deficiency and labs are at goal >30 ng/mL.  she has been compliant with current medications have reviewed them.  The patient denies medication side effects.  Will refill medications. /42   Pulse 80   Temp 97.4 °F (36.3 °C)   Resp 16   Ht 162.6 cm (64\")   Wt 70.3 kg (155 lb)   LMP  (LMP Unknown)   SpO2 96%   BMI 26.61 kg/m²   Cost of SGL2 meds---cannot get----off and glucose now in 200s  I also want to update her thyroid labs she was borderline elevated on the free T4 with last labs  Sees cardiology for inappropriate sinus tachycardia and her mild cardiomyopathy just had visit 1 month ago no meds were changed.  Liver labs negative for genetic cause per work-up with Dr. Valencia GI specialist---fatty liver!!  Also note 20-pack-year history smoking stopped 2019    Lab Results   Component Value Date    HGBA1C 6.30 (H) 09/13/2023     Lab Results   Component Value Date    CHLPL 137 06/27/2023    TRIG 293 (H) 06/27/2023    HDL 35 (L) 06/27/2023    LDL 56 06/27/2023     Saw cardio MARILYN Heck 1-30-24---sees Dr Trivedi  HPI: Jess Razo is a 63 yr/o female with a pmhx of inappropriate sinus tachycardia; mild cardiomyopathy; hypertension; diabetes; hyperlipidemia. She was seen by Dr. Trivedi on 01/24/2023 at which time she was doing well from a cardiovascular perspective and there were no changes made to her medical treatment plan.  EKG reviewed by me and shows sinus rhythm with a rate of 74, CT interval 190, , QRS 86. This is a normal tracing " "  PLAN:  1. Mild cardiomyopathy~euvolemic on exam  Her last echo showed normal LV function, continue current medical therapy    2. Inappropriate sinus tachycardia~ HR is well controlled with beta-blocker, long-acting diltiazem. Continue same     3. HTN~ BP is controlled with the combination of bisoprolol, diltiazem and valsartan.     4. HLD~last lipid profile 06/27/2023  Her lipids are well controlled except for triglycerides which should improve with better control of her diabetes.     5. DM2~managed and followed by endocrinology and primary care.    To have colonoscopy with Dr. Mars on 5/2/2024 8-26-22  World Health Organization fracture risk assessment tool (FRAX Score)  calculates a 10 year risk of major osteoporotic fracture of 18% and a 10  year risk of hip fracture of 1.9%.     IMPRESSION:  Osteopenia both hips.    Saw oncologist Dr. Arnold 6/15/2023   Prior notes  Dr. Arnold was 6/16/2022  PATIENT IDENTIFICATION: 61 yr/o who underwent an 08 November 2006 breast conserving surgical procedure for a left breast cancer. The tumor was moderately differentiated, receptor positive and HER2/salome negative, Stage IIA ([id number]). Two malignant lymph nodes were identified in 19 that were available for review. She received four cycles of doxorubicin/cyclophosphamide followed by four cycles of paclitaxel from 18 January 2007 to 16 March 2007. Tamoxifen initiated in April 2007. Patient substituted letrozole in May 2012. In May 2014, tamoxifen substituted (reinitiated). Completed tamoxifen April 2017.   referral to lymphedema clinic was made with follow-up noted 1 year  Jess Razo female 63 y.o., /42   Pulse 80   Temp 97.4 °F (36.3 °C)   Resp 16   Ht 162.6 cm (64\")   Wt 70.3 kg (155 lb)   LMP  (LMP Unknown)   SpO2 96%   BMI 26.61 kg/m²   who presents today for follow up of Depression and Anxiety.  She reports Prozac is controlling the depression but she is having daily breakthrough anxiety and needs " to increase the Prozac dose. Onset of symptoms was approximately several years ago. She denies current suicidal and homicidal ideation. Risk factors are family history of anxiety and or depression, lifestyle of multiple roles, and chronic illness.  Previous treatment includes current Rx. She complains of the following medication side effects:none. The patient declines to go to counseling..  Failed Zoloft and Lexapro  Jess Razo 63 y.o. female who presents for evaluation of upper respiratory congestion, cough, wheezing, shortness of air. Symptoms include congestion, nasal blockage, post nasal drip, productive cough, chest tightness, and deep bronchial sounding cough.  Onset of symptoms was 2 days ago, gradually worsening since that time. Patient denies fever.   Evaluation to date: none Treatment to date:  Albuterol MDI.     The following portions of the patient's history were reviewed and updated as appropriate: allergies, current medications, past family history, past medical history, past social history, past surgical history, and problem list.    Review of Systems   Constitutional:  Positive for fatigue. Negative for diaphoresis and fever.   HENT:  Positive for congestion, postnasal drip and sinus pressure. Negative for nosebleeds and trouble swallowing.    Eyes:  Negative for blurred vision and visual disturbance.   Respiratory:  Positive for cough, chest tightness, shortness of breath and wheezing. Negative for choking.    Gastrointestinal:  Negative for blood in stool.   Allergic/Immunologic: Negative for immunocompromised state.   Neurological:  Negative for facial asymmetry and speech difficulty.   Psychiatric/Behavioral:  Positive for sleep disturbance. Negative for self-injury, suicidal ideas and depressed mood. The patient is nervous/anxious.    I wanted to    Objective   Physical Exam  Vitals and nursing note reviewed.   Constitutional:       General: She is not in acute distress.     Appearance:  Normal appearance. She is well-developed. She is not toxic-appearing or diaphoretic.   HENT:      Head: Normocephalic and atraumatic. Hair is normal.      Right Ear: Tympanic membrane, ear canal and external ear normal. No drainage, swelling or tenderness. Tympanic membrane is retracted.      Left Ear: Tympanic membrane, ear canal and external ear normal. No drainage, swelling or tenderness. Tympanic membrane is retracted.      Ears:      Comments: Antonieta successfully removed cerumen from left ear and is 90% clear..  TM intact canal without significant irritation or bleeding     Nose: Mucosal edema and congestion present.      Mouth/Throat:      Mouth: No oral lesions.      Pharynx: Uvula midline. Posterior oropharyngeal erythema present. No oropharyngeal exudate or uvula swelling.      Comments: thrush  Eyes:      General: No scleral icterus.        Right eye: No discharge.         Left eye: No discharge.      Conjunctiva/sclera: Conjunctivae normal.      Pupils: Pupils are equal, round, and reactive to light.   Cardiovascular:      Rate and Rhythm: Normal rate and regular rhythm.      Heart sounds: Normal heart sounds. No murmur heard.     No gallop.   Pulmonary:      Effort: No respiratory distress.      Breath sounds: No stridor. Wheezing present. No rales.   Chest:      Chest wall: No tenderness.   Abdominal:      Palpations: Abdomen is soft.      Tenderness: There is no abdominal tenderness.   Musculoskeletal:      Cervical back: Normal range of motion and neck supple.   Lymphadenopathy:      Cervical: Cervical adenopathy present.   Skin:     General: Skin is warm and dry.      Findings: No rash.   Neurological:      Mental Status: She is alert and oriented to person, place, and time.      Motor: No abnormal muscle tone.   Psychiatric:         Mood and Affect: Mood normal.         Behavior: Behavior normal.         Thought Content: Thought content normal.         Judgment: Judgment normal.            Assessment & Plan   Diagnoses and all orders for this visit:    1. Controlled type 2 diabetes mellitus with hyperglycemia, without long-term current use of insulin (Primary)  -     Comprehensive metabolic panel  -     Lipid panel  -     CBC and Differential  -     TSH  -     Hemoglobin A1c  -     T4, Free  -     T3, Free  -     Vitamin D,25-Hydroxy  -     Vitamin B12  -     Folate  -     Urinalysis With Microscopic - Urine, Clean Catch  -     Magnesium  -     Digoxin level    2. Mixed hyperlipidemia  -     Comprehensive metabolic panel  -     Lipid panel  -     CBC and Differential  -     TSH  -     Hemoglobin A1c  -     T4, Free  -     T3, Free  -     Vitamin D,25-Hydroxy  -     Vitamin B12  -     Folate  -     Urinalysis With Microscopic - Urine, Clean Catch  -     Magnesium  -     Digoxin level    3. History of cardiomyopathy  -     Comprehensive metabolic panel  -     Lipid panel  -     CBC and Differential  -     TSH  -     Hemoglobin A1c  -     T4, Free  -     T3, Free  -     Vitamin D,25-Hydroxy  -     Vitamin B12  -     Folate  -     Urinalysis With Microscopic - Urine, Clean Catch  -     Magnesium  -     Digoxin level    4. Essential hypertension  -     Comprehensive metabolic panel  -     Lipid panel  -     CBC and Differential  -     TSH  -     Hemoglobin A1c  -     T4, Free  -     T3, Free  -     Vitamin D,25-Hydroxy  -     Vitamin B12  -     Folate  -     Urinalysis With Microscopic - Urine, Clean Catch  -     Magnesium  -     Digoxin level    5. Gastroesophageal reflux disease without esophagitis  -     Comprehensive metabolic panel  -     Lipid panel  -     CBC and Differential  -     TSH  -     Hemoglobin A1c  -     T4, Free  -     T3, Free  -     Vitamin D,25-Hydroxy  -     Vitamin B12  -     Folate  -     Urinalysis With Microscopic - Urine, Clean Catch  -     Magnesium  -     Digoxin level    6. HARTLEY (nonalcoholic steatohepatitis)  -     Comprehensive metabolic panel  -     Lipid  panel  -     CBC and Differential  -     TSH  -     Hemoglobin A1c  -     T4, Free  -     T3, Free  -     Vitamin D,25-Hydroxy  -     Vitamin B12  -     Folate  -     Urinalysis With Microscopic - Urine, Clean Catch  -     Magnesium  -     Digoxin level    7. History of breast cancer  -     Comprehensive metabolic panel  -     Lipid panel  -     CBC and Differential  -     TSH  -     Hemoglobin A1c  -     T4, Free  -     T3, Free  -     Vitamin D,25-Hydroxy  -     Vitamin B12  -     Folate  -     Urinalysis With Microscopic - Urine, Clean Catch  -     Magnesium  -     Digoxin level    8. Generalized anxiety disorder  -     Comprehensive metabolic panel  -     Lipid panel  -     CBC and Differential  -     TSH  -     Hemoglobin A1c  -     T4, Free  -     T3, Free  -     Vitamin D,25-Hydroxy  -     Vitamin B12  -     Folate  -     Urinalysis With Microscopic - Urine, Clean Catch  -     Magnesium  -     Digoxin level    9. Recurrent major depressive disorder, in partial remission  -     Comprehensive metabolic panel  -     Lipid panel  -     CBC and Differential  -     TSH  -     Hemoglobin A1c  -     T4, Free  -     T3, Free  -     Vitamin D,25-Hydroxy  -     Vitamin B12  -     Folate  -     Urinalysis With Microscopic - Urine, Clean Catch  -     Magnesium  -     Digoxin level    10. Controlled type 2 diabetes mellitus without complication, without long-term current use of insulin  -     metFORMIN (GLUCOPHAGE) 500 MG tablet; Take 1 tablet by mouth 3 times a day. For DMII  Dispense: 270 tablet; Refill: 1    Other orders  -     albuterol sulfate  (90 Base) MCG/ACT inhaler; Inhale 2 puffs Every 4 (Four) Hours As Needed for Wheezing.  Dispense: 24 g; Refill: 3  -     nystatin (MYCOSTATIN) 100,000 unit/mL suspension; Take 5 mL by mouth 4 (Four) Times a Day.  Dispense: 280 mL; Refill: 5  -     Semaglutide,0.25 or 0.5MG/DOS, (OZEMPIC) 2 MG/1.5ML solution pen-injector; .25mg inject subcutaneously once a week X4 for  DMII  Dispense: 3 mL; Refill: 0  -     FLUoxetine (PROzac) 40 MG capsule; Take 1 capsule by mouth Daily. For anxiety and depression  Dispense: 90 capsule; Refill: 1  -     methylPREDNISolone (MEDROL) 4 MG dose pack; follow package directions  Dispense: 21 tablet; Refill: 0  -     azithromycin (ZITHROMAX) 250 MG tablet; Take 2 tablets the first day, then 1 tablet daily for 4 days for infection  Dispense: 6 tablet; Refill: 1  -     fluticasone-salmeterol (Advair HFA) 115-21 MCG/ACT inhaler; Inhale 2 puffs 2 (Two) Times a Day. For asthma and rinse mouth after use.  This replaces the Advair Discus  Dispense: 36 g; Refill: 3  -     atorvastatin (LIPITOR) 80 MG tablet; Take 1 tablet by mouth Daily. for cholesterol  Dispense: 90 tablet; Refill: 3  -     valsartan (DIOVAN) 320 MG tablet; Take 1 tablet by mouth Daily. New dose for blood pressure  Dispense: 90 tablet; Refill: 1      Antonieta successfully removed cerumen from left ear and is 90% clear..  TM intact canal without significant irritation or bleeding  She has thrush I will start her on nystatin and did well in the past  Increase Prozac to 40 mg daily due to breakthrough anxiety and continue treatment of depression  Due to her upper respiratory infection and asthma exacerbation will send Medrol Dosepak  Sent refill on her albuterol and we talked about rinsing mouth gargle and spit after the Advair and will change to HFA  Will have her start Z-Tapan if secondary sinus infection  Stop smoking  Declines low-dose CT of chest screening  Concerned that her glucose readings are now in the 200s since she stopped Mounjaro and she has fatty liver disease and type 2 diabetes already on metformin we will add back Ozempic   Watch glucose while on the Medrol  We will also update her dig level she sees cardiology for cardiomyopathy and inappropriate sinus tachycardia  Plan, Jess Razo, was seen today.  she was seen for HTN and add/adjust medication, DMII wand adjusted/added  medication, GERD and will continue on PPI medication, Hyperlipidemia and will continue current medication, Hypothyroidism and will need to update labs for continued treatment, and Vitamin D deficiency and will update labs .  Before yesterday her asthma's been well-controlled on her Advair-  Answers submitted by the patient for this visit:  Other (Submitted on 2/23/2024)  Please describe your symptoms.: To clean ear for ringing in ear.  Have you had these symptoms before?: No  How long have you been having these symptoms?: Greater than 2 weeks  Please list any medications you are currently taking for this condition.: None  Primary Reason for Visit (Submitted on 2/23/2024)  What is the primary reason for your visit?: Other

## 2024-03-01 LAB
25(OH)D3+25(OH)D2 SERPL-MCNC: 95.9 NG/ML (ref 30–100)
ALBUMIN SERPL-MCNC: 4.6 G/DL (ref 3.5–5.2)
ALBUMIN/GLOB SERPL: 1.8 G/DL
ALP SERPL-CCNC: 93 U/L (ref 39–117)
ALT SERPL-CCNC: 44 U/L (ref 1–33)
APPEARANCE UR: CLEAR
AST SERPL-CCNC: 57 U/L (ref 1–32)
BACTERIA #/AREA URNS HPF: NORMAL /HPF
BASOPHILS # BLD AUTO: 0.05 10*3/MM3 (ref 0–0.2)
BASOPHILS NFR BLD AUTO: 0.9 % (ref 0–1.5)
BILIRUB SERPL-MCNC: 0.5 MG/DL (ref 0–1.2)
BILIRUB UR QL STRIP: NEGATIVE
BUN SERPL-MCNC: 8 MG/DL (ref 8–23)
BUN/CREAT SERPL: 13.6 (ref 7–25)
CALCIUM SERPL-MCNC: 9.6 MG/DL (ref 8.6–10.5)
CASTS URNS MICRO: NORMAL
CHLORIDE SERPL-SCNC: 96 MMOL/L (ref 98–107)
CHOLEST SERPL-MCNC: 126 MG/DL (ref 0–200)
CO2 SERPL-SCNC: 25.9 MMOL/L (ref 22–29)
COLOR UR: YELLOW
CREAT SERPL-MCNC: 0.59 MG/DL (ref 0.57–1)
DIGOXIN SERPL-MCNC: 1.1 NG/ML (ref 0.6–1.2)
EGFRCR SERPLBLD CKD-EPI 2021: 102 ML/MIN/1.73
EOSINOPHIL # BLD AUTO: 0.1 10*3/MM3 (ref 0–0.4)
EOSINOPHIL NFR BLD AUTO: 1.8 % (ref 0.3–6.2)
EPI CELLS #/AREA URNS HPF: NORMAL /HPF
ERYTHROCYTE [DISTWIDTH] IN BLOOD BY AUTOMATED COUNT: 12.4 % (ref 12.3–15.4)
FOLATE SERPL-MCNC: >20 NG/ML (ref 4.78–24.2)
GLOBULIN SER CALC-MCNC: 2.6 GM/DL
GLUCOSE SERPL-MCNC: 114 MG/DL (ref 65–99)
GLUCOSE UR QL STRIP: NEGATIVE
HBA1C MFR BLD: 6.4 % (ref 4.8–5.6)
HCT VFR BLD AUTO: 41.3 % (ref 34–46.6)
HDLC SERPL-MCNC: 34 MG/DL (ref 40–60)
HGB BLD-MCNC: 13.7 G/DL (ref 12–15.9)
HGB UR QL STRIP: NEGATIVE
IMM GRANULOCYTES # BLD AUTO: 0.02 10*3/MM3 (ref 0–0.05)
IMM GRANULOCYTES NFR BLD AUTO: 0.4 % (ref 0–0.5)
KETONES UR QL STRIP: NEGATIVE
LDLC SERPL CALC-MCNC: 59 MG/DL (ref 0–100)
LEUKOCYTE ESTERASE UR QL STRIP: NEGATIVE
LYMPHOCYTES # BLD AUTO: 1.49 10*3/MM3 (ref 0.7–3.1)
LYMPHOCYTES NFR BLD AUTO: 27.1 % (ref 19.6–45.3)
MAGNESIUM SERPL-MCNC: 1.8 MG/DL (ref 1.6–2.4)
MCH RBC QN AUTO: 30 PG (ref 26.6–33)
MCHC RBC AUTO-ENTMCNC: 33.2 G/DL (ref 31.5–35.7)
MCV RBC AUTO: 90.6 FL (ref 79–97)
MONOCYTES # BLD AUTO: 0.56 10*3/MM3 (ref 0.1–0.9)
MONOCYTES NFR BLD AUTO: 10.2 % (ref 5–12)
NEUTROPHILS # BLD AUTO: 3.27 10*3/MM3 (ref 1.7–7)
NEUTROPHILS NFR BLD AUTO: 59.6 % (ref 42.7–76)
NITRITE UR QL STRIP: NEGATIVE
NRBC BLD AUTO-RTO: 0 /100 WBC (ref 0–0.2)
PH UR STRIP: 7 [PH] (ref 5–8)
PLATELET # BLD AUTO: 222 10*3/MM3 (ref 140–450)
POTASSIUM SERPL-SCNC: 4.7 MMOL/L (ref 3.5–5.2)
PROT SERPL-MCNC: 7.2 G/DL (ref 6–8.5)
PROT UR QL STRIP: NEGATIVE
RBC # BLD AUTO: 4.56 10*6/MM3 (ref 3.77–5.28)
RBC #/AREA URNS HPF: NORMAL /HPF
SODIUM SERPL-SCNC: 138 MMOL/L (ref 136–145)
SP GR UR STRIP: 1.01 (ref 1–1.03)
T3FREE SERPL-MCNC: 2.6 PG/ML (ref 2–4.4)
T4 FREE SERPL-MCNC: 1.75 NG/DL (ref 0.93–1.7)
TRIGL SERPL-MCNC: 201 MG/DL (ref 0–150)
TSH SERPL DL<=0.005 MIU/L-ACNC: 1.29 UIU/ML (ref 0.27–4.2)
UROBILINOGEN UR STRIP-MCNC: NORMAL MG/DL
VIT B12 SERPL-MCNC: 684 PG/ML (ref 211–946)
VLDLC SERPL CALC-MCNC: 33 MG/DL (ref 5–40)
WBC # BLD AUTO: 5.49 10*3/MM3 (ref 3.4–10.8)
WBC #/AREA URNS HPF: NORMAL /HPF

## 2024-03-01 RX ORDER — CLINDAMYCIN HYDROCHLORIDE 300 MG/1
300 CAPSULE ORAL 3 TIMES DAILY
Qty: 30 CAPSULE | Refills: 0 | Status: SHIPPED | OUTPATIENT
Start: 2024-03-01

## 2024-03-01 NOTE — TELEPHONE ENCOUNTER
From: Olamide Cobos  To: Jess Razo  Sent: 2/29/2024 6:05 PM EST  Subject: I resent the prescription    I resent the prescription for the Z-Tapan and let pharmacy know that you have taken it last March from urgent care

## 2024-03-24 RX ORDER — VALSARTAN 160 MG/1
TABLET ORAL
Qty: 90 TABLET | Refills: 0 | OUTPATIENT
Start: 2024-03-24

## 2024-04-02 RX ORDER — LEVOTHYROXINE SODIUM 0.05 MG/1
TABLET ORAL
Qty: 90 TABLET | Refills: 0 | Status: SHIPPED | OUTPATIENT
Start: 2024-04-02 | End: 2024-04-05 | Stop reason: SDUPTHER

## 2024-04-05 ENCOUNTER — OFFICE VISIT (OUTPATIENT)
Dept: FAMILY MEDICINE CLINIC | Facility: CLINIC | Age: 64
End: 2024-04-05
Payer: COMMERCIAL

## 2024-04-05 VITALS
WEIGHT: 156 LBS | BODY MASS INDEX: 26.63 KG/M2 | TEMPERATURE: 97.3 F | OXYGEN SATURATION: 96 % | HEIGHT: 64 IN | RESPIRATION RATE: 16 BRPM | DIASTOLIC BLOOD PRESSURE: 80 MMHG | SYSTOLIC BLOOD PRESSURE: 139 MMHG | HEART RATE: 69 BPM

## 2024-04-05 DIAGNOSIS — K21.9 GASTROESOPHAGEAL REFLUX DISEASE WITHOUT ESOPHAGITIS: ICD-10-CM

## 2024-04-05 DIAGNOSIS — F17.200 TOBACCO USE DISORDER: ICD-10-CM

## 2024-04-05 DIAGNOSIS — Z85.3 HISTORY OF BREAST CANCER: ICD-10-CM

## 2024-04-05 DIAGNOSIS — K75.81 NASH (NONALCOHOLIC STEATOHEPATITIS): ICD-10-CM

## 2024-04-05 DIAGNOSIS — F33.41 RECURRENT MAJOR DEPRESSIVE DISORDER, IN PARTIAL REMISSION: ICD-10-CM

## 2024-04-05 DIAGNOSIS — I10 ESSENTIAL HYPERTENSION: ICD-10-CM

## 2024-04-05 DIAGNOSIS — F41.1 GENERALIZED ANXIETY DISORDER: ICD-10-CM

## 2024-04-05 DIAGNOSIS — E11.65 CONTROLLED TYPE 2 DIABETES MELLITUS WITH HYPERGLYCEMIA, WITHOUT LONG-TERM CURRENT USE OF INSULIN: Primary | ICD-10-CM

## 2024-04-05 DIAGNOSIS — E78.2 MIXED HYPERLIPIDEMIA: ICD-10-CM

## 2024-04-05 DIAGNOSIS — J45.40 MODERATE PERSISTENT ASTHMA WITHOUT COMPLICATION: ICD-10-CM

## 2024-04-05 PROCEDURE — 99214 OFFICE O/P EST MOD 30 MIN: CPT | Performed by: PHYSICIAN ASSISTANT

## 2024-04-05 RX ORDER — LEVOTHYROXINE SODIUM 0.05 MG/1
TABLET ORAL
Qty: 90 TABLET | Refills: 3 | Status: SHIPPED | OUTPATIENT
Start: 2024-04-05

## 2024-04-05 RX ORDER — VALSARTAN 160 MG/1
TABLET ORAL
Qty: 90 TABLET | Refills: 1 | Status: SHIPPED | OUTPATIENT
Start: 2024-04-05

## 2024-04-05 RX ORDER — FLUTICASONE PROPIONATE AND SALMETEROL XINAFOATE 115; 21 UG/1; UG/1
2 AEROSOL, METERED RESPIRATORY (INHALATION)
Qty: 36 G | Refills: 3 | Status: SHIPPED | OUTPATIENT
Start: 2024-04-05

## 2024-04-05 RX ORDER — FLUOXETINE 10 MG/1
30 CAPSULE ORAL DAILY
Qty: 270 CAPSULE | Refills: 3 | Status: SHIPPED | OUTPATIENT
Start: 2024-04-05

## 2024-04-05 NOTE — PROGRESS NOTES
"Subjective   Jess Razo is a 63 y.o. female.     Diabetes  Pertinent negatives for hypoglycemia include no confusion or tremors. Associated symptoms include polydipsia and polyuria. Pertinent negatives for diabetes include no blurred vision and no weight loss.       Since the last visit, she has overall felt tired.  She has Primary Hypertension and well controlled on current medication, DMII not controlled on current regimen and will add/adjust medication, work on diet and exercise, get follow up labs, GERD controlled on PPI Rx, Hyperlipidemia with goals met with current Rx, Hypothyroidism with review of labs today and adjustment made in medication doseage with follow up labs ordered, Seasonal allergies and doing well on their medication , Asthma well controlled and not requiring rescue Albuterol > 2 times a week, and Vitamin D deficiency and labs are at goal >30 ng/mL.  Following up today on her primary hypertension last visit her blood pressure was elevated and I increased her valsartan and her home blood pressure readings plummeted her systolic blood pressure 200-105 and she felt very lethargic and went back on the lower dose of valsartan of 160 mg daily and has felt much improved.  Blood pressure is still staying under 130/80.  she has been compliant with current medications have reviewed them.  The patient denies medication side effects.  Will refill medications. /72   Pulse 69   Temp 97.3 °F (36.3 °C)   Resp 16   Ht 162.6 cm (64\")   Wt 70.8 kg (156 lb)   LMP  (LMP Unknown)   SpO2 96%   BMI 26.78 kg/m² .      Jardiance and Mounjaro worked----Cost limit  120/70  She increased Prozac past visit for breakthrough anxiety and depression and is taking 30 mg daily and that is working well and I will send a new prescription  Results for orders placed or performed in visit on 02/29/24   Comprehensive metabolic panel    Specimen: Blood   Result Value Ref Range    Glucose 114 (H) 65 - 99 mg/dL    BUN 8 8 " - 23 mg/dL    Creatinine 0.59 0.57 - 1.00 mg/dL    EGFR Result 102.0 >60.0 mL/min/1.73    BUN/Creatinine Ratio 13.6 7.0 - 25.0    Sodium 138 136 - 145 mmol/L    Potassium 4.7 3.5 - 5.2 mmol/L    Chloride 96 (L) 98 - 107 mmol/L    Total CO2 25.9 22.0 - 29.0 mmol/L    Calcium 9.6 8.6 - 10.5 mg/dL    Total Protein 7.2 6.0 - 8.5 g/dL    Albumin 4.6 3.5 - 5.2 g/dL    Globulin 2.6 gm/dL    A/G Ratio 1.8 g/dL    Total Bilirubin 0.5 0.0 - 1.2 mg/dL    Alkaline Phosphatase 93 39 - 117 U/L    AST (SGOT) 57 (H) 1 - 32 U/L    ALT (SGPT) 44 (H) 1 - 33 U/L   Lipid panel    Specimen: Blood   Result Value Ref Range    Total Cholesterol 126 0 - 200 mg/dL    Triglycerides 201 (H) 0 - 150 mg/dL    HDL Cholesterol 34 (L) 40 - 60 mg/dL    VLDL Cholesterol Diomedes 33 5 - 40 mg/dL    LDL Chol Calc (NIH) 59 0 - 100 mg/dL   TSH    Specimen: Blood   Result Value Ref Range    TSH 1.290 0.270 - 4.200 uIU/mL   Hemoglobin A1c    Specimen: Blood   Result Value Ref Range    Hemoglobin A1C 6.40 (H) 4.80 - 5.60 %   T4, Free    Specimen: Blood   Result Value Ref Range    Free T4 1.75 (H) 0.93 - 1.70 ng/dL   T3, Free    Specimen: Blood   Result Value Ref Range    T3, Free 2.6 2.0 - 4.4 pg/mL   Vitamin D,25-Hydroxy    Specimen: Blood   Result Value Ref Range    25 Hydroxy, Vitamin D 95.9 30.0 - 100.0 ng/ml   Vitamin B12    Specimen: Blood   Result Value Ref Range    Vitamin B-12 684 211 - 946 pg/mL   Folate    Specimen: Blood   Result Value Ref Range    Folate >20.00 4.78 - 24.20 ng/mL   Magnesium    Specimen: Blood   Result Value Ref Range    Magnesium 1.8 1.6 - 2.4 mg/dL   Digoxin level    Specimen: Blood   Result Value Ref Range    Digoxin 1.10 0.60 - 1.20 ng/mL   Microscopic Examination -   Result Value Ref Range    WBC, UA 0-2 /HPF    RBC, UA 0-2 /HPF    Epithelial Cells (non renal) 0-2 /HPF    Cast Type Comment     Bacteria, UA Comment None Seen /HPF   CBC and Differential    Specimen: Blood   Result Value Ref Range    WBC 5.49 3.40 - 10.80 10*3/mm3     RBC 4.56 3.77 - 5.28 10*6/mm3    Hemoglobin 13.7 12.0 - 15.9 g/dL    Hematocrit 41.3 34.0 - 46.6 %    MCV 90.6 79.0 - 97.0 fL    MCH 30.0 26.6 - 33.0 pg    MCHC 33.2 31.5 - 35.7 g/dL    RDW 12.4 12.3 - 15.4 %    Platelets 222 140 - 450 10*3/mm3    Neutrophil Rel % 59.6 42.7 - 76.0 %    Lymphocyte Rel % 27.1 19.6 - 45.3 %    Monocyte Rel % 10.2 5.0 - 12.0 %    Eosinophil Rel % 1.8 0.3 - 6.2 %    Basophil Rel % 0.9 0.0 - 1.5 %    Neutrophils Absolute 3.27 1.70 - 7.00 10*3/mm3    Lymphocytes Absolute 1.49 0.70 - 3.10 10*3/mm3    Monocytes Absolute 0.56 0.10 - 0.90 10*3/mm3    Eosinophils Absolute 0.10 0.00 - 0.40 10*3/mm3    Basophils Absolute 0.05 0.00 - 0.20 10*3/mm3    Immature Granulocyte Rel % 0.4 0.0 - 0.5 %    Immature Grans Absolute 0.02 0.00 - 0.05 10*3/mm3    nRBC 0.0 0.0 - 0.2 /100 WBC   Urinalysis With Microscopic - Urine, Clean Catch    Specimen: Urine, Clean Catch   Result Value Ref Range    Specific Gravity, UA 1.010 1.005 - 1.030    pH, UA 7.0 5.0 - 8.0    Color, UA Yellow     Appearance, UA Clear Clear    Leukocytes, UA Negative Negative    Protein Negative Negative    Glucose, UA Negative Negative    Ketones Negative Negative    Blood, UA Negative Negative    Bilirubin, UA Negative Negative    Urobilinogen, UA Comment     Nitrite, UA Negative Negative   Her hemoglobin A1c is still at goal of 6.4% did increase from last labs since she quit taking the Mounjaro noting her hemoglobin A1c 6/27/2023 was 7.2  Sees cardiology for inappropriate sinus tachycardia and her mild cardiomyopathy just had visit 1-30-24 no meds were changed.  Liver labs negative for genetic cause per work-up with Dr. Valencia GI specialist---fatty liver!!  Also note 20-pack-year history smoking stopped 2019  Also sees [facility] noting visit from 6/15/2023 when she saw Dr. CrooksRESSION:  1. (Stage IIA)[id number] intermediate grade, infiltrating ductal carcinoma, estrogen and progesterone receptor positive, HER2/salome negative.  The patient underwent a breast conserving surgical procedure November 2006.Four cycles of doxorubicin/cyclophosphamide followed by four cycles of paclitaxel 15 March 2007. She completed five years of tamoxifen in May 2012. At that time she was switched to letrozole. Trial of exemestane started November 22, 2013 due to persistent hair thinning. Tamoxifen and substituted per patient request in May 2014. Completed 2017.     The following portions of the patient's history were reviewed and updated as appropriate: allergies, current medications, past family history, past medical history, past social history, past surgical history, and problem list.    Review of Systems   Constitutional:  Negative for unexpected weight loss.   Eyes:  Negative for blurred vision.   Endocrine: Positive for polydipsia and polyuria.   Neurological:  Negative for tremors and confusion.       Objective   Physical Exam  Vitals and nursing note reviewed.   Constitutional:       General: She is not in acute distress.     Appearance: She is well-developed. She is not ill-appearing or toxic-appearing.   HENT:      Head: Normocephalic.      Right Ear: External ear normal.      Left Ear: External ear normal.      Nose: Nose normal.      Mouth/Throat:      Pharynx: Oropharynx is clear.   Eyes:      General: No scleral icterus.     Conjunctiva/sclera: Conjunctivae normal.      Pupils: Pupils are equal, round, and reactive to light.   Neck:      Thyroid: No thyromegaly.   Cardiovascular:      Rate and Rhythm: Normal rate and regular rhythm.      Heart sounds: Normal heart sounds. No murmur heard.  Pulmonary:      Effort: Pulmonary effort is normal. No respiratory distress.      Breath sounds: Normal breath sounds.   Musculoskeletal:         General: No deformity. Normal range of motion.      Cervical back: Normal range of motion and neck supple.   Skin:     General: Skin is warm and dry.      Findings: No rash.   Neurological:      General: No focal  deficit present.      Mental Status: She is alert and oriented to person, place, and time. Mental status is at baseline.   Psychiatric:         Mood and Affect: Mood normal.         Behavior: Behavior normal.         Thought Content: Thought content normal.         Judgment: Judgment normal.           Assessment & Plan   Diagnoses and all orders for this visit:    1. Controlled type 2 diabetes mellitus with hyperglycemia, without long-term current use of insulin (Primary)  -     Comprehensive metabolic panel; Future  -     Lipid panel; Future  -     Hemoglobin A1c; Future    2. Mixed hyperlipidemia  -     Comprehensive metabolic panel; Future  -     Lipid panel; Future  -     Hemoglobin A1c; Future    3. HARTLEY (nonalcoholic steatohepatitis)  -     Comprehensive metabolic panel; Future  -     Lipid panel; Future  -     Hemoglobin A1c; Future    4. Tobacco use disorder  -     Comprehensive metabolic panel; Future  -     Lipid panel; Future  -     Hemoglobin A1c; Future    5. History of breast cancer  -     Comprehensive metabolic panel; Future  -     Lipid panel; Future  -     Hemoglobin A1c; Future    6. Moderate persistent asthma without complication  -     Comprehensive metabolic panel; Future  -     Lipid panel; Future  -     Hemoglobin A1c; Future    7. Recurrent major depressive disorder, in partial remission  -     Comprehensive metabolic panel; Future  -     Lipid panel; Future  -     Hemoglobin A1c; Future    8. Generalized anxiety disorder  -     Comprehensive metabolic panel; Future  -     Lipid panel; Future  -     Hemoglobin A1c; Future    9. Gastroesophageal reflux disease without esophagitis  -     Comprehensive metabolic panel; Future  -     Lipid panel; Future  -     Hemoglobin A1c; Future    10. Essential hypertension  -     Comprehensive metabolic panel; Future  -     Lipid panel; Future  -     Hemoglobin A1c; Future    Other orders  -     levothyroxine (SYNTHROID, LEVOTHROID) 50 MCG tablet; 1 p.o.  daily before breakfast and do not take on Sundays  Dispense: 90 tablet; Refill: 3  -     valsartan (DIOVAN) 160 MG tablet; One PO daily for BP  Dispense: 90 tablet; Refill: 1  -     Dulaglutide 0.75 MG/0.5ML solution pen-injector; Inject 0.75 mg under the skin into the appropriate area as directed 1 (One) Time Per Week. For DMII  Dispense: 2 mL; Refill: 5  -     fluticasone-salmeterol (Advair HFA) 115-21 MCG/ACT inhaler; Inhale 2 puffs 2 (Two) Times a Day. For asthma and rinse mouth after use.  This replaces the Advair Discus  Dispense: 36 g; Refill: 3  -     FLUoxetine (PROzac) 10 MG capsule; Take 3 capsules by mouth Daily. For stress  Dispense: 270 capsule; Refill: 3        TSH is in target range her free T4 is still slightly above range I will have her continue the same dose of her levothyroxine at 50 mcg and have her hold that dose on Sundays  Hemoglobin A1c on 6/27/2023 was 7.2----A1c did increase from September labs and is at goal concerned that she will continue to rise and will add back GLP-1 agonist and send Trulicity  Plan, Jess Razo, was seen today.  she was seen for HTN and continue medication, DMII and adjusted/added medication, GERD and will continue on PPI medication, Hyperlipidemia and will continue current medication, Hypothyroidism with abnormal labs and new medication regimen, Seasonal allergies and is doing well on their medication , Asthma and is well controlled on medication.  , and Vitamin D deficiency and will update labs .  She increased Prozac past visit for breakthrough anxiety and depression and is taking 30 mg daily and that is working well and I will send a new prescription         Answers submitted by the patient for this visit:  Primary Reason for Visit (Submitted on 3/29/2024)  What is the primary reason for your visit?: Diabetes  Diabetes Questionnaire (Submitted on 3/29/2024)  Chief Complaint: Diabetes problem  Below 70: never

## 2024-04-15 RX ORDER — FLUOXETINE 10 MG/1
10 CAPSULE ORAL DAILY
Qty: 90 CAPSULE | Refills: 0 | Status: SHIPPED | OUTPATIENT
Start: 2024-04-15

## 2024-05-01 RX ORDER — FLUOXETINE 10 MG/1
20 CAPSULE ORAL DAILY
Qty: 180 CAPSULE | Refills: 0 | Status: SHIPPED | OUTPATIENT
Start: 2024-05-01

## 2024-05-01 RX ORDER — IBUPROFEN 400 MG/1
400 TABLET ORAL EVERY 6 HOURS PRN
COMMUNITY

## 2024-05-02 ENCOUNTER — ANESTHESIA EVENT (OUTPATIENT)
Dept: GASTROENTEROLOGY | Facility: HOSPITAL | Age: 64
End: 2024-05-02
Payer: COMMERCIAL

## 2024-05-02 ENCOUNTER — HOSPITAL ENCOUNTER (OUTPATIENT)
Facility: HOSPITAL | Age: 64
Setting detail: HOSPITAL OUTPATIENT SURGERY
Discharge: HOME | End: 2024-05-02
Attending: SURGERY | Admitting: SURGERY
Payer: COMMERCIAL

## 2024-05-02 ENCOUNTER — ANESTHESIA (OUTPATIENT)
Dept: GASTROENTEROLOGY | Facility: HOSPITAL | Age: 64
End: 2024-05-02
Payer: COMMERCIAL

## 2024-05-02 VITALS
OXYGEN SATURATION: 98 % | HEART RATE: 61 BPM | WEIGHT: 152 LBS | SYSTOLIC BLOOD PRESSURE: 167 MMHG | RESPIRATION RATE: 18 BRPM | BODY MASS INDEX: 26.09 KG/M2 | DIASTOLIC BLOOD PRESSURE: 75 MMHG

## 2024-05-02 DIAGNOSIS — Z80.0 FAMILY HISTORY OF COLON CANCER: ICD-10-CM

## 2024-05-02 DIAGNOSIS — Z86.010 HISTORY OF ADENOMATOUS POLYP OF COLON: ICD-10-CM

## 2024-05-02 DIAGNOSIS — Z12.11 SCREENING FOR COLON CANCER: ICD-10-CM

## 2024-05-02 DIAGNOSIS — D12.6 TUBULOVILLOUS ADENOMA OF COLON: ICD-10-CM

## 2024-05-02 LAB — GLUCOSE BLDC GLUCOMTR-MCNC: 155 MG/DL (ref 70–130)

## 2024-05-02 PROCEDURE — S0260 H&P FOR SURGERY: HCPCS | Performed by: SURGERY

## 2024-05-02 PROCEDURE — 25810000003 LACTATED RINGERS PER 1000 ML: Performed by: SURGERY

## 2024-05-02 PROCEDURE — 88305 TISSUE EXAM BY PATHOLOGIST: CPT | Performed by: SURGERY

## 2024-05-02 PROCEDURE — 25010000002 PROPOFOL 10 MG/ML EMULSION: Performed by: NURSE ANESTHETIST, CERTIFIED REGISTERED

## 2024-05-02 PROCEDURE — 45380 COLONOSCOPY AND BIOPSY: CPT | Performed by: SURGERY

## 2024-05-02 PROCEDURE — 82948 REAGENT STRIP/BLOOD GLUCOSE: CPT

## 2024-05-02 PROCEDURE — 25010000002 GLYCOPYRROLATE 0.2 MG/ML SOLUTION: Performed by: NURSE ANESTHETIST, CERTIFIED REGISTERED

## 2024-05-02 DEVICE — WORKING LENGTH 235CM, WORKING CHANNEL 2.8MM
Type: IMPLANTABLE DEVICE | Site: TRANSVERSE COLON | Status: FUNCTIONAL
Brand: RESOLUTION 360 CLIP

## 2024-05-02 RX ORDER — SODIUM CHLORIDE, SODIUM LACTATE, POTASSIUM CHLORIDE, CALCIUM CHLORIDE 600; 310; 30; 20 MG/100ML; MG/100ML; MG/100ML; MG/100ML
1000 INJECTION, SOLUTION INTRAVENOUS CONTINUOUS
Status: DISCONTINUED | OUTPATIENT
Start: 2024-05-02 | End: 2024-05-02 | Stop reason: HOSPADM

## 2024-05-02 RX ORDER — GLYCOPYRROLATE 0.2 MG/ML
INJECTION INTRAMUSCULAR; INTRAVENOUS AS NEEDED
Status: DISCONTINUED | OUTPATIENT
Start: 2024-05-02 | End: 2024-05-02 | Stop reason: SURG

## 2024-05-02 RX ORDER — PROPOFOL 10 MG/ML
VIAL (ML) INTRAVENOUS AS NEEDED
Status: DISCONTINUED | OUTPATIENT
Start: 2024-05-02 | End: 2024-05-02 | Stop reason: SURG

## 2024-05-02 RX ADMIN — SODIUM CHLORIDE, POTASSIUM CHLORIDE, SODIUM LACTATE AND CALCIUM CHLORIDE 1000 ML: 600; 310; 30; 20 INJECTION, SOLUTION INTRAVENOUS at 07:37

## 2024-05-02 RX ADMIN — GLYCOPYRROLATE 0.1 MG: 0.2 INJECTION INTRAMUSCULAR; INTRAVENOUS at 08:09

## 2024-05-02 RX ADMIN — SODIUM CHLORIDE, POTASSIUM CHLORIDE, SODIUM LACTATE AND CALCIUM CHLORIDE: 600; 310; 30; 20 INJECTION, SOLUTION INTRAVENOUS at 08:51

## 2024-05-02 RX ADMIN — PROPOFOL 200 MCG/KG/MIN: 10 INJECTION, EMULSION INTRAVENOUS at 08:07

## 2024-05-02 RX ADMIN — PROPOFOL 100 MG: 10 INJECTION, EMULSION INTRAVENOUS at 08:06

## 2024-05-02 NOTE — OP NOTE
Operative Note :  Sabi Mars MD      Jess Barajasnes  1960    Procedure Date: 05/02/24    Pre-op Diagnosis:  Screening for colon cancer [Z12.11]  History of adenomatous polyp of colon [Z86.010]  Family history of colon cancer [Z80.0]  Tubulovillous adenoma of colon [D12.6]    Post-Operative Diagnosis:  Colon polyps  Diverticulosis    Procedure:   Flexible colonoscopy to the cecum with cold forcep polypectomy x6 and hemostatic clip application    Surgeon: Sabi Mars MD    Assistant: None    Anesthesia:  MAC (monitored anesthetic care)    Estimated Blood Loss: Minimal    Specimens:   Rectal polyps x 2  Hepatic flexure polyp  Ascending colon polyp (removed piecemeal)  Transverse colon polyp (hemostatic clip applied)  Sigmoid colon polyp    Complications: None    Indications:  The patient is a 63-year-old lady who presents today for repeat screening colonoscopy given a colonoscopy 6 months ago that showed a large tubulovillous adenoma removed piecemeal and high risk for regrowth.  She has been counseled on the risks of the procedure and has consented to proceed.    Findings: 6 sessile colon polyps removed, including regrowth of the previously removed ascending colon tubulovillous adenoma that was again removed piecemeal    Description of procedure:  The patient was brought to the endoscopy suite and nesha in the left lateral decubitus position.  Continuous propofol anesthesia was administered.  A surgical timeout was completed.  A digital rectal exam was performed, revealing no abnormalities.  An adult colonoscope was then inserted through the anus and passed under direct visualization to the level of the cecum.  The cecum was identified via the ileocecal valve as well as the appendiceal orifice.  The scope was then slowly withdrawn, examining all circumferential walls of the ascending, transverse, descending, and sigmoid colon.  There was a recurrent ascending colon polyp at the site of her previous  tubulovillous adenoma.  This measured at least 1 cm and was sessile.  It was removed piecemeal again using the biopsy forceps.  It was located in the middle of the previously instilled Criselda ink tattoo.  Within the hepatic flexure there was a 3 mm polyp removed using the biopsy forceps.  Within the distal transverse colon there was a 3 mm polyp removed using biopsy forceps.  There was some bleeding from the base of the polypectomy site which was controlled with a hemostatic clip, noting cessation of bleeding thereafter.  Within the sigmoid colon there was a 3 mm polyp removed using the biopsy forceps.  Within the rectum there were 2 separate 3 mm polyps removed using the biopsy forceps.  She also had sigmoid diverticulosis.  She had very poor rectal tone with inability to maintain insufflation enough for scope retroflexion within the rectum.  The scope was then withdrawn and the colon desufflated.  The patient had a very good bowel prep and was transferred to the recovery area in stable condition.     Recommendations:  I will call the patient in 1 week or less with the pathology results of the 6 polyps removed as this will determine when the next screening colonoscopy will be due.    Sabi Mars MD  General, Robotic, and Endoscopic Surgery  [facility], [place], operated by [facility]    [address], [address]  [place] [address]  P: [phone]  F: [phone]

## 2024-05-02 NOTE — ANESTHESIA POSTPROCEDURE EVALUATION
Patient: Jess Razo    Procedure Summary       Date: 05/02/24 Room / Location: [facility] ENDOSCOPY 6 / [facility] ENDOSCOPY    Anesthesia Start: 0800 Anesthesia Stop: 0903    Procedure: COLONOSCOPY with cold biopsy polypectomies and clip placement x1 Diagnosis:       Screening for colon cancer      History of adenomatous polyp of colon      Family history of colon cancer      Tubulovillous adenoma of colon      (Screening for colon cancer [Z12.11])      (History of adenomatous polyp of colon [Z86.010])      (Family history of colon cancer [Z80.0])      (Tubulovillous adenoma of colon [D12.6])    Surgeons: Sabi Mars MD Provider: Leroy Bonilla MD    Anesthesia Type: MAC ASA Status: 3            Anesthesia Type: MAC    Vitals  Vitals Value Taken Time   /71 05/02/24 0905   Temp     Pulse 62 05/02/24 0909   Resp 20 05/02/24 0905   SpO2 96 % 05/02/24 0909   Vitals shown include unfiled device data.        Post Anesthesia Care and Evaluation    Patient location during evaluation: PACU  Patient participation: complete - patient participated  Level of consciousness: awake and alert  Pain management: adequate    Airway patency: patent  Anesthetic complications: No anesthetic complications    Cardiovascular status: acceptable  Respiratory status: acceptable  Hydration status: acceptable    Comments: --------------------            05/02/24               0905     --------------------   BP:       171/71     Pulse:      67       Resp:       20       SpO2:      96%      --------------------

## 2024-05-02 NOTE — ANESTHESIA PREPROCEDURE EVALUATION
Anesthesia Evaluation     Patient summary reviewed and Nursing notes reviewed                Airway   Mallampati: II  TM distance: >3 FB  Neck ROM: full  Dental      Pulmonary    (+) a smoker Current, asthma,  Cardiovascular     Rhythm: regular  Rate: normal    (+) hypertension, hyperlipidemia      Neuro/Psych  (+) dizziness/light headedness, psychiatric history Anxiety  GI/Hepatic/Renal/Endo    (+) GERD, liver disease fatty liver disease, diabetes mellitus type 2, thyroid problem hypothyroidism    Musculoskeletal     Abdominal    Substance History   (+) alcohol use     OB/GYN negative ob/gyn ROS         Other   arthritis,   history of cancer                  Anesthesia Plan    ASA 3     MAC     (Trulicity last injected on 4/19/24  DIG  )  intravenous induction     Anesthetic plan, risks, benefits, and alternatives have been provided, discussed and informed consent has been obtained with: patient.    CODE STATUS:

## 2024-05-02 NOTE — H&P
General Surgery  History and Physical    CC: Screening for colon cancer    HPI: The patient is a pleasant 63 y.o. year-old lady who presents today for repeat screening colonoscopy.  I last performed a colonoscopy on her in October 2023 and found 10 polyps, 1 of which was a large tubulovillous adenoma that was removed piecemeal and tattooed.  3 of the other polyps returned as tubular adenomas and the rest were hyperplastic.  I had also previous performed a colonoscopy on her in June 2020 and discovered 3 tubular adenomas, 2 hyperplastic polyps, and diverticulosis.  She has a family history of colon cancer in her maternal uncle.  She denies any melena or hematochezia.    Past Medical History:   Hypertension  Hyperlipidemia  Type 2 diabetes  Hypothyroidism  GERD  Anxiety  History of breast cancer  History of adenomatous colon polyps  Lymphedema of upper extremity  Asthma  Osteoarthritis    Past Surgical History:   Colonoscopy (October 2023 by me- 1 large tubulovillous adenoma, 3 tubular adenomas, 6 hyperplastic polyps, diverticulosis)  Colonoscopy (2020 by me-3 tubular adenomas, 2 hyperplastic polyps, diverticulosis)  Left breast lumpectomy with sentinel lymph node biopsy  Hysterectomy  Tonsillectomy  Diagnostic laparoscopy due to endometriosis  Bladder repair with hysterectomy    Medications:   Medications Prior to Admission   Medication Sig Dispense Refill Last Dose    albuterol sulfate  (90 Base) MCG/ACT inhaler Inhale 2 puffs Every 4 (Four) Hours As Needed for Wheezing. 24 g 3 5/1/2024    atorvastatin (LIPITOR) 80 MG tablet Take 1 tablet by mouth Daily. for cholesterol 90 tablet 3 5/1/2024    bimatoprost (LATISSE) 0.03 % ophthalmic solution Administer  to both eyes Every Night.   5/1/2024    bimatoprost (LUMIGAN) 0.03 % ophthalmic drops INSTILL 1 DROP INTO EACH EYE AT BEDTIME   5/1/2024    bisoprolol (ZEBeta) 10 MG tablet Take 1 tablet by mouth Daily.   5/2/2024    calcium carbonate (OS-YOON) 1250 (500 CA) MG  tablet Take 1 tablet by mouth Daily.   5/1/2024    Cholecalciferol (VITAMIN D3) 400 UNITS capsule Take 1 capsule by mouth Daily.   5/1/2024    Combigan 0.2-0.5 % ophthalmic solution    5/1/2024    digoxin (LANOXIN) 250 MCG tablet Take 1 tablet by mouth Every Night.   5/1/2024    dilTIAZem (TIAZAC) 360 MG 24 hr capsule Take 1 capsule by mouth Daily. For heart rate 90 capsule 3 5/2/2024    esomeprazole (nexIUM) 20 MG capsule Take 1 capsule by mouth Every Morning Before Breakfast.   5/1/2024    FLUoxetine (PROzac) 10 MG capsule Take 2 capsules by mouth Daily. 180 capsule 0 5/1/2024    fluticasone-salmeterol (Advair HFA) 115-21 MCG/ACT inhaler Inhale 2 puffs 2 (Two) Times a Day. For asthma and rinse mouth after use.  This replaces the Advair Discus 36 g 3 Past Month    Garlic 1000 MG capsule Take 1 capsule by mouth Daily.   Past Week    ibuprofen (ADVIL,MOTRIN) 400 MG tablet Take 1 tablet by mouth Every 6 (Six) Hours As Needed for Mild Pain.   Past Week    levothyroxine (SYNTHROID, LEVOTHROID) 50 MCG tablet 1 p.o. daily before breakfast and do not take on Sundays 90 tablet 3 5/1/2024    Lumigan 0.01 % ophthalmic drops    5/2/2024    metFORMIN (GLUCOPHAGE) 500 MG tablet Take 1 tablet by mouth 3 times a day. For DMII 270 tablet 1 5/1/2024    Multiple Vitamin (MULTIVITAMIN) tablet Take 1 tablet by mouth Daily.   5/1/2024    nystatin (MYCOSTATIN) 100,000 unit/mL suspension Take 5 mL by mouth 4 (Four) Times a Day. 280 mL 5     Probiotic Product (UP4 PROBIOTICS ADULT PO) Take  by mouth.       valsartan (DIOVAN) 160 MG tablet One PO daily for BP 90 tablet 1 5/1/2024    vitamin E 1000 UNIT capsule Take 1 capsule by mouth Daily.   5/1/2024    Dulaglutide 0.75 MG/0.5ML solution pen-injector Inject 0.75 mg under the skin into the appropriate area as directed 1 (One) Time Per Week. For DMII (Patient taking differently: Inject 0.75 mg under the skin into the appropriate area as directed 1 (One) Time Per Week. For DMII. LD 4/21) 2 mL  5 4/19/2024       Allergies: Multiple allergies including morphine, Levaquin, ampicillin, Ceclor, ciprofloxacin, codeine, erythromycin, telithromycin, penicillin, tetracycline, and Ultram    Family History: Maternal uncle with history of colon cancer, father with history of pancreatic cancer and colon polyps, mother and sister both had breast cancer    Social History: , non-smoker, social alcohol use    ROS: A comprehensive review of systems was conducted and negative for melena or hematochezia  All other systems reviewed and negative    Physical Exam:  Vitals:    05/02/24 0734   BP: 169/89   Pulse: 64   Resp: 20   SpO2: 96%     Height: 162 cm  Weight: 68.9 kg  BMI: 26  General: No acute distress, well-nourished & well-developed  HEAD: normocephalic, atraumatic  EYES: normal conjunctiva, sclera anicteric  EARS: grossly normal hearing  NECK: supple, no thyromegaly  CARDIOVASCULAR: regular rate and rhythm  RESPIRATORY: clear to auscultation bilaterally  GASTROINTESTINAL: soft, nontender, non-distended  PSYCHIATRIC: oriented x3, normal mood and affect    ASSESSMENT & PLAN  Mrs. Razo is a 63-year-old lady with a history of adenomatous colon polyps and family history of colon cancer in her uncle who presents today for repeat screening colonoscopy.  She has been counseled on the risks of the procedure to include bleeding, colon perforation, and missed pathology.  Despite these risks, she has consented to proceed.    Sabi Mars MD  General, Robotic, and Endoscopic Surgery  [facility] ([facility]    [address], [address]  [place] [address]  P: [phone]  F: [phone]

## 2024-05-06 ENCOUNTER — TELEPHONE (OUTPATIENT)
Dept: SURGERY | Facility: CLINIC | Age: 64
End: 2024-05-06
Payer: COMMERCIAL

## 2024-05-06 LAB
LAB AP CASE REPORT: NORMAL
PATH REPORT.FINAL DX SPEC: NORMAL
PATH REPORT.GROSS SPEC: NORMAL

## 2024-05-16 RX ORDER — FLUTICASONE PROPIONATE AND SALMETEROL 250; 50 UG/1; UG/1
POWDER RESPIRATORY (INHALATION)
Qty: 180 EACH | Refills: 0 | Status: SHIPPED | OUTPATIENT
Start: 2024-05-16

## 2024-06-09 RX ORDER — FLUOXETINE 10 MG/1
10 CAPSULE ORAL DAILY
Qty: 90 CAPSULE | Refills: 0 | Status: SHIPPED | OUTPATIENT
Start: 2024-06-09 | End: 2024-06-10 | Stop reason: SDUPTHER

## 2024-06-10 RX ORDER — FLUOXETINE 10 MG/1
10 CAPSULE ORAL DAILY
Qty: 90 CAPSULE | Refills: 0 | Status: SHIPPED | OUTPATIENT
Start: 2024-06-10 | End: 2024-06-13 | Stop reason: SDUPTHER

## 2024-06-13 RX ORDER — FLUOXETINE 10 MG/1
10 CAPSULE ORAL DAILY
Qty: 90 CAPSULE | Refills: 0 | Status: SHIPPED | OUTPATIENT
Start: 2024-06-13 | End: 2024-06-16

## 2024-06-15 ENCOUNTER — PATIENT MESSAGE (OUTPATIENT)
Dept: FAMILY MEDICINE CLINIC | Facility: CLINIC | Age: 64
End: 2024-06-15
Payer: COMMERCIAL

## 2024-06-16 RX ORDER — FLUOXETINE HYDROCHLORIDE 20 MG/1
20 CAPSULE ORAL DAILY
Qty: 90 CAPSULE | Refills: 3 | Status: SHIPPED | OUTPATIENT
Start: 2024-06-16

## 2024-06-16 NOTE — TELEPHONE ENCOUNTER
From: Jess Razo  To: Olamide Cobos  Sent: 6/15/2024 3:28 PM EDT  Subject: Rx    The flouextine 10 MG. You had me taking 2 aday. But the rx said 1 a day. Now I can't get it filled again till July. Is there something I can do. Thank you

## 2024-08-05 ENCOUNTER — TELEPHONE (OUTPATIENT)
Dept: FAMILY MEDICINE CLINIC | Facility: CLINIC | Age: 64
End: 2024-08-05

## 2024-08-05 NOTE — TELEPHONE ENCOUNTER
Hub staff attempted to follow warm transfer process and was unsuccessful     Caller: Jess Razo    Relationship to patient: Self    Best call back number: [phone]    Patient is needing: PATIENT WAS NEEDING TO SCHEDULE LABS BUT HUB WAS UNABLE TO WARM TRANSFER.PATIENT IS NEEDING A CALLBACK TO GET SCHEDULED.

## 2024-08-10 RX ORDER — FLUTICASONE PROPIONATE AND SALMETEROL 250; 50 UG/1; UG/1
POWDER RESPIRATORY (INHALATION)
Qty: 180 EACH | Refills: 0 | Status: SHIPPED | OUTPATIENT
Start: 2024-08-10

## 2024-09-03 ENCOUNTER — PREP FOR SURGERY (OUTPATIENT)
Dept: OTHER | Facility: HOSPITAL | Age: 64
End: 2024-09-03
Payer: COMMERCIAL

## 2024-09-03 DIAGNOSIS — Z80.0 FAMILY HISTORY OF COLON CANCER: ICD-10-CM

## 2024-09-03 DIAGNOSIS — Z86.010 HISTORY OF ADENOMATOUS POLYP OF COLON: ICD-10-CM

## 2024-09-03 DIAGNOSIS — Z12.11 SCREENING FOR COLON CANCER: Primary | ICD-10-CM

## 2024-09-06 RX ORDER — DULAGLUTIDE 0.75 MG/.5ML
INJECTION, SOLUTION SUBCUTANEOUS
Qty: 4 ML | Refills: 0 | Status: SHIPPED | OUTPATIENT
Start: 2024-09-06

## 2024-09-12 ENCOUNTER — OFFICE VISIT (OUTPATIENT)
Dept: FAMILY MEDICINE CLINIC | Facility: CLINIC | Age: 64
End: 2024-09-12
Payer: COMMERCIAL

## 2024-09-12 VITALS
WEIGHT: 155 LBS | TEMPERATURE: 97.8 F | HEIGHT: 64 IN | RESPIRATION RATE: 16 BRPM | OXYGEN SATURATION: 96 % | BODY MASS INDEX: 26.46 KG/M2 | SYSTOLIC BLOOD PRESSURE: 132 MMHG | DIASTOLIC BLOOD PRESSURE: 64 MMHG | HEART RATE: 73 BPM

## 2024-09-12 DIAGNOSIS — I89.0 LYMPHEDEMA OF UPPER EXTREMITY: Chronic | ICD-10-CM

## 2024-09-12 DIAGNOSIS — J45.40 MODERATE PERSISTENT ASTHMA WITHOUT COMPLICATION: Chronic | ICD-10-CM

## 2024-09-12 DIAGNOSIS — Z85.3 HISTORY OF BREAST CANCER: Chronic | ICD-10-CM

## 2024-09-12 DIAGNOSIS — K21.9 GASTROESOPHAGEAL REFLUX DISEASE WITHOUT ESOPHAGITIS: Chronic | ICD-10-CM

## 2024-09-12 DIAGNOSIS — K75.81 NASH (NONALCOHOLIC STEATOHEPATITIS): Chronic | ICD-10-CM

## 2024-09-12 DIAGNOSIS — E78.2 MIXED HYPERLIPIDEMIA: Chronic | ICD-10-CM

## 2024-09-12 DIAGNOSIS — E03.9 HYPOTHYROIDISM, ACQUIRED: Chronic | ICD-10-CM

## 2024-09-12 DIAGNOSIS — Z86.79 HISTORY OF CARDIOMYOPATHY: Chronic | ICD-10-CM

## 2024-09-12 DIAGNOSIS — E11.65 CONTROLLED TYPE 2 DIABETES MELLITUS WITH HYPERGLYCEMIA, WITHOUT LONG-TERM CURRENT USE OF INSULIN: Primary | Chronic | ICD-10-CM

## 2024-09-12 DIAGNOSIS — F33.41 RECURRENT MAJOR DEPRESSIVE DISORDER, IN PARTIAL REMISSION: Chronic | ICD-10-CM

## 2024-09-12 DIAGNOSIS — M54.50 ACUTE MIDLINE LOW BACK PAIN WITHOUT SCIATICA: ICD-10-CM

## 2024-09-12 DIAGNOSIS — Z12.31 ENCOUNTER FOR SCREENING MAMMOGRAM FOR BREAST CANCER: ICD-10-CM

## 2024-09-12 DIAGNOSIS — E55.9 VITAMIN D DEFICIENCY: Chronic | ICD-10-CM

## 2024-09-12 DIAGNOSIS — F41.1 GENERALIZED ANXIETY DISORDER: Chronic | ICD-10-CM

## 2024-09-12 DIAGNOSIS — F17.200 TOBACCO USE DISORDER: ICD-10-CM

## 2024-09-12 DIAGNOSIS — Z78.0 POST-MENOPAUSAL: ICD-10-CM

## 2024-09-12 PROCEDURE — 99214 OFFICE O/P EST MOD 30 MIN: CPT | Performed by: PHYSICIAN ASSISTANT

## 2024-09-12 RX ORDER — VALSARTAN 160 MG/1
TABLET ORAL
Qty: 90 TABLET | Refills: 1 | Status: SHIPPED | OUTPATIENT
Start: 2024-09-12

## 2024-09-12 RX ORDER — FLUTICASONE PROPIONATE AND SALMETEROL 250; 50 UG/1; UG/1
1 POWDER RESPIRATORY (INHALATION)
Qty: 180 EACH | Refills: 3 | Status: SHIPPED | OUTPATIENT
Start: 2024-09-12

## 2024-09-12 RX ORDER — FEXOFENADINE HCL 60 MG/1
60 TABLET, FILM COATED ORAL DAILY
COMMUNITY

## 2024-09-12 RX ORDER — FLUOXETINE 40 MG/1
40 CAPSULE ORAL DAILY
Qty: 90 CAPSULE | Refills: 1 | Status: SHIPPED | OUTPATIENT
Start: 2024-09-12

## 2024-09-12 RX ORDER — METFORMIN HCL 500 MG
500 TABLET, EXTENDED RELEASE 24 HR ORAL DAILY
Qty: 90 TABLET | Refills: 3 | Status: SHIPPED | OUTPATIENT
Start: 2024-09-12

## 2024-09-12 NOTE — PROGRESS NOTES
"Subjective   Jess Razo is a 64 y.o. female.     History of Present Illness    Since the last visit, she has overall felt well.  She has Primary Hypertension and well controlled on current medication, DMII well controlled on medication and will continue regimen, GERD controlled on PPI Rx, Hyperlipidemia with goals met with current Rx, Hypothyroidism well controlled on current medication and will continue Rx, Asthma well controlled and not requiring rescue Albuterol > 2 times a week, and Vitamin D deficiency and will update labs for continued management.  she has been compliant with current medications have reviewed them.  The patient denies medication side effects.  Will refill medications. /64   Pulse 73   Temp 97.8 °F (36.6 °C)   Resp 16   Ht 162.6 cm (64\")   Wt 70.3 kg (155 lb)   LMP  (LMP Unknown)   SpO2 96%   BMI 26.61 kg/m² .  BMI is >= 25 and <30. (Overweight) The following options were offered after discussion;: exercise counseling/recommendations      Results for orders placed or performed in visit on 08/01/24   Comprehensive metabolic panel    Specimen: Blood   Result Value Ref Range    Glucose 115 (H) 65 - 99 mg/dL    BUN 10 8 - 23 mg/dL    Creatinine 0.69 0.57 - 1.00 mg/dL    EGFR Result 98.3 >60.0 mL/min/1.73    BUN/Creatinine Ratio 14.5 7.0 - 25.0    Sodium 137 136 - 145 mmol/L    Potassium 4.8 3.5 - 5.2 mmol/L    Chloride 99 98 - 107 mmol/L    Total CO2 27.5 22.0 - 29.0 mmol/L    Calcium 9.8 8.6 - 10.5 mg/dL    Total Protein 7.2 6.0 - 8.5 g/dL    Albumin 4.4 3.5 - 5.2 g/dL    Globulin 2.8 gm/dL    A/G Ratio 1.6 g/dL    Total Bilirubin 0.5 0.0 - 1.2 mg/dL    Alkaline Phosphatase 115 39 - 117 U/L    AST (SGOT) 53 (H) 1 - 32 U/L    ALT (SGPT) 41 (H) 1 - 33 U/L   Lipid panel    Specimen: Blood   Result Value Ref Range    Total Cholesterol 115 0 - 200 mg/dL    Triglycerides 313 (H) 0 - 150 mg/dL    HDL Cholesterol 34 (L) 40 - 60 mg/dL    VLDL Cholesterol Diomedes 46 (H) 5 - 40 mg/dL    LDL " "Chol Calc (NIH) 35 0 - 100 mg/dL   Hemoglobin A1c    Specimen: Blood   Result Value Ref Range    Hemoglobin A1C 6.20 (H) 4.80 - 5.60 %   Need more exercise  Jardiance and Mounjaro worked   Sees [facility] for inappropriate sinus tachycardia and her mild cardiomyopathy just had visit 1-30-24 no meds were changed.  Liver labs negative for genetic cause per work-up with Dr. Valencia GI specialist---fatty liver!!  Also note 20-pack-year history smoking stopped 2019  Also sees [facility] onc[facility] noting visit from 6-19-24 DR Arnold    1. (Stage IIA)[id number] intermediate grade, infiltrating ductal carcinoma, estrogen and progesterone receptor positive, HER2/salome negative. The patient underwent a breast conserving surgical procedure November 2006.Four cycles of doxorubicin/cyclophosphamide followed by four cycles of paclitaxel 15 March 2007. She completed five years of tamoxifen in May 2012. At that time she was switched to letrozole. Trial of exemestane started November 22, 2013 due to persistent hair thinning. Tamoxifen and substituted per patient request in May 2014. Completed 2017.   She still getting colonoscopy every 6 months and due to prior polyp with Dr. Jerad Razo female 64 y.o., /64   Pulse 73   Temp 97.8 °F (36.6 °C)   Resp 16   Ht 162.6 cm (64\")   Wt 70.3 kg (155 lb)   LMP  (LMP Unknown)   SpO2 96%   BMI 26.61 kg/m²   who presents today for follow up of Depression and Anxiety.  She reports overall depression is controlled on the Prozac at 20 mg but is having breakthrough anxiety and I will increase dose of Prozac to 40 mg and see if that works better. Onset of symptoms was approximately several years ago. She denies current suicidal and homicidal ideation. Risk factors are family history of anxiety and or depression and lifestyle of multiple roles.  Previous treatment includes current Rx. She complains of the following medication side effects:none. The patient declines to go to " counseling..    The following portions of the patient's history were reviewed and updated as appropriate: allergies, current medications, past family history, past medical history, past social history, past surgical history, and problem list.    Review of Systems   Constitutional:  Negative for diaphoresis and unexpected weight loss.   HENT:  Negative for nosebleeds and trouble swallowing.    Eyes:  Negative for blurred vision and visual disturbance.   Respiratory:  Negative for choking.    Gastrointestinal:  Negative for blood in stool.   Endocrine: Positive for polydipsia.   Allergic/Immunologic: Negative for immunocompromised state.   Neurological:  Negative for tremors, facial asymmetry and speech difficulty.   Psychiatric/Behavioral:  Negative for self-injury and suicidal ideas.        Objective   Physical Exam  Vitals and nursing note reviewed.   Constitutional:       General: She is not in acute distress.     Appearance: Normal appearance. She is well-developed. She is not ill-appearing or toxic-appearing.   HENT:      Head: Normocephalic.      Right Ear: External ear normal.      Left Ear: External ear normal.      Nose: Nose normal.      Mouth/Throat:      Pharynx: Oropharynx is clear.   Eyes:      General: No scleral icterus.     Conjunctiva/sclera: Conjunctivae normal.      Pupils: Pupils are equal, round, and reactive to light.   Neck:      Thyroid: No thyromegaly.   Cardiovascular:      Rate and Rhythm: Normal rate and regular rhythm.      Heart sounds: Normal heart sounds. No murmur heard.  Pulmonary:      Effort: Pulmonary effort is normal. No respiratory distress.      Breath sounds: Normal breath sounds.   Musculoskeletal:         General: No deformity. Normal range of motion.      Cervical back: Normal range of motion and neck supple.   Skin:     General: Skin is warm and dry.      Findings: No rash.   Neurological:      General: No focal deficit present.      Mental Status: She is alert and  oriented to person, place, and time. Mental status is at baseline.   Psychiatric:         Mood and Affect: Mood normal.         Behavior: Behavior normal.         Thought Content: Thought content normal.         Judgment: Judgment normal.           Assessment & Plan   Diagnoses and all orders for this visit:    1. Controlled type 2 diabetes mellitus with hyperglycemia, without long-term current use of insulin (Primary)    2. Mixed hyperlipidemia    3. HARTLEY (nonalcoholic steatohepatitis)    4. Recurrent major depressive disorder, in partial remission    5. Generalized anxiety disorder    6. Moderate persistent asthma without complication    7. Gastroesophageal reflux disease without esophagitis    8. History of cardiomyopathy    9. Lymphedema of upper extremity    10. History of breast cancer    11. Vitamin D deficiency    12. Hypothyroidism, acquired    13. Post-menopausal  -     DEXA Bone Density Axial    14. Encounter for screening mammogram for breast cancer  -     Mammo Screening Digital Tomosynthesis Bilateral With CAD; Future    15. Tobacco use disorder  -      CT Chest Low Dose Cancer Screening WO    Other orders  -     FLUoxetine (PROzac) 40 MG capsule; Take 1 capsule by mouth Daily. For anxiety and depression  Dispense: 90 capsule; Refill: 1      Wear sleeve left upper extremity for the lymphedema---helps  Followed by Dr. Catalina Wolfe oncology for history of infiltrating ductal carcinoma  History of fatty liver disease with prior GI workup watching LFTs  Will increase Prozac to 40 mg daily for breakthrough anxiety and still working well for depression  Plan, Jess Razo, was seen today.  she was seen for HTN and continue medication, DMII stable and refilled medications, GERD and will continue on PPI medication, Hyperlipidemia and will continue current medication, Hypothyroidism well controlled, continue medication, Seasonal allergies and is doing well on their medication , Asthma and is well controlled  on medication.  , and Vitamin D deficiency and will update labs .  Do advise low dose CT chest  Consider liver ultrasound follow-up next appointment   Yearly eye exam  She also has been having low back pain with sitting in her lumbar area and due to time constraints and amount over by 10 minutes today I will send her for lumbar x-ray and have her do an appointment to see me for that  Has follow-up with Dr. Mars for: Colonoscopy  Mammo due in January and I ordered DEXA screening  Answers submitted by the patient for this visit:  Primary Reason for Visit (Submitted on 9/5/2024)  What is the primary reason for your visit?: Diabetes  Diabetes Questionnaire (Submitted on 9/5/2024)  Chief Complaint: Diabetes problem  Below 70: never

## 2024-10-12 ENCOUNTER — HOSPITAL ENCOUNTER (OUTPATIENT)
Dept: BONE DENSITY | Facility: HOSPITAL | Age: 64
Discharge: HOME | End: 2024-10-12
Admitting: PHYSICIAN ASSISTANT
Payer: COMMERCIAL

## 2024-10-12 PROCEDURE — 77080 DXA BONE DENSITY AXIAL: CPT

## 2024-11-14 ENCOUNTER — ANESTHESIA EVENT (OUTPATIENT)
Dept: GASTROENTEROLOGY | Facility: HOSPITAL | Age: 64
End: 2024-11-14
Payer: COMMERCIAL

## 2024-11-14 ENCOUNTER — HOSPITAL ENCOUNTER (OUTPATIENT)
Facility: HOSPITAL | Age: 64
Setting detail: HOSPITAL OUTPATIENT SURGERY
Discharge: HOME | End: 2024-11-14
Attending: SURGERY | Admitting: SURGERY
Payer: COMMERCIAL

## 2024-11-14 ENCOUNTER — TELEPHONE (OUTPATIENT)
Dept: SURGERY | Facility: CLINIC | Age: 64
End: 2024-11-14

## 2024-11-14 ENCOUNTER — ANESTHESIA (OUTPATIENT)
Dept: GASTROENTEROLOGY | Facility: HOSPITAL | Age: 64
End: 2024-11-14
Payer: COMMERCIAL

## 2024-11-14 VITALS
HEIGHT: 65 IN | BODY MASS INDEX: 25.24 KG/M2 | OXYGEN SATURATION: 97 % | RESPIRATION RATE: 16 BRPM | WEIGHT: 151.5 LBS | HEART RATE: 70 BPM | SYSTOLIC BLOOD PRESSURE: 160 MMHG | DIASTOLIC BLOOD PRESSURE: 86 MMHG

## 2024-11-14 DIAGNOSIS — Z86.0101 HISTORY OF ADENOMATOUS POLYP OF COLON: ICD-10-CM

## 2024-11-14 DIAGNOSIS — Z12.11 SCREENING FOR COLON CANCER: ICD-10-CM

## 2024-11-14 DIAGNOSIS — Z80.0 FAMILY HISTORY OF COLON CANCER: ICD-10-CM

## 2024-11-14 LAB — GLUCOSE BLDC GLUCOMTR-MCNC: 149 MG/DL (ref 70–130)

## 2024-11-14 PROCEDURE — S0260 H&P FOR SURGERY: HCPCS | Performed by: SURGERY

## 2024-11-14 PROCEDURE — 82948 REAGENT STRIP/BLOOD GLUCOSE: CPT

## 2024-11-14 PROCEDURE — 25010000002 LIDOCAINE 2% SOLUTION: Performed by: ANESTHESIOLOGY

## 2024-11-14 PROCEDURE — 88305 TISSUE EXAM BY PATHOLOGIST: CPT | Performed by: SURGERY

## 2024-11-14 PROCEDURE — 25010000002 PROPOFOL 1000 MG/100ML EMULSION: Performed by: ANESTHESIOLOGY

## 2024-11-14 PROCEDURE — 25810000003 LACTATED RINGERS PER 1000 ML: Performed by: SURGERY

## 2024-11-14 PROCEDURE — 45380 COLONOSCOPY AND BIOPSY: CPT | Performed by: SURGERY

## 2024-11-14 RX ORDER — PROPOFOL 10 MG/ML
INJECTION, EMULSION INTRAVENOUS AS NEEDED
Status: DISCONTINUED | OUTPATIENT
Start: 2024-11-14 | End: 2024-11-14 | Stop reason: SURG

## 2024-11-14 RX ORDER — CETIRIZINE HYDROCHLORIDE 10 MG/1
10 TABLET ORAL DAILY
COMMUNITY

## 2024-11-14 RX ORDER — LIDOCAINE HYDROCHLORIDE 20 MG/ML
INJECTION, SOLUTION INFILTRATION; PERINEURAL AS NEEDED
Status: DISCONTINUED | OUTPATIENT
Start: 2024-11-14 | End: 2024-11-14 | Stop reason: SURG

## 2024-11-14 RX ORDER — SODIUM CHLORIDE, SODIUM LACTATE, POTASSIUM CHLORIDE, CALCIUM CHLORIDE 600; 310; 30; 20 MG/100ML; MG/100ML; MG/100ML; MG/100ML
20 INJECTION, SOLUTION INTRAVENOUS CONTINUOUS
Status: DISCONTINUED | OUTPATIENT
Start: 2024-11-14 | End: 2024-11-14 | Stop reason: HOSPADM

## 2024-11-14 RX ADMIN — LIDOCAINE HYDROCHLORIDE 100 MG: 20 INJECTION, SOLUTION INFILTRATION; PERINEURAL at 08:40

## 2024-11-14 RX ADMIN — SODIUM CHLORIDE, POTASSIUM CHLORIDE, SODIUM LACTATE AND CALCIUM CHLORIDE 20 ML/HR: 600; 310; 30; 20 INJECTION, SOLUTION INTRAVENOUS at 07:58

## 2024-11-14 RX ADMIN — PROPOFOL INJECTABLE EMULSION 480 MG: 10 INJECTION, EMULSION INTRAVENOUS at 08:42

## 2024-11-14 NOTE — ANESTHESIA PREPROCEDURE EVALUATION
Anesthesia Evaluation     Patient summary reviewed and Nursing notes reviewed   NPO Solid Status: > 8 hours             Airway   Mallampati: II  TM distance: >3 FB  Neck ROM: full  Dental      Pulmonary    (+) a smoker Current, asthma,  Cardiovascular     Rhythm: regular  Rate: normal    (+) hypertension, hyperlipidemia      Neuro/Psych  (+) dizziness/light headedness, psychiatric history Anxiety  GI/Hepatic/Renal/Endo    (+) GERD, liver disease fatty liver disease, diabetes mellitus type 2, thyroid problem hypothyroidism    Musculoskeletal     Abdominal    Substance History   (+) alcohol use     OB/GYN negative ob/gyn ROS         Other   arthritis,   history of cancer                      Anesthesia Plan    ASA 3     MAC     intravenous induction     Anesthetic plan, risks, benefits, and alternatives have been provided, discussed and informed consent has been obtained with: patient.      CODE STATUS:

## 2024-11-14 NOTE — DISCHARGE INSTRUCTIONS
For the next 24 hours patient needs to be with a responsible adult.    For 24 hours DO NOT drive, operate machinery, appliances, drink alcohol, make important decisions or sign legal documents.    Start with a light or bland diet if you are feeling sick to your stomach otherwise advance to regular diet as tolerated.    Follow recommendations on procedure report if provided by your doctor.    Call Dr Mars for problems [phone]    Problems may include but not limited to: large amounts of bleeding, trouble breathing, repeated vomiting, severe unrelieved pain, fever or chills.

## 2024-11-14 NOTE — TELEPHONE ENCOUNTER
Attempted to contact patient to schedule an appointment.  DX: partial colon resection . Left voicemail to call back.

## 2024-11-14 NOTE — H&P
General Surgery  History and Physical    CC: Screening for colon cancer    HPI: The patient is a pleasant 64 y.o. year-old lady who presents today for repeat screening colonoscopy.  I last performed a colonoscopy on her in May 2024 where a recurrent tubulovillous adenoma of the ascending colon was identified and removed piecemeal with the biopsy forceps.  She had 5 other polyps, for which were adenomatous and 1 was a hyperplastic polyp.  I previously performed a colonoscopy on her in October 2023 and found 10 polyps, 1 of which was a large tubulovillous adenoma that was removed piecemeal and tattooed.  3 of the other polyps returned as tubular adenomas and the rest were hyperplastic.  I had also previous performed a colonoscopy on her in June 2020 and discovered 3 tubular adenomas, 2 hyperplastic polyps, and diverticulosis.  She has a family history of colon cancer in her maternal uncle.  She denies any melena or hematochezia.    Past Medical History:   Hypertension  Hyperlipidemia  Type 2 diabetes  Hypothyroidism  GERD  Anxiety  History of breast cancer  History of adenomatous colon polyps  Lymphedema of upper extremity  Asthma  Osteoarthritis    Past Surgical History:   Colonoscopy x3 (May 2024, October 2023, 2020 by me- 1 large tubulovillous adenoma of ascending colon, multiple other tubular adenomas & hyperplastic polyps, diverticulosis)  Left breast lumpectomy with sentinel lymph node biopsy  Hysterectomy  Tonsillectomy  Diagnostic laparoscopy due to endometriosis  Bladder repair with hysterectomy    Medications:   Medications Prior to Admission   Medication Sig Dispense Refill Last Dose/Taking    atorvastatin (LIPITOR) 80 MG tablet Take 1 tablet by mouth Daily. for cholesterol 90 tablet 3 11/13/2024    bimatoprost (LATISSE) 0.03 % ophthalmic solution Administer  to both eyes Every Night.   11/14/2024 Morning    bimatoprost (LUMIGAN) 0.03 % ophthalmic drops INSTILL 1 DROP INTO EACH EYE AT BEDTIME   11/13/2024     bisoprolol (ZEBeta) 10 MG tablet Take 1 tablet by mouth Daily.   11/13/2024    calcium carbonate (OS-YOON) 1250 (500 CA) MG tablet Take 1 tablet by mouth Daily.   11/13/2024    cetirizine (zyrTEC) 10 MG tablet Take 1 tablet by mouth Daily.   11/13/2024    Cholecalciferol (VITAMIN D3) 400 UNITS capsule Take 1 capsule by mouth Daily.   11/13/2024    Combigan 0.2-0.5 % ophthalmic solution    11/13/2024    digoxin (LANOXIN) 250 MCG tablet Take 1 tablet by mouth Every Night.   11/13/2024    dilTIAZem (TIAZAC) 360 MG 24 hr capsule Take 1 capsule by mouth Daily. For heart rate 90 capsule 3 Taking    Dulaglutide 0.75 MG/0.5ML solution pen-injector Inject 0.75 mg under the skin into the appropriate area as directed 1 (One) Time Per Week. For DMII 2 mL 5 11/1/2024    esomeprazole (nexIUM) 20 MG capsule Take 1 capsule by mouth Every Morning Before Breakfast.   11/13/2024    fexofenadine (ALLEGRA) 60 MG tablet Take 1 tablet by mouth Daily.   Taking    FLUoxetine (PROzac) 40 MG capsule Take 1 capsule by mouth Daily. For anxiety and depression 90 capsule 1 11/13/2024    Fluticasone-Salmeterol (ADVAIR/WIXELA) 250-50 MCG/ACT DISKUS Inhale 1 puff 2 (Two) Times a Day. For asthma rinse mouth after use 180 each 3 11/13/2024    Garlic 1000 MG capsule Take 1 capsule by mouth Daily.   11/13/2024    ibuprofen (ADVIL,MOTRIN) 400 MG tablet Take 1 tablet by mouth Every 6 (Six) Hours As Needed for Mild Pain.   Past Week    levothyroxine (SYNTHROID, LEVOTHROID) 50 MCG tablet 1 p.o. daily before breakfast and do not take on Sundays 90 tablet 3 11/13/2024    Multiple Vitamin (MULTIVITAMIN) tablet Take 1 tablet by mouth Daily.   11/13/2024    Probiotic Product (UP4 PROBIOTICS ADULT PO) Take  by mouth.   11/12/2024    valsartan (DIOVAN) 160 MG tablet One PO daily for BP 90 tablet 1 11/13/2024    vitamin E 1000 UNIT capsule Take 1 capsule by mouth Daily.   11/13/2024    albuterol sulfate  (90 Base) MCG/ACT inhaler Inhale 2 puffs Every 4  (Four) Hours As Needed for Wheezing. 24 g 3 More than a month    metFORMIN ER (GLUCOPHAGE-XR) 500 MG 24 hr tablet Take 1 tablet by mouth Daily. With food for type 2 diabetes 90 tablet 3 11/12/2024       Allergies: Multiple allergies including morphine, Levaquin, ampicillin, Ceclor, ciprofloxacin, codeine, erythromycin, telithromycin, penicillin, tetracycline, and Ultram    Family History: Maternal uncle with history of colon cancer, father with history of pancreatic cancer and colon polyps, mother and sister both had breast cancer    Social History: , non-smoker, social alcohol use    ROS: A comprehensive review of systems was conducted and negative for melena or hematochezia  All other systems reviewed and negative    Physical Exam:  Vitals:    11/14/24 0740   BP: 179/86   Pulse: 64   Resp: 16   SpO2: 99%   Height: 165 cm  Weight: 68.7 kg  BMI: 25  General: No acute distress, well-nourished & well-developed  HEAD: normocephalic, atraumatic  EYES: normal conjunctiva, sclera anicteric  EARS: grossly normal hearing  NECK: supple, no thyromegaly  CARDIOVASCULAR: regular rate and rhythm  RESPIRATORY: clear to auscultation bilaterally  GASTROINTESTINAL: soft, nontender, non-distended  PSYCHIATRIC: oriented x3, normal mood and affect    ASSESSMENT & PLAN  Mrs. Razo is a 64-year-old lady with a history of adenomatous colon polyps and family history of colon cancer in her uncle who presents today for repeat screening colonoscopy.  She has been counseled on the risks of the procedure to include bleeding, colon perforation, and missed pathology.  Despite these risks, she has consented to proceed.    Sabi Mars MD  General, Robotic, and Endoscopic Surgery  [facility]    [address], [address]  [place] [address]  P: [phone]  F: [phone]

## 2024-11-14 NOTE — ANESTHESIA POSTPROCEDURE EVALUATION
"Patient: Jess Razo    Procedure Summary       Date: 11/14/24 Room / Location: [facility] ENDOSCOPY 8 / [facility] ENDOSCOPY    Anesthesia Start: 0837 Anesthesia Stop: 0914    Procedure: COLONOSCOPY into cecum with cold bx polypectomies Diagnosis:       Screening for colon cancer      History of adenomatous polyp of colon      Family history of colon cancer      (Screening for colon cancer [Z12.11])      (History of adenomatous polyp of colon [Z86.010])      (Family history of colon cancer [Z80.0])    Surgeons: Sabi Mars MD Provider: Chi Momin MD    Anesthesia Type: MAC ASA Status: 3            Anesthesia Type: MAC    Vitals  Vitals Value Taken Time   /86 11/14/24 0933   Temp     Pulse 70 11/14/24 0933   Resp 16 11/14/24 0933   SpO2 97 % 11/14/24 0933           Post Anesthesia Care and Evaluation    Patient location during evaluation: bedside  Patient participation: complete - patient participated  Level of consciousness: awake and alert  Pain management: adequate    Airway patency: patent  Anesthetic complications: No anesthetic complications    Cardiovascular status: acceptable  Respiratory status: acceptable  Hydration status: acceptable    Comments: /86 (BP Location: Left arm, Patient Position: Lying)   Pulse 70   Resp 16   Ht 165.1 cm (65\")   Wt 68.7 kg (151 lb 8 oz)   LMP  (LMP Unknown)   SpO2 97%   BMI 25.21 kg/m²     "

## 2024-11-14 NOTE — OP NOTE
Operative Note :  Sabi Mars MD      Jess Barajasnes  1960    Procedure Date: 11/14/24    Pre-op Diagnosis:  Screening for colon cancer [Z12.11]  History of adenomatous polyp of colon [Z86.010]  Family history of colon cancer [Z80.0]    Post-Operative Diagnosis:  Colon polyps  Diverticulosis    Procedure:   Flexible colonoscopy to the cecum with cold forcep polypectomy x 4    Surgeon: Sabi Mars MD    Assistant: None    Anesthesia:  MAC (monitored anesthetic care)    Estimated Blood Loss: Minimal    Specimens:   Ascending colon polyp (recurrent)  Transverse colon polyp #1  Transverse colon polyp #2  Sigmoid colon polyp    Complications: None    Indications:  The patient is a 64-year-old lady who has a history of a tubulovillous adenoma of the ascending colon removed piecemeal 1 year ago.  It quickly recurred and she underwent piecemeal resection again 6 months ago.  She returns today for a follow-up endoscopy to check for regrowth of the adenomatous polyp.  She has been counseled on the risks of the procedure and has consented to proceed.    Findings: Recurrent adenomatous polyp of the ascending colon, difficult to remove given positioning behind a mucosal fold; 3 other small 3 mm sessile colon polyps removed; sigmoid diverticulosis    Description of procedure:  The patient was brought to the endoscopy suite and nesha in the left lateral decubitus position.  Continuous propofol anesthesia was administered.  A surgical timeout was completed.  A digital rectal exam was performed, revealing no abnormalities.  An adult colonoscope was then inserted through the anus and passed under direct visualization to the level of the cecum.  The cecum was identified via the ileocecal valve as well as the appendiceal orifice.  The scope was then slowly withdrawn, examining all circumferential walls of the ascending, transverse, descending, and sigmoid colon.  There was a recurrent adenomatous appearing polyp  located behind a mucosal fold of the mid ascending colon.  I attempted to remove it piecemeal, but was unable to completely remove it due to its positioning behind the mucosal fold along a turn in the colon.  This was at the same location of her previous tubulovillous adenoma, adjacent to the Criselda ink tattoo.  Within the transverse colon there were 2 separate 3 mm sessile polyps removed using the biopsy forceps.  Within the sigmoid colon there was a 2 mm sessile polyp removed using the biopsy forceps.  She had nonbleeding diverticulosis of the sigmoid colon with no evidence of fecal impaction.  Within the rectum the scope was retroflexed and showed no evidence of hemorrhoidal disease.  The scope was then withdrawn and the colon desufflated.  The patient had a very good bowel prep and was transferred to the recovery area in stable condition.     Recommendations:  I will call the patient in 1 week or less with the pathology results of the 4 polyps removed.  Given the consistent regrowth of this adenomatous polyp along the ascending colon and inability to remove it completely today, she will need a laparoscopic right colon resection.  I will have her follow-up with me in the office in the next few weeks to discuss further.    Sabi Mars MD  General, Robotic, and Endoscopic Surgery  [facility]    [address], [address]  [place] [address]  P: [phone]  F: [phone]

## 2024-11-15 LAB
CYTO UR: NORMAL
LAB AP CASE REPORT: NORMAL
LAB AP CLINICAL INFORMATION: NORMAL
PATH REPORT.FINAL DX SPEC: NORMAL
PATH REPORT.GROSS SPEC: NORMAL

## 2024-11-22 ENCOUNTER — TELEPHONE (OUTPATIENT)
Dept: SURGERY | Facility: CLINIC | Age: 64
End: 2024-11-22
Payer: COMMERCIAL

## 2024-11-22 NOTE — TELEPHONE ENCOUNTER
Called and spoke with patient to inform her of the results per  and that any further questions can be brought up at her next appt in December, patient stated understanding.

## 2024-12-10 ENCOUNTER — OFFICE VISIT (OUTPATIENT)
Dept: SURGERY | Facility: CLINIC | Age: 64
End: 2024-12-10
Payer: COMMERCIAL

## 2024-12-10 VITALS
BODY MASS INDEX: 25.36 KG/M2 | OXYGEN SATURATION: 97 % | HEIGHT: 65 IN | DIASTOLIC BLOOD PRESSURE: 80 MMHG | SYSTOLIC BLOOD PRESSURE: 144 MMHG | HEART RATE: 76 BPM | WEIGHT: 152.2 LBS

## 2024-12-10 DIAGNOSIS — D12.6 TUBULOVILLOUS ADENOMA OF COLON: Primary | ICD-10-CM

## 2024-12-10 PROCEDURE — 99214 OFFICE O/P EST MOD 30 MIN: CPT | Performed by: SURGERY

## 2024-12-10 RX ORDER — NEOMYCIN SULFATE 500 MG/1
1000 TABLET ORAL 3 TIMES DAILY
Qty: 6 TABLET | Refills: 0 | Status: SHIPPED | OUTPATIENT
Start: 2024-12-10 | End: 2024-12-11

## 2024-12-10 RX ORDER — ACETAMINOPHEN 500 MG
1000 TABLET ORAL EVERY 6 HOURS
OUTPATIENT
Start: 2024-12-10

## 2024-12-10 RX ORDER — ALVIMOPAN 12 MG/1
12 CAPSULE ORAL ONCE
OUTPATIENT
Start: 2024-12-10 | End: 2024-12-17

## 2024-12-10 RX ORDER — METRONIDAZOLE 500 MG/1
TABLET ORAL
Qty: 3 TABLET | Refills: 0 | Status: SHIPPED | OUTPATIENT
Start: 2024-12-10

## 2024-12-10 RX ORDER — CHLORHEXIDINE GLUCONATE 40 MG/ML
SOLUTION TOPICAL
Qty: 236 ML | Refills: 0 | Status: SHIPPED | OUTPATIENT
Start: 2024-12-10

## 2024-12-18 NOTE — PROGRESS NOTES
General Surgery  Established Patient Office Visit    CC: Endoscopically unresectable colon polyp    HPI: The patient is a pleasant 64 y.o. year-old lady who returns to see me for discussion of a recurrent adenomatous colon polyp of the ascending colon which has been refractory to multiple attempts at endoscopic removal with many colonoscopies.  She denies any blood in her stool and has had no unintentional weight loss or constipation. A few times the biopsies have returned as tubulovillous adenoma and other times it has returned as a tubular adenoma. Each colonoscopy has involved piecemeal removal, and it has quickly regrown when reassessed six months later on follow-up colonoscopy. I have proposed scheduling her for a laparoscopic partial colon resection.    Past Medical History:   Hypertension  Hyperlipidemia  Type 2 diabetes  Hypothyroidism  GERD  Generalized anxiety disorder  History of breast cancer  Lymphedema of upper extremity  Asthma  Osteoarthritis  Vertigo  HARTLEY    Past Surgical History:   Left breast lumpectomy with sentinel lymph node biopsy  Hysterectomy  Tonsillectomy  Diagnostic laparoscopy due to endometriosis  Bladder repair with hysterectomy  Multiple colonoscopies by me (Nov 2024, May 2024, Oct 2023, June 2020)    Medications:   Albuterol inhaler every 4 hours as needed  Atorvastatin 80 mg daily  Bimatoprost ophthalmic solution daily  Bisoprolol 10 mg daily  Calcium carbonate (Os-Diomedes) 1250 mg daily  Cetirizine 10 mg daily  Vitamin D3 2000 units daily  Combigan eye drops daily  Digoxin 250 mcg daily  Diltiazem 360 mg daily  Dulaglutide 0.75 mg subcutaneous weekly  Esomeprazole 20 mg daily  Fluoxetine 40 mg daily  Fluticasone-salmeterol 1 puff two times daily  Garlic 1000 mg daily  Ibuprofen as needed  Levothyroxine 50 mcg daily  Metformin 500 mg twice daily with meals  Multivitamin once daily  Probiotic daily  Valsartan 160 mg daily  Vitamin E 1000 units daily    Allergies: Morphine,  levofloxacin/ciprofloxacin, penicillins, tetracyclines, tramadol, erythromycin, telithromycin, Ceclor, codeine, adhesive tape    Family History: Father with history of pancreatic cancer and colon polyps, mother and sister both had history of breast cancer, no family history of gastrointestinal malignancy    Social History: Non-smoker, social alcohol use, , 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[employer]    ROS:   Constitutional: Negative for fevers or chills  HENT: Negative for hearing loss or runny nose  Eyes: Negative for vision changes or scleral icterus  Respiratory: Negative for cough or shortness of breath  Cardiovascular: Negative for chest pain or heart palpitations  Gastrointestinal: Negative for abdominal pain, nausea, vomiting, constipation, melena, or hematochezia  Genitourinary: Negative for hematuria or dysuria  Musculoskeletal: Negative for joint swelling or gait instability  Neurologic: Negative for tremors or seizures  Psychiatric: Negative for suicidal ideations or depression  All other systems reviewed and negative    Physical Exam:  Height: 165 cm  Weight: 69 kg  BMI: 25.3  General: No acute distress, well-nourished & well-developed  HEAD: normocephalic, atraumatic  EYES: normal conjunctiva, sclera anicteric  EARS: grossly normal hearing  NECK: supple, no thyromegaly  CARDIOVASCULAR: regular rate and rhythm  RESPIRATORY: clear to auscultation bilaterally  GASTROINTESTINAL: soft, nontender, non-distended  MUSCULOSKELETAL: normal gait and station. No gross extremity abnormalities  PSYCHIATRIC: oriented x3, normal mood and affect    PATHOLOGY:  1.  Colon, ascending, biopsy:               A.  Fragments of tubulovillous adenoma.  2.  Colon, transverse, biopsy:               A.  Tubular adenoma.  3.  Colon, transverse #2, biopsy:               A.  Fragments of tubular adenoma.  4.  Colon, sigmoid, biopsy:               A.  Fragments of hyperplastic polyp.    ASSESSMENT & PLAN  Mrs. Razo  is a 64-year-old lady with an endoscopically unresectable tubulovillous adenoma of the ascending colon. I have recommended proceeding with a laparoscopic right colon resection given the high risk this will develop into a colon cancer if not removed in its entirety. I discussed with her the risks of the surgery which include but are not limited to bleeding, wound infection, anastomotic leak, damage to structures around the colon such as the right ureter, inferior vena cava, duodenum, etc. Despite the risks of surgery she has consented to proceed and was given instructions on a mechanical and antibiotic bowel prep the day prior.    Sabi Mars MD  General and Endoscopic Surgery  [facility]    [address], [address]  [place], [place], [address]  P: [phone]  F: [phone]

## 2024-12-18 NOTE — H&P (VIEW-ONLY)
General Surgery  Established Patient Office Visit    CC: Endoscopically unresectable colon polyp    HPI: The patient is a pleasant 64 y.o. year-old lady who returns to see me for discussion of a recurrent adenomatous colon polyp of the ascending colon which has been refractory to multiple attempts at endoscopic removal with many colonoscopies.  She denies any blood in her stool and has had no unintentional weight loss or constipation. A few times the biopsies have returned as tubulovillous adenoma and other times it has returned as a tubular adenoma. Each colonoscopy has involved piecemeal removal, and it has quickly regrown when reassessed six months later on follow-up colonoscopy. I have proposed scheduling her for a laparoscopic partial colon resection.    Past Medical History:   Hypertension  Hyperlipidemia  Type 2 diabetes  Hypothyroidism  GERD  Generalized anxiety disorder  History of breast cancer  Lymphedema of upper extremity  Asthma  Osteoarthritis  Vertigo  HARTLEY    Past Surgical History:   Left breast lumpectomy with sentinel lymph node biopsy  Hysterectomy  Tonsillectomy  Diagnostic laparoscopy due to endometriosis  Bladder repair with hysterectomy  Multiple colonoscopies by me (Nov 2024, May 2024, Oct 2023, June 2020)    Medications:   Albuterol inhaler every 4 hours as needed  Atorvastatin 80 mg daily  Bimatoprost ophthalmic solution daily  Bisoprolol 10 mg daily  Calcium carbonate (Os-Diomedes) 1250 mg daily  Cetirizine 10 mg daily  Vitamin D3 2000 units daily  Combigan eye drops daily  Digoxin 250 mcg daily  Diltiazem 360 mg daily  Dulaglutide 0.75 mg subcutaneous weekly  Esomeprazole 20 mg daily  Fluoxetine 40 mg daily  Fluticasone-salmeterol 1 puff two times daily  Garlic 1000 mg daily  Ibuprofen as needed  Levothyroxine 50 mcg daily  Metformin 500 mg twice daily with meals  Multivitamin once daily  Probiotic daily  Valsartan 160 mg daily  Vitamin E 1000 units daily    Allergies: Morphine,  levofloxacin/ciprofloxacin, penicillins, tetracyclines, tramadol, erythromycin, telithromycin, Ceclor, codeine, adhesive tape    Family History: Father with history of pancreatic cancer and colon polyps, mother and sister both had history of breast cancer, no family history of gastrointestinal malignancy    Social History: Non-smoker, social alcohol use, , 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[employer]    ROS:   Constitutional: Negative for fevers or chills  HENT: Negative for hearing loss or runny nose  Eyes: Negative for vision changes or scleral icterus  Respiratory: Negative for cough or shortness of breath  Cardiovascular: Negative for chest pain or heart palpitations  Gastrointestinal: Negative for abdominal pain, nausea, vomiting, constipation, melena, or hematochezia  Genitourinary: Negative for hematuria or dysuria  Musculoskeletal: Negative for joint swelling or gait instability  Neurologic: Negative for tremors or seizures  Psychiatric: Negative for suicidal ideations or depression  All other systems reviewed and negative    Physical Exam:  Height: 165 cm  Weight: 69 kg  BMI: 25.3  General: No acute distress, well-nourished & well-developed  HEAD: normocephalic, atraumatic  EYES: normal conjunctiva, sclera anicteric  EARS: grossly normal hearing  NECK: supple, no thyromegaly  CARDIOVASCULAR: regular rate and rhythm  RESPIRATORY: clear to auscultation bilaterally  GASTROINTESTINAL: soft, nontender, non-distended  MUSCULOSKELETAL: normal gait and station. No gross extremity abnormalities  PSYCHIATRIC: oriented x3, normal mood and affect    PATHOLOGY:  1.  Colon, ascending, biopsy:               A.  Fragments of tubulovillous adenoma.  2.  Colon, transverse, biopsy:               A.  Tubular adenoma.  3.  Colon, transverse #2, biopsy:               A.  Fragments of tubular adenoma.  4.  Colon, sigmoid, biopsy:               A.  Fragments of hyperplastic polyp.    ASSESSMENT & PLAN  Mrs. Razo  is a 64-year-old lady with an endoscopically unresectable tubulovillous adenoma of the ascending colon. I have recommended proceeding with a laparoscopic right colon resection given the high risk this will develop into a colon cancer if not removed in its entirety. I discussed with her the risks of the surgery which include but are not limited to bleeding, wound infection, anastomotic leak, damage to structures around the colon such as the right ureter, inferior vena cava, duodenum, etc. Despite the risks of surgery she has consented to proceed and was given instructions on a mechanical and antibiotic bowel prep the day prior.    Sabi Mars MD  General and Endoscopic Surgery  [facility]    [address], [address]  [place], [place], [address]  P: [phone]  F: [phone]

## 2024-12-31 ENCOUNTER — PRE-ADMISSION TESTING (OUTPATIENT)
Dept: PREADMISSION TESTING | Facility: HOSPITAL | Age: 64
End: 2024-12-31
Payer: COMMERCIAL

## 2024-12-31 VITALS
HEIGHT: 65 IN | RESPIRATION RATE: 16 BRPM | DIASTOLIC BLOOD PRESSURE: 75 MMHG | BODY MASS INDEX: 25.89 KG/M2 | TEMPERATURE: 98.1 F | HEART RATE: 71 BPM | WEIGHT: 155.4 LBS | SYSTOLIC BLOOD PRESSURE: 162 MMHG | OXYGEN SATURATION: 96 %

## 2024-12-31 DIAGNOSIS — D12.6 TUBULOVILLOUS ADENOMA OF COLON: ICD-10-CM

## 2024-12-31 LAB
ABO GROUP BLD: NORMAL
ANION GAP SERPL CALCULATED.3IONS-SCNC: 11 MMOL/L (ref 5–15)
BLD GP AB SCN SERPL QL: NEGATIVE
BUN SERPL-MCNC: 8 MG/DL (ref 8–23)
BUN/CREAT SERPL: 13.1 (ref 7–25)
CALCIUM SPEC-SCNC: 9.1 MG/DL (ref 8.6–10.5)
CHLORIDE SERPL-SCNC: 99 MMOL/L (ref 98–107)
CO2 SERPL-SCNC: 27 MMOL/L (ref 22–29)
CREAT SERPL-MCNC: 0.61 MG/DL (ref 0.57–1)
DEPRECATED RDW RBC AUTO: 43 FL (ref 37–54)
EGFRCR SERPLBLD CKD-EPI 2021: 100.6 ML/MIN/1.73
ERYTHROCYTE [DISTWIDTH] IN BLOOD BY AUTOMATED COUNT: 12.6 % (ref 12.3–15.4)
GLUCOSE SERPL-MCNC: 126 MG/DL (ref 65–99)
HBA1C MFR BLD: 6.4 % (ref 4.8–5.6)
HCT VFR BLD AUTO: 41.2 % (ref 34–46.6)
HGB BLD-MCNC: 13.7 G/DL (ref 12–15.9)
MCH RBC QN AUTO: 31.1 PG (ref 26.6–33)
MCHC RBC AUTO-ENTMCNC: 33.3 G/DL (ref 31.5–35.7)
MCV RBC AUTO: 93.4 FL (ref 79–97)
PLATELET # BLD AUTO: 225 10*3/MM3 (ref 140–450)
PMV BLD AUTO: 9.3 FL (ref 6–12)
POTASSIUM SERPL-SCNC: 3.9 MMOL/L (ref 3.5–5.2)
RBC # BLD AUTO: 4.41 10*6/MM3 (ref 3.77–5.28)
RH BLD: POSITIVE
SODIUM SERPL-SCNC: 137 MMOL/L (ref 136–145)
T&S EXPIRATION DATE: NORMAL
WBC NRBC COR # BLD AUTO: 5.8 10*3/MM3 (ref 3.4–10.8)

## 2024-12-31 PROCEDURE — 80048 BASIC METABOLIC PNL TOTAL CA: CPT

## 2024-12-31 PROCEDURE — 86850 RBC ANTIBODY SCREEN: CPT

## 2024-12-31 PROCEDURE — 85027 COMPLETE CBC AUTOMATED: CPT

## 2024-12-31 PROCEDURE — 86901 BLOOD TYPING SEROLOGIC RH(D): CPT

## 2024-12-31 PROCEDURE — 83036 HEMOGLOBIN GLYCOSYLATED A1C: CPT

## 2024-12-31 PROCEDURE — 36415 COLL VENOUS BLD VENIPUNCTURE: CPT

## 2024-12-31 PROCEDURE — 93005 ELECTROCARDIOGRAM TRACING: CPT

## 2024-12-31 PROCEDURE — 86900 BLOOD TYPING SEROLOGIC ABO: CPT

## 2024-12-31 NOTE — DISCHARGE INSTRUCTIONS
Take the following medications the morning of surgery:    ADVAIR INHALER  LEVOTHYROXINE  OMEPRAZOLE  DILTIAZEM  BISOPROLOL    If you are on prescription narcotic pain medication to control your pain you may also take that medication the morning of surgery.    General Instructions:    Follow your surgeons instructions regarding when to stop solid foods and when to stop liquids.   Verify with your surgeon if you are to complete a bowel prep and when to do so.  Patients who avoid smoking, chewing tobacco and alcohol for 4 weeks prior to surgery have a reduced risk of post-operative complications.  Quit smoking as many days before surgery as you can.  Do not smoke, use chewing tobacco or drink alcohol the day of surgery.   If applicable bring your C-PAP/ BI-PAP machine in with you to preop day of surgery.  Bring any papers given to you in the doctor’s office.  Wear clean comfortable clothes.  Do not wear contact lenses, false eyelashes or make-up.  Bring a case for your glasses.   Bring crutches or walker if applicable.  Remove all piercings.  Leave jewelry and any other valuables at home.  Hair extensions with metal clips must be removed prior to surgery.  The Pre-Admission Testing nurse will instruct you to bring medications if unable to obtain an accurate list in Pre-Admission Testing.        If you were given a blood bank ID arm band remember to bring it with you the day of surgery.    Preventing a Surgical Site Infection:  For 2 to 3 days before surgery, avoid shaving with a razor because the razor can irritate skin and make it easier to develop an infection.    Any areas of open skin can increase the risk of a post-operative wound infection by allowing bacteria to enter and travel throughout the body.  Notify your surgeon if you have any skin wounds / rashes even if it is not near the expected surgical site.  The area will need assessed to determine if surgery should be delayed until it is healed.  The night prior  to surgery shower using a fresh bar of anti-bacterial soap (such as Dial) and clean washcloth.  Sleep in a clean bed with clean clothing.  Do not allow pets to sleep with you.  Shower on the morning of surgery using a fresh bar of anti-bacterial soap (such as Dial) and clean washcloth.  Dry with a clean towel and dress in clean clothing.  Ask your surgeon if you will be receiving antibiotics prior to surgery.  Make sure you, your family, and all healthcare providers clean their hands with soap and water or an alcohol based hand  before caring for you or your wound.    Day of surgery:  Your arrival time is approximately two hours before your scheduled surgery time.  Upon arrival, a Pre-op nurse and Anesthesiologist will review your health history, obtain vital signs, and answer questions you may have.  The only belongings needed at this time will be a list of your home medications and if applicable your C-PAP/BI-PAP machine.  A Pre-op nurse will start an IV and you may receive medication in preparation for surgery, including something to help you relax.     Please be aware that surgery does come with discomfort.  We want to make every effort to control your discomfort so please discuss any uncontrolled symptoms with your nurse.   Your doctor will most likely have prescribed pain medications.      If you are going home after surgery you will receive individualized written care instructions before being discharged.  A responsible adult must drive you to and from the hospital on the day of your surgery and stay with you for 24 hours.  Discharge prescriptions can be filled by the hospital pharmacy during regular pharmacy hours.  If you are having surgery late in the day/evening your prescription may be e-prescribed to your pharmacy.  Please verify your pharmacy hours or chose a 24 hour pharmacy to avoid not having access to your prescription because your pharmacy has closed for the day.    If you are staying  overnight following surgery, you will be transported to your hospital room following the recovery period.  [facility] has all private rooms.    If you have any questions please call Pre-Admission Testing at [phone].  Deductibles and co-payments are collected on the day of service. Please be prepared to pay the required co-pay, deductible or deposit on the day of service as defined by your plan.    Call your surgeon immediately if you experience any of the following symptoms:  Sore Throat  Shortness of Breath or difficulty breathing  Cough  Chills  Body soreness or muscle pain  Headache  Fever  New loss of taste or smell  Do not arrive for your surgery ill.  Your procedure will need to be rescheduled to another time.  You will need to call your physician before the day of surgery to avoid any unnecessary exposure to hospital staff as well as other patients.      CHLORHEXIDINE CLOTH INSTRUCTIONS  The morning of surgery follow these instructions using the Chlorhexidine cloths you've been given.  These steps reduce bacteria on the body.  Do not use the cloths near your eyes, ears mouth, genitalia or on open wounds.  Throw the cloths away after use but do not try to flush them down a toilet.      Open and remove one cloth at a time from the package.    Leave the cloth unfolded and begin the bathing.  Massage the skin with the cloths using gentle pressure to remove bacteria.  Do not scrub harshly.   Follow the steps below with one 2% CHG cloth per area (6 total cloths).  One cloth for neck, shoulders and chest.  One cloth for both arms, hands, fingers and underarms (do underarms last).  One cloth for the abdomen followed by groin.  One cloth for right leg and foot including between the toes.  One cloth for left leg and foot including between the toes.  The last cloth is to be used for the back of the neck, back and buttocks.    Allow the CHG to air dry 3 minutes on the skin which will give it time to  work and decrease the chance of irritation.  The skin may feel sticky until it is dry.  Do not rinse with water or any other liquid or you will lose the beneficial effects of the CHG.  If mild skin irritation occurs, do rinse the skin to remove the CHG.  Report this to the nurse at time of admission.  Do not apply lotions, creams, ointments, deodorants or perfumes after using the clothes. Dress in clean clothes before coming to the hospital.

## 2025-01-01 LAB
QT INTERVAL: 368 MS
QTC INTERVAL: 395 MS

## 2025-01-07 ENCOUNTER — ANESTHESIA (OUTPATIENT)
Dept: PERIOP | Facility: HOSPITAL | Age: 65
End: 2025-01-07
Payer: COMMERCIAL

## 2025-01-07 ENCOUNTER — HOSPITAL ENCOUNTER (INPATIENT)
Facility: HOSPITAL | Age: 65
LOS: 11 days | Discharge: HOME | DRG: 330 | End: 2025-01-21
Attending: SURGERY | Admitting: SURGERY
Payer: COMMERCIAL

## 2025-01-07 ENCOUNTER — ANESTHESIA EVENT (OUTPATIENT)
Dept: PERIOP | Facility: HOSPITAL | Age: 65
End: 2025-01-07
Payer: COMMERCIAL

## 2025-01-07 DIAGNOSIS — G89.18 ACUTE POST-OPERATIVE PAIN: Primary | ICD-10-CM

## 2025-01-07 DIAGNOSIS — D12.6 TUBULOVILLOUS ADENOMA OF COLON: ICD-10-CM

## 2025-01-07 LAB
GLUCOSE BLDC GLUCOMTR-MCNC: 127 MG/DL (ref 70–130)
GLUCOSE BLDC GLUCOMTR-MCNC: 147 MG/DL (ref 70–130)
GLUCOSE BLDC GLUCOMTR-MCNC: 181 MG/DL (ref 70–130)

## 2025-01-07 PROCEDURE — 88309 TISSUE EXAM BY PATHOLOGIST: CPT | Performed by: SURGERY

## 2025-01-07 PROCEDURE — G0378 HOSPITAL OBSERVATION PER HR: HCPCS

## 2025-01-07 PROCEDURE — 25010000002 LIDOCAINE 2% SOLUTION: Performed by: NURSE ANESTHETIST, CERTIFIED REGISTERED

## 2025-01-07 PROCEDURE — 25010000002 ONDANSETRON PER 1 MG: Performed by: ANESTHESIOLOGY

## 2025-01-07 PROCEDURE — 25010000002 BUPIVACAINE (PF) 0.25 % SOLUTION: Performed by: STUDENT IN AN ORGANIZED HEALTH CARE EDUCATION/TRAINING PROGRAM

## 2025-01-07 PROCEDURE — 25810000003 LACTATED RINGERS PER 1000 ML: Performed by: NURSE ANESTHETIST, CERTIFIED REGISTERED

## 2025-01-07 PROCEDURE — 25010000002 PHENYLEPHRINE 10 MG/ML SOLUTION: Performed by: NURSE ANESTHETIST, CERTIFIED REGISTERED

## 2025-01-07 PROCEDURE — 25010000002 FENTANYL CITRATE (PF) 50 MCG/ML SOLUTION: Performed by: NURSE ANESTHETIST, CERTIFIED REGISTERED

## 2025-01-07 PROCEDURE — 44204 LAPARO PARTIAL COLECTOMY: CPT | Performed by: SURGERY

## 2025-01-07 PROCEDURE — 25010000002 PROPOFOL 200 MG/20ML EMULSION: Performed by: NURSE ANESTHETIST, CERTIFIED REGISTERED

## 2025-01-07 PROCEDURE — 25010000002 ALBUMIN HUMAN 5% PER 50 ML: Performed by: NURSE ANESTHETIST, CERTIFIED REGISTERED

## 2025-01-07 PROCEDURE — 44204 LAPARO PARTIAL COLECTOMY: CPT | Performed by: SPECIALIST/TECHNOLOGIST, OTHER

## 2025-01-07 PROCEDURE — 25810000003 LACTATED RINGERS PER 1000 ML: Performed by: STUDENT IN AN ORGANIZED HEALTH CARE EDUCATION/TRAINING PROGRAM

## 2025-01-07 PROCEDURE — 25010000002 MAGNESIUM SULFATE PER 500 MG OF MAGNESIUM: Performed by: ANESTHESIOLOGY

## 2025-01-07 PROCEDURE — 25010000002 METRONIDAZOLE 500 MG/100ML SOLUTION: Performed by: SURGERY

## 2025-01-07 PROCEDURE — 63710000001 INSULIN LISPRO (HUMAN) PER 5 UNITS: Performed by: SURGERY

## 2025-01-07 PROCEDURE — 25010000002 SUGAMMADEX 200 MG/2ML SOLUTION: Performed by: ANESTHESIOLOGY

## 2025-01-07 PROCEDURE — 0DTF4ZZ RESECTION OF RIGHT LARGE INTESTINE, PERCUTANEOUS ENDOSCOPIC APPROACH: ICD-10-PCS | Performed by: SURGERY

## 2025-01-07 PROCEDURE — P9041 ALBUMIN (HUMAN),5%, 50ML: HCPCS | Performed by: NURSE ANESTHETIST, CERTIFIED REGISTERED

## 2025-01-07 PROCEDURE — 25010000002 HYDROMORPHONE PER 4 MG: Performed by: NURSE ANESTHETIST, CERTIFIED REGISTERED

## 2025-01-07 PROCEDURE — 25810000003 SODIUM CHLORIDE 0.9 % SOLUTION: Performed by: SURGERY

## 2025-01-07 PROCEDURE — 82948 REAGENT STRIP/BLOOD GLUCOSE: CPT

## 2025-01-07 PROCEDURE — 25010000002 CEFAZOLIN PER 500 MG: Performed by: SURGERY

## 2025-01-07 PROCEDURE — 25010000002 MIDAZOLAM PER 1 MG: Performed by: STUDENT IN AN ORGANIZED HEALTH CARE EDUCATION/TRAINING PROGRAM

## 2025-01-07 PROCEDURE — 25010000002 DEXAMETHASONE SODIUM PHOSPHATE 20 MG/5ML SOLUTION: Performed by: ANESTHESIOLOGY

## 2025-01-07 DEVICE — PROXIMATE LINEAR CUTTER RELOAD, BLUE, 75MM
Type: IMPLANTABLE DEVICE | Site: ABDOMEN | Status: FUNCTIONAL
Brand: PROXIMATE

## 2025-01-07 DEVICE — CLIP LIGAT VASC HORIZON TI MD/LG GRN 6CT: Type: IMPLANTABLE DEVICE | Site: ABDOMEN | Status: FUNCTIONAL

## 2025-01-07 DEVICE — PROXIMATE RELOADABLE LINEAR CUTTER WITH SAFETY LOCK-OUT, 75MM
Type: IMPLANTABLE DEVICE | Site: ABDOMEN | Status: FUNCTIONAL
Brand: PROXIMATE

## 2025-01-07 RX ORDER — MIDAZOLAM HYDROCHLORIDE 1 MG/ML
1 INJECTION, SOLUTION INTRAMUSCULAR; INTRAVENOUS
Status: COMPLETED | OUTPATIENT
Start: 2025-01-07 | End: 2025-01-07

## 2025-01-07 RX ORDER — IPRATROPIUM BROMIDE AND ALBUTEROL SULFATE 2.5; .5 MG/3ML; MG/3ML
3 SOLUTION RESPIRATORY (INHALATION) ONCE AS NEEDED
Status: DISCONTINUED | OUTPATIENT
Start: 2025-01-07 | End: 2025-01-07 | Stop reason: HOSPADM

## 2025-01-07 RX ORDER — HYDRALAZINE HYDROCHLORIDE 20 MG/ML
5 INJECTION INTRAMUSCULAR; INTRAVENOUS
Status: DISCONTINUED | OUTPATIENT
Start: 2025-01-07 | End: 2025-01-07 | Stop reason: HOSPADM

## 2025-01-07 RX ORDER — VALSARTAN 80 MG/1
160 TABLET ORAL DAILY
Status: DISCONTINUED | OUTPATIENT
Start: 2025-01-08 | End: 2025-01-21 | Stop reason: HOSPADM

## 2025-01-07 RX ORDER — BUPIVACAINE HYDROCHLORIDE AND EPINEPHRINE 5; 5 MG/ML; UG/ML
INJECTION, SOLUTION EPIDURAL; INTRACAUDAL; PERINEURAL AS NEEDED
Status: DISCONTINUED | OUTPATIENT
Start: 2025-01-07 | End: 2025-01-07 | Stop reason: HOSPADM

## 2025-01-07 RX ORDER — NALOXONE HCL 0.4 MG/ML
0.4 VIAL (ML) INJECTION
Status: DISCONTINUED | OUTPATIENT
Start: 2025-01-07 | End: 2025-01-17

## 2025-01-07 RX ORDER — ROCURONIUM BROMIDE 10 MG/ML
INJECTION, SOLUTION INTRAVENOUS AS NEEDED
Status: DISCONTINUED | OUTPATIENT
Start: 2025-01-07 | End: 2025-01-07 | Stop reason: SURG

## 2025-01-07 RX ORDER — HYDROMORPHONE HYDROCHLORIDE 1 MG/ML
0.5 INJECTION, SOLUTION INTRAMUSCULAR; INTRAVENOUS; SUBCUTANEOUS
Status: DISCONTINUED | OUTPATIENT
Start: 2025-01-07 | End: 2025-01-17

## 2025-01-07 RX ORDER — FLUMAZENIL 0.1 MG/ML
0.2 INJECTION INTRAVENOUS AS NEEDED
Status: DISCONTINUED | OUTPATIENT
Start: 2025-01-07 | End: 2025-01-07 | Stop reason: HOSPADM

## 2025-01-07 RX ORDER — SODIUM CHLORIDE, SODIUM LACTATE, POTASSIUM CHLORIDE, CALCIUM CHLORIDE 600; 310; 30; 20 MG/100ML; MG/100ML; MG/100ML; MG/100ML
INJECTION, SOLUTION INTRAVENOUS CONTINUOUS PRN
Status: DISCONTINUED | OUTPATIENT
Start: 2025-01-07 | End: 2025-01-07 | Stop reason: SURG

## 2025-01-07 RX ORDER — FENTANYL CITRATE 50 UG/ML
INJECTION, SOLUTION INTRAMUSCULAR; INTRAVENOUS AS NEEDED
Status: DISCONTINUED | OUTPATIENT
Start: 2025-01-07 | End: 2025-01-07 | Stop reason: SURG

## 2025-01-07 RX ORDER — CETIRIZINE HYDROCHLORIDE 10 MG/1
10 TABLET ORAL DAILY
Status: DISCONTINUED | OUTPATIENT
Start: 2025-01-08 | End: 2025-01-21 | Stop reason: HOSPADM

## 2025-01-07 RX ORDER — FENTANYL CITRATE 50 UG/ML
25 INJECTION, SOLUTION INTRAMUSCULAR; INTRAVENOUS
Status: DISCONTINUED | OUTPATIENT
Start: 2025-01-07 | End: 2025-01-07 | Stop reason: HOSPADM

## 2025-01-07 RX ORDER — ONDANSETRON 2 MG/ML
INJECTION INTRAMUSCULAR; INTRAVENOUS AS NEEDED
Status: DISCONTINUED | OUTPATIENT
Start: 2025-01-07 | End: 2025-01-07 | Stop reason: SURG

## 2025-01-07 RX ORDER — MAGNESIUM HYDROXIDE 1200 MG/15ML
LIQUID ORAL AS NEEDED
Status: DISCONTINUED | OUTPATIENT
Start: 2025-01-07 | End: 2025-01-07 | Stop reason: HOSPADM

## 2025-01-07 RX ORDER — ALVIMOPAN 12 MG/1
12 CAPSULE ORAL ONCE
Status: COMPLETED | OUTPATIENT
Start: 2025-01-07 | End: 2025-01-07

## 2025-01-07 RX ORDER — DOCUSATE SODIUM 100 MG/1
100 CAPSULE, LIQUID FILLED ORAL 2 TIMES DAILY
Status: DISCONTINUED | OUTPATIENT
Start: 2025-01-07 | End: 2025-01-21 | Stop reason: HOSPADM

## 2025-01-07 RX ORDER — ONDANSETRON 2 MG/ML
4 INJECTION INTRAMUSCULAR; INTRAVENOUS ONCE AS NEEDED
Status: DISCONTINUED | OUTPATIENT
Start: 2025-01-07 | End: 2025-01-07 | Stop reason: HOSPADM

## 2025-01-07 RX ORDER — ALVIMOPAN 12 MG/1
12 CAPSULE ORAL 2 TIMES DAILY
Status: DISCONTINUED | OUTPATIENT
Start: 2025-01-07 | End: 2025-01-08

## 2025-01-07 RX ORDER — DIPHENHYDRAMINE HYDROCHLORIDE 50 MG/ML
12.5 INJECTION INTRAMUSCULAR; INTRAVENOUS
Status: DISCONTINUED | OUTPATIENT
Start: 2025-01-07 | End: 2025-01-07 | Stop reason: HOSPADM

## 2025-01-07 RX ORDER — SODIUM CHLORIDE 0.9 % (FLUSH) 0.9 %
3 SYRINGE (ML) INJECTION EVERY 12 HOURS SCHEDULED
Status: DISCONTINUED | OUTPATIENT
Start: 2025-01-07 | End: 2025-01-07 | Stop reason: HOSPADM

## 2025-01-07 RX ORDER — METRONIDAZOLE 500 MG/100ML
500 INJECTION, SOLUTION INTRAVENOUS ONCE
Status: COMPLETED | OUTPATIENT
Start: 2025-01-07 | End: 2025-01-07

## 2025-01-07 RX ORDER — SODIUM CHLORIDE 9 MG/ML
INJECTION, SOLUTION INTRAVENOUS CONTINUOUS PRN
Status: COMPLETED | OUTPATIENT
Start: 2025-01-07 | End: 2025-01-07

## 2025-01-07 RX ORDER — PHENYLEPHRINE HYDROCHLORIDE 10 MG/ML
INJECTION INTRAVENOUS AS NEEDED
Status: DISCONTINUED | OUTPATIENT
Start: 2025-01-07 | End: 2025-01-07 | Stop reason: SURG

## 2025-01-07 RX ORDER — HYDROMORPHONE HYDROCHLORIDE 1 MG/ML
0.25 INJECTION, SOLUTION INTRAMUSCULAR; INTRAVENOUS; SUBCUTANEOUS
Status: DISCONTINUED | OUTPATIENT
Start: 2025-01-07 | End: 2025-01-07 | Stop reason: HOSPADM

## 2025-01-07 RX ORDER — ACETAMINOPHEN 500 MG
1000 TABLET ORAL EVERY 6 HOURS
Status: DISCONTINUED | OUTPATIENT
Start: 2025-01-07 | End: 2025-01-07 | Stop reason: HOSPADM

## 2025-01-07 RX ORDER — SODIUM CHLORIDE 0.9 % (FLUSH) 0.9 %
3-10 SYRINGE (ML) INJECTION AS NEEDED
Status: DISCONTINUED | OUTPATIENT
Start: 2025-01-07 | End: 2025-01-07 | Stop reason: HOSPADM

## 2025-01-07 RX ORDER — DIGOXIN 125 MCG
250 TABLET ORAL NIGHTLY
Status: DISCONTINUED | OUTPATIENT
Start: 2025-01-07 | End: 2025-01-21 | Stop reason: HOSPADM

## 2025-01-07 RX ORDER — ALBUTEROL SULFATE 90 UG/1
2 INHALANT RESPIRATORY (INHALATION) EVERY 4 HOURS PRN
Status: DISCONTINUED | OUTPATIENT
Start: 2025-01-07 | End: 2025-01-21 | Stop reason: HOSPADM

## 2025-01-07 RX ORDER — ATROPINE SULFATE 0.4 MG/ML
0.4 INJECTION, SOLUTION INTRAMUSCULAR; INTRAVENOUS; SUBCUTANEOUS ONCE AS NEEDED
Status: DISCONTINUED | OUTPATIENT
Start: 2025-01-07 | End: 2025-01-07 | Stop reason: HOSPADM

## 2025-01-07 RX ORDER — ONDANSETRON 2 MG/ML
4 INJECTION INTRAMUSCULAR; INTRAVENOUS EVERY 6 HOURS PRN
Status: DISCONTINUED | OUTPATIENT
Start: 2025-01-07 | End: 2025-01-21 | Stop reason: HOSPADM

## 2025-01-07 RX ORDER — SODIUM CHLORIDE 0.9 % (FLUSH) 0.9 %
10 SYRINGE (ML) INJECTION EVERY 12 HOURS SCHEDULED
Status: DISCONTINUED | OUTPATIENT
Start: 2025-01-07 | End: 2025-01-20 | Stop reason: SDUPTHER

## 2025-01-07 RX ORDER — DEXAMETHASONE SODIUM PHOSPHATE 4 MG/ML
INJECTION, SOLUTION INTRA-ARTICULAR; INTRALESIONAL; INTRAMUSCULAR; INTRAVENOUS; SOFT TISSUE AS NEEDED
Status: DISCONTINUED | OUTPATIENT
Start: 2025-01-07 | End: 2025-01-07 | Stop reason: SURG

## 2025-01-07 RX ORDER — HYDROCODONE BITARTRATE AND ACETAMINOPHEN 7.5; 325 MG/1; MG/1
1 TABLET ORAL EVERY 4 HOURS PRN
Status: DISCONTINUED | OUTPATIENT
Start: 2025-01-07 | End: 2025-01-07 | Stop reason: HOSPADM

## 2025-01-07 RX ORDER — LIDOCAINE HYDROCHLORIDE 10 MG/ML
0.5 INJECTION, SOLUTION INFILTRATION; PERINEURAL ONCE AS NEEDED
Status: DISCONTINUED | OUTPATIENT
Start: 2025-01-07 | End: 2025-01-07 | Stop reason: HOSPADM

## 2025-01-07 RX ORDER — PROMETHAZINE HYDROCHLORIDE 25 MG/1
25 SUPPOSITORY RECTAL ONCE AS NEEDED
Status: DISCONTINUED | OUTPATIENT
Start: 2025-01-07 | End: 2025-01-07 | Stop reason: HOSPADM

## 2025-01-07 RX ORDER — SODIUM CHLORIDE 0.9 % (FLUSH) 0.9 %
10 SYRINGE (ML) INJECTION AS NEEDED
Status: DISCONTINUED | OUTPATIENT
Start: 2025-01-07 | End: 2025-01-21 | Stop reason: HOSPADM

## 2025-01-07 RX ORDER — ATORVASTATIN CALCIUM 20 MG/1
80 TABLET, FILM COATED ORAL NIGHTLY
Status: DISCONTINUED | OUTPATIENT
Start: 2025-01-07 | End: 2025-01-21 | Stop reason: HOSPADM

## 2025-01-07 RX ORDER — INSULIN LISPRO 100 [IU]/ML
2-7 INJECTION, SOLUTION INTRAVENOUS; SUBCUTANEOUS
Status: DISCONTINUED | OUTPATIENT
Start: 2025-01-07 | End: 2025-01-21 | Stop reason: HOSPADM

## 2025-01-07 RX ORDER — BUDESONIDE AND FORMOTEROL FUMARATE DIHYDRATE 160; 4.5 UG/1; UG/1
2 AEROSOL RESPIRATORY (INHALATION)
Status: DISCONTINUED | OUTPATIENT
Start: 2025-01-07 | End: 2025-01-21 | Stop reason: HOSPADM

## 2025-01-07 RX ORDER — NALOXONE HCL 0.4 MG/ML
0.2 VIAL (ML) INJECTION AS NEEDED
Status: DISCONTINUED | OUTPATIENT
Start: 2025-01-07 | End: 2025-01-07 | Stop reason: HOSPADM

## 2025-01-07 RX ORDER — HYDROCODONE BITARTRATE AND ACETAMINOPHEN 5; 325 MG/1; MG/1
1 TABLET ORAL ONCE AS NEEDED
Status: COMPLETED | OUTPATIENT
Start: 2025-01-07 | End: 2025-01-07

## 2025-01-07 RX ORDER — FAMOTIDINE 10 MG/ML
20 INJECTION, SOLUTION INTRAVENOUS ONCE
Status: COMPLETED | OUTPATIENT
Start: 2025-01-07 | End: 2025-01-07

## 2025-01-07 RX ORDER — DEXTROSE MONOHYDRATE 25 G/50ML
25 INJECTION, SOLUTION INTRAVENOUS
Status: DISCONTINUED | OUTPATIENT
Start: 2025-01-07 | End: 2025-01-21 | Stop reason: HOSPADM

## 2025-01-07 RX ORDER — MAGNESIUM SULFATE HEPTAHYDRATE 500 MG/ML
INJECTION, SOLUTION INTRAMUSCULAR; INTRAVENOUS AS NEEDED
Status: DISCONTINUED | OUTPATIENT
Start: 2025-01-07 | End: 2025-01-07 | Stop reason: SURG

## 2025-01-07 RX ORDER — BUPIVACAINE HYDROCHLORIDE 2.5 MG/ML
INJECTION, SOLUTION EPIDURAL; INFILTRATION; INTRACAUDAL
Status: COMPLETED | OUTPATIENT
Start: 2025-01-07 | End: 2025-01-07

## 2025-01-07 RX ORDER — DILTIAZEM HYDROCHLORIDE 120 MG/1
360 CAPSULE, COATED, EXTENDED RELEASE ORAL
Status: DISCONTINUED | OUTPATIENT
Start: 2025-01-08 | End: 2025-01-21 | Stop reason: HOSPADM

## 2025-01-07 RX ORDER — PROPOFOL 10 MG/ML
INJECTION, EMULSION INTRAVENOUS AS NEEDED
Status: DISCONTINUED | OUTPATIENT
Start: 2025-01-07 | End: 2025-01-07 | Stop reason: SURG

## 2025-01-07 RX ORDER — SODIUM CHLORIDE, SODIUM LACTATE, POTASSIUM CHLORIDE, CALCIUM CHLORIDE 600; 310; 30; 20 MG/100ML; MG/100ML; MG/100ML; MG/100ML
9 INJECTION, SOLUTION INTRAVENOUS CONTINUOUS
Status: DISCONTINUED | OUTPATIENT
Start: 2025-01-07 | End: 2025-01-07

## 2025-01-07 RX ORDER — PROMETHAZINE HYDROCHLORIDE 25 MG/1
25 TABLET ORAL ONCE AS NEEDED
Status: DISCONTINUED | OUTPATIENT
Start: 2025-01-07 | End: 2025-01-07 | Stop reason: HOSPADM

## 2025-01-07 RX ORDER — LEVOTHYROXINE SODIUM 50 UG/1
50 TABLET ORAL
Status: DISCONTINUED | OUTPATIENT
Start: 2025-01-08 | End: 2025-01-21 | Stop reason: HOSPADM

## 2025-01-07 RX ORDER — IBUPROFEN 600 MG/1
1 TABLET ORAL
Status: DISCONTINUED | OUTPATIENT
Start: 2025-01-07 | End: 2025-01-21 | Stop reason: HOSPADM

## 2025-01-07 RX ORDER — METFORMIN HYDROCHLORIDE 500 MG/1
500 TABLET, EXTENDED RELEASE ORAL NIGHTLY
Status: DISCONTINUED | OUTPATIENT
Start: 2025-01-07 | End: 2025-01-21 | Stop reason: HOSPADM

## 2025-01-07 RX ORDER — IBUPROFEN 400 MG/1
400 TABLET, FILM COATED ORAL EVERY 6 HOURS PRN
Status: DISCONTINUED | OUTPATIENT
Start: 2025-01-07 | End: 2025-01-21 | Stop reason: HOSPADM

## 2025-01-07 RX ORDER — NICOTINE POLACRILEX 4 MG
15 LOZENGE BUCCAL
Status: DISCONTINUED | OUTPATIENT
Start: 2025-01-07 | End: 2025-01-21 | Stop reason: HOSPADM

## 2025-01-07 RX ORDER — LIDOCAINE HYDROCHLORIDE 20 MG/ML
INJECTION, SOLUTION INFILTRATION; PERINEURAL AS NEEDED
Status: DISCONTINUED | OUTPATIENT
Start: 2025-01-07 | End: 2025-01-07 | Stop reason: SURG

## 2025-01-07 RX ORDER — ALBUMIN HUMAN 50 G/1000ML
SOLUTION INTRAVENOUS CONTINUOUS PRN
Status: DISCONTINUED | OUTPATIENT
Start: 2025-01-07 | End: 2025-01-07 | Stop reason: SURG

## 2025-01-07 RX ORDER — DEXTROSE MONOHYDRATE, SODIUM CHLORIDE, AND POTASSIUM CHLORIDE 50; 1.49; 4.5 G/1000ML; G/1000ML; G/1000ML
75 INJECTION, SOLUTION INTRAVENOUS CONTINUOUS
Status: DISCONTINUED | OUTPATIENT
Start: 2025-01-07 | End: 2025-01-08

## 2025-01-07 RX ORDER — PANTOPRAZOLE SODIUM 40 MG/1
40 TABLET, DELAYED RELEASE ORAL
Status: DISCONTINUED | OUTPATIENT
Start: 2025-01-08 | End: 2025-01-21 | Stop reason: HOSPADM

## 2025-01-07 RX ORDER — CALCIUM CARBONATE 500(1250)
500 TABLET ORAL DAILY
Status: DISCONTINUED | OUTPATIENT
Start: 2025-01-08 | End: 2025-01-21 | Stop reason: HOSPADM

## 2025-01-07 RX ORDER — OXYCODONE AND ACETAMINOPHEN 5; 325 MG/1; MG/1
1 TABLET ORAL EVERY 4 HOURS PRN
Status: DISCONTINUED | OUTPATIENT
Start: 2025-01-07 | End: 2025-01-21 | Stop reason: HOSPADM

## 2025-01-07 RX ORDER — EPHEDRINE SULFATE 50 MG/ML
5 INJECTION, SOLUTION INTRAVENOUS ONCE AS NEEDED
Status: DISCONTINUED | OUTPATIENT
Start: 2025-01-07 | End: 2025-01-07 | Stop reason: HOSPADM

## 2025-01-07 RX ORDER — LABETALOL HYDROCHLORIDE 5 MG/ML
5 INJECTION, SOLUTION INTRAVENOUS
Status: DISCONTINUED | OUTPATIENT
Start: 2025-01-07 | End: 2025-01-07 | Stop reason: HOSPADM

## 2025-01-07 RX ORDER — BISOPROLOL FUMARATE 5 MG/1
10 TABLET, FILM COATED ORAL DAILY
Status: DISCONTINUED | OUTPATIENT
Start: 2025-01-08 | End: 2025-01-21 | Stop reason: HOSPADM

## 2025-01-07 RX ADMIN — INSULIN LISPRO 2 UNITS: 100 INJECTION, SOLUTION INTRAVENOUS; SUBCUTANEOUS at 21:11

## 2025-01-07 RX ADMIN — DEXTROSE MONOHYDRATE, SODIUM CHLORIDE, AND POTASSIUM CHLORIDE 75 ML/HR: 50; 1.49; 4.5 INJECTION, SOLUTION INTRAVENOUS at 19:06

## 2025-01-07 RX ADMIN — HYDROMORPHONE HYDROCHLORIDE 0.25 MG: 1 INJECTION, SOLUTION INTRAMUSCULAR; INTRAVENOUS; SUBCUTANEOUS at 17:25

## 2025-01-07 RX ADMIN — SODIUM CHLORIDE, SODIUM LACTATE, POTASSIUM CHLORIDE, CALCIUM CHLORIDE 9 ML/HR: 20; 30; 600; 310 INJECTION, SOLUTION INTRAVENOUS at 12:38

## 2025-01-07 RX ADMIN — FLUOXETINE HYDROCHLORIDE 40 MG: 20 CAPSULE ORAL at 21:02

## 2025-01-07 RX ADMIN — MIDAZOLAM 1 MG: 1 INJECTION INTRAMUSCULAR; INTRAVENOUS at 13:02

## 2025-01-07 RX ADMIN — FAMOTIDINE 20 MG: 10 INJECTION INTRAVENOUS at 12:39

## 2025-01-07 RX ADMIN — MAGNESIUM SULFATE HEPTAHYDRATE 2 G: 500 INJECTION, SOLUTION INTRAMUSCULAR; INTRAVENOUS at 15:30

## 2025-01-07 RX ADMIN — ONDANSETRON 4 MG: 2 INJECTION INTRAMUSCULAR; INTRAVENOUS at 16:10

## 2025-01-07 RX ADMIN — SUGAMMADEX 200 MG: 100 INJECTION, SOLUTION INTRAVENOUS at 16:10

## 2025-01-07 RX ADMIN — FENTANYL CITRATE 50 MCG: 50 INJECTION, SOLUTION INTRAMUSCULAR; INTRAVENOUS at 14:06

## 2025-01-07 RX ADMIN — DEXAMETHASONE SODIUM PHOSPHATE 8 MG: 4 INJECTION, SOLUTION INTRAMUSCULAR; INTRAVENOUS at 16:10

## 2025-01-07 RX ADMIN — ALVIMOPAN 12 MG: 12 CAPSULE ORAL at 21:11

## 2025-01-07 RX ADMIN — BUPIVACAINE HYDROCHLORIDE 40 ML: 2.5 INJECTION, SOLUTION EPIDURAL; INFILTRATION; INTRACAUDAL; PERINEURAL at 14:15

## 2025-01-07 RX ADMIN — HYDROMORPHONE HYDROCHLORIDE 0.25 MG: 1 INJECTION, SOLUTION INTRAMUSCULAR; INTRAVENOUS; SUBCUTANEOUS at 17:31

## 2025-01-07 RX ADMIN — HYDROMORPHONE HYDROCHLORIDE 0.25 MG: 1 INJECTION, SOLUTION INTRAMUSCULAR; INTRAVENOUS; SUBCUTANEOUS at 17:07

## 2025-01-07 RX ADMIN — ACETAMINOPHEN 1000 MG: 500 TABLET, FILM COATED ORAL at 12:27

## 2025-01-07 RX ADMIN — PHENYLEPHRINE HYDROCHLORIDE 100 MCG: 10 INJECTION INTRAVENOUS at 15:04

## 2025-01-07 RX ADMIN — SODIUM CHLORIDE 2000 MG: 900 INJECTION INTRAVENOUS at 13:35

## 2025-01-07 RX ADMIN — PHENYLEPHRINE HYDROCHLORIDE 100 MCG: 10 INJECTION INTRAVENOUS at 14:45

## 2025-01-07 RX ADMIN — ROCURONIUM BROMIDE 80 MG: 10 INJECTION, SOLUTION INTRAVENOUS at 14:06

## 2025-01-07 RX ADMIN — PROPOFOL 150 MG: 10 INJECTION, EMULSION INTRAVENOUS at 14:06

## 2025-01-07 RX ADMIN — MIDAZOLAM 1 MG: 1 INJECTION INTRAMUSCULAR; INTRAVENOUS at 12:36

## 2025-01-07 RX ADMIN — DOCUSATE SODIUM 100 MG: 100 CAPSULE, LIQUID FILLED ORAL at 21:02

## 2025-01-07 RX ADMIN — OXYCODONE AND ACETAMINOPHEN 1 TABLET: 5; 325 TABLET ORAL at 21:01

## 2025-01-07 RX ADMIN — HYDROMORPHONE HYDROCHLORIDE 0.25 MG: 1 INJECTION, SOLUTION INTRAMUSCULAR; INTRAVENOUS; SUBCUTANEOUS at 17:02

## 2025-01-07 RX ADMIN — METFORMIN HYDROCHLORIDE 500 MG: 500 TABLET, EXTENDED RELEASE ORAL at 21:11

## 2025-01-07 RX ADMIN — METRONIDAZOLE 500 MG: 500 INJECTION, SOLUTION INTRAVENOUS at 13:35

## 2025-01-07 RX ADMIN — DIGOXIN 250 MCG: 0.12 TABLET ORAL at 21:11

## 2025-01-07 RX ADMIN — HYDROCODONE BITARTRATE AND ACETAMINOPHEN 1 TABLET: 5; 325 TABLET ORAL at 17:25

## 2025-01-07 RX ADMIN — SODIUM CHLORIDE, POTASSIUM CHLORIDE, SODIUM LACTATE AND CALCIUM CHLORIDE: 600; 310; 30; 20 INJECTION, SOLUTION INTRAVENOUS at 14:00

## 2025-01-07 RX ADMIN — PHENYLEPHRINE HYDROCHLORIDE 100 MCG: 10 INJECTION INTRAVENOUS at 15:10

## 2025-01-07 RX ADMIN — ALBUMIN (HUMAN): 12.5 INJECTION, SOLUTION INTRAVENOUS at 15:09

## 2025-01-07 RX ADMIN — IBUPROFEN 400 MG: 400 TABLET ORAL at 21:01

## 2025-01-07 RX ADMIN — LIDOCAINE HYDROCHLORIDE 100 MG: 20 INJECTION, SOLUTION INFILTRATION; PERINEURAL at 14:06

## 2025-01-07 RX ADMIN — ATORVASTATIN CALCIUM 80 MG: 20 TABLET, FILM COATED ORAL at 21:02

## 2025-01-07 RX ADMIN — ALVIMOPAN 12 MG: 12 CAPSULE ORAL at 12:27

## 2025-01-07 RX ADMIN — Medication 10 ML: at 21:04

## 2025-01-07 RX ADMIN — PHENYLEPHRINE HYDROCHLORIDE 100 MCG: 10 INJECTION INTRAVENOUS at 14:25

## 2025-01-07 NOTE — ANESTHESIA PROCEDURE NOTES
Peripheral Block      Patient reassessed immediately prior to procedure    Patient location during procedure: OR  Start time: 1/7/2025 2:10 PM  Stop time: 1/7/2025 2:15 PM  Reason for block: at surgeon's request and post-op pain management  Performed by  Anesthesiologist: Tesfaye Zelaya MD  Preanesthetic Checklist  Completed: patient identified, IV checked, site marked, risks and benefits discussed, surgical consent, monitors and equipment checked, pre-op evaluation and timeout performed  Prep:  Pt Position: supine  Sterile barriers:alcohol skin prep, cap, gloves, mask and washed/disinfected hands  Prep: ChloraPrep  Patient monitoring: blood pressure monitoring, continuous pulse oximetry and EKG  Procedure    Sedation: yes  Performed under: general  Guidance:ultrasound guided    ULTRASOUND INTERPRETATION.  Using ultrasound guidance a 21 G gauge needle was placed in close proximity to the nerve, at which point, under ultrasound guidance anesthetic was injected in the area of the nerve and spread of the anesthesia was seen on ultrasound in close proximity thereto.  There were no abnormalities seen on ultrasound; a digital image was taken; and the patient tolerated the procedure with no complications. Images:still images obtained, printed/placed on chart    Laterality:Bilateral  Block Type:TAP  Injection Technique:single-shot  Needle Type:echogenic and Tuohy  Needle Gauge:21 G  Resistance on Injection: none    Medications Used: bupivacaine PF (MARCAINE) injection 0.25% - Infiltration   40 mL - 1/7/2025 2:15:00 PM      Medications  Comment:Medication distributed evenly on each side. 10cc exparel and 20cc 0.25% bupivacaine each side    Post Assessment  Injection Assessment: negative aspiration for heme, no paresthesia on injection and incremental injection  Patient Tolerance:comfortable throughout block  Complications:no  Performed by: Tesfaye Zelaya MD

## 2025-01-07 NOTE — OP NOTE
Operative Note :  Sabi Mars MD      Jess Razo  1960    Procedure Date: 01/07/25    Pre-op Diagnosis:  Endoscopically unresectable tubulovillous adenoma of ascending colon    Post-Operative Diagnosis:  Endoscopically unresectable tubulovillous adenoma of ascending colon    Procedure:   Laparoscopic right colon resection    Surgeon: Sabi Mars MD    Assistant: Carolyn Majano CSA (Carolyn was responsible for suctioning, retracting, suturing of all surgical incisions, and application of sterile dressings at the completion of the case)    Anesthesia:  General (general endotracheal tube)    Estimated Blood Loss: 200ml    Specimens: Right colon with attached appendix and terminal ileum    Complications: None    Indications:  The patient is a 64-year-old lady who has undergone multiple colonoscopies over the last 3 years with a recurrent adenomatous sessile colon polyp of the ascending colon noted every time, refractory to attempts at endoscopic removal.  She presents today for laparoscopic right colon resection for complete eradication of the adenomatous polyp.  She has been counseled on the risks of the procedure which include but are not limited to bleeding, wound infection, anastomotic leak, and possible damage to structures around the ascending colon such as the right ureter, duodenum, or pancreas.  Despite these risks, she has consented to proceed.    Findings: Tattoo noted along distal ascending colon close to hepatic flexure    Description of procedure:  The patient was brought to the operating room and placed on the OR table in supine position.  An endotracheal tube was inserted and general anesthesia induced.  A Nayak catheter was inserted into the urinary bladder using sterile technique.  An ultrasound-guided TAP block was performed bilaterally by the anesthesia staff followed by prepping and draping of the abdomen in the usual sterile fashion.  A surgical timeout was completed.  A  supraumbilical 12 mm skin incision was made after instillation of 0.5% Marcaine with epinephrine at the skin surface.  With upward traction on the abdominal wall fascia, a longitudinal fasciotomy was made and a Jernigan trocar inserted.  The abdomen was insufflated and under direct visualization, 2 additional 5 mm trocars were placed in the infraumbilical and left lower quadrant spaces.  The right colon was then inspected and the tattoo was located along the distal ascending colon just proximal to the hepatic flexure.  I then performed a lateral to medial dissection of the ascending colon by dividing the lateral attachments of the cecum and appendix at the white line of Toldt using the harmonic scalpel.  Working up and around the hepatic flexure of the colon, the gastrocolic omentum was divided along the proximal transverse colon.  This exposed the first and second portions of the duodenum.  The right ureter was identified during the dissection and kept away from the dissection plane, within the retroperitoneum.  Blunt sweeping of the tissues medially allowed for dissection through the appropriate plane.  More of the gastrocolic omentum was divided working towards midline, but in so doing a small venous branch, likely a gastroepiploic vein tore and caused a moderate amount of dark red bleeding.  The ends of this were identified and clipped which allowed for cessation of bleeding.  Once the entirety of the right colon had been mobilized over to midline, the supraumbilical skin incision was extended at the fascial layer to accommodate a small Mikal wound protector.  The ascending colon with attached terminal ileum and appendix was eviscerated from the abdominal cavity through the wound protector.  The ileocolic vascular pedicle was isolated between Patricia clamps and divided, with the proximal end of the vascular pedicle oversewn using a 0 silk suture ligature.  The terminal ileum and transverse colon were then divided  using 75 mm blue load KEKE stapler, making sure to transect the transverse colon just proximal to the middle colic vascular pedicle that was identified using the Doppler ultrasound.  The remainder of the ascending colon mesentery was divided using the harmonic scalpel and the specimen passed off to pathology in formalin including the entire ascending colon with attached terminal ileum and appendix.  The staple line across terminal ileum and transverse colon were each oversewn using 3-0 silk Lembert style sutures.  A stapled side-to-side anastomosis was then fashioned between terminal ileum and transverse colon using an additional blue load on the KEKE stapler.  Throughout the bowel resection, almost every tissue plane that was touched had spontaneous bleeding, similar to patients who are operated on while taking blood thinners.  Every effort was taken to minimize blood loss.  After creation of the stapled anastomosis, the internal lumen of small bowel and transverse colon was inspected and a few areas of venous bleeding at the anastomosis were oversewn using 3-0 Vicryl figure-of-eight sutures.  The common enterotomy channel was then closed in 2 layers using running 3-0 PDS forward and back upon itself, imbricating the initial closure line.  Mesenteric defect was closed using 3-0 silk sutures.  All dirty instruments and gloves were then exchanged for clean versions of each.  The anastomosis was submerged back into the abdominal cavity and the wound protector removed.  The fascia in the midline abdomen was closed using a running 0 PDS suture.  The abdomen was reinsufflated and laparoscope inserted.  There was no evidence for ongoing bleeding.  1 L of warm normal saline was irrigated throughout the entire upper and lower abdomen, removing all blood products that had evacuated during bleeding from the gastroepiploic vein earlier during the case.  An additional 500 cc of saline was instilled throughout the abdominal cavity  and suctioned dry, appearing clear with no further evidence of bleeding.  The abdomen was then desufflated and both of the 5 mm trocars removed.  Skin incisions were closed using 4-0 Vicryl subcuticular suture and topical Exofin glue.  Her Nayak catheter was removed, she was extubated, and she transferred to PACU in stable condition with all counts correct per nursing.    Willam Standards: This procedure was not performed to treat colon cancer through resection        Sabi Mars MD  General, Robotic, and Endoscopic Surgery  [facility]    [address] [place] [address], [address]  [place] [address]  P: [phone]  F: [phone]

## 2025-01-07 NOTE — ANESTHESIA PROCEDURE NOTES
Airway  Urgency: elective    Date/Time: 1/7/2025 2:09 PM  Airway not difficult    General Information and Staff    Patient location during procedure: OR  Anesthesiologist: Tesfaye Zelaya MD  CRNA/CAA: Marly Johnson CRNA    Indications and Patient Condition  Indications for airway management: airway protection    Preoxygenated: yes  MILS not maintained throughout  Mask difficulty assessment: 1 - vent by mask    Final Airway Details  Final airway type: endotracheal airway      Successful airway: ETT  Cuffed: yes   Successful intubation technique: direct laryngoscopy  Facilitating devices/methods: anterior pressure/BURP  Endotracheal tube insertion site: oral  Blade: Luis  Blade size: 3  ETT size (mm): 7.0  Cormack-Lehane Classification: grade IIa - partial view of glottis  Placement verified by: chest auscultation   Cuff volume (mL): 6  Measured from: lips  ETT/EBT  to lips (cm): 21  Number of attempts at approach: 1  Assessment: lips, teeth, and gum same as pre-op and atraumatic intubation

## 2025-01-07 NOTE — ANESTHESIA PREPROCEDURE EVALUATION
Anesthesia Evaluation     Patient summary reviewed and Nursing notes reviewed   NPO Solid Status: > 8 hours  NPO Liquid Status: > 2 hours           Airway   Mallampati: II  TM distance: >3 FB  Neck ROM: full  Dental      Pulmonary    (+) a smoker Current, asthma,  Cardiovascular     ECG reviewed    (+) hypertension, hyperlipidemia    ROS comment: H/o inappropriate sinus tachycardia, well controlled on Beta blocker    TTE 12/2020   Overall left ventricular function is normal.    The estimated ejection fraction is 50-55% (closer to 55%).    Trace-to-mild mitral regurgitation is present.       Neuro/Psych  (+) dizziness/light headedness, psychiatric history Anxiety  GI/Hepatic/Renal/Endo    (+) GERD well controlled, liver disease fatty liver disease, diabetes mellitus type 2, thyroid problem hypothyroidism    ROS Comment: Tubulovillous adenoma of colon     Musculoskeletal     Abdominal    Substance History      OB/GYN negative ob/gyn ROS         Other   arthritis,   history of cancer (h/o breast ca s/p lumpectomy)                  Anesthesia Plan    ASA 3     general     (TAP blocks for POPC    Dalaglutide taken > 1 week ago)  intravenous induction     Anesthetic plan, risks, benefits, and alternatives have been provided, discussed and informed consent has been obtained with: patient.      CODE STATUS:

## 2025-01-07 NOTE — PLAN OF CARE
Goal Outcome Evaluation:  Plan of Care Reviewed With: patient        Progress: improving  Outcome Evaluation: Received pt from PACU, VSS, due to void, sleepy, lap sites CDI,  at bedside

## 2025-01-08 LAB
ANION GAP SERPL CALCULATED.3IONS-SCNC: 11.3 MMOL/L (ref 5–15)
BUN SERPL-MCNC: 8 MG/DL (ref 8–23)
BUN/CREAT SERPL: 12.5 (ref 7–25)
CALCIUM SPEC-SCNC: 8.6 MG/DL (ref 8.6–10.5)
CHLORIDE SERPL-SCNC: 99 MMOL/L (ref 98–107)
CO2 SERPL-SCNC: 18.7 MMOL/L (ref 22–29)
CREAT SERPL-MCNC: 0.64 MG/DL (ref 0.57–1)
DEPRECATED RDW RBC AUTO: 39.8 FL (ref 37–54)
EGFRCR SERPLBLD CKD-EPI 2021: 99.4 ML/MIN/1.73
ERYTHROCYTE [DISTWIDTH] IN BLOOD BY AUTOMATED COUNT: 12 % (ref 12.3–15.4)
GLUCOSE BLDC GLUCOMTR-MCNC: 132 MG/DL (ref 70–130)
GLUCOSE BLDC GLUCOMTR-MCNC: 152 MG/DL (ref 70–130)
GLUCOSE BLDC GLUCOMTR-MCNC: 165 MG/DL (ref 70–130)
GLUCOSE BLDC GLUCOMTR-MCNC: 168 MG/DL (ref 70–130)
GLUCOSE SERPL-MCNC: 183 MG/DL (ref 65–99)
HCT VFR BLD AUTO: 32.3 % (ref 34–46.6)
HGB BLD-MCNC: 11.2 G/DL (ref 12–15.9)
MCH RBC QN AUTO: 31.8 PG (ref 26.6–33)
MCHC RBC AUTO-ENTMCNC: 34.7 G/DL (ref 31.5–35.7)
MCV RBC AUTO: 91.8 FL (ref 79–97)
PLATELET # BLD AUTO: 195 10*3/MM3 (ref 140–450)
PMV BLD AUTO: 9.6 FL (ref 6–12)
POTASSIUM SERPL-SCNC: 4.3 MMOL/L (ref 3.5–5.2)
RBC # BLD AUTO: 3.52 10*6/MM3 (ref 3.77–5.28)
SODIUM SERPL-SCNC: 129 MMOL/L (ref 136–145)
WBC NRBC COR # BLD AUTO: 12.13 10*3/MM3 (ref 3.4–10.8)

## 2025-01-08 PROCEDURE — 99024 POSTOP FOLLOW-UP VISIT: CPT | Performed by: SURGERY

## 2025-01-08 PROCEDURE — 63710000001 INSULIN LISPRO (HUMAN) PER 5 UNITS: Performed by: SURGERY

## 2025-01-08 PROCEDURE — 94799 UNLISTED PULMONARY SVC/PX: CPT

## 2025-01-08 PROCEDURE — 85027 COMPLETE CBC AUTOMATED: CPT | Performed by: SURGERY

## 2025-01-08 PROCEDURE — 25010000002 ONDANSETRON PER 1 MG: Performed by: SURGERY

## 2025-01-08 PROCEDURE — 94640 AIRWAY INHALATION TREATMENT: CPT

## 2025-01-08 PROCEDURE — G0378 HOSPITAL OBSERVATION PER HR: HCPCS

## 2025-01-08 PROCEDURE — 80048 BASIC METABOLIC PNL TOTAL CA: CPT | Performed by: SURGERY

## 2025-01-08 PROCEDURE — 82948 REAGENT STRIP/BLOOD GLUCOSE: CPT

## 2025-01-08 RX ADMIN — INSULIN LISPRO 2 UNITS: 100 INJECTION, SOLUTION INTRAVENOUS; SUBCUTANEOUS at 08:41

## 2025-01-08 RX ADMIN — CETIRIZINE HYDROCHLORIDE 10 MG: 10 TABLET, FILM COATED ORAL at 08:41

## 2025-01-08 RX ADMIN — IBUPROFEN 400 MG: 400 TABLET ORAL at 02:32

## 2025-01-08 RX ADMIN — ONDANSETRON 4 MG: 2 INJECTION, SOLUTION INTRAMUSCULAR; INTRAVENOUS at 21:52

## 2025-01-08 RX ADMIN — Medication 10 ML: at 08:38

## 2025-01-08 RX ADMIN — OXYCODONE AND ACETAMINOPHEN 1 TABLET: 5; 325 TABLET ORAL at 12:41

## 2025-01-08 RX ADMIN — LEVOTHYROXINE SODIUM 50 MCG: 50 TABLET ORAL at 05:58

## 2025-01-08 RX ADMIN — METFORMIN HYDROCHLORIDE 500 MG: 500 TABLET, EXTENDED RELEASE ORAL at 20:16

## 2025-01-08 RX ADMIN — IBUPROFEN 400 MG: 400 TABLET ORAL at 20:21

## 2025-01-08 RX ADMIN — Medication 1000 UNITS: at 08:41

## 2025-01-08 RX ADMIN — Medication 10 ML: at 20:18

## 2025-01-08 RX ADMIN — BISOPROLOL FUMARATE 10 MG: 5 TABLET ORAL at 08:41

## 2025-01-08 RX ADMIN — BUDESONIDE AND FORMOTEROL FUMARATE DIHYDRATE 2 PUFF: 160; 4.5 AEROSOL RESPIRATORY (INHALATION) at 21:56

## 2025-01-08 RX ADMIN — FLUOXETINE HYDROCHLORIDE 40 MG: 20 CAPSULE ORAL at 20:16

## 2025-01-08 RX ADMIN — OXYCODONE AND ACETAMINOPHEN 1 TABLET: 5; 325 TABLET ORAL at 16:38

## 2025-01-08 RX ADMIN — OXYCODONE AND ACETAMINOPHEN 1 TABLET: 5; 325 TABLET ORAL at 08:55

## 2025-01-08 RX ADMIN — IBUPROFEN 400 MG: 400 TABLET ORAL at 12:15

## 2025-01-08 RX ADMIN — VALSARTAN 160 MG: 80 TABLET, FILM COATED ORAL at 08:41

## 2025-01-08 RX ADMIN — DEXTROSE MONOHYDRATE, SODIUM CHLORIDE, AND POTASSIUM CHLORIDE 75 ML/HR: 50; 1.49; 4.5 INJECTION, SOLUTION INTRAVENOUS at 08:39

## 2025-01-08 RX ADMIN — OXYCODONE AND ACETAMINOPHEN 1 TABLET: 5; 325 TABLET ORAL at 01:07

## 2025-01-08 RX ADMIN — ATORVASTATIN CALCIUM 80 MG: 20 TABLET, FILM COATED ORAL at 20:16

## 2025-01-08 RX ADMIN — DIGOXIN 250 MCG: 0.12 TABLET ORAL at 20:15

## 2025-01-08 RX ADMIN — Medication 500 MG: at 08:41

## 2025-01-08 RX ADMIN — PANTOPRAZOLE SODIUM 40 MG: 40 TABLET, DELAYED RELEASE ORAL at 05:58

## 2025-01-08 RX ADMIN — BUDESONIDE AND FORMOTEROL FUMARATE DIHYDRATE 2 PUFF: 160; 4.5 AEROSOL RESPIRATORY (INHALATION) at 12:24

## 2025-01-08 RX ADMIN — INSULIN LISPRO 2 UNITS: 100 INJECTION, SOLUTION INTRAVENOUS; SUBCUTANEOUS at 12:15

## 2025-01-08 RX ADMIN — DILTIAZEM HYDROCHLORIDE 360 MG: 120 CAPSULE, COATED, EXTENDED RELEASE ORAL at 08:41

## 2025-01-08 RX ADMIN — OXYCODONE AND ACETAMINOPHEN 1 TABLET: 5; 325 TABLET ORAL at 20:21

## 2025-01-08 RX ADMIN — INSULIN LISPRO 2 UNITS: 100 INJECTION, SOLUTION INTRAVENOUS; SUBCUTANEOUS at 22:13

## 2025-01-08 NOTE — PROGRESS NOTES
"General Surgery  Progress Note    CC: Follow-up endoscopically unresectable tubulovillous adenoma    POD#1 laparoscopic right colon resection    S: She has had multiple loose green stools that have been nonbloody.  She is tolerating a full liquid diet with no nausea or vomiting.  She feels fatigued, but has been up walking in the halls and is sitting in the bedside chair    O:/73 (BP Location: Right arm, Patient Position: Lying)   Pulse 72   Temp 99.2 °F (37.3 °C) (Oral)   Resp 18   Ht 163.8 cm (64.49\")   Wt 70.5 kg (155 lb 6.8 oz)   LMP  (LMP Unknown)   SpO2 97%   BMI 26.28 kg/m²     Intake & Output:  UOP: 500 mL/24 hrs  BM x 3    GENERAL: alert, well appearing, and in no distress  HEENT: normocephalic, atraumatic, moist mucous membranes, clear sclerae   CHEST: clear to auscultation, no wheezes, rales or rhonchi, symmetric air entry  CARDIAC: regular rate and rhythm    ABDOMEN: Soft, nondistended, incisions clean/dry/intact with glue, mild incisional tenderness  EXTREMITIES: no cyanosis, clubbing, or edema   SKIN: Warm and moist, no rashes    LABS  Results from last 7 days   Lab Units 01/08/25  0620   WBC 10*3/mm3 12.13*   HEMOGLOBIN g/dL 11.2*   HEMATOCRIT % 32.3*   PLATELETS 10*3/mm3 195     Results from last 7 days   Lab Units 01/08/25  0620   SODIUM mmol/L 129*   POTASSIUM mmol/L 4.3   CHLORIDE mmol/L 99   CO2 mmol/L 18.7*   BUN mg/dL 8   CREATININE mg/dL 0.64   CALCIUM mg/dL 8.6   GLUCOSE mg/dL 183*             A/P: 64 y.o. female POD#1 laparoscopic right colon resection for endoscopically unresectable tubulovillous adenoma    Advance to regular diet  Discontinue IV fluids  Discontinue Entereg  Await pathology results  Possible discharge home tomorrow    Sabi Mars MD  General, Robotic, and Endoscopic Surgery  [facility]    [address], [address]  [place] [address]  P: [phone]  F: [phone]     "

## 2025-01-08 NOTE — PLAN OF CARE
Goal Outcome Evaluation:  Plan of Care Reviewed With: patient        Progress: improving  Outcome Evaluation: Percocet and motrin given for pain. Lap sites with glue melissa. Having loose BMs after eating. IVFs infusing. Walked in halls. Encouaged use of incentive spirometer. SCDs when in bed. Patient up in chair most of shift. Monitoring blood sugars and administering insulin as ordered. Spouse at bedside.

## 2025-01-08 NOTE — PLAN OF CARE
Goal Outcome Evaluation:  Plan of Care Reviewed With: patient        Progress: improving  Outcome Evaluation: vss,percocet and ibuprofen for pain, up wtith assist, tolerating clear liquid diet, encourage too increase fluidintake and to use incentive spirometer, voiding freely, ambulatedint he hallway, 3 lap site and a small incision with glue KRYSTIN, has loose stool last night, continue to monitor the pt.

## 2025-01-08 NOTE — ANESTHESIA POSTPROCEDURE EVALUATION
"Patient: Jess Razo    Procedure Summary       Date: 01/07/25 Room / Location: [facility] OR [address] [place] [address] MAIN OR    Anesthesia Start: 1400 Anesthesia Stop: 1628    Procedure: Laparoscopic right colon resection (Abdomen) Diagnosis:       Tubulovillous adenoma of colon      (Tubulovillous adenoma of colon [D12.6])    Surgeons: Sabi Mars MD Provider: Tesfaye Zelaya MD    Anesthesia Type: general ASA Status: 3            Anesthesia Type: general    Vitals  Vitals Value Taken Time   /59 01/07/25 1800   Temp 36.8 °C (98.3 °F) 01/07/25 1800   Pulse 69 01/07/25 1803   Resp 16 01/07/25 1625   SpO2 93 % 01/07/25 1803   Vitals shown include unfiled device data.        Post Anesthesia Care and Evaluation    Anesthetic complications: No anesthetic complications    Comments: /83 (BP Location: Right arm, Patient Position: Lying)   Pulse 68   Temp 37 °C (98.6 °F) (Oral)   Resp 16   Ht 163.8 cm (64.49\")   Wt 70.5 kg (155 lb 6.8 oz)   LMP  (LMP Unknown)   SpO2 91%   BMI 26.28 kg/m²     No anesthesia complications reported to me.    "

## 2025-01-09 LAB
CYTO UR: NORMAL
GLUCOSE BLDC GLUCOMTR-MCNC: 128 MG/DL (ref 70–130)
GLUCOSE BLDC GLUCOMTR-MCNC: 131 MG/DL (ref 70–130)
GLUCOSE BLDC GLUCOMTR-MCNC: 138 MG/DL (ref 70–130)
GLUCOSE BLDC GLUCOMTR-MCNC: 170 MG/DL (ref 70–130)
LAB AP CASE REPORT: NORMAL
PATH REPORT.FINAL DX SPEC: NORMAL
PATH REPORT.GROSS SPEC: NORMAL

## 2025-01-09 PROCEDURE — 82948 REAGENT STRIP/BLOOD GLUCOSE: CPT

## 2025-01-09 PROCEDURE — 25010000002 PROCHLORPERAZINE 10 MG/2ML SOLUTION: Performed by: SURGERY

## 2025-01-09 PROCEDURE — 94799 UNLISTED PULMONARY SVC/PX: CPT

## 2025-01-09 PROCEDURE — 25010000002 HYDROMORPHONE PER 4 MG: Performed by: SURGERY

## 2025-01-09 PROCEDURE — 63710000001 INSULIN LISPRO (HUMAN) PER 5 UNITS: Performed by: SURGERY

## 2025-01-09 PROCEDURE — G0378 HOSPITAL OBSERVATION PER HR: HCPCS

## 2025-01-09 PROCEDURE — 25010000002 ONDANSETRON PER 1 MG: Performed by: SURGERY

## 2025-01-09 PROCEDURE — 99024 POSTOP FOLLOW-UP VISIT: CPT | Performed by: SURGERY

## 2025-01-09 RX ORDER — PROCHLORPERAZINE EDISYLATE 5 MG/ML
5 INJECTION INTRAMUSCULAR; INTRAVENOUS EVERY 6 HOURS PRN
Status: DISCONTINUED | OUTPATIENT
Start: 2025-01-09 | End: 2025-01-21 | Stop reason: HOSPADM

## 2025-01-09 RX ADMIN — PANTOPRAZOLE SODIUM 40 MG: 40 TABLET, DELAYED RELEASE ORAL at 05:02

## 2025-01-09 RX ADMIN — Medication 10 ML: at 09:18

## 2025-01-09 RX ADMIN — HYDROMORPHONE HYDROCHLORIDE 0.5 MG: 1 INJECTION, SOLUTION INTRAMUSCULAR; INTRAVENOUS; SUBCUTANEOUS at 10:54

## 2025-01-09 RX ADMIN — OXYCODONE AND ACETAMINOPHEN 1 TABLET: 5; 325 TABLET ORAL at 08:04

## 2025-01-09 RX ADMIN — Medication 500 MG: at 09:16

## 2025-01-09 RX ADMIN — LEVOTHYROXINE SODIUM 50 MCG: 50 TABLET ORAL at 05:02

## 2025-01-09 RX ADMIN — IBUPROFEN 400 MG: 400 TABLET ORAL at 17:34

## 2025-01-09 RX ADMIN — OXYCODONE AND ACETAMINOPHEN 1 TABLET: 5; 325 TABLET ORAL at 14:56

## 2025-01-09 RX ADMIN — ONDANSETRON 4 MG: 2 INJECTION, SOLUTION INTRAMUSCULAR; INTRAVENOUS at 08:04

## 2025-01-09 RX ADMIN — METFORMIN HYDROCHLORIDE 500 MG: 500 TABLET, EXTENDED RELEASE ORAL at 20:52

## 2025-01-09 RX ADMIN — IBUPROFEN 400 MG: 400 TABLET ORAL at 05:02

## 2025-01-09 RX ADMIN — Medication 1000 UNITS: at 09:30

## 2025-01-09 RX ADMIN — ATORVASTATIN CALCIUM 80 MG: 20 TABLET, FILM COATED ORAL at 20:51

## 2025-01-09 RX ADMIN — Medication 10 ML: at 20:52

## 2025-01-09 RX ADMIN — CETIRIZINE HYDROCHLORIDE 10 MG: 10 TABLET, FILM COATED ORAL at 09:16

## 2025-01-09 RX ADMIN — HYDROMORPHONE HYDROCHLORIDE 0.5 MG: 1 INJECTION, SOLUTION INTRAMUSCULAR; INTRAVENOUS; SUBCUTANEOUS at 13:02

## 2025-01-09 RX ADMIN — PROCHLORPERAZINE EDISYLATE 5 MG: 5 INJECTION INTRAMUSCULAR; INTRAVENOUS at 13:02

## 2025-01-09 RX ADMIN — BISOPROLOL FUMARATE 10 MG: 5 TABLET ORAL at 09:20

## 2025-01-09 RX ADMIN — INSULIN LISPRO 2 UNITS: 100 INJECTION, SOLUTION INTRAVENOUS; SUBCUTANEOUS at 17:34

## 2025-01-09 RX ADMIN — ONDANSETRON 4 MG: 2 INJECTION, SOLUTION INTRAMUSCULAR; INTRAVENOUS at 14:53

## 2025-01-09 RX ADMIN — DIGOXIN 250 MCG: 0.12 TABLET ORAL at 20:52

## 2025-01-09 RX ADMIN — BUDESONIDE AND FORMOTEROL FUMARATE DIHYDRATE 2 PUFF: 160; 4.5 AEROSOL RESPIRATORY (INHALATION) at 10:56

## 2025-01-09 RX ADMIN — DILTIAZEM HYDROCHLORIDE 360 MG: 120 CAPSULE, COATED, EXTENDED RELEASE ORAL at 09:28

## 2025-01-09 RX ADMIN — FLUOXETINE HYDROCHLORIDE 40 MG: 20 CAPSULE ORAL at 20:51

## 2025-01-09 RX ADMIN — VALSARTAN 160 MG: 80 TABLET, FILM COATED ORAL at 09:16

## 2025-01-09 NOTE — PROGRESS NOTES
"General Surgery  Progress Note    CC: Follow-up endoscopically unresectable tubulovillous adenoma    POD#2 laparoscopic right colon resection    S: She continues to have very loose bowel movements and actually had an episode last night with fecal incontinence.  Her stools are nonbloody.  She complains of worsening nausea today but has not vomited.    O:/62 (BP Location: Right arm, Patient Position: Sitting)   Pulse 80   Temp 97.7 °F (36.5 °C) (Oral)   Resp 18   Ht 163.8 cm (64.49\")   Wt 70.5 kg (155 lb 6.8 oz)   LMP  (LMP Unknown)   SpO2 95%   BMI 26.28 kg/m²     Intake & Output:  UOP: Void x 3  BM x2    GENERAL: alert, well appearing, and in no distress  HEENT: normocephalic, atraumatic, moist mucous membranes, clear sclerae   CHEST: clear to auscultation, no wheezes, rales or rhonchi, symmetric air entry  CARDIAC: regular rate and rhythm    ABDOMEN: Soft, nondistended, incisions clean/dry/intact with glue, mild incisional tenderness  EXTREMITIES: no cyanosis, clubbing, or edema   SKIN: Warm and moist, no rashes    LABS  Results from last 7 days   Lab Units 01/08/25  0620   WBC 10*3/mm3 12.13*   HEMOGLOBIN g/dL 11.2*   HEMATOCRIT % 32.3*   PLATELETS 10*3/mm3 195     Results from last 7 days   Lab Units 01/08/25  0620   SODIUM mmol/L 129*   POTASSIUM mmol/L 4.3   CHLORIDE mmol/L 99   CO2 mmol/L 18.7*   BUN mg/dL 8   CREATININE mg/dL 0.64   CALCIUM mg/dL 8.6   GLUCOSE mg/dL 183*             A/P: 64 y.o. female POD#2 laparoscopic right colon resection for endoscopically unresectable tubulovillous adenoma    She is feeling a bit worse today with nausea suggestive of a mild postoperative ileus.  Continue to monitor for now.  I have intentionally held all pharmacologic DVT prophylaxis given risk of bleeding as intraoperatively her tissues were very prone to bleeding.    Sabi Mars MD  General, Robotic, and Endoscopic Surgery  [facility]    [address], [address]  [place], [place] " [address]  P: [phone]  F: [phone]

## 2025-01-09 NOTE — PROGRESS NOTES
Continued Stay Note  [facility]     Patient Name: Jess Razo  MRN: [id number]  Today's Date: 1/9/2025    Admit Date: 1/7/2025    Plan: Home no needs   Discharge Plan       Row Name 01/09/25 1243       Plan    Plan Home no needs    Plan Comments Introduced self and role of CCP. Patient confirmed DC plan is to return to home. Stated she is independent with ADL's and uses no DMEs. Stated her spouse will assist as needed and will provide transportation at DC. Denies any needs/equipment.                   Discharge Codes    No documentation.                 Expected Discharge Date and Time       Expected Discharge Date Expected Discharge Time    Riley 10, 2025               Danette Rod, RN

## 2025-01-09 NOTE — PLAN OF CARE
Goal Outcome Evaluation:  Plan of Care Reviewed With: patient        Progress: improving  Outcome Evaluation: vss, percocet and ibuprofen for pain, tolerating regular diet, encourage to increase fluid intake and to use incentive spirometer, ambulated in the hallway, voiding freely, having loose stool, continue to monitor the pt.

## 2025-01-09 NOTE — PLAN OF CARE
Goal Outcome Evaluation:  Plan of Care Reviewed With: patient        Progress: improving  Outcome Evaluation: VSS. Pt. juanito had a very good day. She has either been nauseated or in pain most of the day. She has had numerous bm's and emesis a couple of times. Antiemetics and pain meds given throughout the day. Using IS q2 W/A.

## 2025-01-10 LAB
GLUCOSE BLDC GLUCOMTR-MCNC: 117 MG/DL (ref 70–130)
GLUCOSE BLDC GLUCOMTR-MCNC: 134 MG/DL (ref 70–130)
GLUCOSE BLDC GLUCOMTR-MCNC: 140 MG/DL (ref 70–130)
GLUCOSE BLDC GLUCOMTR-MCNC: 144 MG/DL (ref 70–130)

## 2025-01-10 PROCEDURE — 25010000002 PROCHLORPERAZINE 10 MG/2ML SOLUTION: Performed by: SURGERY

## 2025-01-10 PROCEDURE — 94664 DEMO&/EVAL PT USE INHALER: CPT

## 2025-01-10 PROCEDURE — 94799 UNLISTED PULMONARY SVC/PX: CPT

## 2025-01-10 PROCEDURE — 25010000002 ONDANSETRON PER 1 MG: Performed by: SURGERY

## 2025-01-10 PROCEDURE — 94761 N-INVAS EAR/PLS OXIMETRY MLT: CPT

## 2025-01-10 PROCEDURE — 99024 POSTOP FOLLOW-UP VISIT: CPT | Performed by: SURGERY

## 2025-01-10 PROCEDURE — 82948 REAGENT STRIP/BLOOD GLUCOSE: CPT

## 2025-01-10 RX ORDER — OXYCODONE AND ACETAMINOPHEN 5; 325 MG/1; MG/1
1 TABLET ORAL EVERY 4 HOURS PRN
Qty: 15 TABLET | Refills: 0 | Status: SHIPPED | OUTPATIENT
Start: 2025-01-10 | End: 2025-01-29

## 2025-01-10 RX ADMIN — ONDANSETRON 4 MG: 2 INJECTION, SOLUTION INTRAMUSCULAR; INTRAVENOUS at 21:06

## 2025-01-10 RX ADMIN — IBUPROFEN 400 MG: 400 TABLET ORAL at 21:06

## 2025-01-10 RX ADMIN — CETIRIZINE HYDROCHLORIDE 10 MG: 10 TABLET, FILM COATED ORAL at 08:40

## 2025-01-10 RX ADMIN — BISOPROLOL FUMARATE 10 MG: 5 TABLET ORAL at 08:37

## 2025-01-10 RX ADMIN — BUDESONIDE AND FORMOTEROL FUMARATE DIHYDRATE 2 PUFF: 160; 4.5 AEROSOL RESPIRATORY (INHALATION) at 21:49

## 2025-01-10 RX ADMIN — PANTOPRAZOLE SODIUM 40 MG: 40 TABLET, DELAYED RELEASE ORAL at 05:53

## 2025-01-10 RX ADMIN — BUDESONIDE AND FORMOTEROL FUMARATE DIHYDRATE 2 PUFF: 160; 4.5 AEROSOL RESPIRATORY (INHALATION) at 10:21

## 2025-01-10 RX ADMIN — FLUOXETINE HYDROCHLORIDE 40 MG: 20 CAPSULE ORAL at 21:06

## 2025-01-10 RX ADMIN — ATORVASTATIN CALCIUM 80 MG: 20 TABLET, FILM COATED ORAL at 21:05

## 2025-01-10 RX ADMIN — DIGOXIN 250 MCG: 0.12 TABLET ORAL at 23:47

## 2025-01-10 RX ADMIN — Medication 500 MG: at 08:26

## 2025-01-10 RX ADMIN — ONDANSETRON 4 MG: 2 INJECTION, SOLUTION INTRAMUSCULAR; INTRAVENOUS at 08:26

## 2025-01-10 RX ADMIN — PROCHLORPERAZINE EDISYLATE 5 MG: 5 INJECTION INTRAMUSCULAR; INTRAVENOUS at 23:41

## 2025-01-10 RX ADMIN — IBUPROFEN 400 MG: 400 TABLET ORAL at 05:53

## 2025-01-10 RX ADMIN — METFORMIN HYDROCHLORIDE 500 MG: 500 TABLET, EXTENDED RELEASE ORAL at 21:05

## 2025-01-10 RX ADMIN — LEVOTHYROXINE SODIUM 50 MCG: 50 TABLET ORAL at 05:53

## 2025-01-10 RX ADMIN — OXYCODONE AND ACETAMINOPHEN 1 TABLET: 5; 325 TABLET ORAL at 10:54

## 2025-01-10 RX ADMIN — VALSARTAN 160 MG: 80 TABLET, FILM COATED ORAL at 09:46

## 2025-01-10 RX ADMIN — Medication 10 ML: at 21:08

## 2025-01-10 RX ADMIN — Medication 1000 UNITS: at 09:46

## 2025-01-10 RX ADMIN — DILTIAZEM HYDROCHLORIDE 360 MG: 120 CAPSULE, COATED, EXTENDED RELEASE ORAL at 08:38

## 2025-01-10 RX ADMIN — IBUPROFEN 400 MG: 400 TABLET ORAL at 14:13

## 2025-01-10 RX ADMIN — Medication 10 ML: at 09:45

## 2025-01-10 NOTE — PAYOR COMM NOTE
"Jess Duque (64 y.o. Female)    PLEASE SEE ATTACHED FOR CLINICAL UPDATES    REF#[id number]   THANK YOU  PATITO NELSON RN/ DEPT   [facility]  PH: [phone]  FAX:  [phone]     Date of Birth   1960    Social Security Number       Address   [address] [place] [address]    Home Phone   [phone]    MRN   [id number]       [facility]    Marital Status                               Admission Date   1/7/25    Admission Type   Elective    Admitting Provider   Sabi Mars MD    Attending Provider   Sabi Mars MD    Department, Room/Bed   [address], 93/1       Discharge Date       Discharge Disposition       Discharge Destination                                 Attending Provider: Sabi Mars MD    Allergies: Morphine, Levaquin [Levofloxacin], Adhesive Tape, Ampicillin, Ceclor [Cefaclor], Ciprofloxacin, Codeine Phosphate, Erythromycin, Ketek [Telithromycin], Penicillins, Tetracyclines & Related, Ultram [Tramadol]    Isolation: None   Infection: None   Code Status: CPR    Ht: 163.8 cm (64.49\")   Wt: 70.5 kg (155 lb 6.8 oz)    Admission Cmt: None   Principal Problem: Tubulovillous adenoma of colon [D12.6]                   Active Insurance as of 1/7/2025       Primary Coverage       Payor Plan Insurance Group Employer/Plan Group    ANTH i3 membrane ANTHEM i3 membrane BLUE SHIELD PPO [id number]       Payor Plan Address Payor Plan Phone Number Payor Plan Fax Number Effective Dates    [address] [phone]  10/1/2023 - None Entered    [place] [address]         Subscriber Name Subscriber Birth Date Member ID       HAILE DUQUE MARTHA 1960 [id number]                     Emergency Contacts        (Rel.) Home Phone Work Phone Mobile Phone    Haile Duque (Spouse) [phone] -- [phone]              [place]: NPI [id number]  Tax ID [id number]  Medication Administration Report for " Jess Razo as of 1/10/25 through 1/10/25     Legend:    Given Hold Not Given Due Canceled Entry Other Actions    Time Time (Time) Time Time-Action         Discontinued     Completed     Future     MAR Hold     Linked             Medications 01/10/25     albuterol sulfate HFA (PROVENTIL HFA;VENTOLIN HFA;PROAIR HFA) inhaler 2 puff  Dose: 2 puff  Freq: Every 4 Hours PRN Route: IN  PRN Reason: Wheezing  Start: 01/07/25 1826     Admin Instructions:   Include Respiratory Treatment Education   Shake well.         atorvastatin (LIPITOR) tablet 80 mg  Dose: 80 mg  Freq: Nightly Route: PO  Start: 01/07/25 2100     Admin Instructions:   Avoid grapefruit juice.      2100                   bisoprolol (ZEBeta) tablet 10 mg  Dose: 10 mg  Freq: Daily Route: PO  Start: 01/08/25 0900     Admin Instructions:   Hold for SBP less than 100, DBP less than 60, or heart rate less than 50. If a dose is held, please contact the provider.  Caution: Look alike/sound alike drug alert      0837-Given                   budesonide-formoterol (SYMBICORT) 160-4.5 MCG/ACT inhaler 2 puff  Dose: 2 puff  Freq: 2 Times Daily - RT Route: IN  Start: 01/07/25 2130     Admin Instructions:    Shake well.  Rinse mouth after use, do not swallow water.  Send aerosols to pharmacy in ziplock bag for proper disposal.      1021-Given     2130                  calcium carbonate (oyster shell) tablet 500 mg  Dose: 500 mg  Freq: Daily Route: PO  Start: 01/08/25 0900      0826-Given                   cetirizine (zyrTEC) tablet 10 mg  Dose: 10 mg  Freq: Daily Route: PO  Start: 01/08/25 0900      0840-Given                   dextrose (D50W) (25 g/50 mL) IV injection 25 g  Dose: 25 g  Freq: Every 15 Minutes PRN Route: IV  PRN Reason: Low Blood Sugar  PRN Comment: Blood Sugar Less Than 70  Start: 01/07/25 1826     Admin Instructions:   Blood sugar less than 70; patient has IV access - Unresponsive, NPO or Unable To Safely Swallow         dextrose (GLUTOSE) oral gel 15  g  Dose: 15 g  Freq: Every 15 Minutes PRN Route: PO  PRN Reason: Low Blood Sugar  PRN Comment: Blood sugar less than 70  Start: 01/07/25 1826     Admin Instructions:   BS<70, Patient Alert, Is not NPO, Can safely swallow.         digoxin (LANOXIN) tablet 250 mcg  Dose: 250 mcg  Freq: Nightly Route: PO  Start: 01/07/25 2100     Admin Instructions:    Check and record heart rate.      2100                   dilTIAZem CD (CARDIZEM CD) 24 hr capsule 360 mg  Dose: 360 mg  Freq: Every 24 Hours Scheduled Route: PO  Start: 01/08/25 0900     Admin Instructions:   Caution: Look alike/sound alike drug alert.   Swallow capsule whole. Do not crush, chew, or open capsule. Avoid grapefruit juice. Maximum simvastatin dose 10 mg while taking dilTIAZem.      0838-Given                   docusate sodium (COLACE) capsule 100 mg  Dose: 100 mg  Freq: 2 Times Daily Route: PO  Start: 01/07/25 2100     Admin Instructions:   Swallow whole.  Do not open, crush, or chew capsule.      (0838)-Not Given     2100                  FLUoxetine (PROzac) capsule 40 mg  Dose: 40 mg  Freq: Nightly Route: PO  Start: 01/07/25 2100     Admin Instructions:   Caution: Look alike/sound alike drug alert      2100                   glucagon (GLUCAGEN) injection 1 mg  Dose: 1 mg  Freq: Every 15 Minutes PRN Route: IM  PRN Reason: Low Blood Sugar  PRN Comment: Blood Glucose Less Than 70  Start: 01/07/25 1826     Admin Instructions:   Blood Glucose Less Than 70 - Patient Without IV Access - Unresponsive, NPO or Unable To Safely Swallow  Reconstitute powder for injection by adding 1 mL of -supplied sterile diluent or sterile water for injection to a vial containing 1 mg of the drug, to provide solutions containing 1 mg/mL. Shake vial gently to dissolve.          HYDROmorphone (DILAUDID) injection 0.5 mg  Dose: 0.5 mg  Freq: Every 2 Hours PRN Route: IV  PRN Reason: Severe Pain  Start: 01/07/25 1826     Admin Instructions:   Based on patient request - if  ordered for moderate or severe pain, provider allows for administration of a medication prescribed for a lower pain scale.  If given for pain, use the following pain scale:  Mild Pain = Pain Score of 1-3, CPOT 1-2  Moderate Pain = Pain Score of 4-6, CPOT 3-4  Severe Pain = Pain Score of 7-10, CPOT 5-8         And   naloxone (NARCAN) injection 0.4 mg  Dose: 0.4 mg  Freq: Every 5 Minutes PRN Route: IV  PRN Reason: Respiratory Depression  Start: 01/07/25 1826     Admin Instructions:   If respiratory rate is less than 8 breaths/minute or patient is difficult to arouse stop any narcotics and contact physician.   Administer slow IV push. Repeat as ordered until patient's respiratory rate is greater than 12 breaths/minute.         ibuprofen (ADVIL,MOTRIN) tablet 400 mg  Dose: 400 mg  Freq: Every 6 Hours PRN Route: PO  PRN Reason: Mild Pain  Start: 01/07/25 1826     Admin Instructions:   Based on patient request - if ordered for moderate or severe pain, provider allows for administration of a medication prescribed for a lower pain scale.    Mucous membrane irritant. Do not crush or chew tablet or capsule unless administered through a feeding tube.  If given for pain, use the following pain scale:  Mild Pain = Pain Score of 1-3, CPOT 1-2  Moderate Pain = Pain Score of 4-6, CPOT 3-4  Severe Pain = Pain Score of 7-10, CPOT 5-8      0553-Given     1413-Given                  insulin lispro (HUMALOG/ADMELOG) injection 2-7 Units  Dose: 2-7 Units  Freq: 4 Times Daily Before Meals & Nightly Route: SC  Start: 01/07/25 2100     Admin Instructions:   Correction Insulin - Low Dose - Total Insulin Dose Less Than 40 units/day (Lean, Elderly or Renal Patients)    Blood Glucose 150-199 mg/dL - 2 units  Blood Glucose 200-249 mg/dL - 3 units  Blood Glucose 250-299 mg/dL - 4 units  Blood Glucose 300-349 mg/dL - 5 units  Blood Glucose 350-400 mg/dL - 6 units  Blood Glucose Greater Than 400 mg/dL - 7 units & Call Provider   Caution: Look  alike/sound alike drug alert      (0828)-Not Given     (1151)-Not Given     1730 2100                levothyroxine (SYNTHROID, LEVOTHROID) tablet 50 mcg  Dose: 50 mcg  Freq: Every Early Morning Route: PO  Start: 01/08/25 0600     Admin Instructions:   Take on empty stomach.      0553-Given                   metFORMIN ER (GLUCOPHAGE-XR) 24 hr tablet 500 mg  Dose: 500 mg  Freq: Nightly Route: PO  Start: 01/07/25 2100     Admin Instructions:   Do not crush or chew the capsules or tablets. The drug may not work as designed if the capsule or tablet is crushed or chewed. Swallow whole.  Caution: Look alike/sound alike drug alert. Do not crush, split, or chew.      2100                   ondansetron (ZOFRAN) injection 4 mg  Dose: 4 mg  Freq: Every 6 Hours PRN Route: IV  PRN Reasons: Nausea,Vomiting  Start: 01/07/25 1826     Admin Instructions:   If BOTH ondansetron (ZOFRAN) and promethazine (PHENERGAN) are ordered use ondansetron first and THEN promethazine IF ondansetron is ineffective.      0826-Given                   oxyCODONE-acetaminophen (PERCOCET) 5-325 MG per tablet 1 tablet  Dose: 1 tablet  Freq: Every 4 Hours PRN Route: PO  PRN Reason: Moderate Pain  Start: 01/07/25 1826     Admin Instructions:   Based on patient request - if ordered for moderate or severe pain, provider allows for administration of a medication prescribed for a lower pain scale.  [KEV]    Do not exceed 4 grams of acetaminophen in a 24 hr period. Max dose of 2gm for AST/ALT greater than 120 units/L        If given for pain, use the following pain scale:   Mild Pain = Pain Score of 1-3, CPOT 1-2  Moderate Pain = Pain Score of 4-6, CPOT 3-4  Severe Pain = Pain Score of 7-10, CPOT 5-8      1054-Given                   pantoprazole (PROTONIX) EC tablet 40 mg  Dose: 40 mg  Freq: Every Early Morning Route: PO  Start: 01/08/25 0600     Admin Instructions:   Do not crush or chew the capsules or tablets. The drug may not work as designed if the  "capsule or tablet is crushed or chewed. Swallow whole.  Swallow whole; do not crush, split, or chew.      0553-Given                   prochlorperazine (COMPAZINE) injection 5 mg  Dose: 5 mg  Freq: Every 6 Hours PRN Route: IV  PRN Reasons: Nausea,Vomiting  Start: 01/09/25 1251     Admin Instructions:   \"If multiple N/V medications ordered, use in the following order: Ondansetron, Prochlorperazine, Promethazine. Use PO unless patient refuses or patient unable to swallow.\"         sodium chloride 0.9 % flush 10 mL  Dose: 10 mL  Freq: As Needed Route: IV  PRN Reason: Line Care  Start: 01/07/25 1826         sodium chloride 0.9 % flush 10 mL  Dose: 10 mL  Freq: Every 12 Hours Scheduled Route: IV  Start: 01/07/25 2100      0945-Given     2100                  valsartan (DIOVAN) tablet 160 mg  Dose: 160 mg  Freq: Daily Route: PO  Start: 01/08/25 0900     Admin Instructions:   Hold for SBP less than 100, DBP less than 60, or heart rate less than 50. If a dose is held, please contact the provider.      0946-Given                   vitamin E capsule 1,000 Units  Dose: 1,000 Units  Freq: Daily Route: PO  Start: 01/08/25 0900      0946-Given                   Completed Medications  Medications 01/10/25      alvimopan (ENTEREG) capsule 12 mg  Dose: 12 mg  Freq: Once Route: PO  Start: 01/07/25 1138 End: 01/07/25 1227     Admin Instructions:   Contraindicated in end stage renal failure or severe hepatic impairment.   Administer 30 minutes to 5 hours prior to surgery  Inpatient use only, max 15 total doses (pre and post op).  Discontinue after first BM or when narcotics have been discontinued.     Order specific questions:   Is this medication for a partial large or small bowel resection with primary anastomosis? Yes  Has the patient recieved daily opioids for the past 7 days? No  Have you received education on the benefits and risks of alvimopan (Entereg)? Yes         ceFAZolin 2000 mg IVPB in 100 mL NS (MBP)  Dose: 2,000 " mg  Freq: Once Route: IV  Indications of Use: PERIOPERATIVE PHARMACOPROPHYLAXIS  Start: 01/07/25 1323 End: 01/07/25 1405     Admin Instructions:   Caution: Look alike/sound alike drug alert         famotidine (PEPCID) injection 20 mg  Dose: 20 mg  Freq: Once Route: IV  Start: 01/07/25 1144 End: 01/07/25 1239     Admin Instructions:   Give IV push over 2 minutes.         HYDROcodone-acetaminophen (NORCO) 5-325 MG per tablet 1 tablet  Dose: 1 tablet  Freq: Once As Needed Route: PO  PRN Reason: Moderate Pain  Start: 01/07/25 1621 End: 01/07/25 1725     Admin Instructions:   Based on patient request - if ordered for moderate or severe pain, provider allows for administration of a medication prescribed for a lower pain scale.  [KEV]    Do not exceed 4 grams of acetaminophen in a 24 hr period. Max dose of 2gm for AST/ALT greater than 120 units/L        If given for pain, use the following pain scale:   Mild Pain = Pain Score of 1-3, CPOT 1-2  Moderate Pain = Pain Score of 4-6, CPOT 3-4  Severe Pain = Pain Score of 7-10, CPOT 5-8         metroNIDAZOLE (FLAGYL) IVPB 500 mg  Dose: 500 mg  Freq: Once Route: IV  Indications of Use: PERIOPERATIVE PHARMACOPROPHYLAXIS  Start: 01/07/25 1323 End: 01/07/25 1405     Admin Instructions:   Caution: Look alike/sound alike drug alert.  Do not refrigerate.         midazolam (VERSED) injection 1 mg  Dose: 1 mg  Freq: Every 5 Minutes PRN Route: IV  PRN Comment: Anxiety prophylaxis, Pre-op comfort  Start: 01/07/25 1141 End: 01/07/25 1302     Admin Instructions:   May repeat dose in 5 minutes one time then contact provider for additional orders.           sodium chloride 0.9 % infusion  Freq: Continuous PRN  Start: 01/07/25 1451 End: 01/07/25 1626         Discontinued Medications  Medications 01/10/25      acetaminophen (TYLENOL) tablet 1,000 mg  Dose: 1,000 mg  Freq: Every 6 Hours Route: PO  Start: 01/07/25 1136 End: 01/07/25 1643     Admin Instructions:   If given for fever, use fever  parameter: fever greater than 100.4 °F  Based on patient request - if ordered for moderate or severe pain, provider allows for administration of a medication prescribed for a lower pain scale.    Do not exceed 4 grams of acetaminophen in a 24 hr period. Max dose of 2gm for AST/ALT greater than 120 units/L.    If given for pain, use the following pain scale:   Mild Pain = Pain Score of 1-3, CPOT 1-2  Moderate Pain = Pain Score of 4-6, CPOT 3-4  Severe Pain = Pain Score of 7-10, CPOT 5-8         alvimopan (ENTEREG) capsule 12 mg  Dose: 12 mg  Freq: 2 Times Daily Route: PO  Start: 01/07/25 2100 End: 01/08/25 0132     Admin Instructions:   Contraindicated in end stage renal failure or severe hepatic impairment.   Administer 30 minutes to 5 hours prior to surgery  Inpatient use only, max 15 total doses (pre and post op).  Discontinue after first BM or when narcotics have been discontinued.     Order specific questions:   Is this medication for a partial large or small bowel resection with primary anastomosis? Yes  Has the patient recieved daily opioids for the past 7 days? No  Have you received education on the benefits and risks of alvimopan (Entereg)? Yes         Atropine Sulfate (PF) injection 0.4 mg  Dose: 0.4 mg  Freq: Once As Needed Route: IV  PRN Reason: Bradycardia  PRN Comment: symptomatic bradycardia (hypotension, dizziness, confusion). Notify attending anesthesiologist.  Start: 01/07/25 1621 End: 01/07/25 1820         bupivacaine-EPINEPHrine PF (MARCAINE w/EPI) 0.5% [id number] injection  Freq: As Needed  Start: 01/07/25 1451 End: 01/07/25 1630         dextrose 5 % and sodium chloride 0.45 % with KCl 20 mEq/L infusion  Rate: 75 mL/hr Dose: 75 mL/hr  Freq: Continuous Route: IV  Start: 01/07/25 1915 End: 01/08/25 1650         diphenhydrAMINE (BENADRYL) injection 12.5 mg  Dose: 12.5 mg  Freq: Every 15 Minutes PRN Route: IV  PRN Reason: Itching  PRN Comment: May repeat x 1  Start: 01/07/25 1621 End: 01/07/25 1820      Admin Instructions:   Caution: Look alike/sound alike drug alert. This med may be ordered in other forms and routes. Before giving verify the last time the drug was given by any route/form.         ePHEDrine injection 5 mg  Dose: 5 mg  Freq: Once As Needed Route: IV  PRN Comment: symptomatic hypotension - Notify attending anesthesiologist if this needs to be given  Start: 01/07/25 1621 End: 01/07/25 1820     Admin Instructions:   Caution: Look alike/sound alike drug alert   Dilute with NS to 5-10 mg/mL.  Central line preferred, if unavailable use large bore IV access with frequent nurse monitoring of IV site.         fentaNYL citrate (PF) (SUBLIMAZE) injection 25 mcg  Dose: 25 mcg  Freq: Every 5 Minutes PRN Route: IV  PRN Reason: Severe Pain  Start: 01/07/25 1621 End: 01/07/25 1820     Admin Instructions:   Maximum total dose of fentanyl is 200 mcg.  If given for pain, use the following pain scale:  Mild Pain = Pain Score of 1-3, CPOT 1-2  Moderate Pain = Pain Score of 4-6, CPOT 3-4  Severe Pain = Pain Score of 7-10, CPOT 5-8         flumazenil (ROMAZICON) injection 0.2 mg  Dose: 0.2 mg  Freq: As Needed Route: IV  PRN Comment: for benzodiazepine induced unresponsiveness or sedation  Start: 01/07/25 1621 End: 01/07/25 1820     Admin Instructions:   Notify Anesthesia if given  ** give IV over 15-30 seconds **         hydrALAZINE (APRESOLINE) injection 5 mg  Dose: 5 mg  Freq: Every 10 Minutes PRN Route: IV  PRN Reason: High Blood Pressure  PRN Comment: for systolic blood pressure greater than 180 mmHg or diastolic blood pressure greater than 105 mmHg  Start: 01/07/25 1621 End: 01/07/25 1820     Admin Instructions:   Up to 20 mg. If labetalol and hydralazine are both ordered, use labetalol first.  Caution: Look alike/sound alike drug alert         HYDROcodone-acetaminophen (NORCO) 7.5-325 MG per tablet 1 tablet  Dose: 1 tablet  Freq: Every 4 Hours PRN Route: PO  PRN Reason: Severe Pain  Start: 01/07/25 1621 End:  01/07/25 1820     Admin Instructions:   Based on patient request - if ordered for moderate or severe pain, provider allows for administration of a medication prescribed for a lower pain scale.  [KEV]    Do not exceed 4 grams of acetaminophen in a 24 hr period. Max dose of 2gm for AST/ALT greater than 120 units/L        If given for pain, use the following pain scale:   Mild Pain = Pain Score of 1-3, CPOT 1-2  Moderate Pain = Pain Score of 4-6, CPOT 3-4  Severe Pain = Pain Score of 7-10, CPOT 5-8         HYDROmorphone (DILAUDID) injection 0.25 mg  Dose: 0.25 mg  Freq: Every 5 Minutes PRN Route: IV  PRN Reason: Moderate Pain  Start: 01/07/25 1621 End: 01/07/25 1820     Admin Instructions:   Maximum total dose of hydromorphone is 2 mg.  If given for pain, use the following pain scale:  Mild Pain = Pain Score of 1-3, CPOT 1-2  Moderate Pain = Pain Score of 4-6, CPOT 3-4  Severe Pain = Pain Score of 7-10, CPOT 5-8         ipratropium-albuterol (DUO-NEB) nebulizer solution 3 mL  Dose: 3 mL  Freq: Once As Needed Route: NEBULIZATION  PRN Reasons: Wheezing,Shortness of Air  PRN Comment: bronchospasm  Start: 01/07/25 1621 End: 01/07/25 1820     Admin Instructions:   Notify Anesthesia if minineb is given         labetalol (NORMODYNE,TRANDATE) injection 5 mg  Dose: 5 mg  Freq: Every 5 Minutes PRN Route: IV  PRN Reason: High Blood Pressure  PRN Comment: for systolic blood pressure greater than 180 mmHg or diastolic blood pressure greater than 105 mmHg  Start: 01/07/25 1621 End: 01/07/25 1820     Admin Instructions:   Hold for heart rate less than 60.    If labetalol and hydralazine are both ordered, use labetalol first. Maximum dose of labetalol is 20mg IV while in PACU, notify anesthesiologist for further orders if needed.  Give IV Push over 2 minutes.         lactated ringers infusion  Rate: 9 mL/hr Dose: 9 mL/hr  Freq: Continuous Route: IV  Start: 01/07/25 1144 End: 01/07/25 1826         lidocaine (XYLOCAINE) 1 % injection  0.5 mL  Dose: 0.5 mL  Freq: Once As Needed Route: ID  PRN Comment: IV Start  Start: 01/07/25 1141 End: 01/07/25 1643         naloxone (NARCAN) injection 0.2 mg  Dose: 0.2 mg  Freq: As Needed Route: IV  PRN Reasons: Opioid Reversal,Respiratory Depression  PRN Comment: unresponsiveness, decrease oxygen saturation  Indications of Use: ACUTE RESPIRATORY FAILURE,OPIOID-INDUCED RESPIRATORY DEPRESSION  Start: 01/07/25 1621 End: 01/07/25 1820     Admin Instructions:   Notify Anesthesia if given         ondansetron (ZOFRAN) injection 4 mg  Dose: 4 mg  Freq: Once As Needed Route: IV  PRN Reasons: Nausea,Vomiting  Indications of Use: POSTOPERATIVE NAUSEA AND VOMITING  Start: 01/07/25 1621 End: 01/07/25 1820     Admin Instructions:   If BOTH ondansetron (ZOFRAN) and promethazine (PHENERGAN) are ordered use ondansetron first and THEN promethazine IF ondansetron is ineffective.          promethazine (PHENERGAN) suppository 25 mg  Dose: 25 mg  Freq: Once As Needed Route: RE  PRN Reasons: Nausea,Vomiting  Start: 01/07/25 1621 End: 01/07/25 1820     Admin Instructions:   If BOTH ondansetron (ZOFRAN) and promethazine (PHENERGAN) are ordered use ondansetron first and THEN promethazine IF ondansetron is ineffective.         Or   promethazine (PHENERGAN) tablet 25 mg  Dose: 25 mg  Freq: Once As Needed Route: PO  PRN Reasons: Nausea,Vomiting  Start: 01/07/25 1621 End: 01/07/25 1820     Admin Instructions:   If BOTH ondansetron (ZOFRAN) and promethazine (PHENERGAN) are ordered use ondansetron first and THEN promethazine IF ondansetron is ineffective.           sodium chloride (NS) irrigation solution  Freq: As Needed  Start: 01/07/25 1451 End: 01/07/25 1630         sodium chloride 0.9 % flush 3 mL  Dose: 3 mL  Freq: Every 12 Hours Scheduled Route: IV  Start: 01/07/25 1144 End: 01/07/25 1643         sodium chloride 0.9 % flush 3-10 mL  Dose: 3-10 mL  Freq: As Needed Route: IV  PRN Reason: Line Care  Start: 01/07/25 1141 End: 01/07/25  1643                        Operative/Procedure Notes (last 4 days)        Sabi Mars MD at 01/07/25 1424          Operative Note :  Sabi Mars MD      Jess Razo  1960    Procedure Date: 01/07/25    Pre-op Diagnosis:  Endoscopically unresectable tubulovillous adenoma of ascending colon    Post-Operative Diagnosis:  Endoscopically unresectable tubulovillous adenoma of ascending colon    Procedure:   Laparoscopic right colon resection    Surgeon: Sabi Mars MD    Assistant: Carolyn Majano CSA (Carolyn was responsible for suctioning, retracting, suturing of all surgical incisions, and application of sterile dressings at the completion of the case)    Anesthesia:  General (general endotracheal tube)    Estimated Blood Loss: 200ml    Specimens: Right colon with attached appendix and terminal ileum    Complications: None    Indications:  The patient is a 64-year-old lady who has undergone multiple colonoscopies over the last 3 years with a recurrent adenomatous sessile colon polyp of the ascending colon noted every time, refractory to attempts at endoscopic removal.  She presents today for laparoscopic right colon resection for complete eradication of the adenomatous polyp.  She has been counseled on the risks of the procedure which include but are not limited to bleeding, wound infection, anastomotic leak, and possible damage to structures around the ascending colon such as the right ureter, duodenum, or pancreas.  Despite these risks, she has consented to proceed.    Findings: Tattoo noted along distal ascending colon close to hepatic flexure    Description of procedure:  The patient was brought to the operating room and placed on the OR table in supine position.  An endotracheal tube was inserted and general anesthesia induced.  A Nayak catheter was inserted into the urinary bladder using sterile technique.  An ultrasound-guided TAP block was performed bilaterally by the anesthesia staff  followed by prepping and draping of the abdomen in the usual sterile fashion.  A surgical timeout was completed.  A supraumbilical 12 mm skin incision was made after instillation of 0.5% Marcaine with epinephrine at the skin surface.  With upward traction on the abdominal wall fascia, a longitudinal fasciotomy was made and a Jernigan trocar inserted.  The abdomen was insufflated and under direct visualization, 2 additional 5 mm trocars were placed in the infraumbilical and left lower quadrant spaces.  The right colon was then inspected and the tattoo was located along the distal ascending colon just proximal to the hepatic flexure.  I then performed a lateral to medial dissection of the ascending colon by dividing the lateral attachments of the cecum and appendix at the white line of Toldt using the harmonic scalpel.  Working up and around the hepatic flexure of the colon, the gastrocolic omentum was divided along the proximal transverse colon.  This exposed the first and second portions of the duodenum.  The right ureter was identified during the dissection and kept away from the dissection plane, within the retroperitoneum.  Blunt sweeping of the tissues medially allowed for dissection through the appropriate plane.  More of the gastrocolic omentum was divided working towards midline, but in so doing a small venous branch, likely a gastroepiploic vein tore and caused a moderate amount of dark red bleeding.  The ends of this were identified and clipped which allowed for cessation of bleeding.  Once the entirety of the right colon had been mobilized over to midline, the supraumbilical skin incision was extended at the fascial layer to accommodate a small Mikal wound protector.  The ascending colon with attached terminal ileum and appendix was eviscerated from the abdominal cavity through the wound protector.  The ileocolic vascular pedicle was isolated between Patricia clamps and divided, with the proximal end of the  vascular pedicle oversewn using a 0 silk suture ligature.  The terminal ileum and transverse colon were then divided using 75 mm blue load KEKE stapler, making sure to transect the transverse colon just proximal to the middle colic vascular pedicle that was identified using the Doppler ultrasound.  The remainder of the ascending colon mesentery was divided using the harmonic scalpel and the specimen passed off to pathology in formalin including the entire ascending colon with attached terminal ileum and appendix.  The staple line across terminal ileum and transverse colon were each oversewn using 3-0 silk Lembert style sutures.  A stapled side-to-side anastomosis was then fashioned between terminal ileum and transverse colon using an additional blue load on the KEKE stapler.  Throughout the bowel resection, almost every tissue plane that was touched had spontaneous bleeding, similar to patients who are operated on while taking blood thinners.  Every effort was taken to minimize blood loss.  After creation of the stapled anastomosis, the internal lumen of small bowel and transverse colon was inspected and a few areas of venous bleeding at the anastomosis were oversewn using 3-0 Vicryl figure-of-eight sutures.  The common enterotomy channel was then closed in 2 layers using running 3-0 PDS forward and back upon itself, imbricating the initial closure line.  Mesenteric defect was closed using 3-0 silk sutures.  All dirty instruments and gloves were then exchanged for clean versions of each.  The anastomosis was submerged back into the abdominal cavity and the wound protector removed.  The fascia in the midline abdomen was closed using a running 0 PDS suture.  The abdomen was reinsufflated and laparoscope inserted.  There was no evidence for ongoing bleeding.  1 L of warm normal saline was irrigated throughout the entire upper and lower abdomen, removing all blood products that had evacuated during bleeding from the  "gastroepiploic vein earlier during the case.  An additional 500 cc of saline was instilled throughout the abdominal cavity and suctioned dry, appearing clear with no further evidence of bleeding.  The abdomen was then desufflated and both of the 5 mm trocars removed.  Skin incisions were closed using 4-0 Vicryl subcuticular suture and topical Exofin glue.  Her Nayak catheter was removed, she was extubated, and she transferred to PACU in stable condition with all counts correct per nursing.    Willam Standards: This procedure was not performed to treat colon cancer through resection        Sabi Mars MD  General, Robotic, and Endoscopic Surgery  [facility]    [address], [address]  [place] [address]  P: [phone]  F: [phone]       Electronically signed by Sabi Mars MD at 01/07/25 1622          Physician Progress Notes (last 24 hours)        Sabi Mars MD at 01/10/25 1339          General Surgery  Progress Note    CC: Follow-up endoscopically unresectable tubulovillous adenoma    POD#2 laparoscopic right colon resection    S: She  had consistent nausea with bilious vomiting yesterday, but had multiple loose nonbloody stools yesterday also.  She has had no bowel movements today and her nausea/vomiting have improved.  She is sitting up in the bedside chair and tolerating small amounts of regular diet.    O:/66 (BP Location: Right arm, Patient Position: Sitting)   Pulse 80   Temp 98.8 °F (37.1 °C) (Oral)   Resp 18   Ht 163.8 cm (64.49\")   Wt 70.5 kg (155 lb 6.8 oz)   LMP  (LMP Unknown)   SpO2 95%   BMI 26.28 kg/m²     Intake & Output:  UOP: Void x2  BM x2    GENERAL: alert, well appearing, and in no distress  HEENT: normocephalic, atraumatic, moist mucous membranes, clear sclerae   CHEST: clear to auscultation, no wheezes, rales or rhonchi, symmetric air entry  CARDIAC: regular rate and rhythm    ABDOMEN: Soft, nondistended, incisions clean/dry/intact " with glue, mild incisional tenderness  EXTREMITIES: no cyanosis, clubbing, or edema   SKIN: Warm and moist, no rashes    LABS  Results from last 7 days   Lab Units 01/08/25  0620   WBC 10*3/mm3 12.13*   HEMOGLOBIN g/dL 11.2*   HEMATOCRIT % 32.3*   PLATELETS 10*3/mm3 195     Results from last 7 days   Lab Units 01/08/25  0620   SODIUM mmol/L 129*   POTASSIUM mmol/L 4.3   CHLORIDE mmol/L 99   CO2 mmol/L 18.7*   BUN mg/dL 8   CREATININE mg/dL 0.64   CALCIUM mg/dL 8.6   GLUCOSE mg/dL 183*         A/P: 64 y.o. female POD#3 laparoscopic right colon resection for endoscopically unresectable tubulovillous adenoma    Although she feels much better today, I would recommend she stay for 1 more night to monitor for any recurrent nausea or vomiting.  She is in agreement with that plan and will probably be ready for discharge home tomorrow.  I discussed the benign pathology findings which showed a tubulovillous adenoma exactly as I expected.    Sabi Mars MD  General, Robotic, and Endoscopic Surgery  [facility]    [address], [address]  [place] [address]  P: [phone]  F: [phone]       Electronically signed by Sabi Mars MD at 01/10/25 1341       Consult Notes (last 24 hours)  Notes from 01/09/25 1559 through 01/10/25 1559   No notes of this type exist for this encounter.

## 2025-01-10 NOTE — PLAN OF CARE
Goal Outcome Evaluation:  Plan of Care Reviewed With: patient        Progress: improving  Outcome Evaluation: vss, ibuprofen for pain, no bowel movement, no nausea and vomiting since last night, voiding freely ambulated in the hallway, incision site healing KRYSTIN, encourage to increase fluid intake, continue to monitor the pt.

## 2025-01-10 NOTE — PLAN OF CARE
Goal Outcome Evaluation:  Plan of Care Reviewed With: patient        Progress: improving  Outcome Evaluation: VSS. Pt. has ambulated in ther hallway three times today. She has not had any nausea . She has only taken motrin and percocet for pain today. Her appetite is poor but she is drinking liquids well.

## 2025-01-10 NOTE — PROGRESS NOTES
"General Surgery  Progress Note    CC: Follow-up endoscopically unresectable tubulovillous adenoma    POD#2 laparoscopic right colon resection    S: She  had consistent nausea with bilious vomiting yesterday, but had multiple loose nonbloody stools yesterday also.  She has had no bowel movements today and her nausea/vomiting have improved.  She is sitting up in the bedside chair and tolerating small amounts of regular diet.    O:/66 (BP Location: Right arm, Patient Position: Sitting)   Pulse 80   Temp 98.8 °F (37.1 °C) (Oral)   Resp 18   Ht 163.8 cm (64.49\")   Wt 70.5 kg (155 lb 6.8 oz)   LMP  (LMP Unknown)   SpO2 95%   BMI 26.28 kg/m²     Intake & Output:  UOP: Void x2  BM x2    GENERAL: alert, well appearing, and in no distress  HEENT: normocephalic, atraumatic, moist mucous membranes, clear sclerae   CHEST: clear to auscultation, no wheezes, rales or rhonchi, symmetric air entry  CARDIAC: regular rate and rhythm    ABDOMEN: Soft, nondistended, incisions clean/dry/intact with glue, mild incisional tenderness  EXTREMITIES: no cyanosis, clubbing, or edema   SKIN: Warm and moist, no rashes    LABS  Results from last 7 days   Lab Units 01/08/25  0620   WBC 10*3/mm3 12.13*   HEMOGLOBIN g/dL 11.2*   HEMATOCRIT % 32.3*   PLATELETS 10*3/mm3 195     Results from last 7 days   Lab Units 01/08/25  0620   SODIUM mmol/L 129*   POTASSIUM mmol/L 4.3   CHLORIDE mmol/L 99   CO2 mmol/L 18.7*   BUN mg/dL 8   CREATININE mg/dL 0.64   CALCIUM mg/dL 8.6   GLUCOSE mg/dL 183*         A/P: 64 y.o. female POD#3 laparoscopic right colon resection for endoscopically unresectable tubulovillous adenoma    Although she feels much better today, I would recommend she stay for 1 more night to monitor for any recurrent nausea or vomiting.  She is in agreement with that plan and will probably be ready for discharge home tomorrow.  I discussed the benign pathology findings which showed a tubulovillous adenoma exactly as I " expected.    Sabi Mars MD  General, Robotic, and Endoscopic Surgery  [facility]    [address], [address]  [place] [address]  P: [phone]  F: [phone]

## 2025-01-11 ENCOUNTER — APPOINTMENT (OUTPATIENT)
Dept: GENERAL RADIOLOGY | Facility: HOSPITAL | Age: 65
DRG: 330 | End: 2025-01-11
Payer: COMMERCIAL

## 2025-01-11 LAB
GLUCOSE BLDC GLUCOMTR-MCNC: 148 MG/DL (ref 70–130)
GLUCOSE BLDC GLUCOMTR-MCNC: 151 MG/DL (ref 70–130)
GLUCOSE BLDC GLUCOMTR-MCNC: 154 MG/DL (ref 70–130)
GLUCOSE BLDC GLUCOMTR-MCNC: 159 MG/DL (ref 70–130)

## 2025-01-11 PROCEDURE — 94799 UNLISTED PULMONARY SVC/PX: CPT

## 2025-01-11 PROCEDURE — 25010000002 ONDANSETRON PER 1 MG: Performed by: SURGERY

## 2025-01-11 PROCEDURE — 25010000002 PROCHLORPERAZINE 10 MG/2ML SOLUTION: Performed by: SURGERY

## 2025-01-11 PROCEDURE — 94760 N-INVAS EAR/PLS OXIMETRY 1: CPT

## 2025-01-11 PROCEDURE — 63710000001 INSULIN LISPRO (HUMAN) PER 5 UNITS: Performed by: SURGERY

## 2025-01-11 PROCEDURE — 94664 DEMO&/EVAL PT USE INHALER: CPT

## 2025-01-11 PROCEDURE — 99024 POSTOP FOLLOW-UP VISIT: CPT | Performed by: STUDENT IN AN ORGANIZED HEALTH CARE EDUCATION/TRAINING PROGRAM

## 2025-01-11 PROCEDURE — 74018 RADEX ABDOMEN 1 VIEW: CPT

## 2025-01-11 PROCEDURE — 82948 REAGENT STRIP/BLOOD GLUCOSE: CPT

## 2025-01-11 RX ORDER — DEXTROSE MONOHYDRATE, SODIUM CHLORIDE, AND POTASSIUM CHLORIDE 50; .745; 4.5 G/1000ML; G/1000ML; G/1000ML
100 INJECTION, SOLUTION INTRAVENOUS CONTINUOUS
Status: DISCONTINUED | OUTPATIENT
Start: 2025-01-11 | End: 2025-01-12

## 2025-01-11 RX ADMIN — IBUPROFEN 400 MG: 400 TABLET ORAL at 16:50

## 2025-01-11 RX ADMIN — BUDESONIDE AND FORMOTEROL FUMARATE DIHYDRATE 2 PUFF: 160; 4.5 AEROSOL RESPIRATORY (INHALATION) at 09:06

## 2025-01-11 RX ADMIN — ONDANSETRON 4 MG: 2 INJECTION, SOLUTION INTRAMUSCULAR; INTRAVENOUS at 16:48

## 2025-01-11 RX ADMIN — Medication 10 ML: at 20:56

## 2025-01-11 RX ADMIN — PROCHLORPERAZINE EDISYLATE 5 MG: 5 INJECTION INTRAMUSCULAR; INTRAVENOUS at 20:52

## 2025-01-11 RX ADMIN — METFORMIN HYDROCHLORIDE 500 MG: 500 TABLET, EXTENDED RELEASE ORAL at 20:52

## 2025-01-11 RX ADMIN — LEVOTHYROXINE SODIUM 50 MCG: 50 TABLET ORAL at 06:13

## 2025-01-11 RX ADMIN — DOCUSATE SODIUM 100 MG: 100 CAPSULE, LIQUID FILLED ORAL at 10:44

## 2025-01-11 RX ADMIN — INSULIN LISPRO 2 UNITS: 100 INJECTION, SOLUTION INTRAVENOUS; SUBCUTANEOUS at 18:09

## 2025-01-11 RX ADMIN — DOCUSATE SODIUM 100 MG: 100 CAPSULE, LIQUID FILLED ORAL at 20:52

## 2025-01-11 RX ADMIN — Medication 500 MG: at 10:44

## 2025-01-11 RX ADMIN — DILTIAZEM HYDROCHLORIDE 360 MG: 120 CAPSULE, COATED, EXTENDED RELEASE ORAL at 10:43

## 2025-01-11 RX ADMIN — VALSARTAN 160 MG: 80 TABLET, FILM COATED ORAL at 10:43

## 2025-01-11 RX ADMIN — ONDANSETRON 4 MG: 2 INJECTION, SOLUTION INTRAMUSCULAR; INTRAVENOUS at 03:12

## 2025-01-11 RX ADMIN — PANTOPRAZOLE SODIUM 40 MG: 40 TABLET, DELAYED RELEASE ORAL at 06:13

## 2025-01-11 RX ADMIN — FLUOXETINE HYDROCHLORIDE 40 MG: 20 CAPSULE ORAL at 20:52

## 2025-01-11 RX ADMIN — PROCHLORPERAZINE EDISYLATE 5 MG: 5 INJECTION INTRAMUSCULAR; INTRAVENOUS at 06:13

## 2025-01-11 RX ADMIN — IBUPROFEN 400 MG: 400 TABLET ORAL at 06:13

## 2025-01-11 RX ADMIN — ONDANSETRON 4 MG: 2 INJECTION, SOLUTION INTRAMUSCULAR; INTRAVENOUS at 22:11

## 2025-01-11 RX ADMIN — OXYCODONE AND ACETAMINOPHEN 1 TABLET: 5; 325 TABLET ORAL at 03:12

## 2025-01-11 RX ADMIN — POTASSIUM CHLORIDE, DEXTROSE MONOHYDRATE AND SODIUM CHLORIDE 100 ML/HR: 75; 5; 450 INJECTION, SOLUTION INTRAVENOUS at 20:52

## 2025-01-11 RX ADMIN — INSULIN LISPRO 2 UNITS: 100 INJECTION, SOLUTION INTRAVENOUS; SUBCUTANEOUS at 13:19

## 2025-01-11 RX ADMIN — ATORVASTATIN CALCIUM 80 MG: 20 TABLET, FILM COATED ORAL at 20:52

## 2025-01-11 RX ADMIN — BISOPROLOL FUMARATE 10 MG: 5 TABLET ORAL at 10:44

## 2025-01-11 RX ADMIN — Medication 1000 UNITS: at 10:43

## 2025-01-11 RX ADMIN — DIGOXIN 250 MCG: 0.12 TABLET ORAL at 22:09

## 2025-01-11 RX ADMIN — CETIRIZINE HYDROCHLORIDE 10 MG: 10 TABLET, FILM COATED ORAL at 10:44

## 2025-01-11 NOTE — PLAN OF CARE
Goal Outcome Evaluation:  Plan of Care Reviewed With: patient        Progress: no change  Outcome Evaluation: Patient uncomfortable today, with c/o  intermittent nauasea and emesis x2, unable to pass flatus, abdomen is distented, KUB done,  encouraged to ambulate in joyce, and to stay hydrated, monitoring blood sugars, vss, afebrile, saline locked, will continue to monitor.

## 2025-01-11 NOTE — PLAN OF CARE
Goal Outcome Evaluation:  Plan of Care Reviewed With: patient        Progress: declining  Outcome Evaluation: Patient is A&Ox4, VSS, but has had consistent nausea throuhgout shfit. Motrin and Percocet given for pain. Medium amount of emesis- pt stated she feels better after and nausea has gone away. POC ongoing. Up ad rozina. IS used.

## 2025-01-11 NOTE — PROGRESS NOTES
General Surgery    Postoperative day 3 status post laparoscopic right colon resection    Had an episode of vomiting this morning.  Having some belching and burping.  No bowel movement today or yesterday.  Otherwise her pain is okay and she is sitting in the chair.    Afebrile, heart rate 103, blood pressure 113/65  On room air, sitting in the chair comfortably  Abdomen is distended, soft, incisions in good order    Plan:  KUB to evaluate for ileus.  Continue diet as tolerated but cautiously.    Out of bed and ambulate as tolerated    Smiley Simon MD

## 2025-01-12 LAB
ANION GAP SERPL CALCULATED.3IONS-SCNC: 16.7 MMOL/L (ref 5–15)
BASOPHILS # BLD MANUAL: 0 10*3/MM3 (ref 0–0.2)
BASOPHILS NFR BLD MANUAL: 0 % (ref 0–1.5)
BUN SERPL-MCNC: 34 MG/DL (ref 8–23)
BUN/CREAT SERPL: 28.3 (ref 7–25)
CALCIUM SPEC-SCNC: 9.2 MG/DL (ref 8.6–10.5)
CHLORIDE SERPL-SCNC: 90 MMOL/L (ref 98–107)
CO2 SERPL-SCNC: 20.3 MMOL/L (ref 22–29)
CREAT SERPL-MCNC: 1.2 MG/DL (ref 0.57–1)
DEPRECATED RDW RBC AUTO: 41.3 FL (ref 37–54)
EGFRCR SERPLBLD CKD-EPI 2021: 51 ML/MIN/1.73
EOSINOPHIL # BLD MANUAL: 0 10*3/MM3 (ref 0–0.4)
EOSINOPHIL NFR BLD MANUAL: 0 % (ref 0.3–6.2)
ERYTHROCYTE [DISTWIDTH] IN BLOOD BY AUTOMATED COUNT: 12.4 % (ref 12.3–15.4)
GLUCOSE BLDC GLUCOMTR-MCNC: 117 MG/DL (ref 70–130)
GLUCOSE BLDC GLUCOMTR-MCNC: 155 MG/DL (ref 70–130)
GLUCOSE BLDC GLUCOMTR-MCNC: 164 MG/DL (ref 70–130)
GLUCOSE BLDC GLUCOMTR-MCNC: 164 MG/DL (ref 70–130)
GLUCOSE SERPL-MCNC: 171 MG/DL (ref 65–99)
HCT VFR BLD AUTO: 32.9 % (ref 34–46.6)
HGB BLD-MCNC: 11.3 G/DL (ref 12–15.9)
LYMPHOCYTES # BLD MANUAL: 1.05 10*3/MM3 (ref 0.7–3.1)
LYMPHOCYTES NFR BLD MANUAL: 11.3 % (ref 5–12)
MCH RBC QN AUTO: 31.6 PG (ref 26.6–33)
MCHC RBC AUTO-ENTMCNC: 34.3 G/DL (ref 31.5–35.7)
MCV RBC AUTO: 91.9 FL (ref 79–97)
MONOCYTES # BLD: 0.55 10*3/MM3 (ref 0.1–0.9)
MYELOCYTES NFR BLD MANUAL: 1 % (ref 0–0)
NEUTROPHILS # BLD AUTO: 3.21 10*3/MM3 (ref 1.7–7)
NEUTROPHILS NFR BLD MANUAL: 66 % (ref 42.7–76)
NRBC BLD AUTO-RTO: 0 /100 WBC (ref 0–0.2)
PLAT MORPH BLD: NORMAL
PLATELET # BLD AUTO: 270 10*3/MM3 (ref 140–450)
PMV BLD AUTO: 9.2 FL (ref 6–12)
POTASSIUM SERPL-SCNC: 3.6 MMOL/L (ref 3.5–5.2)
RBC # BLD AUTO: 3.58 10*6/MM3 (ref 3.77–5.28)
RBC MORPH BLD: NORMAL
SODIUM SERPL-SCNC: 127 MMOL/L (ref 136–145)
VARIANT LYMPHS NFR BLD MANUAL: 21.6 % (ref 19.6–45.3)
WBC MORPH BLD: NORMAL
WBC NRBC COR # BLD AUTO: 4.86 10*3/MM3 (ref 3.4–10.8)

## 2025-01-12 PROCEDURE — 85025 COMPLETE CBC W/AUTO DIFF WBC: CPT | Performed by: SURGERY

## 2025-01-12 PROCEDURE — 99024 POSTOP FOLLOW-UP VISIT: CPT | Performed by: STUDENT IN AN ORGANIZED HEALTH CARE EDUCATION/TRAINING PROGRAM

## 2025-01-12 PROCEDURE — 94799 UNLISTED PULMONARY SVC/PX: CPT

## 2025-01-12 PROCEDURE — 25810000003 SODIUM CHLORIDE 0.9 % SOLUTION: Performed by: SURGERY

## 2025-01-12 PROCEDURE — 25010000002 ONDANSETRON PER 1 MG: Performed by: SURGERY

## 2025-01-12 PROCEDURE — 25010000002 PROCHLORPERAZINE 10 MG/2ML SOLUTION: Performed by: SURGERY

## 2025-01-12 PROCEDURE — 63710000001 INSULIN LISPRO (HUMAN) PER 5 UNITS: Performed by: SURGERY

## 2025-01-12 PROCEDURE — 80048 BASIC METABOLIC PNL TOTAL CA: CPT | Performed by: SURGERY

## 2025-01-12 PROCEDURE — 85007 BL SMEAR W/DIFF WBC COUNT: CPT | Performed by: SURGERY

## 2025-01-12 PROCEDURE — 82948 REAGENT STRIP/BLOOD GLUCOSE: CPT

## 2025-01-12 RX ORDER — SODIUM CHLORIDE 9 MG/ML
125 INJECTION, SOLUTION INTRAVENOUS CONTINUOUS
Status: DISCONTINUED | OUTPATIENT
Start: 2025-01-12 | End: 2025-01-14

## 2025-01-12 RX ADMIN — DOCUSATE SODIUM 100 MG: 100 CAPSULE, LIQUID FILLED ORAL at 09:16

## 2025-01-12 RX ADMIN — VALSARTAN 160 MG: 80 TABLET, FILM COATED ORAL at 09:16

## 2025-01-12 RX ADMIN — PROCHLORPERAZINE EDISYLATE 5 MG: 5 INJECTION INTRAMUSCULAR; INTRAVENOUS at 02:49

## 2025-01-12 RX ADMIN — LEVOTHYROXINE SODIUM 50 MCG: 50 TABLET ORAL at 06:23

## 2025-01-12 RX ADMIN — PROCHLORPERAZINE EDISYLATE 5 MG: 5 INJECTION INTRAMUSCULAR; INTRAVENOUS at 09:13

## 2025-01-12 RX ADMIN — PANTOPRAZOLE SODIUM 40 MG: 40 TABLET, DELAYED RELEASE ORAL at 06:24

## 2025-01-12 RX ADMIN — ONDANSETRON 4 MG: 2 INJECTION, SOLUTION INTRAMUSCULAR; INTRAVENOUS at 06:24

## 2025-01-12 RX ADMIN — Medication 10 ML: at 20:14

## 2025-01-12 RX ADMIN — IBUPROFEN 400 MG: 400 TABLET ORAL at 18:22

## 2025-01-12 RX ADMIN — DOCUSATE SODIUM 100 MG: 100 CAPSULE, LIQUID FILLED ORAL at 20:07

## 2025-01-12 RX ADMIN — FLUOXETINE HYDROCHLORIDE 40 MG: 20 CAPSULE ORAL at 20:07

## 2025-01-12 RX ADMIN — SODIUM CHLORIDE 125 ML/HR: 9 INJECTION, SOLUTION INTRAVENOUS at 20:07

## 2025-01-12 RX ADMIN — CETIRIZINE HYDROCHLORIDE 10 MG: 10 TABLET, FILM COATED ORAL at 09:17

## 2025-01-12 RX ADMIN — Medication 500 MG: at 09:16

## 2025-01-12 RX ADMIN — ATORVASTATIN CALCIUM 80 MG: 20 TABLET, FILM COATED ORAL at 20:07

## 2025-01-12 RX ADMIN — POTASSIUM CHLORIDE, DEXTROSE MONOHYDRATE AND SODIUM CHLORIDE 100 ML/HR: 75; 5; 450 INJECTION, SOLUTION INTRAVENOUS at 09:13

## 2025-01-12 RX ADMIN — DILTIAZEM HYDROCHLORIDE 360 MG: 120 CAPSULE, COATED, EXTENDED RELEASE ORAL at 09:16

## 2025-01-12 RX ADMIN — Medication 1000 UNITS: at 09:16

## 2025-01-12 RX ADMIN — DIGOXIN 250 MCG: 0.12 TABLET ORAL at 20:07

## 2025-01-12 RX ADMIN — METFORMIN HYDROCHLORIDE 500 MG: 500 TABLET, EXTENDED RELEASE ORAL at 20:07

## 2025-01-12 RX ADMIN — BISOPROLOL FUMARATE 10 MG: 5 TABLET ORAL at 09:17

## 2025-01-12 RX ADMIN — INSULIN LISPRO 2 UNITS: 100 INJECTION, SOLUTION INTRAVENOUS; SUBCUTANEOUS at 09:28

## 2025-01-12 NOTE — PROGRESS NOTES
General Surgery    Postoperative day 4 status post laparoscopic right colon resection    Continued emesis yesterday.  Patient refused an NG tube to try today. IVF restarted. One BM yesterday this morning.    Afebrile, heart rate 93, blood pressure 118/75  On room air, sitting in the chair comfortably  Abdomen is distended, soft, incisions in good order    WBC 4 Hgb 11 platelets 270 Cr 1.2    Plan:  Now with an ileus. NPO, IVF.  I attempted an NG tube placement at the bedside with multiple attempts.  Due to what appears to be aberrant anatomy, I was unable to place this.  She did have a small bowel movement this morning.  Will hold off on NG tube placement and fluoroscopy for now and continue to manage symptomatically.  May need to go to fluoroscopy tomorrow for NG tube placement.  As needed pain and nausea meds.  Out of bed and ambulate as tolerated    Smiley Simon MD

## 2025-01-12 NOTE — PLAN OF CARE
Goal Outcome Evaluation:  Plan of Care Reviewed With: patient        Progress: no change  Outcome Evaluation: Patient is A&Ox4, VSS, afebrile. Pt still very uncomfortable with continuous nausea and emesis x5-6. Provider on call ordered for an NGT to be placed on continuous suction but pt refused. RN and charge RN educated family and explained benefits, process, etc to pt. Up ad rozina. Ambulated in hallway x1 with . IVFs infusing without difficulty. NO SSI needed in evening. POC ongoing.     Emesis smells like stool, light brown colored and all liquid- MD aware.

## 2025-01-12 NOTE — PLAN OF CARE
Goal Outcome Evaluation:  Plan of Care Reviewed With: patient        Progress: no change  Outcome Evaluation: Patient continues to c/o nausea and vomiting, given Compazine with some relief, abdomen is distended, patient reports small liquid stools, vss, afebrile, will continue to monitor.

## 2025-01-13 LAB
ANION GAP SERPL CALCULATED.3IONS-SCNC: 13.4 MMOL/L (ref 5–15)
ANISOCYTOSIS BLD QL: ABNORMAL
BASOPHILS # BLD MANUAL: 0 10*3/MM3 (ref 0–0.2)
BASOPHILS NFR BLD MANUAL: 0 % (ref 0–1.5)
BUN SERPL-MCNC: 23 MG/DL (ref 8–23)
BUN/CREAT SERPL: 27.7 (ref 7–25)
CALCIUM SPEC-SCNC: 8.4 MG/DL (ref 8.6–10.5)
CHLORIDE SERPL-SCNC: 98 MMOL/L (ref 98–107)
CO2 SERPL-SCNC: 18.6 MMOL/L (ref 22–29)
CREAT SERPL-MCNC: 0.83 MG/DL (ref 0.57–1)
DEPRECATED RDW RBC AUTO: 39.5 FL (ref 37–54)
EGFRCR SERPLBLD CKD-EPI 2021: 79.3 ML/MIN/1.73
EOSINOPHIL # BLD MANUAL: 0.09 10*3/MM3 (ref 0–0.4)
EOSINOPHIL NFR BLD MANUAL: 1 % (ref 0.3–6.2)
ERYTHROCYTE [DISTWIDTH] IN BLOOD BY AUTOMATED COUNT: 12.3 % (ref 12.3–15.4)
GLUCOSE BLDC GLUCOMTR-MCNC: 107 MG/DL (ref 70–130)
GLUCOSE BLDC GLUCOMTR-MCNC: 111 MG/DL (ref 70–130)
GLUCOSE BLDC GLUCOMTR-MCNC: 121 MG/DL (ref 70–130)
GLUCOSE BLDC GLUCOMTR-MCNC: 93 MG/DL (ref 70–130)
GLUCOSE SERPL-MCNC: 116 MG/DL (ref 65–99)
HCT VFR BLD AUTO: 29.7 % (ref 34–46.6)
HGB BLD-MCNC: 10.3 G/DL (ref 12–15.9)
LYMPHOCYTES # BLD MANUAL: 0.81 10*3/MM3 (ref 0.7–3.1)
LYMPHOCYTES NFR BLD MANUAL: 11.1 % (ref 5–12)
MCH RBC QN AUTO: 31.3 PG (ref 26.6–33)
MCHC RBC AUTO-ENTMCNC: 34.7 G/DL (ref 31.5–35.7)
MCV RBC AUTO: 90.3 FL (ref 79–97)
MONOCYTES # BLD: 0.99 10*3/MM3 (ref 0.1–0.9)
NEUTROPHILS # BLD AUTO: 7.02 10*3/MM3 (ref 1.7–7)
NEUTROPHILS NFR BLD MANUAL: 78.8 % (ref 42.7–76)
NRBC BLD AUTO-RTO: 0 /100 WBC (ref 0–0.2)
PLAT MORPH BLD: NORMAL
PLATELET # BLD AUTO: 305 10*3/MM3 (ref 140–450)
PMV BLD AUTO: 9.4 FL (ref 6–12)
POTASSIUM SERPL-SCNC: 3.7 MMOL/L (ref 3.5–5.2)
RBC # BLD AUTO: 3.29 10*6/MM3 (ref 3.77–5.28)
SODIUM SERPL-SCNC: 130 MMOL/L (ref 136–145)
VARIANT LYMPHS NFR BLD MANUAL: 9.1 % (ref 19.6–45.3)
WBC MORPH BLD: NORMAL
WBC NRBC COR # BLD AUTO: 8.91 10*3/MM3 (ref 3.4–10.8)

## 2025-01-13 PROCEDURE — 94761 N-INVAS EAR/PLS OXIMETRY MLT: CPT

## 2025-01-13 PROCEDURE — 85025 COMPLETE CBC W/AUTO DIFF WBC: CPT | Performed by: SURGERY

## 2025-01-13 PROCEDURE — 80048 BASIC METABOLIC PNL TOTAL CA: CPT | Performed by: SURGERY

## 2025-01-13 PROCEDURE — 25010000002 ENOXAPARIN PER 10 MG: Performed by: SURGERY

## 2025-01-13 PROCEDURE — 94664 DEMO&/EVAL PT USE INHALER: CPT

## 2025-01-13 PROCEDURE — 25810000003 SODIUM CHLORIDE 0.9 % SOLUTION: Performed by: SURGERY

## 2025-01-13 PROCEDURE — 94799 UNLISTED PULMONARY SVC/PX: CPT

## 2025-01-13 PROCEDURE — 82948 REAGENT STRIP/BLOOD GLUCOSE: CPT

## 2025-01-13 PROCEDURE — 25010000002 PROCHLORPERAZINE 10 MG/2ML SOLUTION: Performed by: SURGERY

## 2025-01-13 PROCEDURE — 99024 POSTOP FOLLOW-UP VISIT: CPT | Performed by: SURGERY

## 2025-01-13 PROCEDURE — 85007 BL SMEAR W/DIFF WBC COUNT: CPT | Performed by: SURGERY

## 2025-01-13 RX ORDER — ENOXAPARIN SODIUM 100 MG/ML
40 INJECTION SUBCUTANEOUS DAILY
Status: DISCONTINUED | OUTPATIENT
Start: 2025-01-13 | End: 2025-01-21 | Stop reason: HOSPADM

## 2025-01-13 RX ADMIN — DIGOXIN 250 MCG: 0.12 TABLET ORAL at 21:59

## 2025-01-13 RX ADMIN — SODIUM CHLORIDE 125 ML/HR: 9 INJECTION, SOLUTION INTRAVENOUS at 11:51

## 2025-01-13 RX ADMIN — Medication 1000 UNITS: at 09:57

## 2025-01-13 RX ADMIN — BUDESONIDE AND FORMOTEROL FUMARATE DIHYDRATE 2 PUFF: 160; 4.5 AEROSOL RESPIRATORY (INHALATION) at 21:39

## 2025-01-13 RX ADMIN — LEVOTHYROXINE SODIUM 50 MCG: 50 TABLET ORAL at 05:50

## 2025-01-13 RX ADMIN — OXYCODONE AND ACETAMINOPHEN 1 TABLET: 5; 325 TABLET ORAL at 22:04

## 2025-01-13 RX ADMIN — PROCHLORPERAZINE EDISYLATE 5 MG: 5 INJECTION INTRAMUSCULAR; INTRAVENOUS at 22:04

## 2025-01-13 RX ADMIN — BUDESONIDE AND FORMOTEROL FUMARATE DIHYDRATE 2 PUFF: 160; 4.5 AEROSOL RESPIRATORY (INHALATION) at 09:29

## 2025-01-13 RX ADMIN — PANTOPRAZOLE SODIUM 40 MG: 40 TABLET, DELAYED RELEASE ORAL at 05:50

## 2025-01-13 RX ADMIN — CETIRIZINE HYDROCHLORIDE 10 MG: 10 TABLET, FILM COATED ORAL at 09:59

## 2025-01-13 RX ADMIN — VALSARTAN 160 MG: 80 TABLET, FILM COATED ORAL at 09:58

## 2025-01-13 RX ADMIN — BISOPROLOL FUMARATE 10 MG: 5 TABLET ORAL at 09:59

## 2025-01-13 RX ADMIN — IBUPROFEN 400 MG: 400 TABLET ORAL at 05:54

## 2025-01-13 RX ADMIN — IBUPROFEN 400 MG: 400 TABLET ORAL at 16:51

## 2025-01-13 RX ADMIN — FLUOXETINE HYDROCHLORIDE 40 MG: 20 CAPSULE ORAL at 21:59

## 2025-01-13 RX ADMIN — DILTIAZEM HYDROCHLORIDE 360 MG: 120 CAPSULE, COATED, EXTENDED RELEASE ORAL at 09:59

## 2025-01-13 RX ADMIN — METFORMIN HYDROCHLORIDE 500 MG: 500 TABLET, EXTENDED RELEASE ORAL at 21:59

## 2025-01-13 RX ADMIN — ATORVASTATIN CALCIUM 80 MG: 20 TABLET, FILM COATED ORAL at 21:59

## 2025-01-13 RX ADMIN — Medication 500 MG: at 09:59

## 2025-01-13 RX ADMIN — ENOXAPARIN SODIUM 40 MG: 100 INJECTION SUBCUTANEOUS at 14:42

## 2025-01-13 RX ADMIN — SODIUM CHLORIDE 125 ML/HR: 9 INJECTION, SOLUTION INTRAVENOUS at 19:43

## 2025-01-13 RX ADMIN — SODIUM CHLORIDE 125 ML/HR: 9 INJECTION, SOLUTION INTRAVENOUS at 04:05

## 2025-01-13 RX ADMIN — DOCUSATE SODIUM 100 MG: 100 CAPSULE, LIQUID FILLED ORAL at 09:59

## 2025-01-13 NOTE — PLAN OF CARE
Goal Outcome Evaluation:  Plan of Care Reviewed With: patient        Progress: improving  Outcome Evaluation: VSS , no nausea or vomiting today. Has only taken ibuprofen for c/o headache and back pain. Having bm's but not large amount. Advanced to full liquids and lovenox added .

## 2025-01-13 NOTE — PROGRESS NOTES
"General Surgery  Progress Note    CC: Follow-up endoscopically unresectable tubulovillous adenoma    POD#6 laparoscopic right colon resection    S: She developed an ileus over the weekend, not surprisingly, and had multiple episodes of nausea with vomiting and no flatus or bowel movement.  Overnight, she began to have multiple liquid bowel movements and reports at least 6 bowel movements this morning.  She denies any nausea or vomiting this morning.    O:/58 (BP Location: Right arm, Patient Position: Sitting)   Pulse 78   Temp 97 °F (36.1 °C) (Oral)   Resp 16   Ht 163.8 cm (64.49\")   Wt 70.5 kg (155 lb 6.8 oz)   LMP  (LMP Unknown)   SpO2 96%   BMI 26.28 kg/m²     Intake & Output:  UOP: Void x3  BM x9    GENERAL: alert, well appearing, and in no distress  HEENT: normocephalic, atraumatic, moist mucous membranes, clear sclerae   CHEST: clear to auscultation, no wheezes, rales or rhonchi, symmetric air entry  CARDIAC: regular rate and rhythm    ABDOMEN: Soft, nondistended, incisions clean/dry/intact with glue, mild incisional tenderness  EXTREMITIES: no cyanosis, clubbing, or edema   SKIN: Warm and moist, no rashes    LABS  Results from last 7 days   Lab Units 01/13/25  0534 01/12/25  0424 01/08/25  0620   WBC 10*3/mm3 8.91 4.86 12.13*   HEMOGLOBIN g/dL 10.3* 11.3* 11.2*   HEMATOCRIT % 29.7* 32.9* 32.3*   PLATELETS 10*3/mm3 305 270 195   MONOCYTES % % 11.1 11.3  --    EOSINOPHIL % % 1.0 0.0*  --      Results from last 7 days   Lab Units 01/13/25  0534 01/12/25  0424 01/08/25  0620   SODIUM mmol/L 130* 127* 129*   POTASSIUM mmol/L 3.7 3.6 4.3   CHLORIDE mmol/L 98 90* 99   CO2 mmol/L 18.6* 20.3* 18.7*   BUN mg/dL 23 34* 8   CREATININE mg/dL 0.83 1.20* 0.64   CALCIUM mg/dL 8.4* 9.2 8.6   GLUCOSE mg/dL 116* 171* 183*         A/P: 64 y.o. female POD#6 laparoscopic right colon resection for endoscopically unresectable tubulovillous adenoma    Her ileus is improving.  She developed a mild acute kidney injury " over the weekend with a creatinine up to 1.20, but this has improved with IV fluid resuscitation.  Continue IV fluids and advance to full liquid diet today.  If her electrolytes and creatinine remain improved/normal by tomorrow I will stop her IV fluids tomorrow    Sabi Mars MD  General, Robotic, and Endoscopic Surgery  [facility]    [address], [address]  [place] [address]  P: [phone]  F: [phone]

## 2025-01-13 NOTE — PAYOR COMM NOTE
"Jess Duque (64 y.o. Female)    PLEASE SEE ATTACHED FOR CLINICAL UPDATES    PT STILL IN HOUSE, REQUEST ADDITIONAL DAYS   REF#[id number]  THANK YOU  PATITO NELSON RN/ DEPT   [facility]  PH: [phone]  FAX:  [phone]     Date of Birth   1960    Social Security Number       Address   [address] [place] [address]    Home Phone   [phone]    MRN   [id number]       [facility]    Marital Status                               Admission Date   1/7/25    Admission Type   Elective    Admitting Provider   Sabi Mars MD    Attending Provider   Sabi Mars MD    Department, Room/Bed   [address], 93/1       Discharge Date       Discharge Disposition       Discharge Destination                                 Attending Provider: Sabi Mars MD    Allergies: Morphine, Levaquin [Levofloxacin], Adhesive Tape, Ampicillin, Ceclor [Cefaclor], Ciprofloxacin, Codeine Phosphate, Erythromycin, Ketek [Telithromycin], Penicillins, Tetracyclines & Related, Ultram [Tramadol]    Isolation: None   Infection: None   Code Status: CPR    Ht: 163.8 cm (64.49\")   Wt: 70.5 kg (155 lb 6.8 oz)    Admission Cmt: None   Principal Problem: Tubulovillous adenoma of colon [D12.6]                   Active Insurance as of 1/7/2025       Primary Coverage       Payor Plan Insurance Group Employer/Plan Group    [employer] [facility] [employer] BL[employer] PPO [id number]       Payor Plan Address Payor Plan Phone Number Payor Plan Fax Number Effective Dates    [address] [phone]  10/1/2023 - None Entered    [facility] [id number]         Subscriber Name Subscriber Birth Date Member ID       HAILE DUQUE MARTHA 1960 [id number]                     Emergency Contacts        (Rel.) Home Phone Work Phone Mobile Phone    DuqueHaile (Spouse) [phone] -- [phone]              [place]: NPI [id number]  Tax ID " [id number]  Medication Administration Report for Jess Razo as of 1/12/25 through 1/13/25     Legend:    Given Hold Not Given Due Canceled Entry Other Actions    Time Time (Time) Time Time-Action         Discontinued     Completed     Future     MAR Hold     Linked             Medications 01/12/25 01/13/25     albuterol sulfate HFA (PROVENTIL HFA;VENTOLIN HFA;PROAIR HFA) inhaler 2 puff  Dose: 2 puff  Freq: Every 4 Hours PRN Route: IN  PRN Reason: Wheezing  Start: 01/07/25 1826     Admin Instructions:   Include Respiratory Treatment Education   Shake well.          atorvastatin (LIPITOR) tablet 80 mg  Dose: 80 mg  Freq: Nightly Route: PO  Start: 01/07/25 2100     Admin Instructions:   Avoid grapefruit juice.      2007-Given 2100              bisoprolol (ZEBeta) tablet 10 mg  Dose: 10 mg  Freq: Daily Route: PO  Start: 01/08/25 0900     Admin Instructions:   Hold for SBP less than 100, DBP less than 60, or heart rate less than 50. If a dose is held, please contact the provider.  Caution: Look alike/sound alike drug alert      0917-Given            0959-Given              budesonide-formoterol (SYMBICORT) 160-4.5 MCG/ACT inhaler 2 puff  Dose: 2 puff  Freq: 2 Times Daily - RT Route: IN  Start: 01/07/25 2130     Admin Instructions:    Shake well.  Rinse mouth after use, do not swallow water.  Send aerosols to pharmacy in ziplock bag for proper disposal.      (0924)-Not Given     (2244)-Not Given [C]           0929-Given     2130             calcium carbonate (oyster shell) tablet 500 mg  Dose: 500 mg  Freq: Daily Route: PO  Start: 01/08/25 0900 0916-Given            0959-Given              cetirizine (zyrTEC) tablet 10 mg  Dose: 10 mg  Freq: Daily Route: PO  Start: 01/08/25 0900 0917-Given            0959-Given              dextrose (D50W) (25 g/50 mL) IV injection 25 g  Dose: 25 g  Freq: Every 15 Minutes PRN Route: IV  PRN Reason: Low Blood Sugar  PRN Comment: Blood Sugar Less Than 70  Start:  01/07/25 1826     Admin Instructions:   Blood sugar less than 70; patient has IV access - Unresponsive, NPO or Unable To Safely Swallow          dextrose (GLUTOSE) oral gel 15 g  Dose: 15 g  Freq: Every 15 Minutes PRN Route: PO  PRN Reason: Low Blood Sugar  PRN Comment: Blood sugar less than 70  Start: 01/07/25 1826     Admin Instructions:   BS<70, Patient Alert, Is not NPO, Can safely swallow.          digoxin (LANOXIN) tablet 250 mcg  Dose: 250 mcg  Freq: Nightly Route: PO  Start: 01/07/25 2100     Admin Instructions:    Check and record heart rate.      2007-Given            2100              dilTIAZem CD (CARDIZEM CD) 24 hr capsule 360 mg  Dose: 360 mg  Freq: Every 24 Hours Scheduled Route: PO  Start: 01/08/25 0900     Admin Instructions:   Caution: Look alike/sound alike drug alert.   Swallow capsule whole. Do not crush, chew, or open capsule. Avoid grapefruit juice. Maximum simvastatin dose 10 mg while taking dilTIAZem.      0916-Given 0959-Given              docusate sodium (COLACE) capsule 100 mg  Dose: 100 mg  Freq: 2 Times Daily Route: PO  Start: 01/07/25 2100     Admin Instructions:   Swallow whole.  Do not open, crush, or chew capsule.      0916-Given 2007-Given 0959-Given     2100             Enoxaparin Sodium (LOVENOX) syringe 40 mg  Dose: 40 mg  Freq: Daily Route: SC  Indications of Use: PROPHYLAXIS OF VENOUS THROMBOEMBOLISM  Start: 01/13/25 1300     Admin Instructions:   Give subcutaneous in abdomen only. Do not massage site after injection.       1442-Given              FLUoxetine (PROzac) capsule 40 mg  Dose: 40 mg  Freq: Nightly Route: PO  Start: 01/07/25 2100     Admin Instructions:   Caution: Look alike/sound alike drug alert      2007-Given 2100              glucagon (GLUCAGEN) injection 1 mg  Dose: 1 mg  Freq: Every 15 Minutes PRN Route: IM  PRN Reason: Low Blood Sugar  PRN Comment: Blood Glucose Less Than 70  Start: 01/07/25 1826     Admin Instructions:    Blood Glucose Less Than 70 - Patient Without IV Access - Unresponsive, NPO or Unable To Safely Swallow  Reconstitute powder for injection by adding 1 mL of -supplied sterile diluent or sterile water for injection to a vial containing 1 mg of the drug, to provide solutions containing 1 mg/mL. Shake vial gently to dissolve.           HYDROmorphone (DILAUDID) injection 0.5 mg  Dose: 0.5 mg  Freq: Every 2 Hours PRN Route: IV  PRN Reason: Severe Pain  Start: 01/07/25 1826     Admin Instructions:   Based on patient request - if ordered for moderate or severe pain, provider allows for administration of a medication prescribed for a lower pain scale.  If given for pain, use the following pain scale:  Mild Pain = Pain Score of 1-3, CPOT 1-2  Moderate Pain = Pain Score of 4-6, CPOT 3-4  Severe Pain = Pain Score of 7-10, CPOT 5-8          And   naloxone (NARCAN) injection 0.4 mg  Dose: 0.4 mg  Freq: Every 5 Minutes PRN Route: IV  PRN Reason: Respiratory Depression  Start: 01/07/25 1826     Admin Instructions:   If respiratory rate is less than 8 breaths/minute or patient is difficult to arouse stop any narcotics and contact physician.   Administer slow IV push. Repeat as ordered until patient's respiratory rate is greater than 12 breaths/minute.          ibuprofen (ADVIL,MOTRIN) tablet 400 mg  Dose: 400 mg  Freq: Every 6 Hours PRN Route: PO  PRN Reason: Mild Pain  Start: 01/07/25 1826     Admin Instructions:   Based on patient request - if ordered for moderate or severe pain, provider allows for administration of a medication prescribed for a lower pain scale.    Mucous membrane irritant. Do not crush or chew tablet or capsule unless administered through a feeding tube.  If given for pain, use the following pain scale:  Mild Pain = Pain Score of 1-3, CPOT 1-2  Moderate Pain = Pain Score of 4-6, CPOT 3-4  Severe Pain = Pain Score of 7-10, CPOT 5-8      1822-Given            0554-Given     1651-Given              insulin lispro (HUMALOG/ADMELOG) injection 2-7 Units  Dose: 2-7 Units  Freq: 4 Times Daily Before Meals & Nightly Route: SC  Start: 01/07/25 2100     Admin Instructions:   Correction Insulin - Low Dose - Total Insulin Dose Less Than 40 units/day (Lean, Elderly or Renal Patients)    Blood Glucose 150-199 mg/dL - 2 units  Blood Glucose 200-249 mg/dL - 3 units  Blood Glucose 250-299 mg/dL - 4 units  Blood Glucose 300-349 mg/dL - 5 units  Blood Glucose 350-400 mg/dL - 6 units  Blood Glucose Greater Than 400 mg/dL - 7 units & Call Provider   Caution: Look alike/sound alike drug alert      0928-Given     (1230)-Not Given [C]     (1819)-Not Given [C]     (2235)-Not Given         (1000)-Not Given     (1442)-Not Given     1730 2100           levothyroxine (SYNTHROID, LEVOTHROID) tablet 50 mcg  Dose: 50 mcg  Freq: Every Early Morning Route: PO  Start: 01/08/25 0600     Admin Instructions:   Take on empty stomach.      0623-Given            0550-Given              metFORMIN ER (GLUCOPHAGE-XR) 24 hr tablet 500 mg  Dose: 500 mg  Freq: Nightly Route: PO  Start: 01/07/25 2100     Admin Instructions:   Do not crush or chew the capsules or tablets. The drug may not work as designed if the capsule or tablet is crushed or chewed. Swallow whole.  Caution: Look alike/sound alike drug alert. Do not crush, split, or chew.      2007-Given            2100              ondansetron (ZOFRAN) injection 4 mg  Dose: 4 mg  Freq: Every 6 Hours PRN Route: IV  PRN Reasons: Nausea,Vomiting  Start: 01/07/25 1826     Admin Instructions:   If BOTH ondansetron (ZOFRAN) and promethazine (PHENERGAN) are ordered use ondansetron first and THEN promethazine IF ondansetron is ineffective.      0624-Given               oxyCODONE-acetaminophen (PERCOCET) 5-325 MG per tablet 1 tablet  Dose: 1 tablet  Freq: Every 4 Hours PRN Route: PO  PRN Reason: Moderate Pain  Start: 01/07/25 1826     Admin Instructions:   Based on patient request - if ordered for moderate  "or severe pain, provider allows for administration of a medication prescribed for a lower pain scale.  [KEV]    Do not exceed 4 grams of acetaminophen in a 24 hr period. Max dose of 2gm for AST/ALT greater than 120 units/L        If given for pain, use the following pain scale:   Mild Pain = Pain Score of 1-3, CPOT 1-2  Moderate Pain = Pain Score of 4-6, CPOT 3-4  Severe Pain = Pain Score of 7-10, CPOT 5-8          pantoprazole (PROTONIX) EC tablet 40 mg  Dose: 40 mg  Freq: Every Early Morning Route: PO  Start: 01/08/25 0600     Admin Instructions:   Do not crush or chew the capsules or tablets. The drug may not work as designed if the capsule or tablet is crushed or chewed. Swallow whole.  Swallow whole; do not crush, split, or chew.      0624-Given            0550-Given              prochlorperazine (COMPAZINE) injection 5 mg  Dose: 5 mg  Freq: Every 6 Hours PRN Route: IV  PRN Reasons: Nausea,Vomiting  Start: 01/09/25 1251     Admin Instructions:   \"If multiple N/V medications ordered, use in the following order: Ondansetron, Prochlorperazine, Promethazine. Use PO unless patient refuses or patient unable to swallow.\"      0249-Given     0913-Given              sodium chloride 0.9 % flush 10 mL  Dose: 10 mL  Freq: As Needed Route: IV  PRN Reason: Line Care  Start: 01/07/25 1826          sodium chloride 0.9 % flush 10 mL  Dose: 10 mL  Freq: Every 12 Hours Scheduled Route: IV  Start: 01/07/25 2100      (0917)-Not Given     2014-Given           0900 2100             sodium chloride 0.9 % infusion  Rate: 125 mL/hr Dose: 125 mL/hr  Freq: Continuous Route: IV  Start: 01/12/25 1745 End: 01/15/25 2006      2007-New Bag            0405-New Bag     1151-New Bag             valsartan (DIOVAN) tablet 160 mg  Dose: 160 mg  Freq: Daily Route: PO  Start: 01/08/25 0900     Admin Instructions:   Hold for SBP less than 100, DBP less than 60, or heart rate less than 50. If a dose is held, please contact the provider.      " 0916-Given            0958-Given              vitamin E capsule 1,000 Units  Dose: 1,000 Units  Freq: Daily Route: PO  Start: 01/08/25 0900      0916-Given            0957-Given              Completed Medications  Medications 01/12/25 01/13/25      alvimopan (ENTEREG) capsule 12 mg  Dose: 12 mg  Freq: Once Route: PO  Start: 01/07/25 1138 End: 01/07/25 1227     Admin Instructions:   Contraindicated in end stage renal failure or severe hepatic impairment.   Administer 30 minutes to 5 hours prior to surgery  Inpatient use only, max 15 total doses (pre and post op).  Discontinue after first BM or when narcotics have been discontinued.     Order specific questions:   Is this medication for a partial large or small bowel resection with primary anastomosis? Yes  Has the patient recieved daily opioids for the past 7 days? No  Have you received education on the benefits and risks of alvimopan (Entereg)? Yes          ceFAZolin 2000 mg IVPB in 100 mL NS (MBP)  Dose: 2,000 mg  Freq: Once Route: IV  Indications of Use: PERIOPERATIVE PHARMACOPROPHYLAXIS  Start: 01/07/25 1323 End: 01/07/25 1405     Admin Instructions:   Caution: Look alike/sound alike drug alert          famotidine (PEPCID) injection 20 mg  Dose: 20 mg  Freq: Once Route: IV  Start: 01/07/25 1144 End: 01/07/25 1239     Admin Instructions:   Give IV push over 2 minutes.          HYDROcodone-acetaminophen (NORCO) 5-325 MG per tablet 1 tablet  Dose: 1 tablet  Freq: Once As Needed Route: PO  PRN Reason: Moderate Pain  Start: 01/07/25 1621 End: 01/07/25 1725     Admin Instructions:   Based on patient request - if ordered for moderate or severe pain, provider allows for administration of a medication prescribed for a lower pain scale.  [KEV]    Do not exceed 4 grams of acetaminophen in a 24 hr period. Max dose of 2gm for AST/ALT greater than 120 units/L        If given for pain, use the following pain scale:   Mild Pain = Pain Score of 1-3, CPOT 1-2  Moderate Pain = Pain  Score of 4-6, CPOT 3-4  Severe Pain = Pain Score of 7-10, CPOT 5-8          metroNIDAZOLE (FLAGYL) IVPB 500 mg  Dose: 500 mg  Freq: Once Route: IV  Indications of Use: PERIOPERATIVE PHARMACOPROPHYLAXIS  Start: 01/07/25 1323 End: 01/07/25 1405     Admin Instructions:   Caution: Look alike/sound alike drug alert.  Do not refrigerate.          midazolam (VERSED) injection 1 mg  Dose: 1 mg  Freq: Every 5 Minutes PRN Route: IV  PRN Comment: Anxiety prophylaxis, Pre-op comfort  Start: 01/07/25 1141 End: 01/07/25 1302     Admin Instructions:   May repeat dose in 5 minutes one time then contact provider for additional orders.            sodium chloride 0.9 % infusion  Freq: Continuous PRN  Start: 01/07/25 1451 End: 01/07/25 1626          Discontinued Medications  Medications 01/12/25 01/13/25      acetaminophen (TYLENOL) tablet 1,000 mg  Dose: 1,000 mg  Freq: Every 6 Hours Route: PO  Start: 01/07/25 1136 End: 01/07/25 1643     Admin Instructions:   If given for fever, use fever parameter: fever greater than 100.4 °F  Based on patient request - if ordered for moderate or severe pain, provider allows for administration of a medication prescribed for a lower pain scale.    Do not exceed 4 grams of acetaminophen in a 24 hr period. Max dose of 2gm for AST/ALT greater than 120 units/L.    If given for pain, use the following pain scale:   Mild Pain = Pain Score of 1-3, CPOT 1-2  Moderate Pain = Pain Score of 4-6, CPOT 3-4  Severe Pain = Pain Score of 7-10, CPOT 5-8          alvimopan (ENTEREG) capsule 12 mg  Dose: 12 mg  Freq: 2 Times Daily Route: PO  Start: 01/07/25 2100 End: 01/08/25 0132     Admin Instructions:   Contraindicated in end stage renal failure or severe hepatic impairment.   Administer 30 minutes to 5 hours prior to surgery  Inpatient use only, max 15 total doses (pre and post op).  Discontinue after first BM or when narcotics have been discontinued.     Order specific questions:   Is this medication for a partial  large or small bowel resection with primary anastomosis? Yes  Has the patient recieved daily opioids for the past 7 days? No  Have you received education on the benefits and risks of alvimopan (Entereg)? Yes          Atropine Sulfate (PF) injection 0.4 mg  Dose: 0.4 mg  Freq: Once As Needed Route: IV  PRN Reason: Bradycardia  PRN Comment: symptomatic bradycardia (hypotension, dizziness, confusion). Notify attending anesthesiologist.  Start: 01/07/25 1621 End: 01/07/25 1820          bupivacaine-EPINEPHrine PF (MARCAINE w/EPI) 0.5% -1:200000 injection  Freq: As Needed  Start: 01/07/25 1451 End: 01/07/25 1630          dextrose 5 % and sodium chloride 0.45 % with KCl 10 mEq/L infusion  Rate: 100 mL/hr Dose: 100 mL/hr  Freq: Continuous Route: IV  Start: 01/11/25 1945 End: 01/12/25 1659      0913-New Bag               dextrose 5 % and sodium chloride 0.45 % with KCl 20 mEq/L infusion  Rate: 75 mL/hr Dose: 75 mL/hr  Freq: Continuous Route: IV  Start: 01/07/25 1915 End: 01/08/25 1650          diphenhydrAMINE (BENADRYL) injection 12.5 mg  Dose: 12.5 mg  Freq: Every 15 Minutes PRN Route: IV  PRN Reason: Itching  PRN Comment: May repeat x 1  Start: 01/07/25 1621 End: 01/07/25 1820     Admin Instructions:   Caution: Look alike/sound alike drug alert. This med may be ordered in other forms and routes. Before giving verify the last time the drug was given by any route/form.          ePHEDrine injection 5 mg  Dose: 5 mg  Freq: Once As Needed Route: IV  PRN Comment: symptomatic hypotension - Notify attending anesthesiologist if this needs to be given  Start: 01/07/25 1621 End: 01/07/25 1820     Admin Instructions:   Caution: Look alike/sound alike drug alert   Dilute with NS to 5-10 mg/mL.  Central line preferred, if unavailable use large bore IV access with frequent nurse monitoring of IV site.          fentaNYL citrate (PF) (SUBLIMAZE) injection 25 mcg  Dose: 25 mcg  Freq: Every 5 Minutes PRN Route: IV  PRN Reason: Severe  Pain  Start: 01/07/25 1621 End: 01/07/25 1820     Admin Instructions:   Maximum total dose of fentanyl is 200 mcg.  If given for pain, use the following pain scale:  Mild Pain = Pain Score of 1-3, CPOT 1-2  Moderate Pain = Pain Score of 4-6, CPOT 3-4  Severe Pain = Pain Score of 7-10, CPOT 5-8          flumazenil (ROMAZICON) injection 0.2 mg  Dose: 0.2 mg  Freq: As Needed Route: IV  PRN Comment: for benzodiazepine induced unresponsiveness or sedation  Start: 01/07/25 1621 End: 01/07/25 1820     Admin Instructions:   Notify Anesthesia if given  ** give IV over 15-30 seconds **          hydrALAZINE (APRESOLINE) injection 5 mg  Dose: 5 mg  Freq: Every 10 Minutes PRN Route: IV  PRN Reason: High Blood Pressure  PRN Comment: for systolic blood pressure greater than 180 mmHg or diastolic blood pressure greater than 105 mmHg  Start: 01/07/25 1621 End: 01/07/25 1820     Admin Instructions:   Up to 20 mg. If labetalol and hydralazine are both ordered, use labetalol first.  Caution: Look alike/sound alike drug alert          HYDROcodone-acetaminophen (NORCO) 7.5-325 MG per tablet 1 tablet  Dose: 1 tablet  Freq: Every 4 Hours PRN Route: PO  PRN Reason: Severe Pain  Start: 01/07/25 1621 End: 01/07/25 1820     Admin Instructions:   Based on patient request - if ordered for moderate or severe pain, provider allows for administration of a medication prescribed for a lower pain scale.  [KEV]    Do not exceed 4 grams of acetaminophen in a 24 hr period. Max dose of 2gm for AST/ALT greater than 120 units/L        If given for pain, use the following pain scale:   Mild Pain = Pain Score of 1-3, CPOT 1-2  Moderate Pain = Pain Score of 4-6, CPOT 3-4  Severe Pain = Pain Score of 7-10, CPOT 5-8          HYDROmorphone (DILAUDID) injection 0.25 mg  Dose: 0.25 mg  Freq: Every 5 Minutes PRN Route: IV  PRN Reason: Moderate Pain  Start: 01/07/25 1621 End: 01/07/25 1820     Admin Instructions:   Maximum total dose of hydromorphone is 2 mg.  If  given for pain, use the following pain scale:  Mild Pain = Pain Score of 1-3, CPOT 1-2  Moderate Pain = Pain Score of 4-6, CPOT 3-4  Severe Pain = Pain Score of 7-10, CPOT 5-8          ipratropium-albuterol (DUO-NEB) nebulizer solution 3 mL  Dose: 3 mL  Freq: Once As Needed Route: NEBULIZATION  PRN Reasons: Wheezing,Shortness of Air  PRN Comment: bronchospasm  Start: 01/07/25 1621 End: 01/07/25 1820     Admin Instructions:   Notify Anesthesia if minineb is given          labetalol (NORMODYNE,TRANDATE) injection 5 mg  Dose: 5 mg  Freq: Every 5 Minutes PRN Route: IV  PRN Reason: High Blood Pressure  PRN Comment: for systolic blood pressure greater than 180 mmHg or diastolic blood pressure greater than 105 mmHg  Start: 01/07/25 1621 End: 01/07/25 1820     Admin Instructions:   Hold for heart rate less than 60.    If labetalol and hydralazine are both ordered, use labetalol first. Maximum dose of labetalol is 20mg IV while in PACU, notify anesthesiologist for further orders if needed.  Give IV Push over 2 minutes.          lactated ringers infusion  Rate: 9 mL/hr Dose: 9 mL/hr  Freq: Continuous Route: IV  Start: 01/07/25 1144 End: 01/07/25 1826          lidocaine (XYLOCAINE) 1 % injection 0.5 mL  Dose: 0.5 mL  Freq: Once As Needed Route: ID  PRN Comment: IV Start  Start: 01/07/25 1141 End: 01/07/25 1643          naloxone (NARCAN) injection 0.2 mg  Dose: 0.2 mg  Freq: As Needed Route: IV  PRN Reasons: Opioid Reversal,Respiratory Depression  PRN Comment: unresponsiveness, decrease oxygen saturation  Indications of Use: ACUTE RESPIRATORY FAILURE,OPIOID-INDUCED RESPIRATORY DEPRESSION  Start: 01/07/25 1621 End: 01/07/25 1820     Admin Instructions:   Notify Anesthesia if given          ondansetron (ZOFRAN) injection 4 mg  Dose: 4 mg  Freq: Once As Needed Route: IV  PRN Reasons: Nausea,Vomiting  Indications of Use: POSTOPERATIVE NAUSEA AND VOMITING  Start: 01/07/25 1621 End: 01/07/25 1820     Admin Instructions:   If BOTH  "ondansetron (ZOFRAN) and promethazine (PHENERGAN) are ordered use ondansetron first and THEN promethazine IF ondansetron is ineffective.           promethazine (PHENERGAN) suppository 25 mg  Dose: 25 mg  Freq: Once As Needed Route: RE  PRN Reasons: Nausea,Vomiting  Start: 01/07/25 1621 End: 01/07/25 1820     Admin Instructions:   If BOTH ondansetron (ZOFRAN) and promethazine (PHENERGAN) are ordered use ondansetron first and THEN promethazine IF ondansetron is ineffective.          Or   promethazine (PHENERGAN) tablet 25 mg  Dose: 25 mg  Freq: Once As Needed Route: PO  PRN Reasons: Nausea,Vomiting  Start: 01/07/25 1621 End: 01/07/25 1820     Admin Instructions:   If BOTH ondansetron (ZOFRAN) and promethazine (PHENERGAN) are ordered use ondansetron first and THEN promethazine IF ondansetron is ineffective.            sodium chloride (NS) irrigation solution  Freq: As Needed  Start: 01/07/25 1451 End: 01/07/25 1630          sodium chloride 0.9 % flush 3 mL  Dose: 3 mL  Freq: Every 12 Hours Scheduled Route: IV  Start: 01/07/25 1144 End: 01/07/25 1643          sodium chloride 0.9 % flush 3-10 mL  Dose: 3-10 mL  Freq: As Needed Route: IV  PRN Reason: Line Care  Start: 01/07/25 1141 End: 01/07/25 1643                         Physician Progress Notes (last 48 hours)        Sabi Mars MD at 01/13/25 1234          General Surgery  Progress Note    CC: Follow-up endoscopically unresectable tubulovillous adenoma    POD#6 laparoscopic right colon resection    S: She developed an ileus over the weekend, not surprisingly, and had multiple episodes of nausea with vomiting and no flatus or bowel movement.  Overnight, she began to have multiple liquid bowel movements and reports at least 6 bowel movements this morning.  She denies any nausea or vomiting this morning.    O:/58 (BP Location: Right arm, Patient Position: Sitting)   Pulse 78   Temp 97 °F (36.1 °C) (Oral)   Resp 16   Ht 163.8 cm (64.49\")   Wt 70.5 kg " (155 lb 6.8 oz)   LMP  (LMP Unknown)   SpO2 96%   BMI 26.28 kg/m²     Intake & Output:  UOP: Void x3  BM x9    GENERAL: alert, well appearing, and in no distress  HEENT: normocephalic, atraumatic, moist mucous membranes, clear sclerae   CHEST: clear to auscultation, no wheezes, rales or rhonchi, symmetric air entry  CARDIAC: regular rate and rhythm    ABDOMEN: Soft, nondistended, incisions clean/dry/intact with glue, mild incisional tenderness  EXTREMITIES: no cyanosis, clubbing, or edema   SKIN: Warm and moist, no rashes    LABS  Results from last 7 days   Lab Units 01/13/25  0534 01/12/25  0424 01/08/25  0620   WBC 10*3/mm3 8.91 4.86 12.13*   HEMOGLOBIN g/dL 10.3* 11.3* 11.2*   HEMATOCRIT % 29.7* 32.9* 32.3*   PLATELETS 10*3/mm3 305 270 195   MONOCYTES % % 11.1 11.3  --    EOSINOPHIL % % 1.0 0.0*  --      Results from last 7 days   Lab Units 01/13/25  0534 01/12/25  0424 01/08/25  0620   SODIUM mmol/L 130* 127* 129*   POTASSIUM mmol/L 3.7 3.6 4.3   CHLORIDE mmol/L 98 90* 99   CO2 mmol/L 18.6* 20.3* 18.7*   BUN mg/dL 23 34* 8   CREATININE mg/dL 0.83 1.20* 0.64   CALCIUM mg/dL 8.4* 9.2 8.6   GLUCOSE mg/dL 116* 171* 183*         A/P: 64 y.o. female POD#6 laparoscopic right colon resection for endoscopically unresectable tubulovillous adenoma    Her ileus is improving.  She developed a mild acute kidney injury over the weekend with a creatinine up to 1.20, but this has improved with IV fluid resuscitation.  Continue IV fluids and advance to full liquid diet today.  If her electrolytes and creatinine remain improved/normal by tomorrow I will stop her IV fluids tomorrow    Sabi Mars MD  General, Robotic, and Endoscopic Surgery  [facility]    [address], [address]  [place] [address]  P: [phone]  F: [phone]       Electronically signed by Sabi Mars MD at 01/13/25 1236       Smiley Simon MD at 01/12/25 0949          General Surgery    Postoperative day 4 status  post laparoscopic right colon resection    Continued emesis yesterday.  Patient refused an NG tube to try today. IVF restarted. One BM yesterday this morning.    Afebrile, heart rate 93, blood pressure 118/75  On room air, sitting in the chair comfortably  Abdomen is distended, soft, incisions in good order    WBC 4 Hgb 11 platelets 270 Cr 1.2    Plan:  Now with an ileus. NPO, IVF.  I attempted an NG tube placement at the bedside with multiple attempts.  Due to what appears to be aberrant anatomy, I was unable to place this.  She did have a small bowel movement this morning.  Will hold off on NG tube placement and fluoroscopy for now and continue to manage symptomatically.  May need to go to fluoroscopy tomorrow for NG tube placement.  As needed pain and nausea meds.  Out of bed and ambulate as tolerated    Smiley Simon MD    Electronically signed by Smiley Simno MD at 01/12/25 1233       Consult Notes (last 48 hours)  Notes from 01/11/25 1741 through 01/13/25 1741   No notes of this type exist for this encounter.

## 2025-01-13 NOTE — PROGRESS NOTES
Continued Stay Note  [facility]     Patient Name: Jess Razo  MRN: [id number]  Today's Date: 1/13/2025    Admit Date: 1/7/2025    Plan: Home no needs   Discharge Plan       Row Name 01/13/25 1626       Plan    Plan Home no needs    Plan Comments CCP is continuing to follow for poss needs/equipment                   Discharge Codes    No documentation.                 Expected Discharge Date and Time       Expected Discharge Date Expected Discharge Time    Riley 15, 2025               Danette Rod RN

## 2025-01-13 NOTE — PLAN OF CARE
Goal Outcome Evaluation:  Plan of Care Reviewed With: patient        Progress: no change  Outcome Evaluation: vss, no c/o pain or n/v since last night, having liquid stool x3, keep npo, iv fluid infusing, abdomen distended, encourage to use incentive spirometer, continue to monitor the pt.

## 2025-01-14 ENCOUNTER — APPOINTMENT (OUTPATIENT)
Dept: GENERAL RADIOLOGY | Facility: HOSPITAL | Age: 65
DRG: 330 | End: 2025-01-14
Payer: COMMERCIAL

## 2025-01-14 LAB
ANION GAP SERPL CALCULATED.3IONS-SCNC: 12 MMOL/L (ref 5–15)
BASOPHILS # BLD MANUAL: 0.11 10*3/MM3 (ref 0–0.2)
BASOPHILS NFR BLD MANUAL: 1 % (ref 0–1.5)
BUN SERPL-MCNC: 9 MG/DL (ref 8–23)
BUN/CREAT SERPL: 17.3 (ref 7–25)
CALCIUM SPEC-SCNC: 8.6 MG/DL (ref 8.6–10.5)
CHLORIDE SERPL-SCNC: 102 MMOL/L (ref 98–107)
CO2 SERPL-SCNC: 17 MMOL/L (ref 22–29)
CREAT SERPL-MCNC: 0.52 MG/DL (ref 0.57–1)
DEPRECATED RDW RBC AUTO: 42.9 FL (ref 37–54)
EGFRCR SERPLBLD CKD-EPI 2021: 104.5 ML/MIN/1.73
EOSINOPHIL # BLD MANUAL: 0.11 10*3/MM3 (ref 0–0.4)
EOSINOPHIL NFR BLD MANUAL: 1 % (ref 0.3–6.2)
ERYTHROCYTE [DISTWIDTH] IN BLOOD BY AUTOMATED COUNT: 13 % (ref 12.3–15.4)
GLUCOSE BLDC GLUCOMTR-MCNC: 111 MG/DL (ref 70–130)
GLUCOSE BLDC GLUCOMTR-MCNC: 113 MG/DL (ref 70–130)
GLUCOSE BLDC GLUCOMTR-MCNC: 116 MG/DL (ref 70–130)
GLUCOSE BLDC GLUCOMTR-MCNC: 119 MG/DL (ref 70–130)
GLUCOSE SERPL-MCNC: 114 MG/DL (ref 65–99)
HCT VFR BLD AUTO: 31.6 % (ref 34–46.6)
HGB BLD-MCNC: 10.4 G/DL (ref 12–15.9)
LYMPHOCYTES # BLD MANUAL: 0.91 10*3/MM3 (ref 0.7–3.1)
LYMPHOCYTES NFR BLD MANUAL: 9.1 % (ref 5–12)
MCH RBC QN AUTO: 30.5 PG (ref 26.6–33)
MCHC RBC AUTO-ENTMCNC: 32.9 G/DL (ref 31.5–35.7)
MCV RBC AUTO: 92.7 FL (ref 79–97)
MONOCYTES # BLD: 1.03 10*3/MM3 (ref 0.1–0.9)
MYELOCYTES NFR BLD MANUAL: 2 % (ref 0–0)
NEUTROPHILS # BLD AUTO: 8.88 10*3/MM3 (ref 1.7–7)
NEUTROPHILS NFR BLD MANUAL: 78.8 % (ref 42.7–76)
NRBC BLD AUTO-RTO: 0 /100 WBC (ref 0–0.2)
NRBC SPEC MANUAL: 1 /100 WBC (ref 0–0.2)
PLAT MORPH BLD: NORMAL
PLATELET # BLD AUTO: 326 10*3/MM3 (ref 140–450)
PMV BLD AUTO: 9.5 FL (ref 6–12)
POIKILOCYTOSIS BLD QL SMEAR: ABNORMAL
POTASSIUM SERPL-SCNC: 3.7 MMOL/L (ref 3.5–5.2)
RBC # BLD AUTO: 3.41 10*6/MM3 (ref 3.77–5.28)
SODIUM SERPL-SCNC: 131 MMOL/L (ref 136–145)
VARIANT LYMPHS NFR BLD MANUAL: 8.1 % (ref 19.6–45.3)
WBC MORPH BLD: NORMAL
WBC NRBC COR # BLD AUTO: 11.27 10*3/MM3 (ref 3.4–10.8)

## 2025-01-14 PROCEDURE — 85007 BL SMEAR W/DIFF WBC COUNT: CPT | Performed by: SURGERY

## 2025-01-14 PROCEDURE — 25010000002 ONDANSETRON PER 1 MG: Performed by: SURGERY

## 2025-01-14 PROCEDURE — 94799 UNLISTED PULMONARY SVC/PX: CPT

## 2025-01-14 PROCEDURE — 74018 RADEX ABDOMEN 1 VIEW: CPT

## 2025-01-14 PROCEDURE — 82948 REAGENT STRIP/BLOOD GLUCOSE: CPT

## 2025-01-14 PROCEDURE — 25010000002 HYDROMORPHONE PER 4 MG: Performed by: SURGERY

## 2025-01-14 PROCEDURE — 85025 COMPLETE CBC W/AUTO DIFF WBC: CPT | Performed by: SURGERY

## 2025-01-14 PROCEDURE — 25010000002 PROCHLORPERAZINE 10 MG/2ML SOLUTION: Performed by: SURGERY

## 2025-01-14 PROCEDURE — 25810000003 SODIUM CHLORIDE 0.9 % SOLUTION: Performed by: SURGERY

## 2025-01-14 PROCEDURE — 80048 BASIC METABOLIC PNL TOTAL CA: CPT | Performed by: SURGERY

## 2025-01-14 PROCEDURE — 25010000002 ENOXAPARIN PER 10 MG: Performed by: SURGERY

## 2025-01-14 PROCEDURE — 99024 POSTOP FOLLOW-UP VISIT: CPT | Performed by: SURGERY

## 2025-01-14 RX ADMIN — ATORVASTATIN CALCIUM 80 MG: 20 TABLET, FILM COATED ORAL at 21:39

## 2025-01-14 RX ADMIN — IBUPROFEN 400 MG: 400 TABLET ORAL at 04:43

## 2025-01-14 RX ADMIN — ONDANSETRON 4 MG: 2 INJECTION, SOLUTION INTRAMUSCULAR; INTRAVENOUS at 11:41

## 2025-01-14 RX ADMIN — CETIRIZINE HYDROCHLORIDE 10 MG: 10 TABLET, FILM COATED ORAL at 11:23

## 2025-01-14 RX ADMIN — SODIUM CHLORIDE 125 ML/HR: 9 INJECTION, SOLUTION INTRAVENOUS at 11:41

## 2025-01-14 RX ADMIN — Medication 10 ML: at 20:49

## 2025-01-14 RX ADMIN — PROCHLORPERAZINE EDISYLATE 5 MG: 5 INJECTION INTRAMUSCULAR; INTRAVENOUS at 06:27

## 2025-01-14 RX ADMIN — PANTOPRAZOLE SODIUM 40 MG: 40 TABLET, DELAYED RELEASE ORAL at 06:27

## 2025-01-14 RX ADMIN — SODIUM CHLORIDE 125 ML/HR: 9 INJECTION, SOLUTION INTRAVENOUS at 03:25

## 2025-01-14 RX ADMIN — Medication 500 MG: at 11:22

## 2025-01-14 RX ADMIN — DIGOXIN 250 MCG: 0.12 TABLET ORAL at 21:39

## 2025-01-14 RX ADMIN — LEVOTHYROXINE SODIUM 50 MCG: 50 TABLET ORAL at 06:27

## 2025-01-14 RX ADMIN — VALSARTAN 160 MG: 80 TABLET, FILM COATED ORAL at 11:21

## 2025-01-14 RX ADMIN — ENOXAPARIN SODIUM 40 MG: 100 INJECTION SUBCUTANEOUS at 11:23

## 2025-01-14 RX ADMIN — BUDESONIDE AND FORMOTEROL FUMARATE DIHYDRATE 2 PUFF: 160; 4.5 AEROSOL RESPIRATORY (INHALATION) at 21:41

## 2025-01-14 RX ADMIN — OXYCODONE AND ACETAMINOPHEN 1 TABLET: 5; 325 TABLET ORAL at 11:38

## 2025-01-14 RX ADMIN — FLUOXETINE HYDROCHLORIDE 40 MG: 20 CAPSULE ORAL at 21:39

## 2025-01-14 RX ADMIN — DILTIAZEM HYDROCHLORIDE 360 MG: 120 CAPSULE, COATED, EXTENDED RELEASE ORAL at 11:22

## 2025-01-14 RX ADMIN — DOCUSATE SODIUM 100 MG: 100 CAPSULE, LIQUID FILLED ORAL at 11:21

## 2025-01-14 RX ADMIN — BISOPROLOL FUMARATE 10 MG: 5 TABLET ORAL at 11:21

## 2025-01-14 RX ADMIN — ONDANSETRON 4 MG: 2 INJECTION, SOLUTION INTRAMUSCULAR; INTRAVENOUS at 02:38

## 2025-01-14 RX ADMIN — HYDROMORPHONE HYDROCHLORIDE 0.5 MG: 1 INJECTION, SOLUTION INTRAMUSCULAR; INTRAVENOUS; SUBCUTANEOUS at 20:49

## 2025-01-14 RX ADMIN — Medication 1000 UNITS: at 11:23

## 2025-01-14 NOTE — PLAN OF CARE
Goal Outcome Evaluation:  Plan of Care Reviewed With: patient        Progress: improving  Outcome Evaluation: Patient is A&Ox4, VSS, up ad rozina. Vomited x2 tonight so far. One unmeasured account (vomited on floor) and 50ccs a while after. Zofran and Compazine given. Percocet and ibuprofen given for pain.  at bedside. Ambulating in joyce. Refused stool softener tonight. No SSI needed. IVFs infusing without difficulty. POC ongoing.

## 2025-01-14 NOTE — PROGRESS NOTES
"General Surgery  Progress Note    CC: Follow-up endoscopically unresectable tubulovillous adenoma    POD#7 laparoscopic right colon resection    S: She had multiple loose bowel movements yesterday, but no bowel movements at all today.  She had 2 episodes of vomiting last night and a smaller episode of vomiting this morning.  She has been up walking in the halls and denies any abdominal pain, but her abdomen has become more distended.    O:/69 (BP Location: Left arm, Patient Position: Sitting)   Pulse 84   Temp 97.7 °F (36.5 °C) (Oral)   Resp 16   Ht 163.8 cm (64.49\")   Wt 70.5 kg (155 lb 6.8 oz)   LMP  (LMP Unknown)   SpO2 96%   BMI 26.28 kg/m²     Intake & Output:  UOP: Void x11 yesterday  BM x 6 yesterday    GENERAL: alert, well appearing, and in no distress  HEENT: normocephalic, atraumatic, moist mucous membranes, clear sclerae   CHEST: clear to auscultation, no wheezes, rales or rhonchi, symmetric air entry  CARDIAC: regular rate and rhythm    ABDOMEN: Soft, distended, incisions clean/dry/intact with glue, nontender with no evidence of peritonitis  EXTREMITIES: no cyanosis, clubbing, or edema   SKIN: Warm and moist, no rashes    LABS  Results from last 7 days   Lab Units 01/14/25  0609 01/13/25  0534 01/12/25  0424   WBC 10*3/mm3 11.27* 8.91 4.86   HEMOGLOBIN g/dL 10.4* 10.3* 11.3*   HEMATOCRIT % 31.6* 29.7* 32.9*   PLATELETS 10*3/mm3 326 305 270   MONOCYTES % % 9.1 11.1 11.3   EOSINOPHIL % % 1.0 1.0 0.0*     Results from last 7 days   Lab Units 01/14/25  0609 01/13/25  0534 01/12/25  0424   SODIUM mmol/L 131* 130* 127*   POTASSIUM mmol/L 3.7 3.7 3.6   CHLORIDE mmol/L 102 98 90*   CO2 mmol/L 17.0* 18.6* 20.3*   BUN mg/dL 9 23 34*   CREATININE mg/dL 0.52* 0.83 1.20*   CALCIUM mg/dL 8.6 8.4* 9.2   GLUCOSE mg/dL 114* 116* 171*         A/P: 64 y.o. female POD#7 laparoscopic right colon resection for endoscopically unresectable tubulovillous adenoma    She continues to exhibit signs of an ileus.  I " have ordered a KUB to be done this evening.  I will review those images and if markedly abnormal, she may need a CT abdomen/pelvis to rule out a bowel obstruction.    Sabi Mars MD  General, Robotic, and Endoscopic Surgery  [facility]    [address], [address]  [place] [address]  P: [phone]  F: [phone]

## 2025-01-14 NOTE — PLAN OF CARE
Goal Outcome Evaluation:  Plan of Care Reviewed With: patient        Progress: no change  Outcome Evaluation: Patient continues to be uncomfortable with c/o intermittent nasuea and back pain, emesis x1, no bowel movements today, abdomen is distended, vss, afebrile, saline locked, KUB ordered.

## 2025-01-15 LAB
ANION GAP SERPL CALCULATED.3IONS-SCNC: 14.1 MMOL/L (ref 5–15)
BASOPHILS # BLD AUTO: 0.05 10*3/MM3 (ref 0–0.2)
BASOPHILS NFR BLD AUTO: 0.4 % (ref 0–1.5)
BUN SERPL-MCNC: 11 MG/DL (ref 8–23)
BUN/CREAT SERPL: 18.6 (ref 7–25)
CALCIUM SPEC-SCNC: 8.6 MG/DL (ref 8.6–10.5)
CHLORIDE SERPL-SCNC: 100 MMOL/L (ref 98–107)
CO2 SERPL-SCNC: 19.9 MMOL/L (ref 22–29)
CREAT SERPL-MCNC: 0.59 MG/DL (ref 0.57–1)
DEPRECATED RDW RBC AUTO: 41.9 FL (ref 37–54)
EGFRCR SERPLBLD CKD-EPI 2021: 101.4 ML/MIN/1.73
EOSINOPHIL # BLD AUTO: 0.06 10*3/MM3 (ref 0–0.4)
EOSINOPHIL NFR BLD AUTO: 0.5 % (ref 0.3–6.2)
ERYTHROCYTE [DISTWIDTH] IN BLOOD BY AUTOMATED COUNT: 12.7 % (ref 12.3–15.4)
GLUCOSE BLDC GLUCOMTR-MCNC: 102 MG/DL (ref 70–130)
GLUCOSE BLDC GLUCOMTR-MCNC: 105 MG/DL (ref 70–130)
GLUCOSE BLDC GLUCOMTR-MCNC: 96 MG/DL (ref 70–130)
GLUCOSE BLDC GLUCOMTR-MCNC: 97 MG/DL (ref 70–130)
GLUCOSE SERPL-MCNC: 95 MG/DL (ref 65–99)
HCT VFR BLD AUTO: 28.5 % (ref 34–46.6)
HGB BLD-MCNC: 9.6 G/DL (ref 12–15.9)
IMM GRANULOCYTES # BLD AUTO: 0.2 10*3/MM3 (ref 0–0.05)
IMM GRANULOCYTES NFR BLD AUTO: 1.6 % (ref 0–0.5)
LYMPHOCYTES # BLD AUTO: 1.34 10*3/MM3 (ref 0.7–3.1)
LYMPHOCYTES NFR BLD AUTO: 10.8 % (ref 19.6–45.3)
MCH RBC QN AUTO: 31.1 PG (ref 26.6–33)
MCHC RBC AUTO-ENTMCNC: 33.7 G/DL (ref 31.5–35.7)
MCV RBC AUTO: 92.2 FL (ref 79–97)
MONOCYTES # BLD AUTO: 1.6 10*3/MM3 (ref 0.1–0.9)
MONOCYTES NFR BLD AUTO: 12.9 % (ref 5–12)
NEUTROPHILS NFR BLD AUTO: 73.8 % (ref 42.7–76)
NEUTROPHILS NFR BLD AUTO: 9.17 10*3/MM3 (ref 1.7–7)
PLATELET # BLD AUTO: 345 10*3/MM3 (ref 140–450)
PMV BLD AUTO: 8.9 FL (ref 6–12)
POTASSIUM SERPL-SCNC: 3.7 MMOL/L (ref 3.5–5.2)
RBC # BLD AUTO: 3.09 10*6/MM3 (ref 3.77–5.28)
SODIUM SERPL-SCNC: 134 MMOL/L (ref 136–145)
WBC NRBC COR # BLD AUTO: 12.42 10*3/MM3 (ref 3.4–10.8)

## 2025-01-15 PROCEDURE — 82948 REAGENT STRIP/BLOOD GLUCOSE: CPT

## 2025-01-15 PROCEDURE — 94664 DEMO&/EVAL PT USE INHALER: CPT

## 2025-01-15 PROCEDURE — 94799 UNLISTED PULMONARY SVC/PX: CPT

## 2025-01-15 PROCEDURE — 99024 POSTOP FOLLOW-UP VISIT: CPT | Performed by: SURGERY

## 2025-01-15 PROCEDURE — 80048 BASIC METABOLIC PNL TOTAL CA: CPT | Performed by: SURGERY

## 2025-01-15 PROCEDURE — 94760 N-INVAS EAR/PLS OXIMETRY 1: CPT

## 2025-01-15 PROCEDURE — 25010000002 ENOXAPARIN PER 10 MG: Performed by: SURGERY

## 2025-01-15 PROCEDURE — 85025 COMPLETE CBC W/AUTO DIFF WBC: CPT | Performed by: SURGERY

## 2025-01-15 RX ADMIN — VALSARTAN 160 MG: 80 TABLET, FILM COATED ORAL at 10:32

## 2025-01-15 RX ADMIN — DIGOXIN 250 MCG: 0.12 TABLET ORAL at 21:49

## 2025-01-15 RX ADMIN — PANTOPRAZOLE SODIUM 40 MG: 40 TABLET, DELAYED RELEASE ORAL at 05:41

## 2025-01-15 RX ADMIN — METFORMIN HYDROCHLORIDE 500 MG: 500 TABLET, EXTENDED RELEASE ORAL at 21:49

## 2025-01-15 RX ADMIN — Medication 10 ML: at 21:50

## 2025-01-15 RX ADMIN — BUDESONIDE AND FORMOTEROL FUMARATE DIHYDRATE 2 PUFF: 160; 4.5 AEROSOL RESPIRATORY (INHALATION) at 08:19

## 2025-01-15 RX ADMIN — DILTIAZEM HYDROCHLORIDE 360 MG: 120 CAPSULE, COATED, EXTENDED RELEASE ORAL at 09:00

## 2025-01-15 RX ADMIN — CETIRIZINE HYDROCHLORIDE 10 MG: 10 TABLET, FILM COATED ORAL at 08:53

## 2025-01-15 RX ADMIN — FLUOXETINE HYDROCHLORIDE 40 MG: 20 CAPSULE ORAL at 21:49

## 2025-01-15 RX ADMIN — BUDESONIDE AND FORMOTEROL FUMARATE DIHYDRATE 2 PUFF: 160; 4.5 AEROSOL RESPIRATORY (INHALATION) at 19:39

## 2025-01-15 RX ADMIN — ATORVASTATIN CALCIUM 80 MG: 20 TABLET, FILM COATED ORAL at 21:49

## 2025-01-15 RX ADMIN — Medication 500 MG: at 08:53

## 2025-01-15 RX ADMIN — Medication 1000 UNITS: at 08:58

## 2025-01-15 RX ADMIN — IBUPROFEN 400 MG: 400 TABLET ORAL at 19:04

## 2025-01-15 RX ADMIN — BISOPROLOL FUMARATE 10 MG: 5 TABLET ORAL at 10:28

## 2025-01-15 RX ADMIN — LEVOTHYROXINE SODIUM 50 MCG: 50 TABLET ORAL at 05:41

## 2025-01-15 RX ADMIN — Medication 10 ML: at 08:55

## 2025-01-15 RX ADMIN — ENOXAPARIN SODIUM 40 MG: 100 INJECTION SUBCUTANEOUS at 08:54

## 2025-01-15 NOTE — PLAN OF CARE
Goal Outcome Evaluation:  Plan of Care Reviewed With: patient        Progress: improving  Outcome Evaluation: Alert and oriented. VSS. IV Dilaudid given once for pain. Had moderate loose  yellow BM X4  tonight.  Abdomen remain distended.   KUB done. Incision + lap sites CDI, with bruising at lower abdomen. Saline locked. Oral fluids taken and tolerated. Rested in between.

## 2025-01-15 NOTE — PLAN OF CARE
Goal Outcome Evaluation:  Plan of Care Reviewed With: patient, spouse        Progress: improving  Outcome Evaluation: VSS, on room air, voiding without issue, up in chair, ambulated in halls, had several BMs this shift, denies pain and n/v, SL, tolerating small amounts of regular diet. Abdomen is still rounded. Lap sites are CDI and approximated w/ skin glue. Able to rest some this afternoon, patient updated on plan of care.

## 2025-01-15 NOTE — CASE MANAGEMENT/SOCIAL WORK
Continued Stay Note  [facility]     Patient Name: Jess Razo  MRN: [id number]  Today's Date: 1/15/2025    Admit Date: 1/7/2025    Plan: Home with spouse   Discharge Plan       Row Name 01/15/25 1429       Plan    Plan Home with spouse    Patient/Family in Agreement with Plan yes    Plan Comments CCP spoke with the patient regarding discharge plans and she states her discharge plan remains the same; home with spouse. CCP to follow. CD, CSW.                   Discharge Codes    No documentation.                 Expected Discharge Date and Time       Expected Discharge Date Expected Discharge Time    Jan 16, 2025

## 2025-01-15 NOTE — PAYOR COMM NOTE
"Jess Duque (64 y.o. Female)    PLEASE SEE ATTACHED FOR CLINICAL UPDATES    REF#[id number]   THANK YOU  PATITO NELSON RN/ DEPT   [facility]  PH: [phone]  FAX:  [phone]     Date of Birth   1960    Social Security Number       Address   [address] [place] [address]    Home Phone   [phone]    MRN   [id number]       [employer]   [facility]    Marital Status                               Admission Date   1/7/25    Admission Type   Elective    Admitting Provider   Sabi Mars MD    Attending Provider   Sabi Mars MD    Department, Room/Bed   [address], 93/1       Discharge Date       Discharge Disposition       Discharge Destination                                 Attending Provider: Sabi Mars MD    Allergies: Morphine, Levaquin [Levofloxacin], Adhesive Tape, Ampicillin, Ceclor [Cefaclor], Ciprofloxacin, Codeine Phosphate, Erythromycin, Ketek [Telithromycin], Penicillins, Tetracyclines & Related, Ultram [Tramadol]    Isolation: None   Infection: None   Code Status: CPR    Ht: 163.8 cm (64.49\")   Wt: 70.5 kg (155 lb 6.8 oz)    Admission Cmt: None   Principal Problem: Tubulovillous adenoma of colon [D12.6]                   Active Insurance as of 1/7/2025       Primary Coverage       Payor Plan Insurance Group Employer/Plan Group    [employer] BL[employer] PPO [id number]       Payor Plan Address Payor Plan Phone Number Payor Plan Fax Number Effective Dates    [address] [phone]  10/1/2023 - None Entered    [place] [address]         Subscriber Name Subscriber Birth Date Member ID       HAILE DUQUE MARTHA 1960 [id number]                     Emergency Contacts        (Rel.) Home Phone Work Phone Mobile Phone    Haile Duque (Spouse) [phone] -- [phone]              [place]: NPI [id number]  Tax ID [id number]  Medication Administration Report for " Jess Razo as of 1/14/25 through 1/15/25     Legend:    Given Hold Not Given Due Canceled Entry Other Actions    Time Time (Time) Time Time-Action         Discontinued     Completed     Future     MAR Hold     Linked             Medications 01/14/25 01/15/25     albuterol sulfate HFA (PROVENTIL HFA;VENTOLIN HFA;PROAIR HFA) inhaler 2 puff  Dose: 2 puff  Freq: Every 4 Hours PRN Route: IN  PRN Reason: Wheezing  Start: 01/07/25 1826     Admin Instructions:   Include Respiratory Treatment Education   Shake well.          atorvastatin (LIPITOR) tablet 80 mg  Dose: 80 mg  Freq: Nightly Route: PO  Start: 01/07/25 2100     Admin Instructions:   Avoid grapefruit juice.      2139-Given            2100              bisoprolol (ZEBeta) tablet 10 mg  Dose: 10 mg  Freq: Daily Route: PO  Start: 01/08/25 0900     Admin Instructions:   Hold for SBP less than 100, DBP less than 60, or heart rate less than 50. If a dose is held, please contact the provider.  Caution: Look alike/sound alike drug alert      1121-Given            1028-Given              budesonide-formoterol (SYMBICORT) 160-4.5 MCG/ACT inhaler 2 puff  Dose: 2 puff  Freq: 2 Times Daily - RT Route: IN  Start: 01/07/25 2130     Admin Instructions:    Shake well.  Rinse mouth after use, do not swallow water.  Send aerosols to pharmacy in ziplock bag for proper disposal.      (3157)-Not Given [C]     2141-Given           0819-Given     2130             calcium carbonate (oyster shell) tablet 500 mg  Dose: 500 mg  Freq: Daily Route: PO  Start: 01/08/25 0900      1122-Given            0853-Given              cetirizine (zyrTEC) tablet 10 mg  Dose: 10 mg  Freq: Daily Route: PO  Start: 01/08/25 0900      1123-Given            0853-Given              dextrose (D50W) (25 g/50 mL) IV injection 25 g  Dose: 25 g  Freq: Every 15 Minutes PRN Route: IV  PRN Reason: Low Blood Sugar  PRN Comment: Blood Sugar Less Than 70  Start: 01/07/25 1826     Admin Instructions:   Blood sugar less  than 70; patient has IV access - Unresponsive, NPO or Unable To Safely Swallow          dextrose (GLUTOSE) oral gel 15 g  Dose: 15 g  Freq: Every 15 Minutes PRN Route: PO  PRN Reason: Low Blood Sugar  PRN Comment: Blood sugar less than 70  Start: 01/07/25 1826     Admin Instructions:   BS<70, Patient Alert, Is not NPO, Can safely swallow.          digoxin (LANOXIN) tablet 250 mcg  Dose: 250 mcg  Freq: Nightly Route: PO  Start: 01/07/25 2100     Admin Instructions:    Check and record heart rate.      2139-Given            2100              dilTIAZem CD (CARDIZEM CD) 24 hr capsule 360 mg  Dose: 360 mg  Freq: Every 24 Hours Scheduled Route: PO  Start: 01/08/25 0900     Admin Instructions:   Caution: Look alike/sound alike drug alert.   Swallow capsule whole. Do not crush, chew, or open capsule. Avoid grapefruit juice. Maximum simvastatin dose 10 mg while taking dilTIAZem.      1122-Given            0900-Given              docusate sodium (COLACE) capsule 100 mg  Dose: 100 mg  Freq: 2 Times Daily Route: PO  Start: 01/07/25 2100     Admin Instructions:   Swallow whole.  Do not open, crush, or chew capsule.      1121-Given     (2145)-Not Given           (1047)-Not Given [C]     2100             Enoxaparin Sodium (LOVENOX) syringe 40 mg  Dose: 40 mg  Freq: Daily Route: SC  Indications of Use: PROPHYLAXIS OF VENOUS THROMBOEMBOLISM  Start: 01/13/25 1300     Admin Instructions:   Give subcutaneous in abdomen only. Do not massage site after injection.      1123-Given            0854-Given              FLUoxetine (PROzac) capsule 40 mg  Dose: 40 mg  Freq: Nightly Route: PO  Start: 01/07/25 2100     Admin Instructions:   Caution: Look alike/sound alike drug alert      2139-Given            2100              glucagon (GLUCAGEN) injection 1 mg  Dose: 1 mg  Freq: Every 15 Minutes PRN Route: IM  PRN Reason: Low Blood Sugar  PRN Comment: Blood Glucose Less Than 70  Start: 01/07/25 1826     Admin Instructions:   Blood Glucose Less  Than 70 - Patient Without IV Access - Unresponsive, NPO or Unable To Safely Swallow  Reconstitute powder for injection by adding 1 mL of -supplied sterile diluent or sterile water for injection to a vial containing 1 mg of the drug, to provide solutions containing 1 mg/mL. Shake vial gently to dissolve.           HYDROmorphone (DILAUDID) injection 0.5 mg  Dose: 0.5 mg  Freq: Every 2 Hours PRN Route: IV  PRN Reason: Severe Pain  Start: 01/07/25 1826     Admin Instructions:   Based on patient request - if ordered for moderate or severe pain, provider allows for administration of a medication prescribed for a lower pain scale.  If given for pain, use the following pain scale:  Mild Pain = Pain Score of 1-3, CPOT 1-2  Moderate Pain = Pain Score of 4-6, CPOT 3-4  Severe Pain = Pain Score of 7-10, CPOT 5-8      2049-Given               And   naloxone (NARCAN) injection 0.4 mg  Dose: 0.4 mg  Freq: Every 5 Minutes PRN Route: IV  PRN Reason: Respiratory Depression  Start: 01/07/25 1826     Admin Instructions:   If respiratory rate is less than 8 breaths/minute or patient is difficult to arouse stop any narcotics and contact physician.   Administer slow IV push. Repeat as ordered until patient's respiratory rate is greater than 12 breaths/minute.          ibuprofen (ADVIL,MOTRIN) tablet 400 mg  Dose: 400 mg  Freq: Every 6 Hours PRN Route: PO  PRN Reason: Mild Pain  Start: 01/07/25 1826     Admin Instructions:   Based on patient request - if ordered for moderate or severe pain, provider allows for administration of a medication prescribed for a lower pain scale.    Mucous membrane irritant. Do not crush or chew tablet or capsule unless administered through a feeding tube.  If given for pain, use the following pain scale:  Mild Pain = Pain Score of 1-3, CPOT 1-2  Moderate Pain = Pain Score of 4-6, CPOT 3-4  Severe Pain = Pain Score of 7-10, CPOT 5-8      9419-Given [C]               insulin lispro (HUMALOG/ADMELOG)  injection 2-7 Units  Dose: 2-7 Units  Freq: 4 Times Daily Before Meals & Nightly Route: SC  Start: 01/07/25 2100     Admin Instructions:   Correction Insulin - Low Dose - Total Insulin Dose Less Than 40 units/day (Lean, Elderly or Renal Patients)    Blood Glucose 150-199 mg/dL - 2 units  Blood Glucose 200-249 mg/dL - 3 units  Blood Glucose 250-299 mg/dL - 4 units  Blood Glucose 300-349 mg/dL - 5 units  Blood Glucose 350-400 mg/dL - 6 units  Blood Glucose Greater Than 400 mg/dL - 7 units & Call Provider   Caution: Look alike/sound alike drug alert      (0800)-Not Given     (1142)-Not Given     (1811)-Not Given     (2159)-Not Given         (0850)-Not Given     1130 1730 2100           levothyroxine (SYNTHROID, LEVOTHROID) tablet 50 mcg  Dose: 50 mcg  Freq: Every Early Morning Route: PO  Start: 01/08/25 0600     Admin Instructions:   Take on empty stomach.      0627-Given            0541-Given              metFORMIN ER (GLUCOPHAGE-XR) 24 hr tablet 500 mg  Dose: 500 mg  Freq: Nightly Route: PO  Start: 01/07/25 2100     Admin Instructions:   Do not crush or chew the capsules or tablets. The drug may not work as designed if the capsule or tablet is crushed or chewed. Swallow whole.  Caution: Look alike/sound alike drug alert. Do not crush, split, or chew.      (2159)-Not Given            2100              ondansetron (ZOFRAN) injection 4 mg  Dose: 4 mg  Freq: Every 6 Hours PRN Route: IV  PRN Reasons: Nausea,Vomiting  Start: 01/07/25 1826     Admin Instructions:   If BOTH ondansetron (ZOFRAN) and promethazine (PHENERGAN) are ordered use ondansetron first and THEN promethazine IF ondansetron is ineffective.      0238-Given     1141-Given              oxyCODONE-acetaminophen (PERCOCET) 5-325 MG per tablet 1 tablet  Dose: 1 tablet  Freq: Every 4 Hours PRN Route: PO  PRN Reason: Moderate Pain  Start: 01/07/25 1826     Admin Instructions:   Based on patient request - if ordered for moderate or severe pain, provider  "allows for administration of a medication prescribed for a lower pain scale.  [KEV]    Do not exceed 4 grams of acetaminophen in a 24 hr period. Max dose of 2gm for AST/ALT greater than 120 units/L        If given for pain, use the following pain scale:   Mild Pain = Pain Score of 1-3, CPOT 1-2  Moderate Pain = Pain Score of 4-6, CPOT 3-4  Severe Pain = Pain Score of 7-10, CPOT 5-8      1138-Given               pantoprazole (PROTONIX) EC tablet 40 mg  Dose: 40 mg  Freq: Every Early Morning Route: PO  Start: 01/08/25 0600     Admin Instructions:   Do not crush or chew the capsules or tablets. The drug may not work as designed if the capsule or tablet is crushed or chewed. Swallow whole.  Swallow whole; do not crush, split, or chew.      0627-Given            0541-Given              prochlorperazine (COMPAZINE) injection 5 mg  Dose: 5 mg  Freq: Every 6 Hours PRN Route: IV  PRN Reasons: Nausea,Vomiting  Start: 01/09/25 1251     Admin Instructions:   \"If multiple N/V medications ordered, use in the following order: Ondansetron, Prochlorperazine, Promethazine. Use PO unless patient refuses or patient unable to swallow.\"      0627-Given     (1141)-Not Given [C]              sodium chloride 0.9 % flush 10 mL  Dose: 10 mL  Freq: As Needed Route: IV  PRN Reason: Line Care  Start: 01/07/25 1826          sodium chloride 0.9 % flush 10 mL  Dose: 10 mL  Freq: Every 12 Hours Scheduled Route: IV  Start: 01/07/25 2100      (1123)-Not Given     2049-Given           0855-Given     2100             valsartan (DIOVAN) tablet 160 mg  Dose: 160 mg  Freq: Daily Route: PO  Start: 01/08/25 0900     Admin Instructions:   Hold for SBP less than 100, DBP less than 60, or heart rate less than 50. If a dose is held, please contact the provider.      1121-Given            1032-Given              vitamin E capsule 1,000 Units  Dose: 1,000 Units  Freq: Daily Route: PO  Start: 01/08/25 0900      1123-Given            0858-Given            "   Completed Medications  Medications 01/14/25 01/15/25      alvimopan (ENTEREG) capsule 12 mg  Dose: 12 mg  Freq: Once Route: PO  Start: 01/07/25 1138 End: 01/07/25 1227     Admin Instructions:   Contraindicated in end stage renal failure or severe hepatic impairment.   Administer 30 minutes to 5 hours prior to surgery  Inpatient use only, max 15 total doses (pre and post op).  Discontinue after first BM or when narcotics have been discontinued.     Order specific questions:   Is this medication for a partial large or small bowel resection with primary anastomosis? Yes  Has the patient recieved daily opioids for the past 7 days? No  Have you received education on the benefits and risks of alvimopan (Entereg)? Yes          ceFAZolin 2000 mg IVPB in 100 mL NS (MBP)  Dose: 2,000 mg  Freq: Once Route: IV  Indications of Use: PERIOPERATIVE PHARMACOPROPHYLAXIS  Start: 01/07/25 1323 End: 01/07/25 1405     Admin Instructions:   Caution: Look alike/sound alike drug alert          famotidine (PEPCID) injection 20 mg  Dose: 20 mg  Freq: Once Route: IV  Start: 01/07/25 1144 End: 01/07/25 1239     Admin Instructions:   Give IV push over 2 minutes.          HYDROcodone-acetaminophen (NORCO) 5-325 MG per tablet 1 tablet  Dose: 1 tablet  Freq: Once As Needed Route: PO  PRN Reason: Moderate Pain  Start: 01/07/25 1621 End: 01/07/25 1725     Admin Instructions:   Based on patient request - if ordered for moderate or severe pain, provider allows for administration of a medication prescribed for a lower pain scale.  [KEV]    Do not exceed 4 grams of acetaminophen in a 24 hr period. Max dose of 2gm for AST/ALT greater than 120 units/L        If given for pain, use the following pain scale:   Mild Pain = Pain Score of 1-3, CPOT 1-2  Moderate Pain = Pain Score of 4-6, CPOT 3-4  Severe Pain = Pain Score of 7-10, CPOT 5-8          metroNIDAZOLE (FLAGYL) IVPB 500 mg  Dose: 500 mg  Freq: Once Route: IV  Indications of Use: PERIOPERATIVE  PHARMACOPROPHYLAXIS  Start: 01/07/25 1323 End: 01/07/25 1405     Admin Instructions:   Caution: Look alike/sound alike drug alert.  Do not refrigerate.          midazolam (VERSED) injection 1 mg  Dose: 1 mg  Freq: Every 5 Minutes PRN Route: IV  PRN Comment: Anxiety prophylaxis, Pre-op comfort  Start: 01/07/25 1141 End: 01/07/25 1302     Admin Instructions:   May repeat dose in 5 minutes one time then contact provider for additional orders.            sodium chloride 0.9 % infusion  Freq: Continuous PRN  Start: 01/07/25 1451 End: 01/07/25 1626          Discontinued Medications  Medications 01/14/25 01/15/25      acetaminophen (TYLENOL) tablet 1,000 mg  Dose: 1,000 mg  Freq: Every 6 Hours Route: PO  Start: 01/07/25 1136 End: 01/07/25 1643     Admin Instructions:   If given for fever, use fever parameter: fever greater than 100.4 °F  Based on patient request - if ordered for moderate or severe pain, provider allows for administration of a medication prescribed for a lower pain scale.    Do not exceed 4 grams of acetaminophen in a 24 hr period. Max dose of 2gm for AST/ALT greater than 120 units/L.    If given for pain, use the following pain scale:   Mild Pain = Pain Score of 1-3, CPOT 1-2  Moderate Pain = Pain Score of 4-6, CPOT 3-4  Severe Pain = Pain Score of 7-10, CPOT 5-8          alvimopan (ENTEREG) capsule 12 mg  Dose: 12 mg  Freq: 2 Times Daily Route: PO  Start: 01/07/25 2100 End: 01/08/25 0132     Admin Instructions:   Contraindicated in end stage renal failure or severe hepatic impairment.   Administer 30 minutes to 5 hours prior to surgery  Inpatient use only, max 15 total doses (pre and post op).  Discontinue after first BM or when narcotics have been discontinued.     Order specific questions:   Is this medication for a partial large or small bowel resection with primary anastomosis? Yes  Has the patient recieved daily opioids for the past 7 days? No  Have you received education on the benefits and risks of  alvimopan (Entereg)? Yes          Atropine Sulfate (PF) injection 0.4 mg  Dose: 0.4 mg  Freq: Once As Needed Route: IV  PRN Reason: Bradycardia  PRN Comment: symptomatic bradycardia (hypotension, dizziness, confusion). Notify attending anesthesiologist.  Start: 01/07/25 1621 End: 01/07/25 1820          bupivacaine-EPINEPHrine PF (MARCAINE w/EPI) 0.5% -[id number] injection  Freq: As Needed  Start: 01/07/25 1451 End: 01/07/25 1630          dextrose 5 % and sodium chloride 0.45 % with KCl 10 mEq/L infusion  Rate: 100 mL/hr Dose: 100 mL/hr  Freq: Continuous Route: IV  Start: 01/11/25 1945 End: 01/12/25 1659          dextrose 5 % and sodium chloride 0.45 % with KCl 20 mEq/L infusion  Rate: 75 mL/hr Dose: 75 mL/hr  Freq: Continuous Route: IV  Start: 01/07/25 1915 End: 01/08/25 1650          diphenhydrAMINE (BENADRYL) injection 12.5 mg  Dose: 12.5 mg  Freq: Every 15 Minutes PRN Route: IV  PRN Reason: Itching  PRN Comment: May repeat x 1  Start: 01/07/25 1621 End: 01/07/25 1820     Admin Instructions:   Caution: Look alike/sound alike drug alert. This med may be ordered in other forms and routes. Before giving verify the last time the drug was given by any route/form.          ePHEDrine injection 5 mg  Dose: 5 mg  Freq: Once As Needed Route: IV  PRN Comment: symptomatic hypotension - Notify attending anesthesiologist if this needs to be given  Start: 01/07/25 1621 End: 01/07/25 1820     Admin Instructions:   Caution: Look alike/sound alike drug alert   Dilute with NS to 5-10 mg/mL.  Central line preferred, if unavailable use large bore IV access with frequent nurse monitoring of IV site.          fentaNYL citrate (PF) (SUBLIMAZE) injection 25 mcg  Dose: 25 mcg  Freq: Every 5 Minutes PRN Route: IV  PRN Reason: Severe Pain  Start: 01/07/25 1621 End: 01/07/25 1820     Admin Instructions:   Maximum total dose of fentanyl is 200 mcg.  If given for pain, use the following pain scale:  Mild Pain = Pain Score of 1-3, CPOT  1-2  Moderate Pain = Pain Score of 4-6, CPOT 3-4  Severe Pain = Pain Score of 7-10, CPOT 5-8          flumazenil (ROMAZICON) injection 0.2 mg  Dose: 0.2 mg  Freq: As Needed Route: IV  PRN Comment: for benzodiazepine induced unresponsiveness or sedation  Start: 01/07/25 1621 End: 01/07/25 1820     Admin Instructions:   Notify Anesthesia if given  ** give IV over 15-30 seconds **          hydrALAZINE (APRESOLINE) injection 5 mg  Dose: 5 mg  Freq: Every 10 Minutes PRN Route: IV  PRN Reason: High Blood Pressure  PRN Comment: for systolic blood pressure greater than 180 mmHg or diastolic blood pressure greater than 105 mmHg  Start: 01/07/25 1621 End: 01/07/25 1820     Admin Instructions:   Up to 20 mg. If labetalol and hydralazine are both ordered, use labetalol first.  Caution: Look alike/sound alike drug alert          HYDROcodone-acetaminophen (NORCO) 7.5-325 MG per tablet 1 tablet  Dose: 1 tablet  Freq: Every 4 Hours PRN Route: PO  PRN Reason: Severe Pain  Start: 01/07/25 1621 End: 01/07/25 1820     Admin Instructions:   Based on patient request - if ordered for moderate or severe pain, provider allows for administration of a medication prescribed for a lower pain scale.  [KEV]    Do not exceed 4 grams of acetaminophen in a 24 hr period. Max dose of 2gm for AST/ALT greater than 120 units/L        If given for pain, use the following pain scale:   Mild Pain = Pain Score of 1-3, CPOT 1-2  Moderate Pain = Pain Score of 4-6, CPOT 3-4  Severe Pain = Pain Score of 7-10, CPOT 5-8          HYDROmorphone (DILAUDID) injection 0.25 mg  Dose: 0.25 mg  Freq: Every 5 Minutes PRN Route: IV  PRN Reason: Moderate Pain  Start: 01/07/25 1621 End: 01/07/25 1820     Admin Instructions:   Maximum total dose of hydromorphone is 2 mg.  If given for pain, use the following pain scale:  Mild Pain = Pain Score of 1-3, CPOT 1-2  Moderate Pain = Pain Score of 4-6, CPOT 3-4  Severe Pain = Pain Score of 7-10, CPOT 5-8           ipratropium-albuterol (DUO-NEB) nebulizer solution 3 mL  Dose: 3 mL  Freq: Once As Needed Route: NEBULIZATION  PRN Reasons: Wheezing,Shortness of Air  PRN Comment: bronchospasm  Start: 01/07/25 1621 End: 01/07/25 1820     Admin Instructions:   Notify Anesthesia if minineb is given          labetalol (NORMODYNE,TRANDATE) injection 5 mg  Dose: 5 mg  Freq: Every 5 Minutes PRN Route: IV  PRN Reason: High Blood Pressure  PRN Comment: for systolic blood pressure greater than 180 mmHg or diastolic blood pressure greater than 105 mmHg  Start: 01/07/25 1621 End: 01/07/25 1820     Admin Instructions:   Hold for heart rate less than 60.    If labetalol and hydralazine are both ordered, use labetalol first. Maximum dose of labetalol is 20mg IV while in PACU, notify anesthesiologist for further orders if needed.  Give IV Push over 2 minutes.          lactated ringers infusion  Rate: 9 mL/hr Dose: 9 mL/hr  Freq: Continuous Route: IV  Start: 01/07/25 1144 End: 01/07/25 1826          lidocaine (XYLOCAINE) 1 % injection 0.5 mL  Dose: 0.5 mL  Freq: Once As Needed Route: ID  PRN Comment: IV Start  Start: 01/07/25 1141 End: 01/07/25 1643          naloxone (NARCAN) injection 0.2 mg  Dose: 0.2 mg  Freq: As Needed Route: IV  PRN Reasons: Opioid Reversal,Respiratory Depression  PRN Comment: unresponsiveness, decrease oxygen saturation  Indications of Use: ACUTE RESPIRATORY FAILURE,OPIOID-INDUCED RESPIRATORY DEPRESSION  Start: 01/07/25 1621 End: 01/07/25 1820     Admin Instructions:   Notify Anesthesia if given          ondansetron (ZOFRAN) injection 4 mg  Dose: 4 mg  Freq: Once As Needed Route: IV  PRN Reasons: Nausea,Vomiting  Indications of Use: POSTOPERATIVE NAUSEA AND VOMITING  Start: 01/07/25 1621 End: 01/07/25 1820     Admin Instructions:   If BOTH ondansetron (ZOFRAN) and promethazine (PHENERGAN) are ordered use ondansetron first and THEN promethazine IF ondansetron is ineffective.           promethazine (PHENERGAN) suppository 25  "mg  Dose: 25 mg  Freq: Once As Needed Route: RE  PRN Reasons: Nausea,Vomiting  Start: 01/07/25 1621 End: 01/07/25 1820     Admin Instructions:   If BOTH ondansetron (ZOFRAN) and promethazine (PHENERGAN) are ordered use ondansetron first and THEN promethazine IF ondansetron is ineffective.          Or   promethazine (PHENERGAN) tablet 25 mg  Dose: 25 mg  Freq: Once As Needed Route: PO  PRN Reasons: Nausea,Vomiting  Start: 01/07/25 1621 End: 01/07/25 1820     Admin Instructions:   If BOTH ondansetron (ZOFRAN) and promethazine (PHENERGAN) are ordered use ondansetron first and THEN promethazine IF ondansetron is ineffective.            sodium chloride (NS) irrigation solution  Freq: As Needed  Start: 01/07/25 1451 End: 01/07/25 1630          sodium chloride 0.9 % flush 3 mL  Dose: 3 mL  Freq: Every 12 Hours Scheduled Route: IV  Start: 01/07/25 1144 End: 01/07/25 1643          sodium chloride 0.9 % flush 3-10 mL  Dose: 3-10 mL  Freq: As Needed Route: IV  PRN Reason: Line Care  Start: 01/07/25 1141 End: 01/07/25 1643          sodium chloride 0.9 % infusion  Rate: 125 mL/hr Dose: 125 mL/hr  Freq: Continuous Route: IV  Start: 01/12/25 1745 End: 01/14/25 1249      0325-New Bag     1141-New Bag     1741-Stopped                            Physician Progress Notes (last 48 hours)        Sabi Mars MD at 01/15/25 1204          General Surgery  Progress Note    CC: Follow-up endoscopically unresectable tubulovillous adenoma    POD#8 laparoscopic right colon resection    S: She has had multiple loose yellow bowel movements today.  Her abdomen is much less distended and softer.  She denies any nausea or vomiting in over 24 hours.    O:/72 (BP Location: Right arm, Patient Position: Sitting)   Pulse 92   Temp 99.2 °F (37.3 °C) (Oral)   Resp 16   Ht 163.8 cm (64.49\")   Wt 70.5 kg (155 lb 6.8 oz)   LMP  (LMP Unknown)   SpO2 96%   BMI 26.28 kg/m²     Intake & Output:  UOP: Void x4 yesterday  BM x 6 " yesterday    GENERAL: alert, well appearing, and in no distress  HEENT: normocephalic, atraumatic, moist mucous membranes, clear sclerae   CHEST: clear to auscultation, no wheezes, rales or rhonchi, symmetric air entry  CARDIAC: regular rate and rhythm    ABDOMEN: Soft, much less distended today, incisions clean/dry/intact with glue, nontender with no evidence of peritonitis  EXTREMITIES: no cyanosis, clubbing, or edema   SKIN: Warm and moist, no rashes    LABS  Results from last 7 days   Lab Units 01/15/25  0459 01/14/25  0609 01/13/25  0534 01/12/25  0424   WBC 10*3/mm3 12.42* 11.27* 8.91 4.86   HEMOGLOBIN g/dL 9.6* 10.4* 10.3* 11.3*   HEMATOCRIT % 28.5* 31.6* 29.7* 32.9*   PLATELETS 10*3/mm3 345 326 305 270   MONOCYTES % %  --  9.1 11.1 11.3   EOSINOPHIL % %  --  1.0 1.0 0.0*     Results from last 7 days   Lab Units 01/15/25  0459 01/14/25  0609 01/13/25  0534   SODIUM mmol/L 134* 131* 130*   POTASSIUM mmol/L 3.7 3.7 3.7   CHLORIDE mmol/L 100 102 98   CO2 mmol/L 19.9* 17.0* 18.6*   BUN mg/dL 11 9 23   CREATININE mg/dL 0.59 0.52* 0.83   CALCIUM mg/dL 8.6 8.6 8.4*   GLUCOSE mg/dL 95 114* 116*         A/P: 64 y.o. female POD#8 laparoscopic right colon resection for endoscopically unresectable tubulovillous adenoma, complicated by an expected postoperative adynamic ileus    I reviewed her KUB done yesterday which looks to be much improved with more gas now in the colon and less small bowel distention.  I suspect her ileus is on its way to being resolved.  I therefore have advanced her to a regular diet but I cautioned her to go slow.    Sabi Mars MD  General, Robotic, and Endoscopic Surgery  [facility]    [address], [address]  [place] [address]  P: [phone]  F: [phone]       Electronically signed by Sabi Mars MD at 01/15/25 1200       Sabi Mars MD at 01/14/25 180          General Surgery  Progress Note    CC: Follow-up endoscopically unresectable  "tubulovillous adenoma    POD#7 laparoscopic right colon resection    S: She had multiple loose bowel movements yesterday, but no bowel movements at all today.  She had 2 episodes of vomiting last night and a smaller episode of vomiting this morning.  She has been up walking in the halls and denies any abdominal pain, but her abdomen has become more distended.    O:/69 (BP Location: Left arm, Patient Position: Sitting)   Pulse 84   Temp 97.7 °F (36.5 °C) (Oral)   Resp 16   Ht 163.8 cm (64.49\")   Wt 70.5 kg (155 lb 6.8 oz)   LMP  (LMP Unknown)   SpO2 96%   BMI 26.28 kg/m²     Intake & Output:  UOP: Void x11 yesterday  BM x 6 yesterday    GENERAL: alert, well appearing, and in no distress  HEENT: normocephalic, atraumatic, moist mucous membranes, clear sclerae   CHEST: clear to auscultation, no wheezes, rales or rhonchi, symmetric air entry  CARDIAC: regular rate and rhythm    ABDOMEN: Soft, distended, incisions clean/dry/intact with glue, nontender with no evidence of peritonitis  EXTREMITIES: no cyanosis, clubbing, or edema   SKIN: Warm and moist, no rashes    LABS  Results from last 7 days   Lab Units 01/14/25  0609 01/13/25  0534 01/12/25  0424   WBC 10*3/mm3 11.27* 8.91 4.86   HEMOGLOBIN g/dL 10.4* 10.3* 11.3*   HEMATOCRIT % 31.6* 29.7* 32.9*   PLATELETS 10*3/mm3 326 305 270   MONOCYTES % % 9.1 11.1 11.3   EOSINOPHIL % % 1.0 1.0 0.0*     Results from last 7 days   Lab Units 01/14/25  0609 01/13/25  0534 01/12/25  0424   SODIUM mmol/L 131* 130* 127*   POTASSIUM mmol/L 3.7 3.7 3.6   CHLORIDE mmol/L 102 98 90*   CO2 mmol/L 17.0* 18.6* 20.3*   BUN mg/dL 9 23 34*   CREATININE mg/dL 0.52* 0.83 1.20*   CALCIUM mg/dL 8.6 8.4* 9.2   GLUCOSE mg/dL 114* 116* 171*         A/P: 64 y.o. female POD#7 laparoscopic right colon resection for endoscopically unresectable tubulovillous adenoma    She continues to exhibit signs of an ileus.  I have ordered a KUB to be done this evening.  I will review those images and if " markedly abnormal, she may need a CT abdomen/pelvis to rule out a bowel obstruction.    Sabi Mars MD  General, Robotic, and Endoscopic Surgery  [facility]    [address], [address]  [place] [address]  P: [phone]  F: [phone]       Electronically signed by Sabi Mars MD at 01/14/25 1805       Consult Notes (last 48 hours)  Notes from 01/13/25 1300 through 01/15/25 1300   No notes of this type exist for this encounter.

## 2025-01-15 NOTE — PROGRESS NOTES
"General Surgery  Progress Note    CC: Follow-up endoscopically unresectable tubulovillous adenoma    POD#8 laparoscopic right colon resection    S: She has had multiple loose yellow bowel movements today.  Her abdomen is much less distended and softer.  She denies any nausea or vomiting in over 24 hours.    O:/72 (BP Location: Right arm, Patient Position: Sitting)   Pulse 92   Temp 99.2 °F (37.3 °C) (Oral)   Resp 16   Ht 163.8 cm (64.49\")   Wt 70.5 kg (155 lb 6.8 oz)   LMP  (LMP Unknown)   SpO2 96%   BMI 26.28 kg/m²     Intake & Output:  UOP: Void x4 yesterday  BM x 6 yesterday    GENERAL: alert, well appearing, and in no distress  HEENT: normocephalic, atraumatic, moist mucous membranes, clear sclerae   CHEST: clear to auscultation, no wheezes, rales or rhonchi, symmetric air entry  CARDIAC: regular rate and rhythm    ABDOMEN: Soft, much less distended today, incisions clean/dry/intact with glue, nontender with no evidence of peritonitis  EXTREMITIES: no cyanosis, clubbing, or edema   SKIN: Warm and moist, no rashes    LABS  Results from last 7 days   Lab Units 01/15/25  0459 01/14/25  0609 01/13/25  0534 01/12/25  0424   WBC 10*3/mm3 12.42* 11.27* 8.91 4.86   HEMOGLOBIN g/dL 9.6* 10.4* 10.3* 11.3*   HEMATOCRIT % 28.5* 31.6* 29.7* 32.9*   PLATELETS 10*3/mm3 345 326 305 270   MONOCYTES % %  --  9.1 11.1 11.3   EOSINOPHIL % %  --  1.0 1.0 0.0*     Results from last 7 days   Lab Units 01/15/25  0459 01/14/25  0609 01/13/25  0534   SODIUM mmol/L 134* 131* 130*   POTASSIUM mmol/L 3.7 3.7 3.7   CHLORIDE mmol/L 100 102 98   CO2 mmol/L 19.9* 17.0* 18.6*   BUN mg/dL 11 9 23   CREATININE mg/dL 0.59 0.52* 0.83   CALCIUM mg/dL 8.6 8.6 8.4*   GLUCOSE mg/dL 95 114* 116*         A/P: 64 y.o. female POD#8 laparoscopic right colon resection for endoscopically unresectable tubulovillous adenoma, complicated by an expected postoperative adynamic ileus    I reviewed her KUB done yesterday which looks to be much improved " with more gas now in the colon and less small bowel distention.  I suspect her ileus is on its way to being resolved.  I therefore have advanced her to a regular diet but I cautioned her to go slow.    Sabi Mars MD  General, Robotic, and Endoscopic Surgery  [facility]    [address], [address]  [place] [address]  P: [phone]  F: [phone]

## 2025-01-16 ENCOUNTER — APPOINTMENT (OUTPATIENT)
Dept: CT IMAGING | Facility: HOSPITAL | Age: 65
DRG: 330 | End: 2025-01-16
Payer: COMMERCIAL

## 2025-01-16 LAB
GLUCOSE BLDC GLUCOMTR-MCNC: 109 MG/DL (ref 70–130)
GLUCOSE BLDC GLUCOMTR-MCNC: 119 MG/DL (ref 70–130)
GLUCOSE BLDC GLUCOMTR-MCNC: 121 MG/DL (ref 70–130)
GLUCOSE BLDC GLUCOMTR-MCNC: 125 MG/DL (ref 70–130)

## 2025-01-16 PROCEDURE — 74177 CT ABD & PELVIS W/CONTRAST: CPT

## 2025-01-16 PROCEDURE — 82948 REAGENT STRIP/BLOOD GLUCOSE: CPT

## 2025-01-16 PROCEDURE — 94799 UNLISTED PULMONARY SVC/PX: CPT

## 2025-01-16 PROCEDURE — 25010000002 ENOXAPARIN PER 10 MG: Performed by: SURGERY

## 2025-01-16 PROCEDURE — 25510000001 IOPAMIDOL 61 % SOLUTION: Performed by: SURGERY

## 2025-01-16 PROCEDURE — 25010000002 PROCHLORPERAZINE 10 MG/2ML SOLUTION: Performed by: SURGERY

## 2025-01-16 PROCEDURE — 99024 POSTOP FOLLOW-UP VISIT: CPT | Performed by: SURGERY

## 2025-01-16 RX ORDER — CALCIUM CARBONATE 500 MG/1
2 TABLET, CHEWABLE ORAL 3 TIMES DAILY PRN
Status: DISCONTINUED | OUTPATIENT
Start: 2025-01-16 | End: 2025-01-21 | Stop reason: HOSPADM

## 2025-01-16 RX ORDER — LATANOPROST 50 UG/ML
1 SOLUTION/ DROPS OPHTHALMIC NIGHTLY
Status: DISCONTINUED | OUTPATIENT
Start: 2025-01-16 | End: 2025-01-21 | Stop reason: HOSPADM

## 2025-01-16 RX ORDER — DEXTROSE MONOHYDRATE, SODIUM CHLORIDE, AND POTASSIUM CHLORIDE 50; 1.49; 4.5 G/1000ML; G/1000ML; G/1000ML
75 INJECTION, SOLUTION INTRAVENOUS CONTINUOUS
Status: DISCONTINUED | OUTPATIENT
Start: 2025-01-16 | End: 2025-01-17

## 2025-01-16 RX ORDER — BRIMONIDINE TARTRATE 2 MG/ML
1 SOLUTION/ DROPS OPHTHALMIC 2 TIMES DAILY
Status: DISCONTINUED | OUTPATIENT
Start: 2025-01-16 | End: 2025-01-21 | Stop reason: HOSPADM

## 2025-01-16 RX ORDER — IOPAMIDOL 612 MG/ML
100 INJECTION, SOLUTION INTRAVASCULAR
Status: COMPLETED | OUTPATIENT
Start: 2025-01-16 | End: 2025-01-16

## 2025-01-16 RX ORDER — SIMETHICONE 80 MG
80 TABLET,CHEWABLE ORAL 4 TIMES DAILY PRN
Status: DISCONTINUED | OUTPATIENT
Start: 2025-01-16 | End: 2025-01-21 | Stop reason: HOSPADM

## 2025-01-16 RX ADMIN — DOCUSATE SODIUM 100 MG: 100 CAPSULE, LIQUID FILLED ORAL at 20:43

## 2025-01-16 RX ADMIN — SIMETHICONE 80 MG: 80 TABLET, CHEWABLE ORAL at 12:49

## 2025-01-16 RX ADMIN — BUDESONIDE AND FORMOTEROL FUMARATE DIHYDRATE 2 PUFF: 160; 4.5 AEROSOL RESPIRATORY (INHALATION) at 08:23

## 2025-01-16 RX ADMIN — FLUOXETINE HYDROCHLORIDE 40 MG: 20 CAPSULE ORAL at 20:43

## 2025-01-16 RX ADMIN — ATORVASTATIN CALCIUM 80 MG: 20 TABLET, FILM COATED ORAL at 20:43

## 2025-01-16 RX ADMIN — IBUPROFEN 400 MG: 400 TABLET ORAL at 03:20

## 2025-01-16 RX ADMIN — CETIRIZINE HYDROCHLORIDE 10 MG: 10 TABLET, FILM COATED ORAL at 09:15

## 2025-01-16 RX ADMIN — PROCHLORPERAZINE EDISYLATE 5 MG: 5 INJECTION INTRAMUSCULAR; INTRAVENOUS at 03:20

## 2025-01-16 RX ADMIN — ENOXAPARIN SODIUM 40 MG: 100 INJECTION SUBCUTANEOUS at 09:14

## 2025-01-16 RX ADMIN — BISOPROLOL FUMARATE 10 MG: 5 TABLET ORAL at 11:04

## 2025-01-16 RX ADMIN — METFORMIN HYDROCHLORIDE 500 MG: 500 TABLET, EXTENDED RELEASE ORAL at 20:43

## 2025-01-16 RX ADMIN — DIGOXIN 250 MCG: 0.12 TABLET ORAL at 23:10

## 2025-01-16 RX ADMIN — VALSARTAN 160 MG: 80 TABLET, FILM COATED ORAL at 11:03

## 2025-01-16 RX ADMIN — Medication 500 MG: at 09:14

## 2025-01-16 RX ADMIN — Medication 10 ML: at 09:14

## 2025-01-16 RX ADMIN — OXYCODONE AND ACETAMINOPHEN 1 TABLET: 5; 325 TABLET ORAL at 05:07

## 2025-01-16 RX ADMIN — LEVOTHYROXINE SODIUM 50 MCG: 50 TABLET ORAL at 06:55

## 2025-01-16 RX ADMIN — PANTOPRAZOLE SODIUM 40 MG: 40 TABLET, DELAYED RELEASE ORAL at 06:55

## 2025-01-16 RX ADMIN — IBUPROFEN 400 MG: 400 TABLET ORAL at 09:14

## 2025-01-16 RX ADMIN — LATANOPROST 1 DROP: 50 SOLUTION OPHTHALMIC at 20:45

## 2025-01-16 RX ADMIN — PROCHLORPERAZINE EDISYLATE 5 MG: 5 INJECTION INTRAMUSCULAR; INTRAVENOUS at 17:53

## 2025-01-16 RX ADMIN — DEXTROSE MONOHYDRATE, SODIUM CHLORIDE, AND POTASSIUM CHLORIDE 75 ML/HR: 50; 1.49; 4.5 INJECTION, SOLUTION INTRAVENOUS at 18:42

## 2025-01-16 RX ADMIN — DOCUSATE SODIUM 100 MG: 100 CAPSULE, LIQUID FILLED ORAL at 09:14

## 2025-01-16 RX ADMIN — Medication 1000 UNITS: at 09:22

## 2025-01-16 RX ADMIN — IBUPROFEN 400 MG: 400 TABLET ORAL at 20:43

## 2025-01-16 RX ADMIN — DILTIAZEM HYDROCHLORIDE 360 MG: 120 CAPSULE, COATED, EXTENDED RELEASE ORAL at 09:15

## 2025-01-16 RX ADMIN — IOPAMIDOL 85 ML: 612 INJECTION, SOLUTION INTRAVENOUS at 19:01

## 2025-01-16 RX ADMIN — BRIMONIDINE TARTRATE 1 DROP: 2 SOLUTION OPHTHALMIC at 20:45

## 2025-01-16 NOTE — PROGRESS NOTES
"General Surgery  Progress Note    CC: Follow-up endoscopically unresectable tubulovillous adenoma    POD#9 laparoscopic right colon resection    S: She has now had no bowel movements since yesterday after stopping the Colace.  She passed a small amount of flatus overnight.  She is having new onset epigastric abdominal pain with radiation to the right upper quadrant and right side of her back that started around 2:00 this morning.  The pain has been constant and is no worse with palpation.  The pain feels like intense gas pain.    O:/65 (BP Location: Right arm, Patient Position: Sitting)   Pulse 104   Temp 97.4 °F (36.3 °C) (Oral)   Resp 18   Ht 163.8 cm (64.49\")   Wt 70.5 kg (155 lb 6.8 oz)   LMP  (LMP Unknown)   SpO2 96%   BMI 26.28 kg/m²     Intake & Output:  UOP: Void x6 yesterday  BM x2 yesterday    GENERAL: Laying on her side in bed, appears comfortable but fatigued  HEENT: normocephalic, atraumatic, moist mucous membranes, clear sclerae   CHEST: clear to auscultation, no wheezes, rales or rhonchi, symmetric air entry  CARDIAC: regular rate and rhythm    ABDOMEN: Soft, relatively distended, incisions clean/dry/intact with glue, no evidence of peritonitis but she is mildly tender to palpation in the epigastrium  EXTREMITIES: no cyanosis, clubbing, or edema   SKIN: Warm and moist, no rashes    LABS  Results from last 7 days   Lab Units 01/15/25  0459 01/14/25  0609 01/13/25  0534 01/12/25  0424   WBC 10*3/mm3 12.42* 11.27* 8.91 4.86   HEMOGLOBIN g/dL 9.6* 10.4* 10.3* 11.3*   HEMATOCRIT % 28.5* 31.6* 29.7* 32.9*   PLATELETS 10*3/mm3 345 326 305 270   MONOCYTES % %  --  9.1 11.1 11.3   EOSINOPHIL % %  --  1.0 1.0 0.0*     Results from last 7 days   Lab Units 01/15/25  0459 01/14/25  0609 01/13/25  0534   SODIUM mmol/L 134* 131* 130*   POTASSIUM mmol/L 3.7 3.7 3.7   CHLORIDE mmol/L 100 102 98   CO2 mmol/L 19.9* 17.0* 18.6*   BUN mg/dL 11 9 23   CREATININE mg/dL 0.59 0.52* 0.83   CALCIUM mg/dL 8.6 8.6 " 8.4*   GLUCOSE mg/dL 95 114* 116*         A/P: 64 y.o. female POD#9 laparoscopic right colon resection for endoscopically unresectable tubulovillous adenoma, complicated by an expected postoperative adynamic ileus    I have ordered a CT abdomen/pelvis to be done today given her new onset abdominal pain and her persistent abnormal bowel habits.    Sabi Mars MD  General, Robotic, and Endoscopic Surgery  [facility]    [address], [address]  [place] [address]  P: [phone]  F: [phone]

## 2025-01-16 NOTE — PLAN OF CARE
Goal Outcome Evaluation:  Plan of Care Reviewed With: patient        Progress: improving  Outcome Evaluation: Patient is A&Ox4, VSS, RA, afebrile. SL. Abdomen distended. Refused colace d/t loose BMs. Lap sites CDI with glue. Rested for a few hours. Compazine given for nausea. Ibuprofen given for pain. No SSI needed tonight. IS at bedside.  at bedisde. Ambulates in halls. POC ongoing.

## 2025-01-16 NOTE — PAYOR COMM NOTE
"Jess Duque (64 y.o. Female)      Jess Duque (64 y.o. Female)    PLEASE SEE ATTACHED FOR CLINICAL UPDATES    REF#[id number]   THANK YOU  PATITO NELSON RN/ DEPT   [facility]  PH: [phone]  FAX:  [phone]      Date of Birth   1960    Social Security Number       Address   [address] [place] [address]    Home Phone   [phone]    MRN   [id number]       [facility]    Marital Status                               Admission Date   1/7/25    Admission Type   Elective    Admitting Provider   Sabi Mars MD    Attending Provider   Sabi Mars MD    Department, Room/Bed   [address], 93/1       Discharge Date       Discharge Disposition       Discharge Destination                                 Attending Provider: Sabi Mars MD    Allergies: Morphine, Levaquin [Levofloxacin], Adhesive Tape, Ampicillin, Ceclor [Cefaclor], Ciprofloxacin, Codeine Phosphate, Erythromycin, Ketek [Telithromycin], Penicillins, Tetracyclines & Related, Ultram [Tramadol]    Isolation: None   Infection: None   Code Status: CPR    Ht: 163.8 cm (64.49\")   Wt: 70.5 kg (155 lb 6.8 oz)    Admission Cmt: None   Principal Problem: Tubulovillous adenoma of colon [D12.6]                   Active Insurance as of 1/7/2025       Primary Coverage       Payor Plan Insurance Group Employer/Plan Group    [facility] [employer] [facility] [employer] [facility] PPO [id number]       Payor Plan Address Payor Plan Phone Number Payor Plan Fax Number Effective Dates    [address] [phone]  10/1/2023 - None Entered    [facility] [id number]         Subscriber Name Subscriber Birth Date Member ID       HAILE DUQUE MARTHA 1960 [id number]                     Emergency Contacts        (Rel.) Home Phone Work Phone Mobile Phone    Haile Duque (Spouse) [phone] -- [phone]              [place]: NPI [id number]  Tax ID " [id number]  Medication Administration Report for Jess Razo as of 1/16/25 through 1/16/25     Legend:    Given Hold Not Given Due Canceled Entry Other Actions    Time Time (Time) Time Time-Action         Discontinued     Completed     Future     MAR Hold     Linked             Medications 01/16/25     albuterol sulfate HFA (PROVENTIL HFA;VENTOLIN HFA;PROAIR HFA) inhaler 2 puff  Dose: 2 puff  Freq: Every 4 Hours PRN Route: IN  PRN Reason: Wheezing  Start: 01/07/25 1826     Admin Instructions:   Include Respiratory Treatment Education   Shake well.         atorvastatin (LIPITOR) tablet 80 mg  Dose: 80 mg  Freq: Nightly Route: PO  Start: 01/07/25 2100     Admin Instructions:   Avoid grapefruit juice.      2100                   bisoprolol (ZEBeta) tablet 10 mg  Dose: 10 mg  Freq: Daily Route: PO  Start: 01/08/25 0900     Admin Instructions:   Hold for SBP less than 100, DBP less than 60, or heart rate less than 50. If a dose is held, please contact the provider.  Caution: Look alike/sound alike drug alert      1104-Given                   brimonidine (ALPHAGAN) 0.2 % ophthalmic solution 1 drop  Dose: 1 drop  Freq: 2 Times Daily Route: Both Eyes  Start: 01/16/25 1330      1330     2100                  budesonide-formoterol (SYMBICORT) 160-4.5 MCG/ACT inhaler 2 puff  Dose: 2 puff  Freq: 2 Times Daily - RT Route: IN  Start: 01/07/25 2130     Admin Instructions:    Shake well.  Rinse mouth after use, do not swallow water.  Send aerosols to pharmacy in ziplock bag for proper disposal.      0823-Given     0853-Canceled Entry [C]     2130                 calcium carbonate (oyster shell) tablet 500 mg  Dose: 500 mg  Freq: Daily Route: PO  Start: 01/08/25 0900      0914-Given                   calcium carbonate (TUMS) chewable tablet 500 mg (200 mg elemental)  Dose: 2 tablet  Freq: 3 Times Daily PRN Route: PO  PRN Reasons: Indigestion,Heartburn  Start: 01/16/25 1210     Admin Instructions:   One tablet contains 200 mg  elemental calcium.  Take with food.         cetirizine (zyrTEC) tablet 10 mg  Dose: 10 mg  Freq: Daily Route: PO  Start: 01/08/25 0900      0915-Given                   dextrose (D50W) (25 g/50 mL) IV injection 25 g  Dose: 25 g  Freq: Every 15 Minutes PRN Route: IV  PRN Reason: Low Blood Sugar  PRN Comment: Blood Sugar Less Than 70  Start: 01/07/25 1826     Admin Instructions:   Blood sugar less than 70; patient has IV access - Unresponsive, NPO or Unable To Safely Swallow         dextrose (GLUTOSE) oral gel 15 g  Dose: 15 g  Freq: Every 15 Minutes PRN Route: PO  PRN Reason: Low Blood Sugar  PRN Comment: Blood sugar less than 70  Start: 01/07/25 1826     Admin Instructions:   BS<70, Patient Alert, Is not NPO, Can safely swallow.         digoxin (LANOXIN) tablet 250 mcg  Dose: 250 mcg  Freq: Nightly Route: PO  Start: 01/07/25 2100     Admin Instructions:    Check and record heart rate.      2100                   dilTIAZem CD (CARDIZEM CD) 24 hr capsule 360 mg  Dose: 360 mg  Freq: Every 24 Hours Scheduled Route: PO  Start: 01/08/25 0900     Admin Instructions:   Caution: Look alike/sound alike drug alert.   Swallow capsule whole. Do not crush, chew, or open capsule. Avoid grapefruit juice. Maximum simvastatin dose 10 mg while taking dilTIAZem.      0915-Given                   docusate sodium (COLACE) capsule 100 mg  Dose: 100 mg  Freq: 2 Times Daily Route: PO  Start: 01/07/25 2100     Admin Instructions:   Swallow whole.  Do not open, crush, or chew capsule.      0914-Given     2100                  Enoxaparin Sodium (LOVENOX) syringe 40 mg  Dose: 40 mg  Freq: Daily Route: SC  Indications of Use: PROPHYLAXIS OF VENOUS THROMBOEMBOLISM  Start: 01/13/25 1300     Admin Instructions:   Give subcutaneous in abdomen only. Do not massage site after injection.      0914-Given                   FLUoxetine (PROzac) capsule 40 mg  Dose: 40 mg  Freq: Nightly Route: PO  Start: 01/07/25 2100     Admin Instructions:   Caution: Look  alike/sound alike drug alert      2100                   glucagon (GLUCAGEN) injection 1 mg  Dose: 1 mg  Freq: Every 15 Minutes PRN Route: IM  PRN Reason: Low Blood Sugar  PRN Comment: Blood Glucose Less Than 70  Start: 01/07/25 1826     Admin Instructions:   Blood Glucose Less Than 70 - Patient Without IV Access - Unresponsive, NPO or Unable To Safely Swallow  Reconstitute powder for injection by adding 1 mL of -supplied sterile diluent or sterile water for injection to a vial containing 1 mg of the drug, to provide solutions containing 1 mg/mL. Shake vial gently to dissolve.          HYDROmorphone (DILAUDID) injection 0.5 mg  Dose: 0.5 mg  Freq: Every 2 Hours PRN Route: IV  PRN Reason: Severe Pain  Start: 01/07/25 1826     Admin Instructions:   Based on patient request - if ordered for moderate or severe pain, provider allows for administration of a medication prescribed for a lower pain scale.  If given for pain, use the following pain scale:  Mild Pain = Pain Score of 1-3, CPOT 1-2  Moderate Pain = Pain Score of 4-6, CPOT 3-4  Severe Pain = Pain Score of 7-10, CPOT 5-8         And   naloxone (NARCAN) injection 0.4 mg  Dose: 0.4 mg  Freq: Every 5 Minutes PRN Route: IV  PRN Reason: Respiratory Depression  Start: 01/07/25 1826     Admin Instructions:   If respiratory rate is less than 8 breaths/minute or patient is difficult to arouse stop any narcotics and contact physician.   Administer slow IV push. Repeat as ordered until patient's respiratory rate is greater than 12 breaths/minute.         ibuprofen (ADVIL,MOTRIN) tablet 400 mg  Dose: 400 mg  Freq: Every 6 Hours PRN Route: PO  PRN Reason: Mild Pain  Start: 01/07/25 1826     Admin Instructions:   Based on patient request - if ordered for moderate or severe pain, provider allows for administration of a medication prescribed for a lower pain scale.    Mucous membrane irritant. Do not crush or chew tablet or capsule unless administered through a  feeding tube.  If given for pain, use the following pain scale:  Mild Pain = Pain Score of 1-3, CPOT 1-2  Moderate Pain = Pain Score of 4-6, CPOT 3-4  Severe Pain = Pain Score of 7-10, CPOT 5-8      0320-Given     0914-Given                  insulin lispro (HUMALOG/ADMELOG) injection 2-7 Units  Dose: 2-7 Units  Freq: 4 Times Daily Before Meals & Nightly Route: SC  Start: 01/07/25 2100     Admin Instructions:   Correction Insulin - Low Dose - Total Insulin Dose Less Than 40 units/day (Lean, Elderly or Renal Patients)    Blood Glucose 150-199 mg/dL - 2 units  Blood Glucose 200-249 mg/dL - 3 units  Blood Glucose 250-299 mg/dL - 4 units  Blood Glucose 300-349 mg/dL - 5 units  Blood Glucose 350-400 mg/dL - 6 units  Blood Glucose Greater Than 400 mg/dL - 7 units & Call Provider   Caution: Look alike/sound alike drug alert      (2147)-Not Given     (4739)-Not Given     1730 2100                levothyroxine (SYNTHROID, LEVOTHROID) tablet 50 mcg  Dose: 50 mcg  Freq: Every Early Morning Route: PO  Start: 01/08/25 0600     Admin Instructions:   Take on empty stomach.      0655-Given                   metFORMIN ER (GLUCOPHAGE-XR) 24 hr tablet 500 mg  Dose: 500 mg  Freq: Nightly Route: PO  Start: 01/07/25 2100     Admin Instructions:   Do not crush or chew the capsules or tablets. The drug may not work as designed if the capsule or tablet is crushed or chewed. Swallow whole.  Caution: Look alike/sound alike drug alert. Do not crush, split, or chew.      2100                   ondansetron (ZOFRAN) injection 4 mg  Dose: 4 mg  Freq: Every 6 Hours PRN Route: IV  PRN Reasons: Nausea,Vomiting  Start: 01/07/25 1826     Admin Instructions:   If BOTH ondansetron (ZOFRAN) and promethazine (PHENERGAN) are ordered use ondansetron first and THEN promethazine IF ondansetron is ineffective.         oxyCODONE-acetaminophen (PERCOCET) 5-325 MG per tablet 1 tablet  Dose: 1 tablet  Freq: Every 4 Hours PRN Route: PO  PRN Reason: Moderate  "Pain  Start: 01/07/25 1826     Admin Instructions:   Based on patient request - if ordered for moderate or severe pain, provider allows for administration of a medication prescribed for a lower pain scale.  [KEV]    Do not exceed 4 grams of acetaminophen in a 24 hr period. Max dose of 2gm for AST/ALT greater than 120 units/L        If given for pain, use the following pain scale:   Mild Pain = Pain Score of 1-3, CPOT 1-2  Moderate Pain = Pain Score of 4-6, CPOT 3-4  Severe Pain = Pain Score of 7-10, CPOT 5-8      0507-Given                   pantoprazole (PROTONIX) EC tablet 40 mg  Dose: 40 mg  Freq: Every Early Morning Route: PO  Start: 01/08/25 0600     Admin Instructions:   Do not crush or chew the capsules or tablets. The drug may not work as designed if the capsule or tablet is crushed or chewed. Swallow whole.  Swallow whole; do not crush, split, or chew.      0655-Given                   prochlorperazine (COMPAZINE) injection 5 mg  Dose: 5 mg  Freq: Every 6 Hours PRN Route: IV  PRN Reasons: Nausea,Vomiting  Start: 01/09/25 1251     Admin Instructions:   \"If multiple N/V medications ordered, use in the following order: Ondansetron, Prochlorperazine, Promethazine. Use PO unless patient refuses or patient unable to swallow.\"      0320-Given                   simethicone (MYLICON) chewable tablet 80 mg  Dose: 80 mg  Freq: 4 Times Daily PRN Route: PO  PRN Reason: Flatulence  PRN Comment: Gas pains  Start: 01/16/25 1210      1249-Given                   sodium chloride 0.9 % flush 10 mL  Dose: 10 mL  Freq: As Needed Route: IV  PRN Reason: Line Care  Start: 01/07/25 1826         sodium chloride 0.9 % flush 10 mL  Dose: 10 mL  Freq: Every 12 Hours Scheduled Route: IV  Start: 01/07/25 2100 0914-Given     2100                  valsartan (DIOVAN) tablet 160 mg  Dose: 160 mg  Freq: Daily Route: PO  Start: 01/08/25 0900     Admin Instructions:   Hold for SBP less than 100, DBP less than 60, or heart rate less than " 50. If a dose is held, please contact the provider.      1103-Given                   vitamin E capsule 1,000 Units  Dose: 1,000 Units  Freq: Daily Route: PO  Start: 01/08/25 0900      0922-Given                   Future Medications  Medications 01/16/25      latanoprost (XALATAN) 0.005 % ophthalmic solution 1 drop  Dose: 1 drop  Freq: Nightly Route: Both Eyes  Start: 01/16/25 2100      2100                   Completed Medications  Medications 01/16/25      alvimopan (ENTEREG) capsule 12 mg  Dose: 12 mg  Freq: Once Route: PO  Start: 01/07/25 1138 End: 01/07/25 1227     Admin Instructions:   Contraindicated in end stage renal failure or severe hepatic impairment.   Administer 30 minutes to 5 hours prior to surgery  Inpatient use only, max 15 total doses (pre and post op).  Discontinue after first BM or when narcotics have been discontinued.     Order specific questions:   Is this medication for a partial large or small bowel resection with primary anastomosis? Yes  Has the patient recieved daily opioids for the past 7 days? No  Have you received education on the benefits and risks of alvimopan (Entereg)? Yes         ceFAZolin 2000 mg IVPB in 100 mL NS (MBP)  Dose: 2,000 mg  Freq: Once Route: IV  Indications of Use: PERIOPERATIVE PHARMACOPROPHYLAXIS  Start: 01/07/25 1323 End: 01/07/25 1405     Admin Instructions:   Caution: Look alike/sound alike drug alert         famotidine (PEPCID) injection 20 mg  Dose: 20 mg  Freq: Once Route: IV  Start: 01/07/25 1144 End: 01/07/25 1239     Admin Instructions:   Give IV push over 2 minutes.         HYDROcodone-acetaminophen (NORCO) 5-325 MG per tablet 1 tablet  Dose: 1 tablet  Freq: Once As Needed Route: PO  PRN Reason: Moderate Pain  Start: 01/07/25 1621 End: 01/07/25 1725     Admin Instructions:   Based on patient request - if ordered for moderate or severe pain, provider allows for administration of a medication prescribed for a lower pain scale.  [KEV]    Do not exceed 4  grams of acetaminophen in a 24 hr period. Max dose of 2gm for AST/ALT greater than 120 units/L        If given for pain, use the following pain scale:   Mild Pain = Pain Score of 1-3, CPOT 1-2  Moderate Pain = Pain Score of 4-6, CPOT 3-4  Severe Pain = Pain Score of 7-10, CPOT 5-8         metroNIDAZOLE (FLAGYL) IVPB 500 mg  Dose: 500 mg  Freq: Once Route: IV  Indications of Use: PERIOPERATIVE PHARMACOPROPHYLAXIS  Start: 01/07/25 1323 End: 01/07/25 1405     Admin Instructions:   Caution: Look alike/sound alike drug alert.  Do not refrigerate.         midazolam (VERSED) injection 1 mg  Dose: 1 mg  Freq: Every 5 Minutes PRN Route: IV  PRN Comment: Anxiety prophylaxis, Pre-op comfort  Start: 01/07/25 1141 End: 01/07/25 1302     Admin Instructions:   May repeat dose in 5 minutes one time then contact provider for additional orders.           sodium chloride 0.9 % infusion  Freq: Continuous PRN  Start: 01/07/25 1451 End: 01/07/25 1626         Discontinued Medications  Medications 01/16/25      acetaminophen (TYLENOL) tablet 1,000 mg  Dose: 1,000 mg  Freq: Every 6 Hours Route: PO  Start: 01/07/25 1136 End: 01/07/25 1643     Admin Instructions:   If given for fever, use fever parameter: fever greater than 100.4 °F  Based on patient request - if ordered for moderate or severe pain, provider allows for administration of a medication prescribed for a lower pain scale.    Do not exceed 4 grams of acetaminophen in a 24 hr period. Max dose of 2gm for AST/ALT greater than 120 units/L.    If given for pain, use the following pain scale:   Mild Pain = Pain Score of 1-3, CPOT 1-2  Moderate Pain = Pain Score of 4-6, CPOT 3-4  Severe Pain = Pain Score of 7-10, CPOT 5-8         alvimopan (ENTEREG) capsule 12 mg  Dose: 12 mg  Freq: 2 Times Daily Route: PO  Start: 01/07/25 2100 End: 01/08/25 0132     Admin Instructions:   Contraindicated in end stage renal failure or severe hepatic impairment.   Administer 30 minutes to 5 hours prior to  surgery  Inpatient use only, max 15 total doses (pre and post op).  Discontinue after first BM or when narcotics have been discontinued.     Order specific questions:   Is this medication for a partial large or small bowel resection with primary anastomosis? Yes  Has the patient recieved daily opioids for the past 7 days? No  Have you received education on the benefits and risks of alvimopan (Entereg)? Yes         Atropine Sulfate (PF) injection 0.4 mg  Dose: 0.4 mg  Freq: Once As Needed Route: IV  PRN Reason: Bradycardia  PRN Comment: symptomatic bradycardia (hypotension, dizziness, confusion). Notify attending anesthesiologist.  Start: 01/07/25 1621 End: 01/07/25 1820         bupivacaine-EPINEPHrine PF (MARCAINE w/EPI) 0.5% -[id number] injection  Freq: As Needed  Start: 01/07/25 1451 End: 01/07/25 1630         dextrose 5 % and sodium chloride 0.45 % with KCl 10 mEq/L infusion  Rate: 100 mL/hr Dose: 100 mL/hr  Freq: Continuous Route: IV  Start: 01/11/25 1945 End: 01/12/25 1659         dextrose 5 % and sodium chloride 0.45 % with KCl 20 mEq/L infusion  Rate: 75 mL/hr Dose: 75 mL/hr  Freq: Continuous Route: IV  Start: 01/07/25 1915 End: 01/08/25 1650         diphenhydrAMINE (BENADRYL) injection 12.5 mg  Dose: 12.5 mg  Freq: Every 15 Minutes PRN Route: IV  PRN Reason: Itching  PRN Comment: May repeat x 1  Start: 01/07/25 1621 End: 01/07/25 1820     Admin Instructions:   Caution: Look alike/sound alike drug alert. This med may be ordered in other forms and routes. Before giving verify the last time the drug was given by any route/form.         ePHEDrine injection 5 mg  Dose: 5 mg  Freq: Once As Needed Route: IV  PRN Comment: symptomatic hypotension - Notify attending anesthesiologist if this needs to be given  Start: 01/07/25 1621 End: 01/07/25 1820     Admin Instructions:   Caution: Look alike/sound alike drug alert   Dilute with NS to 5-10 mg/mL.  Central line preferred, if unavailable use large bore IV access with  frequent nurse monitoring of IV site.         fentaNYL citrate (PF) (SUBLIMAZE) injection 25 mcg  Dose: 25 mcg  Freq: Every 5 Minutes PRN Route: IV  PRN Reason: Severe Pain  Start: 01/07/25 1621 End: 01/07/25 1820     Admin Instructions:   Maximum total dose of fentanyl is 200 mcg.  If given for pain, use the following pain scale:  Mild Pain = Pain Score of 1-3, CPOT 1-2  Moderate Pain = Pain Score of 4-6, CPOT 3-4  Severe Pain = Pain Score of 7-10, CPOT 5-8         flumazenil (ROMAZICON) injection 0.2 mg  Dose: 0.2 mg  Freq: As Needed Route: IV  PRN Comment: for benzodiazepine induced unresponsiveness or sedation  Start: 01/07/25 1621 End: 01/07/25 1820     Admin Instructions:   Notify Anesthesia if given  ** give IV over 15-30 seconds **         hydrALAZINE (APRESOLINE) injection 5 mg  Dose: 5 mg  Freq: Every 10 Minutes PRN Route: IV  PRN Reason: High Blood Pressure  PRN Comment: for systolic blood pressure greater than 180 mmHg or diastolic blood pressure greater than 105 mmHg  Start: 01/07/25 1621 End: 01/07/25 1820     Admin Instructions:   Up to 20 mg. If labetalol and hydralazine are both ordered, use labetalol first.  Caution: Look alike/sound alike drug alert         HYDROcodone-acetaminophen (NORCO) 7.5-325 MG per tablet 1 tablet  Dose: 1 tablet  Freq: Every 4 Hours PRN Route: PO  PRN Reason: Severe Pain  Start: 01/07/25 1621 End: 01/07/25 1820     Admin Instructions:   Based on patient request - if ordered for moderate or severe pain, provider allows for administration of a medication prescribed for a lower pain scale.  [KEV]    Do not exceed 4 grams of acetaminophen in a 24 hr period. Max dose of 2gm for AST/ALT greater than 120 units/L        If given for pain, use the following pain scale:   Mild Pain = Pain Score of 1-3, CPOT 1-2  Moderate Pain = Pain Score of 4-6, CPOT 3-4  Severe Pain = Pain Score of 7-10, CPOT 5-8         HYDROmorphone (DILAUDID) injection 0.25 mg  Dose: 0.25 mg  Freq: Every 5  Minutes PRN Route: IV  PRN Reason: Moderate Pain  Start: 01/07/25 1621 End: 01/07/25 1820     Admin Instructions:   Maximum total dose of hydromorphone is 2 mg.  If given for pain, use the following pain scale:  Mild Pain = Pain Score of 1-3, CPOT 1-2  Moderate Pain = Pain Score of 4-6, CPOT 3-4  Severe Pain = Pain Score of 7-10, CPOT 5-8         ipratropium-albuterol (DUO-NEB) nebulizer solution 3 mL  Dose: 3 mL  Freq: Once As Needed Route: NEBULIZATION  PRN Reasons: Wheezing,Shortness of Air  PRN Comment: bronchospasm  Start: 01/07/25 1621 End: 01/07/25 1820     Admin Instructions:   Notify Anesthesia if minineb is given         labetalol (NORMODYNE,TRANDATE) injection 5 mg  Dose: 5 mg  Freq: Every 5 Minutes PRN Route: IV  PRN Reason: High Blood Pressure  PRN Comment: for systolic blood pressure greater than 180 mmHg or diastolic blood pressure greater than 105 mmHg  Start: 01/07/25 1621 End: 01/07/25 1820     Admin Instructions:   Hold for heart rate less than 60.    If labetalol and hydralazine are both ordered, use labetalol first. Maximum dose of labetalol is 20mg IV while in PACU, notify anesthesiologist for further orders if needed.  Give IV Push over 2 minutes.         lactated ringers infusion  Rate: 9 mL/hr Dose: 9 mL/hr  Freq: Continuous Route: IV  Start: 01/07/25 1144 End: 01/07/25 1826         lidocaine (XYLOCAINE) 1 % injection 0.5 mL  Dose: 0.5 mL  Freq: Once As Needed Route: ID  PRN Comment: IV Start  Start: 01/07/25 1141 End: 01/07/25 1643         naloxone (NARCAN) injection 0.2 mg  Dose: 0.2 mg  Freq: As Needed Route: IV  PRN Reasons: Opioid Reversal,Respiratory Depression  PRN Comment: unresponsiveness, decrease oxygen saturation  Indications of Use: ACUTE RESPIRATORY FAILURE,OPIOID-INDUCED RESPIRATORY DEPRESSION  Start: 01/07/25 1621 End: 01/07/25 1820     Admin Instructions:   Notify Anesthesia if given         ondansetron (ZOFRAN) injection 4 mg  Dose: 4 mg  Freq: Once As Needed Route: IV  PRN  Reasons: Nausea,Vomiting  Indications of Use: POSTOPERATIVE NAUSEA AND VOMITING  Start: 01/07/25 1621 End: 01/07/25 1820     Admin Instructions:   If BOTH ondansetron (ZOFRAN) and promethazine (PHENERGAN) are ordered use ondansetron first and THEN promethazine IF ondansetron is ineffective.          promethazine (PHENERGAN) suppository 25 mg  Dose: 25 mg  Freq: Once As Needed Route: RE  PRN Reasons: Nausea,Vomiting  Start: 01/07/25 1621 End: 01/07/25 1820     Admin Instructions:   If BOTH ondansetron (ZOFRAN) and promethazine (PHENERGAN) are ordered use ondansetron first and THEN promethazine IF ondansetron is ineffective.         Or   promethazine (PHENERGAN) tablet 25 mg  Dose: 25 mg  Freq: Once As Needed Route: PO  PRN Reasons: Nausea,Vomiting  Start: 01/07/25 1621 End: 01/07/25 1820     Admin Instructions:   If BOTH ondansetron (ZOFRAN) and promethazine (PHENERGAN) are ordered use ondansetron first and THEN promethazine IF ondansetron is ineffective.           sodium chloride (NS) irrigation solution  Freq: As Needed  Start: 01/07/25 1451 End: 01/07/25 1630         sodium chloride 0.9 % flush 3 mL  Dose: 3 mL  Freq: Every 12 Hours Scheduled Route: IV  Start: 01/07/25 1144 End: 01/07/25 1643         sodium chloride 0.9 % flush 3-10 mL  Dose: 3-10 mL  Freq: As Needed Route: IV  PRN Reason: Line Care  Start: 01/07/25 1141 End: 01/07/25 1643         sodium chloride 0.9 % infusion  Rate: 125 mL/hr Dose: 125 mL/hr  Freq: Continuous Route: IV  Start: 01/12/25 1745 End: 01/14/25 1249                Physician Progress Notes (last 24 hours)        Sabi Mars MD at 01/16/25 1210          General Surgery  Progress Note    CC: Follow-up endoscopically unresectable tubulovillous adenoma    POD#9 laparoscopic right colon resection    S: She has now had no bowel movements since yesterday after stopping the Colace.  She passed a small amount of flatus overnight.  She is having new onset epigastric abdominal pain with  "radiation to the right upper quadrant and right side of her back that started around 2:00 this morning.  The pain has been constant and is no worse with palpation.  The pain feels like intense gas pain.    O:/65 (BP Location: Right arm, Patient Position: Sitting)   Pulse 104   Temp 97.4 °F (36.3 °C) (Oral)   Resp 18   Ht 163.8 cm (64.49\")   Wt 70.5 kg (155 lb 6.8 oz)   LMP  (LMP Unknown)   SpO2 96%   BMI 26.28 kg/m²     Intake & Output:  UOP: Void x6 yesterday  BM x2 yesterday    GENERAL: Laying on her side in bed, appears comfortable but fatigued  HEENT: normocephalic, atraumatic, moist mucous membranes, clear sclerae   CHEST: clear to auscultation, no wheezes, rales or rhonchi, symmetric air entry  CARDIAC: regular rate and rhythm    ABDOMEN: Soft, relatively distended, incisions clean/dry/intact with glue, no evidence of peritonitis but she is mildly tender to palpation in the epigastrium  EXTREMITIES: no cyanosis, clubbing, or edema   SKIN: Warm and moist, no rashes    LABS  Results from last 7 days   Lab Units 01/15/25  0459 01/14/25  0609 01/13/25  0534 01/12/25  0424   WBC 10*3/mm3 12.42* 11.27* 8.91 4.86   HEMOGLOBIN g/dL 9.6* 10.4* 10.3* 11.3*   HEMATOCRIT % 28.5* 31.6* 29.7* 32.9*   PLATELETS 10*3/mm3 345 326 305 270   MONOCYTES % %  --  9.1 11.1 11.3   EOSINOPHIL % %  --  1.0 1.0 0.0*     Results from last 7 days   Lab Units 01/15/25  0459 01/14/25  0609 01/13/25  0534   SODIUM mmol/L 134* 131* 130*   POTASSIUM mmol/L 3.7 3.7 3.7   CHLORIDE mmol/L 100 102 98   CO2 mmol/L 19.9* 17.0* 18.6*   BUN mg/dL 11 9 23   CREATININE mg/dL 0.59 0.52* 0.83   CALCIUM mg/dL 8.6 8.6 8.4*   GLUCOSE mg/dL 95 114* 116*         A/P: 64 y.o. female POD#9 laparoscopic right colon resection for endoscopically unresectable tubulovillous adenoma, complicated by an expected postoperative adynamic ileus    I have ordered a CT abdomen/pelvis to be done today given her new onset abdominal pain and her persistent abnormal " bowel habits.    Sabi Mars MD  General, Robotic, and Endoscopic Surgery  [facility]    [address], [address]  [place] [address]  P: [phone]  F: [phone]       Electronically signed by Sabi Mars MD at 01/16/25 1213       Consult Notes (last 24 hours)  Notes from 01/15/25 1640 through 01/16/25 1640   No notes of this type exist for this encounter.

## 2025-01-16 NOTE — PROGRESS NOTES
"Nutrition Services    Patient Name:  Jess Razo  YOB: 1960  MRN: [id number]  Admit Date:  1/7/2025  Assessment Date:  01/16/25    NUTRITION SCREENING      Reason for Encounter LOS   Diagnosis/Problem Tubulovillous adenoma of colon  POD#9 s/p lap R colon resection        PO Diet Diet: Regular/House, Diabetic; Consistent Carbohydrate; Fluid Consistency: Thin (IDDSI 0)   Supplements N/a   PO Intake % 0-50%       Medications MAR reviewed by RD   Labs  Listed below, reviewed   Physical Findings Overweight  Tooth/teeth broken   GI Function +BM 1/15   Skin Status B=20, abdomen lap sites       Height  Weight  BMI  Weight Trend     Height: 163.8 cm (64.49\")  Weight: 70.5 kg (155 lb 6.8 oz) (01/07/25 1136)  Body mass index is 26.28 kg/m².  Stable       Nutrition Problem (PES) No nutrition diagnosis at this time.       Intervention/Plan 0-50% at meals.  Adding Boost GC BID to help promote kcal and protein intake.    RD to follow up per protocol.     Results from last 7 days   Lab Units 01/15/25  0459 01/14/25  0609 01/13/25  0534   SODIUM mmol/L 134* 131* 130*   POTASSIUM mmol/L 3.7 3.7 3.7   CHLORIDE mmol/L 100 102 98   CO2 mmol/L 19.9* 17.0* 18.6*   BUN mg/dL 11 9 23   CREATININE mg/dL 0.59 0.52* 0.83   CALCIUM mg/dL 8.6 8.6 8.4*   GLUCOSE mg/dL 95 114* 116*     Results from last 7 days   Lab Units 01/15/25  0459   HEMOGLOBIN g/dL 9.6*   HEMATOCRIT % 28.5*     Lab Results   Component Value Date    HGBA1C 6.40 (H) 12/31/2024         Electronically signed by:  Zara Jones RD  01/16/25 13:18 EST  "

## 2025-01-17 LAB
ANION GAP SERPL CALCULATED.3IONS-SCNC: 11.3 MMOL/L (ref 5–15)
BASOPHILS # BLD AUTO: 0.07 10*3/MM3 (ref 0–0.2)
BASOPHILS NFR BLD AUTO: 0.4 % (ref 0–1.5)
BUN SERPL-MCNC: 14 MG/DL (ref 8–23)
BUN/CREAT SERPL: 23.7 (ref 7–25)
CALCIUM SPEC-SCNC: 8.5 MG/DL (ref 8.6–10.5)
CHLORIDE SERPL-SCNC: 94 MMOL/L (ref 98–107)
CO2 SERPL-SCNC: 20.7 MMOL/L (ref 22–29)
CREAT SERPL-MCNC: 0.59 MG/DL (ref 0.57–1)
DEPRECATED RDW RBC AUTO: 42.9 FL (ref 37–54)
EGFRCR SERPLBLD CKD-EPI 2021: 101.4 ML/MIN/1.73
EOSINOPHIL # BLD AUTO: 0.03 10*3/MM3 (ref 0–0.4)
EOSINOPHIL NFR BLD AUTO: 0.2 % (ref 0.3–6.2)
ERYTHROCYTE [DISTWIDTH] IN BLOOD BY AUTOMATED COUNT: 13 % (ref 12.3–15.4)
GLUCOSE BLDC GLUCOMTR-MCNC: 119 MG/DL (ref 70–130)
GLUCOSE BLDC GLUCOMTR-MCNC: 137 MG/DL (ref 70–130)
GLUCOSE BLDC GLUCOMTR-MCNC: 150 MG/DL (ref 70–130)
GLUCOSE BLDC GLUCOMTR-MCNC: 176 MG/DL (ref 70–130)
GLUCOSE SERPL-MCNC: 137 MG/DL (ref 65–99)
HCT VFR BLD AUTO: 28.5 % (ref 34–46.6)
HGB BLD-MCNC: 9.8 G/DL (ref 12–15.9)
IMM GRANULOCYTES # BLD AUTO: 0.22 10*3/MM3 (ref 0–0.05)
IMM GRANULOCYTES NFR BLD AUTO: 1.4 % (ref 0–0.5)
LYMPHOCYTES # BLD AUTO: 1.12 10*3/MM3 (ref 0.7–3.1)
LYMPHOCYTES NFR BLD AUTO: 7.1 % (ref 19.6–45.3)
MAGNESIUM SERPL-MCNC: 1.6 MG/DL (ref 1.6–2.4)
MCH RBC QN AUTO: 31 PG (ref 26.6–33)
MCHC RBC AUTO-ENTMCNC: 34.4 G/DL (ref 31.5–35.7)
MCV RBC AUTO: 90.2 FL (ref 79–97)
MONOCYTES # BLD AUTO: 0.91 10*3/MM3 (ref 0.1–0.9)
MONOCYTES NFR BLD AUTO: 5.8 % (ref 5–12)
NEUTROPHILS NFR BLD AUTO: 13.32 10*3/MM3 (ref 1.7–7)
NEUTROPHILS NFR BLD AUTO: 85.1 % (ref 42.7–76)
NRBC BLD AUTO-RTO: 0 /100 WBC (ref 0–0.2)
PHOSPHATE SERPL-MCNC: 2 MG/DL (ref 2.5–4.5)
PLATELET # BLD AUTO: 341 10*3/MM3 (ref 140–450)
PMV BLD AUTO: 8.6 FL (ref 6–12)
POTASSIUM SERPL-SCNC: 3.5 MMOL/L (ref 3.5–5.2)
RBC # BLD AUTO: 3.16 10*6/MM3 (ref 3.77–5.28)
SODIUM SERPL-SCNC: 126 MMOL/L (ref 136–145)
WBC NRBC COR # BLD AUTO: 15.67 10*3/MM3 (ref 3.4–10.8)

## 2025-01-17 PROCEDURE — 25810000003 SODIUM CHLORIDE 0.9 % SOLUTION: Performed by: SURGERY

## 2025-01-17 PROCEDURE — 80048 BASIC METABOLIC PNL TOTAL CA: CPT | Performed by: SURGERY

## 2025-01-17 PROCEDURE — 85025 COMPLETE CBC W/AUTO DIFF WBC: CPT | Performed by: SURGERY

## 2025-01-17 PROCEDURE — 99024 POSTOP FOLLOW-UP VISIT: CPT | Performed by: SURGERY

## 2025-01-17 PROCEDURE — 94799 UNLISTED PULMONARY SVC/PX: CPT

## 2025-01-17 PROCEDURE — 25010000002 MAGNESIUM SULFATE PER 500 MG OF MAGNESIUM: Performed by: SURGERY

## 2025-01-17 PROCEDURE — 25010000002 ENOXAPARIN PER 10 MG: Performed by: SURGERY

## 2025-01-17 PROCEDURE — 05HY33Z INSERTION OF INFUSION DEVICE INTO UPPER VEIN, PERCUTANEOUS APPROACH: ICD-10-PCS | Performed by: SURGERY

## 2025-01-17 PROCEDURE — 84100 ASSAY OF PHOSPHORUS: CPT | Performed by: SURGERY

## 2025-01-17 PROCEDURE — 3E0336Z INTRODUCTION OF NUTRITIONAL SUBSTANCE INTO PERIPHERAL VEIN, PERCUTANEOUS APPROACH: ICD-10-PCS | Performed by: SURGERY

## 2025-01-17 PROCEDURE — 63710000001 INSULIN LISPRO (HUMAN) PER 5 UNITS: Performed by: SURGERY

## 2025-01-17 PROCEDURE — C1751 CATH, INF, PER/CENT/MIDLINE: HCPCS

## 2025-01-17 PROCEDURE — 82948 REAGENT STRIP/BLOOD GLUCOSE: CPT

## 2025-01-17 PROCEDURE — 25010000002 POTASSIUM CHLORIDE PER 2 MEQ OF POTASSIUM: Performed by: SURGERY

## 2025-01-17 PROCEDURE — 25010000002 CALCIUM GLUCONATE PER 10 ML: Performed by: SURGERY

## 2025-01-17 PROCEDURE — 83735 ASSAY OF MAGNESIUM: CPT | Performed by: SURGERY

## 2025-01-17 RX ORDER — SODIUM CHLORIDE 0.9 % (FLUSH) 0.9 %
10 SYRINGE (ML) INJECTION EVERY 12 HOURS SCHEDULED
Status: DISCONTINUED | OUTPATIENT
Start: 2025-01-17 | End: 2025-01-20 | Stop reason: SDUPTHER

## 2025-01-17 RX ORDER — SODIUM CHLORIDE 9 MG/ML
40 INJECTION, SOLUTION INTRAVENOUS AS NEEDED
Status: DISCONTINUED | OUTPATIENT
Start: 2025-01-17 | End: 2025-01-20 | Stop reason: SDUPTHER

## 2025-01-17 RX ORDER — DEXTROSE MONOHYDRATE, SODIUM CHLORIDE, AND POTASSIUM CHLORIDE 50; 1.49; 4.5 G/1000ML; G/1000ML; G/1000ML
50 INJECTION, SOLUTION INTRAVENOUS CONTINUOUS
Status: DISCONTINUED | OUTPATIENT
Start: 2025-01-17 | End: 2025-01-18

## 2025-01-17 RX ORDER — SODIUM CHLORIDE 0.9 % (FLUSH) 0.9 %
10 SYRINGE (ML) INJECTION EVERY 12 HOURS SCHEDULED
Status: DISCONTINUED | OUTPATIENT
Start: 2025-01-17 | End: 2025-01-21 | Stop reason: HOSPADM

## 2025-01-17 RX ORDER — FENTANYL/ROPIVACAINE/NS/PF 2-625MCG/1
15 PLASTIC BAG, INJECTION (ML) EPIDURAL ONCE
Status: COMPLETED | OUTPATIENT
Start: 2025-01-17 | End: 2025-01-18

## 2025-01-17 RX ORDER — SODIUM CHLORIDE 0.9 % (FLUSH) 0.9 %
20 SYRINGE (ML) INJECTION AS NEEDED
Status: DISCONTINUED | OUTPATIENT
Start: 2025-01-17 | End: 2025-01-21 | Stop reason: HOSPADM

## 2025-01-17 RX ORDER — SODIUM CHLORIDE 0.9 % (FLUSH) 0.9 %
10 SYRINGE (ML) INJECTION AS NEEDED
Status: DISCONTINUED | OUTPATIENT
Start: 2025-01-17 | End: 2025-01-20 | Stop reason: SDUPTHER

## 2025-01-17 RX ORDER — SODIUM CHLORIDE 0.9 % (FLUSH) 0.9 %
20 SYRINGE (ML) INJECTION AS NEEDED
Status: DISCONTINUED | OUTPATIENT
Start: 2025-01-17 | End: 2025-01-20 | Stop reason: SDUPTHER

## 2025-01-17 RX ORDER — SODIUM CHLORIDE 0.9 % (FLUSH) 0.9 %
10 SYRINGE (ML) INJECTION AS NEEDED
Status: DISCONTINUED | OUTPATIENT
Start: 2025-01-17 | End: 2025-01-21 | Stop reason: HOSPADM

## 2025-01-17 RX ORDER — SODIUM CHLORIDE 9 MG/ML
40 INJECTION, SOLUTION INTRAVENOUS AS NEEDED
Status: DISCONTINUED | OUTPATIENT
Start: 2025-01-17 | End: 2025-01-21 | Stop reason: HOSPADM

## 2025-01-17 RX ADMIN — INSULIN LISPRO 2 UNITS: 100 INJECTION, SOLUTION INTRAVENOUS; SUBCUTANEOUS at 23:08

## 2025-01-17 RX ADMIN — METFORMIN HYDROCHLORIDE 500 MG: 500 TABLET, EXTENDED RELEASE ORAL at 22:47

## 2025-01-17 RX ADMIN — VALSARTAN 160 MG: 80 TABLET, FILM COATED ORAL at 08:05

## 2025-01-17 RX ADMIN — ATORVASTATIN CALCIUM 80 MG: 20 TABLET, FILM COATED ORAL at 22:48

## 2025-01-17 RX ADMIN — Medication 500 MG: at 08:05

## 2025-01-17 RX ADMIN — BRIMONIDINE TARTRATE 1 DROP: 2 SOLUTION OPHTHALMIC at 22:48

## 2025-01-17 RX ADMIN — DOCUSATE SODIUM 100 MG: 100 CAPSULE, LIQUID FILLED ORAL at 22:47

## 2025-01-17 RX ADMIN — CALCIUM GLUCONATE: 98 INJECTION, SOLUTION INTRAVENOUS at 17:41

## 2025-01-17 RX ADMIN — LATANOPROST 1 DROP: 50 SOLUTION OPHTHALMIC at 22:48

## 2025-01-17 RX ADMIN — DILTIAZEM HYDROCHLORIDE 360 MG: 120 CAPSULE, COATED, EXTENDED RELEASE ORAL at 08:05

## 2025-01-17 RX ADMIN — IBUPROFEN 400 MG: 400 TABLET ORAL at 17:46

## 2025-01-17 RX ADMIN — DEXTROSE MONOHYDRATE, SODIUM CHLORIDE, AND POTASSIUM CHLORIDE 75 ML/HR: 50; 1.49; 4.5 INJECTION, SOLUTION INTRAVENOUS at 07:22

## 2025-01-17 RX ADMIN — DOCUSATE SODIUM 100 MG: 100 CAPSULE, LIQUID FILLED ORAL at 08:05

## 2025-01-17 RX ADMIN — BUDESONIDE AND FORMOTEROL FUMARATE DIHYDRATE 2 PUFF: 160; 4.5 AEROSOL RESPIRATORY (INHALATION) at 20:31

## 2025-01-17 RX ADMIN — DIGOXIN 250 MCG: 0.12 TABLET ORAL at 22:48

## 2025-01-17 RX ADMIN — CETIRIZINE HYDROCHLORIDE 10 MG: 10 TABLET, FILM COATED ORAL at 08:05

## 2025-01-17 RX ADMIN — DEXTROSE MONOHYDRATE, SODIUM CHLORIDE, AND POTASSIUM CHLORIDE 50 ML/HR: 50; 1.49; 4.5 INJECTION, SOLUTION INTRAVENOUS at 22:00

## 2025-01-17 RX ADMIN — BUDESONIDE AND FORMOTEROL FUMARATE DIHYDRATE 2 PUFF: 160; 4.5 AEROSOL RESPIRATORY (INHALATION) at 07:32

## 2025-01-17 RX ADMIN — PANTOPRAZOLE SODIUM 40 MG: 40 TABLET, DELAYED RELEASE ORAL at 07:00

## 2025-01-17 RX ADMIN — LEVOTHYROXINE SODIUM 50 MCG: 50 TABLET ORAL at 07:00

## 2025-01-17 RX ADMIN — DEXTROSE MONOHYDRATE, SODIUM CHLORIDE, AND POTASSIUM CHLORIDE 50 ML/HR: 50; 1.49; 4.5 INJECTION, SOLUTION INTRAVENOUS at 17:35

## 2025-01-17 RX ADMIN — BISOPROLOL FUMARATE 10 MG: 5 TABLET ORAL at 08:05

## 2025-01-17 RX ADMIN — FLUOXETINE HYDROCHLORIDE 40 MG: 20 CAPSULE ORAL at 22:48

## 2025-01-17 RX ADMIN — BRIMONIDINE TARTRATE 1 DROP: 2 SOLUTION OPHTHALMIC at 08:06

## 2025-01-17 RX ADMIN — ENOXAPARIN SODIUM 40 MG: 100 INJECTION SUBCUTANEOUS at 08:09

## 2025-01-17 RX ADMIN — Medication 1000 UNITS: at 08:09

## 2025-01-17 RX ADMIN — POTASSIUM PHOSPHATE, MONOBASIC AND POTASSIUM PHOSPHATE, DIBASIC 15 MMOL: 224; 236 INJECTION, SOLUTION, CONCENTRATE INTRAVENOUS at 22:48

## 2025-01-17 NOTE — PLAN OF CARE
Goal Outcome Evaluation:  Plan of Care Reviewed With: patient, spouse        Progress: improving  Outcome Evaluation: BP Soft but stable, slightly tachycardic, abdomen remains distended, she did have a couple small bowel movements today and did pass gas. Ambulated in halls, voiding without issue, up in chair. Refusing bed alarm but patient and her spouse were educated on the risk of falls, and they were agreeable to call for help. Around 1800 patient vomited a large amount into trash can which was foul smelling. MD notified - new orders placed, CT complete. Per patient and her spouse a NGT was not able to be placed by nursing staff or the MD, so another attempt is not to be made at this point - MD notified and orders adjusted. Patient did report a slight imrovement in pain, and denies nausea despite the emesis. Pt and spouse updated on plan of care.

## 2025-01-17 NOTE — PROGRESS NOTES
"[facility] Clinical Pharmacy Services: Total Parental Nutrition Initial Consult    Indication: Prolonged Paralytic Ileus  Route: central  Type: standard    Relevant clinical data and objective history reviewed:  64 y.o. female 163.8 cm (64.49\") 70.5 kg (155 lb 6.8 oz)    Results from last 7 days   Lab Units 01/17/25  0537   SODIUM mmol/L 126*   POTASSIUM mmol/L 3.5   CHLORIDE mmol/L 94*   CO2 mmol/L 20.7*   BUN mg/dL 14   CREATININE mg/dL 0.59   CALCIUM mg/dL 8.5*   GLUCOSE mg/dL 137*        Estimated Creatinine Clearance: 95.1 mL/min (by C-G formula based on SCr of 0.59 mg/dL).    Active fluid orders: D 5 1/2 KCL 20 will lower to 50ml/hr when tpn started  at 75ml/hr for a total of 125ml/hr total fluid per Dr Mars's request .    Dietary Orders (From admission, onward)       Start     Ordered    01/17/25 1211  NPO Diet NPO Type: Sips with Meds, Ice Chips, Other (see comments)  Diet Effective Now        Comments: She can have hard candy, chewing gum, and sips of diet Sprite for comfort   Question Answer Comment   NPO Type Sips with Meds    NPO Type Ice Chips    NPO Type Other (see comments)        01/17/25 1210                  Assessment  Ideal Body Weight: 55.8 kg  Body Weight Used for Calculations: 60 kg ( used adjusted weight due to % IBW > 120)  Daily Est. Diomedes: 20-35 kCal/Day = 9214-6855 kcal/day  Estimated Fluid Requirements: 30-40 ml/kg = 0603-3782 ml/day   75ml/hr = 1800ml in addition to IVF running at 50ml/hr    Goal   Protein: 1.2 grams/kg/day (75 grams/day)     Goal will be adjusted per Dietary Recommendations when available   Dextrose: 1200 Kcal/day   Lipids: 100 ml of 20% lipid infusion    awaiting triglyceride level in am  will possible start tomorrow (Hx high triglycerides)             Total calories would equal 1700 kcal per day    Plan  Will start TPN at lower goal and will taper up as appropriate. Will initiate TPN with the following macros:   Protein/Dextrose/Lipids: 50/800/0    Volume: 1800 " ml (75 mL/hr over 24 hrs daily)    Based on the above labs, will add the following electrolytes/additives to the TPN.    Sodium Chloride: 80 mEq   Sodium Acetate: 30 mEq   Sodium Phosphate: 0 mEq   Potassium Chloride: 60 mEq   Potassium Acetate: 0 mEq   Potassium Phosphate: 22 mEq   Calcium Gluconate: 9 mEq   Magnesium Sulfate: 10 mEq   MVI for TPN   Trace Elements              Other Additives: n/a    Notes:   has order for POC finger glucose q6h                Currently on calcium 500mg po daily and calcium carbonate 500mg ( tums) 2 tabs tid prn indigestion                PPI:  protonix 40mg po every am    Labs to be ordered: cmp , magnesium , phosphorous daily  and triglycerides in am    Pharmacy will continue to follow.     Katya Luna [facility]  Clinical Pharmacist

## 2025-01-17 NOTE — SIGNIFICANT NOTE
"   01/17/25 1759   PICC Double Lumen 01/17/25 Right Brachial   Placement date: If unknown, DO NOT use \"Add Comment\" note/Placement time: If unknown, DO NOT use \"Add Comment\" note: 01/17/25 1700   Hand Hygiene Completed: Yes  Size (Fr): 4  Description (optional): DL PICC  Length (cm): 34 cm  Orientation: Right  Lo...   Site Assessment Clean;Dry;Intact   #1 Lumen Status Blood return noted;Capped;Flushed;Normal saline locked   #2 Lumen Status Blood return noted;Capped;Flushed;Normal saline locked   Length baltazar (cm) 0 cm   Line Care Connections checked and tightened   Extremity Circumference (cm) 33 cm   Dressing Type Border Dressing;Transparent;Securing device;Antimicrobial dressing/disc   Dressing Status Clean;Dry;Intact   Dressing Intervention New dressing   Liquid Adhesive Applied   Dressing Change Due 01/24/25   Indication/Daily Review of Necessity admin of parenteral nutrition/lipids       PICC placed successfully, confirmation via 3CG  Primary RN notified - PICC LINE IS READY TO BE USED     LOT - [id number]  EX - 10/31/2025    FINAL COUNT - 3 NEEDLES, 2 GUIDEWIRES, 1 SCALPEL ACCOUNTED FOR AT PROCEDURE END         "

## 2025-01-17 NOTE — PLAN OF CARE
Goal Outcome Evaluation:  Plan of Care Reviewed With: patient        Progress: improving  Outcome Evaluation: Patient denies any abdominal pain/nausea this shift. Reports numerous BMs last shift, bowel sounds hypoactive this AM. Remains NPO except ice chips. IVF infusing. PICC line consent & education signed, IV RN informed. Patient to start on TPN. Fall precautions maintained, up with stand-by assist to bathroom. Accu-checks WNL. VSS.

## 2025-01-17 NOTE — PLAN OF CARE
Goal Outcome Evaluation:  Plan of Care Reviewed With: patient        Progress: improving  Outcome Evaluation: PAtient is A&Ox4, VSS, no NGT placed per order. No vomiting yet this shift. Pt denies nausea and seems to be resting in bed well.  at bedside. Pain managed with Ibuprofen PRN. Pt agreeable to bed alarm and calling out for restroom. POC ongoing.

## 2025-01-17 NOTE — CONSULTS
Nutrition Services    Patient Name:  Jess Razo  YOB: 1960  MRN: [id number]  Admit Date:  1/7/2025    Assessment Date:  01/17/25    Summary: 63 yo female with Severe Adynamic Ileus      CLINICAL NUTRITION ASSESSMENT      Reason for Assessment TPN Assessment     Diagnosis/Problem   As above   Medical/Surgical History Past Medical History:   Diagnosis Date    Allergic     Anxiety     Asthma     Breast cancer     Colon polyp     Took out    Diabetes mellitus, type 2     Esophageal reflux     Eye exam, routine 05/2015    Fatty liver     Generalized anxiety disorder     Glaucoma     HL (hearing loss) right ear feels full    Hyperlipidemia     Hypertension, essential     Hypothyroidism (acquired)     Irritable bowel syndrome     Lactose intolerance     Liver disease     Lymphedema     Osteoarthritis, multiple sites     Pap smear, as part of routine gynecological examination 03/2015    normal, Dr. Nay Fine    Paroxysmal tachycardia     Positive colorectal cancer screening using Cologuard test        Past Surgical History:   Procedure Laterality Date    BLADDER SURGERY      w/ hysterectomy    BREAST BIOPSY Left     BREAST LUMPECTOMY Left     COLON RESECTION N/A 1/7/2025    Procedure: Laparoscopic right colon resection;  Surgeon: Sabi Mars MD;  Location: [facility] MAIN OR;  Service: General;  Laterality: N/A;    COLONOSCOPY      COLONOSCOPY N/A 06/25/2020    Procedure: COLONOSCOPY TO CECUM WITH HOT SNARE AND COLD BIOPSY POLYPECTOMIES;  Surgeon: Sabi Mars MD;  Location: [facility] ENDOSCOPY;  Service: General;  Laterality: N/A;  pre: positive cologuard  post: diverticulosis and polyps    COLONOSCOPY N/A 10/26/2023    Procedure: COLONOSCOPY to cecum with hot snare polypectomies and cold biopsy polypectomy with tattoo;  Surgeon: Sabi Mars MD;  Location: [facility] ENDOSCOPY;  Service: General;  Laterality: N/A;  Pre: hx polyps  post: polyps, diverticulosis    COLONOSCOPY  05/02/2024     "COLONOSCOPY N/A 5/2/2024    Procedure: COLONOSCOPY with cold biopsy polypectomies and clip placement x1;  Surgeon: Sabi Mars MD;  Location: [facility] ENDOSCOPY;  Service: General;  Laterality: N/A;  preop-hx of colon polyps; family hx of colon cancer  postop-diverticulosis; polyps    COLONOSCOPY N/A 11/14/2024    Procedure: COLONOSCOPY into cecum with cold bx polypectomies;  Surgeon: Sabi Mars MD;  Location: [facility] ENDOSCOPY;  Service: General;  Laterality: N/A;  pre: persoanl hx of colon polyps, family hx of colon cancer   post: diverticulosis, polyps    DIAGNOSTIC LAPAROSCOPY      due to endometriosis    HYSTERECTOMY      MAMMO BREAST SPECIMEN UNILATERAL  03/2014    normal    TONSILLECTOMY      TUBAL ABDOMINAL LIGATION          Anthropometrics        Current Height  Current Weight  BMI kg/m2 Height: 163.8 cm (64.49\")  Weight: 70.5 kg (155 lb 6.8 oz) (01/07/25 1136)  Body mass index is 26.28 kg/m².   Adjusted BMI (if applicable)    BMI Category Overweight (25 - 29.9)   Ideal Body Weight (IBW) 125# +/- 10%   Usual Body Weight (UBW) 155-160#   Weight Trend Stable   Weight History Wt Readings from Last 30 Encounters:   01/07/25 1136 70.5 kg (155 lb 6.8 oz)   12/31/24 1258 70.5 kg (155 lb 6.4 oz)   12/10/24 0826 69 kg (152 lb 3.2 oz)   11/14/24 0740 68.7 kg (151 lb 8 oz)   09/12/24 0838 70.3 kg (155 lb)   05/02/24 0724 68.9 kg (152 lb)   04/05/24 0912 70.8 kg (156 lb)   02/29/24 0848 70.3 kg (155 lb)   10/26/23 1030 68.5 kg (151 lb)   10/18/23 0928 69.9 kg (154 lb)   10/10/23 0844 71.4 kg (157 lb 6.4 oz)   09/13/23 0757 70.3 kg (155 lb)   07/26/23 0824 72.6 kg (160 lb)   03/06/23 0811 71.2 kg (157 lb)   02/11/23 1117 68 kg (150 lb)   08/12/22 0806 69.3 kg (152 lb 12.8 oz)   05/04/22 0819 67.2 kg (148 lb 3.2 oz)   04/25/22 0913 66.9 kg (147 lb 6.4 oz)   02/15/22 0805 68.2 kg (150 lb 6.4 oz)   06/22/21 0806 69.9 kg (154 lb)   05/11/21 0813 70.9 kg (156 lb 3.2 oz)   04/27/21 0838 70.8 kg (156 lb) "   06/25/20 1009 68 kg (150 lb)   06/16/20 1029 69.4 kg (153 lb)   11/21/19 0817 70.3 kg (155 lb)   10/30/18 0832 71.7 kg (158 lb)   03/15/18 0753 73 kg (161 lb)   11/28/17 0927 69.9 kg (154 lb)   09/06/17 0758 70.3 kg (155 lb)   03/13/17 0808 68.5 kg (151 lb)   02/02/17 0917 68 kg (150 lb)        Estimated/Assessed Needs        Energy Requirements    Weight for Calculation 70.5 kg   Method for Estimation  20 kcal/kg, 25 kcal/kg   EST Needs (kcal/day) 2355-6036 kcal       Protein Requirements    Weight for Calculation 70.5 kg   EST Protein Needs (g/kg) 1.2 - 1.5 gm/kg   EST Daily Needs (g/day)        Fluid Requirements     Method for Estimation 1 mL/kcal    Estimated Needs (mL/day)        Fluid Deficit    Current Na Level (mEq/L)    Desired Na Level (mEq/L)    Estimated Fluid Deficit (L)       Labs       Pertinent Labs    Results from last 7 days   Lab Units 01/17/25  0537 01/15/25  0459 01/14/25  0609   SODIUM mmol/L 126* 134* 131*   POTASSIUM mmol/L 3.5 3.7 3.7   CHLORIDE mmol/L 94* 100 102   CO2 mmol/L 20.7* 19.9* 17.0*   BUN mg/dL 14 11 9   CREATININE mg/dL 0.59 0.59 0.52*   CALCIUM mg/dL 8.5* 8.6 8.6   GLUCOSE mg/dL 137* 95 114*     Results from last 7 days   Lab Units 01/17/25  0537   HEMOGLOBIN g/dL 9.8*   HEMATOCRIT % 28.5*   WBC 10*3/mm3 15.67*     Results from last 7 days   Lab Units 01/17/25  0537 01/15/25  0459 01/14/25  0609 01/13/25  0534 01/12/25  0424   PLATELETS 10*3/mm3 341 345 326 305 270     COVID19   Date Value Ref Range Status   06/23/2020 Not Detected Not Detected - Ref. Range Final     Lab Results   Component Value Date    HGBA1C 6.40 (H) 12/31/2024          Medications           Scheduled Medications atorvastatin, 80 mg, Oral, Nightly  bisoprolol, 10 mg, Oral, Daily  brimonidine, 1 drop, Both Eyes, BID  budesonide-formoterol, 2 puff, Inhalation, BID - RT  calcium carbonate (oyster shell), 500 mg, Oral, Daily  cetirizine, 10 mg, Oral, Daily  digoxin, 250 mcg, Oral, Nightly  dilTIAZem CD,  360 mg, Oral, Q24H  docusate sodium, 100 mg, Oral, BID  enoxaparin, 40 mg, Subcutaneous, Daily  FLUoxetine, 40 mg, Oral, Nightly  insulin lispro, 2-7 Units, Subcutaneous, 4x Daily AC & at Bedtime  latanoprost, 1 drop, Both Eyes, Nightly  levothyroxine, 50 mcg, Oral, Q AM  metFORMIN ER, 500 mg, Oral, Nightly  pantoprazole, 40 mg, Oral, Q AM  sodium chloride, 10 mL, Intravenous, Q12H  valsartan, 160 mg, Oral, Daily  vitamin E, 1,000 Units, Oral, Daily       Infusions Adult Central 2-in-1 TPN,   dextrose 5 % and sodium chloride 0.45 % with KCl 20 mEq/L, 50 mL/hr  Pharmacy to Dose TPN,        PRN Medications   albuterol sulfate HFA    calcium carbonate    dextrose    dextrose    glucagon (human recombinant)    ibuprofen    ondansetron    oxyCODONE-acetaminophen    Pharmacy to Dose TPN    prochlorperazine    simethicone    sodium chloride     Physical Findings          General Findings alert, oriented   Oral/Mouth Cavity other:broken teeth   Edema  no edema   Gastrointestinal diarrhea, last bowel movement: 1/16/25   Skin  surgical incision: abd   Tubes/Drains/Lines none   NFPE Not indicated at this time   --  Current Nutrition Orders & Evaluation of Intake       Oral Nutrition     Food Allergies NKFA   Current PO Diet NPO Diet NPO Type: Sips with Meds, Ice Chips, Other (see comments)  Adult Central 2-in-1 TPN   Supplement n/a   PO Evaluation     % PO Intake 0    Factors Affecting Intake: diarrhea, nausea, vomiting        Parenteral Nutrition Recommendation     TPN Route PICC   TPN Rate/Volume 75 mL/hr, 1800 mL per day   Current TPN Order        Dextrose (kcal) 950       Amino Acid (gm) 85       Lipid Concentration 20%       Lipid Volume/Frequency  200 mL   MVI Frequency  daily   Trace Element Frequency  daily   Total # Days on TPN 0   TPN to Provide 1690 kcal, 85 gm AA; meets 100% Kcal and Pro needs    Current TPN, Day 1 85 ml/hr of 800 kcal, 50 gm AA, no lipids noted to provide 1000 kcal.  Pharmacy will continue to  increase per tolerance.     PES STATEMENT / NUTRITION DIAGNOSIS      Nutrition Dx Problem  Problem: Altered GI Function  Etiology: Medical Diagnosis - Severe adynamic Ileus    Signs/Symptoms: NPO and PO Diet Not Tolerated   --  NUTRITION INTERVENTION / PLAN OF CARE      Intervention Goal(s) Reduce/improve symptoms, Meet estimated needs, Disease management/therapy, Initiate TF/PN, Tolerate TF/PN at goal, and Maintain weight         RD Intervention/Action Await initiation of EN/PN, Continue to monitor, and Care plan reviewed         Prescription/Orders:       PO Diet       Supplements       Enteral Nutrition       Parenteral Nutrition    New Prescription Ordered? No, recommended   --      Monitor/Evaluation I&O, Pertinent labs, PN delivery/tolerance, Weight, GI status   Discharge Plan/Needs Pending clinical course   --    RD to follow per protocol.      Electronically signed by:  Jack Medley RD  01/17/25 15:47 EST

## 2025-01-17 NOTE — PROGRESS NOTES
"General Surgery  Progress Note    CC: Follow-up endoscopically unresectable tubulovillous adenoma    POD#10 laparoscopic right colon resection    S: She had a large volume of emesis last night and is feeling much better today.  She currently denies any nausea and was resting in bed this morning.  She feels extremely fatigued.  She had about 8 loose bowel movements last night.    O:/56 (BP Location: Right arm, Patient Position: Lying)   Pulse 83   Temp 96.7 °F (35.9 °C) (Oral)   Resp 16   Ht 163.8 cm (64.49\")   Wt 70.5 kg (155 lb 6.8 oz)   LMP  (LMP Unknown)   SpO2 96%   BMI 26.28 kg/m²     Intake & Output:  UOP: Void x4 yesterday  BM x8 yesterday    GENERAL: Resting in bed comfortably, in no acute distress  HEENT: normocephalic, atraumatic, moist mucous membranes, clear sclerae   CHEST: clear to auscultation, no wheezes, rales or rhonchi, symmetric air entry  CARDIAC: regular rate and rhythm    ABDOMEN: Soft, relatively distended, incisions clean/dry/intact with glue, nontender today  EXTREMITIES: no cyanosis, clubbing, or edema   SKIN: Warm and moist, no rashes    LABS  Results from last 7 days   Lab Units 01/17/25  0537 01/15/25  0459 01/14/25  0609 01/13/25  0534 01/12/25  0424   WBC 10*3/mm3 15.67* 12.42* 11.27* 8.91 4.86   HEMOGLOBIN g/dL 9.8* 9.6* 10.4* 10.3* 11.3*   HEMATOCRIT % 28.5* 28.5* 31.6* 29.7* 32.9*   PLATELETS 10*3/mm3 341 345 326 305 270   MONOCYTES % %  --   --  9.1 11.1 11.3   EOSINOPHIL % %  --   --  1.0 1.0 0.0*     Results from last 7 days   Lab Units 01/17/25  0537 01/15/25  0459 01/14/25  0609   SODIUM mmol/L 126* 134* 131*   POTASSIUM mmol/L 3.5 3.7 3.7   CHLORIDE mmol/L 94* 100 102   CO2 mmol/L 20.7* 19.9* 17.0*   BUN mg/dL 14 11 9   CREATININE mg/dL 0.59 0.59 0.52*   CALCIUM mg/dL 8.5* 8.6 8.6   GLUCOSE mg/dL 137* 95 114*         A/P: 64 y.o. female POD#10 laparoscopic right colon resection for endoscopically unresectable tubulovillous adenoma, complicated by an expected " postoperative adynamic ileus    Her CT abdomen/pelvis done last night shows a severe adynamic ileus but there is no evidence for anastomotic leak, internal hernia, small bowel obstruction, etc.  Last weekend, attempts at nasogastric tube placement were unsuccessful by both nursing staff and Dr. Simon.  She refuses to have another nasogastric tube placed.  For now, I would recommend she stay n.p.o. except ice chips and sips of water/clear liquids for comfort.  I have ordered a repeat KUB to be done tomorrow.  I would also recommended we place a PICC line for initiation of TPN while we await resolution of her ileus.  Dr. Chamberlain will be covering over the weekend.    Sabi Mars MD  General, Robotic, and Endoscopic Surgery  [facility]    [address], [address]  [place] [address]  P: [phone]  F: [phone]

## 2025-01-18 ENCOUNTER — APPOINTMENT (OUTPATIENT)
Dept: GENERAL RADIOLOGY | Facility: HOSPITAL | Age: 65
DRG: 330 | End: 2025-01-18
Payer: COMMERCIAL

## 2025-01-18 LAB
ALBUMIN SERPL-MCNC: 2.7 G/DL (ref 3.5–5.2)
ALBUMIN/GLOB SERPL: 0.8 G/DL
ALP SERPL-CCNC: 96 U/L (ref 39–117)
ALT SERPL W P-5'-P-CCNC: 27 U/L (ref 1–33)
ANION GAP SERPL CALCULATED.3IONS-SCNC: 7.3 MMOL/L (ref 5–15)
AST SERPL-CCNC: 24 U/L (ref 1–32)
BASOPHILS # BLD AUTO: 0.03 10*3/MM3 (ref 0–0.2)
BASOPHILS NFR BLD AUTO: 0.3 % (ref 0–1.5)
BILIRUB SERPL-MCNC: 0.3 MG/DL (ref 0–1.2)
BUN SERPL-MCNC: 6 MG/DL (ref 8–23)
BUN/CREAT SERPL: 10.9 (ref 7–25)
CALCIUM SPEC-SCNC: 8.4 MG/DL (ref 8.6–10.5)
CHLORIDE SERPL-SCNC: 101 MMOL/L (ref 98–107)
CO2 SERPL-SCNC: 22.7 MMOL/L (ref 22–29)
CREAT SERPL-MCNC: 0.55 MG/DL (ref 0.57–1)
DEPRECATED RDW RBC AUTO: 44 FL (ref 37–54)
EGFRCR SERPLBLD CKD-EPI 2021: 103.1 ML/MIN/1.73
EOSINOPHIL # BLD AUTO: 0.12 10*3/MM3 (ref 0–0.4)
EOSINOPHIL NFR BLD AUTO: 1.1 % (ref 0.3–6.2)
ERYTHROCYTE [DISTWIDTH] IN BLOOD BY AUTOMATED COUNT: 13.1 % (ref 12.3–15.4)
GLOBULIN UR ELPH-MCNC: 3.3 GM/DL
GLUCOSE BLDC GLUCOMTR-MCNC: 147 MG/DL (ref 70–130)
GLUCOSE BLDC GLUCOMTR-MCNC: 155 MG/DL (ref 70–130)
GLUCOSE BLDC GLUCOMTR-MCNC: 157 MG/DL (ref 70–130)
GLUCOSE BLDC GLUCOMTR-MCNC: 180 MG/DL (ref 70–130)
GLUCOSE BLDC GLUCOMTR-MCNC: 194 MG/DL (ref 70–130)
GLUCOSE SERPL-MCNC: 203 MG/DL (ref 65–99)
HCT VFR BLD AUTO: 28.6 % (ref 34–46.6)
HGB BLD-MCNC: 9.3 G/DL (ref 12–15.9)
IMM GRANULOCYTES # BLD AUTO: 0.26 10*3/MM3 (ref 0–0.05)
IMM GRANULOCYTES NFR BLD AUTO: 2.4 % (ref 0–0.5)
LYMPHOCYTES # BLD AUTO: 1.53 10*3/MM3 (ref 0.7–3.1)
LYMPHOCYTES NFR BLD AUTO: 14.3 % (ref 19.6–45.3)
MAGNESIUM SERPL-MCNC: 1.9 MG/DL (ref 1.6–2.4)
MCH RBC QN AUTO: 30 PG (ref 26.6–33)
MCHC RBC AUTO-ENTMCNC: 32.5 G/DL (ref 31.5–35.7)
MCV RBC AUTO: 92.3 FL (ref 79–97)
MONOCYTES # BLD AUTO: 0.64 10*3/MM3 (ref 0.1–0.9)
MONOCYTES NFR BLD AUTO: 6 % (ref 5–12)
NEUTROPHILS NFR BLD AUTO: 75.9 % (ref 42.7–76)
NEUTROPHILS NFR BLD AUTO: 8.12 10*3/MM3 (ref 1.7–7)
NRBC BLD AUTO-RTO: 0 /100 WBC (ref 0–0.2)
PHOSPHATE SERPL-MCNC: 2.6 MG/DL (ref 2.5–4.5)
PLATELET # BLD AUTO: 340 10*3/MM3 (ref 140–450)
PMV BLD AUTO: 8.9 FL (ref 6–12)
POTASSIUM SERPL-SCNC: 3.5 MMOL/L (ref 3.5–5.2)
PROT SERPL-MCNC: 6 G/DL (ref 6–8.5)
RBC # BLD AUTO: 3.1 10*6/MM3 (ref 3.77–5.28)
SODIUM SERPL-SCNC: 131 MMOL/L (ref 136–145)
TRIGL SERPL-MCNC: 105 MG/DL (ref 0–150)
WBC NRBC COR # BLD AUTO: 10.7 10*3/MM3 (ref 3.4–10.8)

## 2025-01-18 PROCEDURE — 25010000002 MAGNESIUM SULFATE PER 500 MG OF MAGNESIUM: Performed by: SURGERY

## 2025-01-18 PROCEDURE — 94799 UNLISTED PULMONARY SVC/PX: CPT

## 2025-01-18 PROCEDURE — 99024 POSTOP FOLLOW-UP VISIT: CPT | Performed by: SURGERY

## 2025-01-18 PROCEDURE — 85025 COMPLETE CBC W/AUTO DIFF WBC: CPT | Performed by: SURGERY

## 2025-01-18 PROCEDURE — 25010000002 CALCIUM GLUCONATE PER 10 ML: Performed by: SURGERY

## 2025-01-18 PROCEDURE — 94761 N-INVAS EAR/PLS OXIMETRY MLT: CPT

## 2025-01-18 PROCEDURE — 63710000001 INSULIN LISPRO (HUMAN) PER 5 UNITS: Performed by: SURGERY

## 2025-01-18 PROCEDURE — 74018 RADEX ABDOMEN 1 VIEW: CPT

## 2025-01-18 PROCEDURE — 80053 COMPREHEN METABOLIC PANEL: CPT | Performed by: SURGERY

## 2025-01-18 PROCEDURE — 94664 DEMO&/EVAL PT USE INHALER: CPT

## 2025-01-18 PROCEDURE — 25010000002 ENOXAPARIN PER 10 MG: Performed by: SURGERY

## 2025-01-18 PROCEDURE — 82948 REAGENT STRIP/BLOOD GLUCOSE: CPT

## 2025-01-18 PROCEDURE — 83735 ASSAY OF MAGNESIUM: CPT | Performed by: SURGERY

## 2025-01-18 PROCEDURE — 84478 ASSAY OF TRIGLYCERIDES: CPT | Performed by: SURGERY

## 2025-01-18 PROCEDURE — 84100 ASSAY OF PHOSPHORUS: CPT | Performed by: SURGERY

## 2025-01-18 PROCEDURE — 25010000002 POTASSIUM CHLORIDE PER 2 MEQ OF POTASSIUM: Performed by: SURGERY

## 2025-01-18 RX ADMIN — VALSARTAN 160 MG: 80 TABLET, FILM COATED ORAL at 10:14

## 2025-01-18 RX ADMIN — BUDESONIDE AND FORMOTEROL FUMARATE DIHYDRATE 2 PUFF: 160; 4.5 AEROSOL RESPIRATORY (INHALATION) at 11:11

## 2025-01-18 RX ADMIN — BUDESONIDE AND FORMOTEROL FUMARATE DIHYDRATE 2 PUFF: 160; 4.5 AEROSOL RESPIRATORY (INHALATION) at 20:47

## 2025-01-18 RX ADMIN — Medication 10 ML: at 09:00

## 2025-01-18 RX ADMIN — INSULIN LISPRO 2 UNITS: 100 INJECTION, SOLUTION INTRAVENOUS; SUBCUTANEOUS at 21:18

## 2025-01-18 RX ADMIN — BISOPROLOL FUMARATE 10 MG: 5 TABLET ORAL at 10:14

## 2025-01-18 RX ADMIN — ENOXAPARIN SODIUM 40 MG: 100 INJECTION SUBCUTANEOUS at 09:14

## 2025-01-18 RX ADMIN — LATANOPROST 1 DROP: 50 SOLUTION OPHTHALMIC at 21:14

## 2025-01-18 RX ADMIN — Medication 500 MG: at 09:12

## 2025-01-18 RX ADMIN — SMOFLIPID 100 ML: 6; 6; 5; 3 INJECTION, EMULSION INTRAVENOUS at 18:20

## 2025-01-18 RX ADMIN — DOCUSATE SODIUM 100 MG: 100 CAPSULE, LIQUID FILLED ORAL at 09:12

## 2025-01-18 RX ADMIN — BRIMONIDINE TARTRATE 1 DROP: 2 SOLUTION OPHTHALMIC at 21:02

## 2025-01-18 RX ADMIN — IBUPROFEN 400 MG: 400 TABLET ORAL at 05:05

## 2025-01-18 RX ADMIN — FLUOXETINE HYDROCHLORIDE 40 MG: 20 CAPSULE ORAL at 20:56

## 2025-01-18 RX ADMIN — Medication 10 ML: at 09:28

## 2025-01-18 RX ADMIN — PANTOPRAZOLE SODIUM 40 MG: 40 TABLET, DELAYED RELEASE ORAL at 05:07

## 2025-01-18 RX ADMIN — ATORVASTATIN CALCIUM 80 MG: 20 TABLET, FILM COATED ORAL at 20:57

## 2025-01-18 RX ADMIN — BRIMONIDINE TARTRATE 1 DROP: 2 SOLUTION OPHTHALMIC at 10:14

## 2025-01-18 RX ADMIN — INSULIN LISPRO 2 UNITS: 100 INJECTION, SOLUTION INTRAVENOUS; SUBCUTANEOUS at 18:20

## 2025-01-18 RX ADMIN — DILTIAZEM HYDROCHLORIDE 360 MG: 120 CAPSULE, COATED, EXTENDED RELEASE ORAL at 10:14

## 2025-01-18 RX ADMIN — DOCUSATE SODIUM 100 MG: 100 CAPSULE, LIQUID FILLED ORAL at 20:57

## 2025-01-18 RX ADMIN — OXYCODONE AND ACETAMINOPHEN 1 TABLET: 5; 325 TABLET ORAL at 21:02

## 2025-01-18 RX ADMIN — INSULIN LISPRO 2 UNITS: 100 INJECTION, SOLUTION INTRAVENOUS; SUBCUTANEOUS at 09:28

## 2025-01-18 RX ADMIN — CETIRIZINE HYDROCHLORIDE 10 MG: 10 TABLET, FILM COATED ORAL at 09:13

## 2025-01-18 RX ADMIN — Medication 10 ML: at 21:18

## 2025-01-18 RX ADMIN — LEVOTHYROXINE SODIUM 50 MCG: 50 TABLET ORAL at 05:07

## 2025-01-18 RX ADMIN — METFORMIN HYDROCHLORIDE 500 MG: 500 TABLET, EXTENDED RELEASE ORAL at 20:57

## 2025-01-18 RX ADMIN — DIGOXIN 250 MCG: 0.12 TABLET ORAL at 20:56

## 2025-01-18 RX ADMIN — POTASSIUM PHOSPHATE, MONOBASIC POTASSIUM PHOSPHATE, DIBASIC INJECTION,: 236; 224 SOLUTION, CONCENTRATE INTRAVENOUS at 18:19

## 2025-01-18 RX ADMIN — Medication 1000 UNITS: at 09:13

## 2025-01-18 NOTE — PROGRESS NOTES
[facility] Clinical Pharmacy Services: TPN Daily Progress Note    TPN Day # 2   Indication: Prolonged Paralytic Ileus  Route: central  Type: standard    Subjective/Objective  Results from last 7 days   Lab Units 25  0512   SODIUM mmol/L 131*   POTASSIUM mmol/L 3.5   CHLORIDE mmol/L 101   CO2 mmol/L 22.7   BUN mg/dL 6*   CREATININE mg/dL 0.55*   CALCIUM mg/dL 8.4*   ALBUMIN g/dL 2.7*   BILIRUBIN mg/dL 0.3   ALK PHOS U/L 96   ALT (SGPT) U/L 27   AST (SGOT) U/L 24   GLUCOSE mg/dL 203*   MAGNESIUM mg/dL 1.9   PHOSPHORUS mg/dL 2.6   TRIGLYCERIDES mg/dL 105        Diet Orders (active) (From admission, onward)       Start     Ordered    25 1211  NPO Diet NPO Type: Sips with Meds, Ice Chips, Other (see comments)  Diet Effective Now        Comments: She can have hard candy, chewing gum, and sips of diet Sprite for comfort    25 1210                  Additional insulin administration while previous TPN infusin units  Additional electrolyte administration while previous TPN infusing: 15 mmol K Phos  Acid suppression: pantoprazole 40 mg po qam    Goal TPN Formula Recommendations: 85/950/200 mL AA/Dex/Lipids; 1690 kcal/day per RD recommendation  Current TPN Formula: 50/800/0 AA/Dex/Lipids    Assessment/Plan    Macronutrients: will advance AA to 85 gm; dextrose to 950 and begin Lipids 20% 100 mL since triglyceride level is appropriate  Electrolytes/Additives: since maintenance fluids were discontinued, will increase sodium, potassium and phos as per current labs  MVI  Labs: CMP, Mag and Phos  Comments: Pharmacy will continue to follow dialy and adjust.    Tran Bush [facility]  Clinical Pharmacist

## 2025-01-18 NOTE — PROGRESS NOTES
Postoperative 11 laparoscopic right colectomy for endoscopically unresectable tubulovillous adenoma    Indicates no further nausea or vomiting in last 24 hours.  Says her abdominal distention has all but resolved.  Continues to have diarrhea although less overall.    Afebrile, normal vital signs  Labs reviewed.  Abdomen soft.  Incisions healing well.  CT abdomen pelvis 1/16/2025: Gastric distention and ileus.    Status post laparoscopic right colectomy with significant ileus.  Check KUB today.  Depending on KUB result may start clear liquids.  Continue TPN.  DC IV fluids.

## 2025-01-18 NOTE — PLAN OF CARE
Goal Outcome Evaluation:  Plan of Care Reviewed With: patient, spouse, child        Progress: improving  Outcome Evaluation: VSS, on room air, voiding without issue, is having bowel movement and bowel sounds are audible. No n/v, denies need for pain meds. TPN infusing. Tolerating small amount of clear liquids today. Accuchecks w/ SSI as indicated. No n/v, ambulated in halls, up in chair, refused bed alarm but education provided and encouraged to call for help. Pt and spouse updated on plan of care.

## 2025-01-18 NOTE — PLAN OF CARE
Goal Outcome Evaluation:  Plan of Care Reviewed With: patient        Progress: improving  Outcome Evaluation: Patient is A&Ox4, VSS. IVF and TPN infusing without diffiuclty. Fall precautions maintained. Pain managed with Ibuprofen. NPO with exceptions diet. Having BMs. 2 units SSI needed. Phosphorus replaced. Pt ambulated in halls. Potential d/c in AM. POC ongoing.

## 2025-01-19 LAB
ALBUMIN SERPL-MCNC: 2.4 G/DL (ref 3.5–5.2)
ALBUMIN/GLOB SERPL: 0.7 G/DL
ALP SERPL-CCNC: 88 U/L (ref 39–117)
ALT SERPL W P-5'-P-CCNC: 27 U/L (ref 1–33)
ANION GAP SERPL CALCULATED.3IONS-SCNC: 9 MMOL/L (ref 5–15)
AST SERPL-CCNC: 24 U/L (ref 1–32)
BILIRUB SERPL-MCNC: 0.2 MG/DL (ref 0–1.2)
BUN SERPL-MCNC: 5 MG/DL (ref 8–23)
BUN/CREAT SERPL: 8.6 (ref 7–25)
CALCIUM SPEC-SCNC: 8.4 MG/DL (ref 8.6–10.5)
CHLORIDE SERPL-SCNC: 102 MMOL/L (ref 98–107)
CO2 SERPL-SCNC: 25 MMOL/L (ref 22–29)
CREAT SERPL-MCNC: 0.58 MG/DL (ref 0.57–1)
EGFRCR SERPLBLD CKD-EPI 2021: 101.8 ML/MIN/1.73
GLOBULIN UR ELPH-MCNC: 3.6 GM/DL
GLUCOSE BLDC GLUCOMTR-MCNC: 138 MG/DL (ref 70–130)
GLUCOSE BLDC GLUCOMTR-MCNC: 163 MG/DL (ref 70–130)
GLUCOSE BLDC GLUCOMTR-MCNC: 169 MG/DL (ref 70–130)
GLUCOSE BLDC GLUCOMTR-MCNC: 195 MG/DL (ref 70–130)
GLUCOSE SERPL-MCNC: 193 MG/DL (ref 65–99)
MAGNESIUM SERPL-MCNC: 1.8 MG/DL (ref 1.6–2.4)
PHOSPHATE SERPL-MCNC: 2.6 MG/DL (ref 2.5–4.5)
POTASSIUM SERPL-SCNC: 4.4 MMOL/L (ref 3.5–5.2)
PROT SERPL-MCNC: 6 G/DL (ref 6–8.5)
SODIUM SERPL-SCNC: 136 MMOL/L (ref 136–145)

## 2025-01-19 PROCEDURE — 25010000002 POTASSIUM CHLORIDE PER 2 MEQ OF POTASSIUM: Performed by: SURGERY

## 2025-01-19 PROCEDURE — 25010000002 ENOXAPARIN PER 10 MG: Performed by: SURGERY

## 2025-01-19 PROCEDURE — 94799 UNLISTED PULMONARY SVC/PX: CPT

## 2025-01-19 PROCEDURE — 83735 ASSAY OF MAGNESIUM: CPT | Performed by: SURGERY

## 2025-01-19 PROCEDURE — 84100 ASSAY OF PHOSPHORUS: CPT | Performed by: SURGERY

## 2025-01-19 PROCEDURE — 25010000002 MAGNESIUM SULFATE PER 500 MG OF MAGNESIUM: Performed by: SURGERY

## 2025-01-19 PROCEDURE — 80053 COMPREHEN METABOLIC PANEL: CPT | Performed by: SURGERY

## 2025-01-19 PROCEDURE — 25010000002 CALCIUM GLUCONATE PER 10 ML: Performed by: SURGERY

## 2025-01-19 PROCEDURE — 94761 N-INVAS EAR/PLS OXIMETRY MLT: CPT

## 2025-01-19 PROCEDURE — 82948 REAGENT STRIP/BLOOD GLUCOSE: CPT

## 2025-01-19 PROCEDURE — 63710000001 INSULIN LISPRO (HUMAN) PER 5 UNITS: Performed by: SURGERY

## 2025-01-19 PROCEDURE — 94664 DEMO&/EVAL PT USE INHALER: CPT

## 2025-01-19 RX ADMIN — LEVOTHYROXINE SODIUM 50 MCG: 50 TABLET ORAL at 04:27

## 2025-01-19 RX ADMIN — INSULIN LISPRO 2 UNITS: 100 INJECTION, SOLUTION INTRAVENOUS; SUBCUTANEOUS at 12:33

## 2025-01-19 RX ADMIN — BUDESONIDE AND FORMOTEROL FUMARATE DIHYDRATE 2 PUFF: 160; 4.5 AEROSOL RESPIRATORY (INHALATION) at 08:44

## 2025-01-19 RX ADMIN — FLUOXETINE HYDROCHLORIDE 40 MG: 20 CAPSULE ORAL at 20:13

## 2025-01-19 RX ADMIN — ENOXAPARIN SODIUM 40 MG: 100 INJECTION SUBCUTANEOUS at 08:53

## 2025-01-19 RX ADMIN — ATORVASTATIN CALCIUM 80 MG: 20 TABLET, FILM COATED ORAL at 20:13

## 2025-01-19 RX ADMIN — PANTOPRAZOLE SODIUM 40 MG: 40 TABLET, DELAYED RELEASE ORAL at 04:27

## 2025-01-19 RX ADMIN — Medication 10 ML: at 20:14

## 2025-01-19 RX ADMIN — Medication 10 ML: at 20:15

## 2025-01-19 RX ADMIN — DILTIAZEM HYDROCHLORIDE 360 MG: 120 CAPSULE, COATED, EXTENDED RELEASE ORAL at 08:53

## 2025-01-19 RX ADMIN — DOCUSATE SODIUM 100 MG: 100 CAPSULE, LIQUID FILLED ORAL at 08:54

## 2025-01-19 RX ADMIN — VALSARTAN 160 MG: 80 TABLET, FILM COATED ORAL at 10:37

## 2025-01-19 RX ADMIN — INSULIN LISPRO 2 UNITS: 100 INJECTION, SOLUTION INTRAVENOUS; SUBCUTANEOUS at 23:07

## 2025-01-19 RX ADMIN — BRIMONIDINE TARTRATE 1 DROP: 2 SOLUTION OPHTHALMIC at 20:13

## 2025-01-19 RX ADMIN — CETIRIZINE HYDROCHLORIDE 10 MG: 10 TABLET, FILM COATED ORAL at 08:54

## 2025-01-19 RX ADMIN — INSULIN LISPRO 2 UNITS: 100 INJECTION, SOLUTION INTRAVENOUS; SUBCUTANEOUS at 08:54

## 2025-01-19 RX ADMIN — POTASSIUM PHOSPHATE, MONOBASIC POTASSIUM PHOSPHATE, DIBASIC INJECTION,: 236; 224 SOLUTION, CONCENTRATE INTRAVENOUS at 18:47

## 2025-01-19 RX ADMIN — Medication 500 MG: at 08:53

## 2025-01-19 RX ADMIN — SMOFLIPID 100 ML: 6; 6; 5; 3 INJECTION, EMULSION INTRAVENOUS at 18:47

## 2025-01-19 RX ADMIN — METFORMIN HYDROCHLORIDE 500 MG: 500 TABLET, EXTENDED RELEASE ORAL at 20:14

## 2025-01-19 RX ADMIN — LATANOPROST 1 DROP: 50 SOLUTION OPHTHALMIC at 20:13

## 2025-01-19 RX ADMIN — BRIMONIDINE TARTRATE 1 DROP: 2 SOLUTION OPHTHALMIC at 08:55

## 2025-01-19 RX ADMIN — Medication 1000 UNITS: at 08:55

## 2025-01-19 RX ADMIN — Medication 10 ML: at 08:55

## 2025-01-19 RX ADMIN — BISOPROLOL FUMARATE 10 MG: 5 TABLET ORAL at 10:37

## 2025-01-19 RX ADMIN — DIGOXIN 250 MCG: 0.12 TABLET ORAL at 20:14

## 2025-01-19 NOTE — PLAN OF CARE
Problem: Adult Inpatient Plan of Care  Goal: Plan of Care Review  Outcome: Progressing  Flowsheets (Taken 1/19/2025 0559)  Progress: improving  Outcome Evaluation: A&Ox4, VSS, audible bowel sounds and had bowel movement earlier in the night, percocet given once for pain control, spouse at bedside assisting patient to bathroom and with other ADL's, tolerating clears, plan of care continues.           Problem: Adult Inpatient Plan of Care  Goal: Absence of Hospital-Acquired Illness or Injury  Intervention: Prevent and Manage VTE (Venous Thromboembolism) Risk  Recent Flowsheet Documentation  Taken 1/18/2025 2055 by Jaimee Fitzpatrick, RN  VTE Prevention/Management: (patient on lovenox, educated about the need for scd's)   patient refused intervention   SCDs (sequential compression devices) off

## 2025-01-19 NOTE — PROGRESS NOTES
Has had no further nausea or vomiting.  Tolerated clear liquids.  Abdominal distention seems to have resolved.    Remains afebrile with stable vital signs  Labs reviewed, no problems there  Abdomen is soft and does not seem particularly distended    Advance to full liquids  Likely regular food tomorrow if tolerates  Likely DC TPN tomorrow if tolerates

## 2025-01-19 NOTE — PROGRESS NOTES
[facility] Clinical Pharmacy Services: TPN Daily Progress Note    TPN Day # 3   Indication: Prolonged Paralytic Ileus  Route: central  Type: standard    Subjective/Objective  Results from last 7 days   Lab Units 25  0427 25  0512   SODIUM mmol/L 136 131*   POTASSIUM mmol/L 4.4 3.5   CHLORIDE mmol/L 102 101   CO2 mmol/L 25.0 22.7   BUN mg/dL 5* 6*   CREATININE mg/dL 0.58 0.55*   CALCIUM mg/dL 8.4* 8.4*   ALBUMIN g/dL 2.4* 2.7*   BILIRUBIN mg/dL 0.2 0.3   ALK PHOS U/L 88 96   ALT (SGPT) U/L 27 27   AST (SGOT) U/L 24 24   GLUCOSE mg/dL 193* 203*   MAGNESIUM mg/dL 1.8 1.9   PHOSPHORUS mg/dL 2.6 2.6   TRIGLYCERIDES mg/dL  --  105        Diet Orders (active) (From admission, onward)       Start     Ordered    25 1249  Diet: Liquid; Clear Liquid; Fluid Consistency: Thin (IDDSI 0)  Diet Effective Now         25 1249                  Additional insulin administration while previous TPN infusin units  Additional electrolyte administration while previous TPN infusing: none  Acid suppression: pantoprazole 40 mg qam    Goal TPN Formula Recommendations: /200 AA/Dex/Lipids; 1690 kcal/day  Current TPN Formula: 85/950/200 AA/Dex/Lipids    Assessment/Plan    Macronutrients: will increase dextrose to 1200 kcal as recommended; IVF stopped, will increase rate to 100 mL/hr  Electrolytes/Additives: all electrolytes in therapeutic range (Calcium in range when corrected)  MVI  Labs: CMP, Magnesium, Phos  Comments: will continue to follow daily and adjust as necessary    Tran Bush [facility]  Clinical Pharmacist

## 2025-01-20 LAB
ALBUMIN SERPL-MCNC: 3.1 G/DL (ref 3.5–5.2)
ALBUMIN/GLOB SERPL: 1.1 G/DL
ALP SERPL-CCNC: 94 U/L (ref 39–117)
ALT SERPL W P-5'-P-CCNC: 32 U/L (ref 1–33)
ANION GAP SERPL CALCULATED.3IONS-SCNC: 9 MMOL/L (ref 5–15)
AST SERPL-CCNC: 27 U/L (ref 1–32)
BILIRUB SERPL-MCNC: 0.2 MG/DL (ref 0–1.2)
BUN SERPL-MCNC: 7 MG/DL (ref 8–23)
BUN/CREAT SERPL: 12.3 (ref 7–25)
CALCIUM SPEC-SCNC: 8.6 MG/DL (ref 8.6–10.5)
CHLORIDE SERPL-SCNC: 98 MMOL/L (ref 98–107)
CO2 SERPL-SCNC: 27 MMOL/L (ref 22–29)
CREAT SERPL-MCNC: 0.57 MG/DL (ref 0.57–1)
EGFRCR SERPLBLD CKD-EPI 2021: 102.3 ML/MIN/1.73
GLOBULIN UR ELPH-MCNC: 2.9 GM/DL
GLUCOSE BLDC GLUCOMTR-MCNC: 143 MG/DL (ref 70–130)
GLUCOSE BLDC GLUCOMTR-MCNC: 151 MG/DL (ref 70–130)
GLUCOSE BLDC GLUCOMTR-MCNC: 171 MG/DL (ref 70–130)
GLUCOSE BLDC GLUCOMTR-MCNC: 181 MG/DL (ref 70–130)
GLUCOSE SERPL-MCNC: 158 MG/DL (ref 65–99)
MAGNESIUM SERPL-MCNC: 2 MG/DL (ref 1.6–2.4)
PHOSPHATE SERPL-MCNC: 2.7 MG/DL (ref 2.5–4.5)
POTASSIUM SERPL-SCNC: 4.9 MMOL/L (ref 3.5–5.2)
PROT SERPL-MCNC: 6 G/DL (ref 6–8.5)
SODIUM SERPL-SCNC: 134 MMOL/L (ref 136–145)

## 2025-01-20 PROCEDURE — 25010000002 ENOXAPARIN PER 10 MG: Performed by: SURGERY

## 2025-01-20 PROCEDURE — 80053 COMPREHEN METABOLIC PANEL: CPT | Performed by: SURGERY

## 2025-01-20 PROCEDURE — 84100 ASSAY OF PHOSPHORUS: CPT | Performed by: SURGERY

## 2025-01-20 PROCEDURE — 83735 ASSAY OF MAGNESIUM: CPT | Performed by: SURGERY

## 2025-01-20 PROCEDURE — 63710000001 INSULIN LISPRO (HUMAN) PER 5 UNITS: Performed by: SURGERY

## 2025-01-20 PROCEDURE — 99024 POSTOP FOLLOW-UP VISIT: CPT | Performed by: SURGERY

## 2025-01-20 PROCEDURE — 94799 UNLISTED PULMONARY SVC/PX: CPT

## 2025-01-20 PROCEDURE — 82948 REAGENT STRIP/BLOOD GLUCOSE: CPT

## 2025-01-20 PROCEDURE — 94664 DEMO&/EVAL PT USE INHALER: CPT

## 2025-01-20 RX ADMIN — ATORVASTATIN CALCIUM 80 MG: 20 TABLET, FILM COATED ORAL at 20:46

## 2025-01-20 RX ADMIN — CETIRIZINE HYDROCHLORIDE 10 MG: 10 TABLET, FILM COATED ORAL at 08:40

## 2025-01-20 RX ADMIN — INSULIN LISPRO 2 UNITS: 100 INJECTION, SOLUTION INTRAVENOUS; SUBCUTANEOUS at 12:31

## 2025-01-20 RX ADMIN — METFORMIN HYDROCHLORIDE 500 MG: 500 TABLET, EXTENDED RELEASE ORAL at 20:46

## 2025-01-20 RX ADMIN — PANTOPRAZOLE SODIUM 40 MG: 40 TABLET, DELAYED RELEASE ORAL at 05:18

## 2025-01-20 RX ADMIN — BUDESONIDE AND FORMOTEROL FUMARATE DIHYDRATE 2 PUFF: 160; 4.5 AEROSOL RESPIRATORY (INHALATION) at 08:03

## 2025-01-20 RX ADMIN — Medication 10 ML: at 08:41

## 2025-01-20 RX ADMIN — Medication 1000 UNITS: at 08:43

## 2025-01-20 RX ADMIN — INSULIN LISPRO 2 UNITS: 100 INJECTION, SOLUTION INTRAVENOUS; SUBCUTANEOUS at 18:00

## 2025-01-20 RX ADMIN — DILTIAZEM HYDROCHLORIDE 360 MG: 120 CAPSULE, COATED, EXTENDED RELEASE ORAL at 08:40

## 2025-01-20 RX ADMIN — IBUPROFEN 400 MG: 400 TABLET ORAL at 06:07

## 2025-01-20 RX ADMIN — DOCUSATE SODIUM 100 MG: 100 CAPSULE, LIQUID FILLED ORAL at 08:40

## 2025-01-20 RX ADMIN — Medication 500 MG: at 08:40

## 2025-01-20 RX ADMIN — BISOPROLOL FUMARATE 10 MG: 5 TABLET ORAL at 08:39

## 2025-01-20 RX ADMIN — Medication 10 ML: at 20:48

## 2025-01-20 RX ADMIN — INSULIN LISPRO 2 UNITS: 100 INJECTION, SOLUTION INTRAVENOUS; SUBCUTANEOUS at 08:35

## 2025-01-20 RX ADMIN — FLUOXETINE HYDROCHLORIDE 40 MG: 20 CAPSULE ORAL at 20:46

## 2025-01-20 RX ADMIN — BRIMONIDINE TARTRATE 1 DROP: 2 SOLUTION OPHTHALMIC at 20:46

## 2025-01-20 RX ADMIN — BRIMONIDINE TARTRATE 1 DROP: 2 SOLUTION OPHTHALMIC at 08:39

## 2025-01-20 RX ADMIN — Medication 10 ML: at 20:47

## 2025-01-20 RX ADMIN — LATANOPROST 1 DROP: 50 SOLUTION OPHTHALMIC at 20:46

## 2025-01-20 RX ADMIN — LEVOTHYROXINE SODIUM 50 MCG: 50 TABLET ORAL at 05:18

## 2025-01-20 RX ADMIN — ENOXAPARIN SODIUM 40 MG: 100 INJECTION SUBCUTANEOUS at 08:40

## 2025-01-20 RX ADMIN — IBUPROFEN 400 MG: 400 TABLET ORAL at 20:52

## 2025-01-20 RX ADMIN — VALSARTAN 160 MG: 80 TABLET, FILM COATED ORAL at 08:43

## 2025-01-20 RX ADMIN — DOCUSATE SODIUM 100 MG: 100 CAPSULE, LIQUID FILLED ORAL at 20:46

## 2025-01-20 RX ADMIN — DIGOXIN 250 MCG: 0.12 TABLET ORAL at 23:10

## 2025-01-20 NOTE — PROGRESS NOTES
[facility] Clinical Pharmacy Services: TPN Daily Progress Note    TPN Day # 4   Indication: Prolonged Paralytic Ileus  Route: central  Type: standard    Subjective/Objective  Results from last 7 days   Lab Units 25  0517 25  0427 25  0512   SODIUM mmol/L 134*   < > 131*   POTASSIUM mmol/L 4.9   < > 3.5   CHLORIDE mmol/L 98   < > 101   CO2 mmol/L 27.0   < > 22.7   BUN mg/dL 7*   < > 6*   CREATININE mg/dL 0.57   < > 0.55*   CALCIUM mg/dL 8.6   < > 8.4*   ALBUMIN g/dL 3.1*   < > 2.7*   BILIRUBIN mg/dL 0.2   < > 0.3   ALK PHOS U/L 94   < > 96   ALT (SGPT) U/L 32   < > 27   AST (SGOT) U/L 27   < > 24   GLUCOSE mg/dL 158*   < > 203*   MAGNESIUM mg/dL 2.0   < > 1.9   PHOSPHORUS mg/dL 2.7   < > 2.6   TRIGLYCERIDES mg/dL  --   --  105    < > = values in this interval not displayed.        Diet Orders (active) (From admission, onward)       Start     Ordered    25 1249  Diet: Liquid; Clear Liquid; Fluid Consistency: Thin (IDDSI 0)  Diet Effective Now         25 1249                  Additional insulin administration while previous TPN infusin units  Additional electrolyte administration while previous TPN infusing: none  Acid suppression: pantoprazole 40 mg qam    Goal TPN Formula Recommendations: /200 AA/Dex/Lipids; 1690 kcal/day  Current TPN Formula: 85/950/200 AA/Dex/Lipids    Assessment/Plan    Macronutrients: continue at goal. Tolerating liquids, will adjust rate down to 80 ml/hr  Electrolytes/Additives: all electrolytes in therapeutic range  MVI  Labs: CMP, Magnesium, Phos  Comments: will continue to follow daily and adjust as necessary    rGis Scherer [facility]  Clinical Pharmacist

## 2025-01-20 NOTE — PROGRESS NOTES
Continued Stay Note  [facility]     Patient Name: Jess Razo  MRN: [id number]  Today's Date: 1/20/2025    Admit Date: 1/7/2025    Plan: Home with spouse   Discharge Plan       Row Name 01/20/25 1548       Plan    Plan Home with spouse    Plan Comments Met with patient who confirmed DC plan is home with spouse assist. Patient stated MD said she will not be going home on TPN. Poss DC tomorrow.                   Discharge Codes    No documentation.                 Expected Discharge Date and Time       Expected Discharge Date Expected Discharge Time    Jan 21, 2025               Danette Rod RN

## 2025-01-20 NOTE — PROGRESS NOTES
Nutrition Services    Patient Name:  Jess Razo  YOB: 1960  MRN: [id number]  Admit Date:  1/7/2025Nutrition Services    Patient Name: Jess Razo  YOB: 1960  MRN: [id number]  Admission date: 1/7/2025    PROGRESS NOTE      Encounter Information: TPN day #3 follow up        PO Diet: Adult Central 2-in-1 TPN  Diet: Diabetic; Consistent Carbohydrate; Fluid Consistency: Thin (IDDSI 0)   PO Supplements: -   PO Intake:  1690kcal: 950/85/200 dex/AA/lipids       Current nutrition support: Full liquids, TPN    Nutrition support review: Started full liquids yesterday, tolerated and started regular diet today. Per surgery will possibly DC TPN if tolerates regular diet. Abdominal distention is better.        Labs (reviewed below): Na 134, gluc 158-195       GI Function:  BM 1/20       Nutrition Intervention Updates: ADAT per surgery   Will add boost GC BID to aid in increasing oral intakes   Continue TPN at goal 1690kcal: 950/85/200 dex/AA/lipids       Results from last 7 days   Lab Units 01/20/25  0517 01/19/25 0427 01/18/25  0512   SODIUM mmol/L 134* 136 131*   POTASSIUM mmol/L 4.9 4.4 3.5   CHLORIDE mmol/L 98 102 101   CO2 mmol/L 27.0 25.0 22.7   BUN mg/dL 7* 5* 6*   CREATININE mg/dL 0.57 0.58 0.55*   CALCIUM mg/dL 8.6 8.4* 8.4*   BILIRUBIN mg/dL 0.2 0.2 0.3   ALK PHOS U/L 94 88 96   ALT (SGPT) U/L 32 27 27   AST (SGOT) U/L 27 24 24   GLUCOSE mg/dL 158* 193* 203*     Results from last 7 days   Lab Units 01/20/25  0517 01/19/25 0427 01/18/25  0512   MAGNESIUM mg/dL 2.0 1.8 1.9   PHOSPHORUS mg/dL 2.7 2.6 2.6   HEMOGLOBIN g/dL  --   --  9.3*   HEMATOCRIT %  --   --  28.6*   TRIGLYCERIDES mg/dL  --   --  105     COVID19   Date Value Ref Range Status   06/23/2020 Not Detected Not Detected - Ref. Range Final     Lab Results   Component Value Date    HGBA1C 6.40 (H) 12/31/2024       Wt Readings from Last 10 Encounters:   01/07/25 1136 70.5 kg (155 lb 6.8 oz)   12/31/24 1258 70.5 kg (155 lb 6.4  oz)   12/10/24 0826 69 kg (152 lb 3.2 oz)   11/14/24 0740 68.7 kg (151 lb 8 oz)   09/12/24 0838 70.3 kg (155 lb)   05/02/24 0724 68.9 kg (152 lb)   04/05/24 0912 70.8 kg (156 lb)   02/29/24 0848 70.3 kg (155 lb)   10/26/23 1030 68.5 kg (151 lb)   10/18/23 0928 69.9 kg (154 lb)   10/10/23 0844 71.4 kg (157 lb 6.4 oz)       RD to follow up per protocol.    Electronically signed by:  Irma Viveros RD  01/20/25 13:32 EST

## 2025-01-20 NOTE — PLAN OF CARE
Goal Outcome Evaluation:  Plan of Care Reviewed With: patient, spouse, child        Progress: improving  Outcome Evaluation: VSS, on room air, voiding without issue, continues to have mutliple BMs but they do seem to be forming some. denies N/V, and need for pain meds. Tolerating full liquids, ambulated in halls, up in chair, refusing bed alarm - education provided. TPN and lipids infusuing per order. Pt and family updated on plan of care.

## 2025-01-20 NOTE — PROGRESS NOTES
"General Surgery  Progress Note    CC: Follow-up endoscopically unresectable tubulovillous adenoma    POD#13 laparoscopic right colon resection    S: She has had multiple bowel movements daily since I saw her last on Friday.  Her abdominal distention has resolved and she denies any abdominal pain.  She has been tolerating a full liquid diet, but has been nervous to eat most of the full liquids due to her underlying diabetes.    O:/62 (BP Location: Left leg, Patient Position: Lying)   Pulse 76   Temp 97.4 °F (36.3 °C) (Oral)   Resp 16   Ht 163.8 cm (64.49\")   Wt 70.5 kg (155 lb 6.8 oz)   LMP  (LMP Unknown)   SpO2 97%   BMI 26.28 kg/m²     Intake & Output:  UOP: Void x9 yesterday  BM x7 yesterday    GENERAL: Resting in bed comfortably, in no acute distress  HEENT: normocephalic, atraumatic, moist mucous membranes, clear sclerae   CHEST: clear to auscultation, no wheezes, rales or rhonchi, symmetric air entry  CARDIAC: regular rate and rhythm    ABDOMEN: Soft, nondistended, incisions clean/dry/intact with glue, nontender today  EXTREMITIES: no cyanosis, clubbing, or edema   SKIN: Warm and moist, no rashes    LABS  Results from last 7 days   Lab Units 01/18/25  0512 01/17/25  0537 01/15/25  0459 01/14/25  0609   WBC 10*3/mm3 10.70 15.67* 12.42* 11.27*   HEMOGLOBIN g/dL 9.3* 9.8* 9.6* 10.4*   HEMATOCRIT % 28.6* 28.5* 28.5* 31.6*   PLATELETS 10*3/mm3 340 341 345 326   MONOCYTES % %  --   --   --  9.1   EOSINOPHIL % %  --   --   --  1.0     Results from last 7 days   Lab Units 01/20/25  0517 01/19/25  0427 01/18/25  0512   SODIUM mmol/L 134* 136 131*   POTASSIUM mmol/L 4.9 4.4 3.5   CHLORIDE mmol/L 98 102 101   CO2 mmol/L 27.0 25.0 22.7   BUN mg/dL 7* 5* 6*   CREATININE mg/dL 0.57 0.58 0.55*   CALCIUM mg/dL 8.6 8.4* 8.4*   BILIRUBIN mg/dL 0.2 0.2 0.3   ALK PHOS U/L 94 88 96   ALT (SGPT) U/L 32 27 27   AST (SGOT) U/L 27 24 24   GLUCOSE mg/dL 158* 193* 203*         A/P: 64 y.o. female POD#13 laparoscopic right " colon resection for endoscopically unresectable tubulovillous adenoma, complicated by an expected postoperative adynamic ileus    Advance to diabetic diet and allow her TPN bag today to run out.  I will discontinue the TPN after today's bag.  She will likely be stable for discharge to home tomorrow.    Sabi Mars MD  General, Robotic, and Endoscopic Surgery  [facility]    [address], [address]  [place] [address]  P: [phone]  F: [phone]

## 2025-01-21 ENCOUNTER — READMISSION MANAGEMENT (OUTPATIENT)
Dept: CALL CENTER | Facility: HOSPITAL | Age: 65
End: 2025-01-21
Payer: COMMERCIAL

## 2025-01-21 VITALS
HEIGHT: 64 IN | HEART RATE: 75 BPM | DIASTOLIC BLOOD PRESSURE: 56 MMHG | SYSTOLIC BLOOD PRESSURE: 100 MMHG | BODY MASS INDEX: 26.53 KG/M2 | OXYGEN SATURATION: 97 % | TEMPERATURE: 98.3 F | WEIGHT: 155.42 LBS | RESPIRATION RATE: 16 BRPM

## 2025-01-21 PROBLEM — K56.0 ADYNAMIC ILEUS: Status: ACTIVE | Noted: 2025-01-21

## 2025-01-21 LAB
GLUCOSE BLDC GLUCOMTR-MCNC: 101 MG/DL (ref 70–130)
GLUCOSE BLDC GLUCOMTR-MCNC: 108 MG/DL (ref 70–130)

## 2025-01-21 PROCEDURE — 94799 UNLISTED PULMONARY SVC/PX: CPT

## 2025-01-21 PROCEDURE — 25010000002 ENOXAPARIN PER 10 MG: Performed by: SURGERY

## 2025-01-21 PROCEDURE — 82948 REAGENT STRIP/BLOOD GLUCOSE: CPT

## 2025-01-21 PROCEDURE — 94760 N-INVAS EAR/PLS OXIMETRY 1: CPT

## 2025-01-21 PROCEDURE — 99024 POSTOP FOLLOW-UP VISIT: CPT | Performed by: SURGERY

## 2025-01-21 PROCEDURE — 94664 DEMO&/EVAL PT USE INHALER: CPT

## 2025-01-21 RX ADMIN — PANTOPRAZOLE SODIUM 40 MG: 40 TABLET, DELAYED RELEASE ORAL at 06:02

## 2025-01-21 RX ADMIN — BUDESONIDE AND FORMOTEROL FUMARATE DIHYDRATE 2 PUFF: 160; 4.5 AEROSOL RESPIRATORY (INHALATION) at 08:47

## 2025-01-21 RX ADMIN — Medication 500 MG: at 09:03

## 2025-01-21 RX ADMIN — BRIMONIDINE TARTRATE 1 DROP: 2 SOLUTION OPHTHALMIC at 10:25

## 2025-01-21 RX ADMIN — ENOXAPARIN SODIUM 40 MG: 100 INJECTION SUBCUTANEOUS at 09:00

## 2025-01-21 RX ADMIN — Medication 10 ML: at 09:02

## 2025-01-21 RX ADMIN — DILTIAZEM HYDROCHLORIDE 360 MG: 120 CAPSULE, COATED, EXTENDED RELEASE ORAL at 10:25

## 2025-01-21 RX ADMIN — BISOPROLOL FUMARATE 10 MG: 5 TABLET ORAL at 10:25

## 2025-01-21 RX ADMIN — CETIRIZINE HYDROCHLORIDE 10 MG: 10 TABLET, FILM COATED ORAL at 09:00

## 2025-01-21 RX ADMIN — VALSARTAN 160 MG: 80 TABLET, FILM COATED ORAL at 10:25

## 2025-01-21 RX ADMIN — LEVOTHYROXINE SODIUM 50 MCG: 50 TABLET ORAL at 06:02

## 2025-01-21 RX ADMIN — Medication 1000 UNITS: at 09:00

## 2025-01-21 NOTE — PLAN OF CARE
Goal Outcome Evaluation:  Plan of Care Reviewed With: patient        Progress: improving  Outcome Evaluation: vss, ibuprofen for pain, voiding freely, abdomen soft and distended with bowel sound, last bowel movement yesterday morning, D/C TPN last night as ordered, tolerating diet, encourage to increase fluid intake and to use incentive spirometer, ambulated in the hallway, for possibel d/c home today, continue to monitor the pt.

## 2025-01-21 NOTE — NURSING NOTE
VSS, on room air, voiding without issue and having Bms. No n/v, denies pain, belly is soft. Pt educated on post op care, new meds, and when to seek help. She is aware of her follow up appt. Pt spouse is taking her home and she is eager to go.

## 2025-01-21 NOTE — DISCHARGE SUMMARY
Discharge Summary    Patient name: Jess Razo    Medical record number: [id number]    Admission date: 1/7/2025  Discharge date:  1/21/2025    Attending physician: Sabi Mars MD    Primary care physician: Olamide Cobos PA-C    Consulting physician(s): None    Condition on discharge: improving    Admitting diagnosis: Endoscopically unresectable tubulovillous adenoma of the ascending colon    Final diagnosis:   Endoscopically unresectable tubulovillous adenoma of the ascending colon  Postoperative adynamic ileus    Procedures: Laparoscopic right colon resection    History of present illness: The patient is a  64 y.o. female that was admitted to the hospital with a recently identified recurrent tubulovillous adenoma of the ascending colon located behind a mucosal fold and difficult to remove endoscopically in its entirety.  As it has recurred despite multiple attempts at endoscopic removal, I have recommended proceeding with a laparoscopic right colon resection for definitive tissue excision.  She was counseled on the risks of the procedure and has consented to proceed.    Hospital course: She was brought electively to the hospital for laparoscopic right colon resection which was achieved without difficulty.  Postoperatively, she struggled with a prolonged adynamic ileus that lasted almost 2 weeks.  She was started on IV TPN during that time given her dietary intolerance.  KUB followed by CT were performed that confirmed the expected postoperative ileus but showed no other complications.  As her adynamic ileus slowly resolved, her diet was advanced and her TPN discontinued.  She is being discharged home 2 weeks postop with instructions to follow-up with me in 2 to 3 weeks.    Discharge medications:      Discharge Medications        New Medications        Instructions Start Date   oxyCODONE-acetaminophen 5-325 MG per tablet  Commonly known as: PERCOCET   1 tablet, Oral, Every 4 Hours PRN             Changes  to Medications        Instructions Start Date   atorvastatin 80 MG tablet  Commonly known as: LIPITOR  What changed: when to take this   80 mg, Oral, Daily, for cholesterol      Dulaglutide 0.75 MG/0.5ML solution auto-injector  What changed: additional instructions   0.75 mg, Subcutaneous, Weekly, For DMII      FLUoxetine 40 MG capsule  Commonly known as: PROzac  What changed: when to take this   40 mg, Oral, Daily, For anxiety and depression      metFORMIN  MG 24 hr tablet  Commonly known as: GLUCOPHAGE-XR  What changed: when to take this   500 mg, Oral, Daily, With food for type 2 diabetes             Continue These Medications        Instructions Start Date   albuterol sulfate  (90 Base) MCG/ACT inhaler  Commonly known as: PROVENTIL HFA;VENTOLIN HFA;PROAIR HFA   2 puffs, Inhalation, Every 4 Hours PRN      bimatoprost 0.03 % ophthalmic drops  Commonly known as: LUMIGAN   INSTILL 1 DROP INTO EACH EYE AT BEDTIME      bimatoprost 0.03 % ophthalmic solution  Commonly known as: LATISSE   Nightly      bisoprolol 10 MG tablet  Commonly known as: ZEBeta   10 mg, Daily      calcium carbonate 1250 (500 Ca) MG tablet  Commonly known as: OS-YOON   1 tablet, Daily      cetirizine 10 MG tablet  Commonly known as: zyrTEC   10 mg, Daily      Combigan 0.2-0.5 % ophthalmic solution  Generic drug: brimonidine-timolol   Administer 1 drop to both eyes Every Night.      digoxin 250 MCG tablet  Commonly known as: LANOXIN   250 mcg, Nightly      dilTIAZem 360 MG 24 hr capsule  Commonly known as: TIAZAC   360 mg, Oral, Daily, For heart rate      esomeprazole 20 MG capsule  Commonly known as: nexIUM   20 mg, Every Morning Before Breakfast      Fluticasone-Salmeterol 250-50 MCG/ACT DISKUS  Commonly known as: ADVAIR/WIXELA   1 puff, Inhalation, 2 Times Daily - RT, For asthma rinse mouth after use      Garlic 1000 MG capsule   1,000 mg, Daily      ibuprofen 400 MG tablet  Commonly known as: ADVIL,MOTRIN   400 mg, Every 6 Hours  PRN      levothyroxine 50 MCG tablet  Commonly known as: SYNTHROID, LEVOTHROID   1 p.o. daily before breakfast and do not take on Sundays      multivitamin tablet  Generic drug: multivitamin   1 tablet, Daily      omeprazole 20 MG capsule  Commonly known as: priLOSEC   20 mg, Daily      UP4 PROBIOTICS ADULT PO   Take  by mouth.      valsartan 160 MG tablet  Commonly known as: DIOVAN   One PO daily for BP      Vitamin D3 10 MCG (400 UNIT) capsule   1 capsule, Daily      vitamin E 1000 UNIT capsule   1,000 Units, Daily               Discharge instructions:    - Resume regular diet as tolerated.  - No lifting anything heavier than 15 pounds for 8 weeks postop.  You may resume all other activities as tolerated once your pain level allows.  - No driving while taking narcotic pain medications.  - You may resume showering, no tub bathing for two weeks while your incisions heal.  - Wash your incisions with soap and water daily in the shower, pat dry, and leave open to air.    Follow-up appointment: Follow up with Sabi Mars MD in the office in 2 weeks. Call for appointment at [phone].    CODE STATUS: Full code

## 2025-01-21 NOTE — OUTREACH NOTE
Prep Survey      Flowsheet Row Responses   [facility] patient discharged from? [place]   Is LACE score < 7 ? No   Eligibility [facility]   Date of Admission 01/07/25   Date of Discharge 01/21/25   Discharge Disposition Home or Self Care   Discharge diagnosis Tubulovillous adenoma of colon, Laparoscopic right colon resection   Does the patient have one of the following disease processes/diagnoses(primary or secondary)? General Surgery   Does the patient have Home health ordered? No   Is there a DME ordered? No   Prep survey completed? Yes            Ivory HENDERSON - Registered Nurse

## 2025-01-21 NOTE — PAYOR COMM NOTE
"Jess Duque (64 y.o. Female)    PLEASE SEE ATTACHED FOR DC NOTICE   REF#[id number]   THANK YOU  PATITO NELSON RN/ DEPT   [facility]  PH: [phone]  FAX:  [phone]     Date of Birth   1960    Social Security Number       Address   [address] [place] [address]    Home Phone   [phone]    MRN   [id number]       Faith   [facility]    Marital Status                               Admission Date   1/7/25    Admission Type   Elective    Admitting Provider   Sabi Mars MD    Attending Provider       Department, Room/Bed   [address], P693/1       Discharge Date   1/21/2025    Discharge Disposition   Home or Self Care    Discharge Destination                                 Attending Provider: (none)   Allergies: Morphine, Levaquin [Levofloxacin], Adhesive Tape, Ampicillin, Ceclor [Cefaclor], Ciprofloxacin, Codeine Phosphate, Erythromycin, Ketek [Telithromycin], Penicillins, Tetracyclines & Related, Ultram [Tramadol]    Isolation: None   Infection: None   Code Status: CPR    Ht: 163.8 cm (64.49\")   Wt: 70.5 kg (155 lb 6.8 oz)    Admission Cmt: None   Principal Problem: Tubulovillous adenoma of colon [D12.6]                   Active Insurance as of 1/7/2025       Primary Coverage       Payor Plan Insurance Group Employer/Plan Group    [employer] BLUE [employer] BLUE [employer] PPO [id number]       Payor Plan Address Payor Plan Phone Number Payor Plan Fax Number Effective Dates    [address] [phone]  10/1/2023 - None Entered    [place] [address]         Subscriber Name Subscriber Birth Date Member ID       HAILE DUQUE T 1960 [id number]                     Emergency Contacts        (Rel.) Home Phone Work Phone Mobile Phone    Haile Duque (Spouse) [phone] -- [phone]              [place]: NPI [id number]  Tax ID [id number]     Discharge Summary        Sabi Mars MD at 01/21/25 " 1219          Discharge Summary    Patient name: Jess Razo    Medical record number: [id number]    Admission date: 1/7/2025  Discharge date:  1/21/2025    Attending physician: Sabi Mars MD    Primary care physician: Olamide Cobos PA-C    Consulting physician(s): None    Condition on discharge: improving    Admitting diagnosis: Endoscopically unresectable tubulovillous adenoma of the ascending colon    Final diagnosis:   Endoscopically unresectable tubulovillous adenoma of the ascending colon  Postoperative adynamic ileus    Procedures: Laparoscopic right colon resection    History of present illness: The patient is a  64 y.o. female that was admitted to the hospital with a recently identified recurrent tubulovillous adenoma of the ascending colon located behind a mucosal fold and difficult to remove endoscopically in its entirety.  As it has recurred despite multiple attempts at endoscopic removal, I have recommended proceeding with a laparoscopic right colon resection for definitive tissue excision.  She was counseled on the risks of the procedure and has consented to proceed.    Hospital course: She was brought electively to the hospital for laparoscopic right colon resection which was achieved without difficulty.  Postoperatively, she struggled with a prolonged adynamic ileus that lasted almost 2 weeks.  She was started on IV TPN during that time given her dietary intolerance.  KUB followed by CT were performed that confirmed the expected postoperative ileus but showed no other complications.  As her adynamic ileus slowly resolved, her diet was advanced and her TPN discontinued.  She is being discharged home 2 weeks postop with instructions to follow-up with me in 2 to 3 weeks.    Discharge medications:      Discharge Medications        New Medications        Instructions Start Date   oxyCODONE-acetaminophen 5-325 MG per tablet  Commonly known as: PERCOCET   1 tablet, Oral, Every 4 Hours PRN              Changes to Medications        Instructions Start Date   atorvastatin 80 MG tablet  Commonly known as: LIPITOR  What changed: when to take this   80 mg, Oral, Daily, for cholesterol      Dulaglutide 0.75 MG/0.5ML solution auto-injector  What changed: additional instructions   0.75 mg, Subcutaneous, Weekly, For DMII      FLUoxetine 40 MG capsule  Commonly known as: PROzac  What changed: when to take this   40 mg, Oral, Daily, For anxiety and depression      metFORMIN  MG 24 hr tablet  Commonly known as: GLUCOPHAGE-XR  What changed: when to take this   500 mg, Oral, Daily, With food for type 2 diabetes             Continue These Medications        Instructions Start Date   albuterol sulfate  (90 Base) MCG/ACT inhaler  Commonly known as: PROVENTIL HFA;VENTOLIN HFA;PROAIR HFA   2 puffs, Inhalation, Every 4 Hours PRN      bimatoprost 0.03 % ophthalmic drops  Commonly known as: LUMIGAN   INSTILL 1 DROP INTO EACH EYE AT BEDTIME      bimatoprost 0.03 % ophthalmic solution  Commonly known as: LATISSE   Nightly      bisoprolol 10 MG tablet  Commonly known as: ZEBeta   10 mg, Daily      calcium carbonate 1250 (500 Ca) MG tablet  Commonly known as: OS-YOON   1 tablet, Daily      cetirizine 10 MG tablet  Commonly known as: zyrTEC   10 mg, Daily      Combigan 0.2-0.5 % ophthalmic solution  Generic drug: brimonidine-timolol   Administer 1 drop to both eyes Every Night.      digoxin 250 MCG tablet  Commonly known as: LANOXIN   250 mcg, Nightly      dilTIAZem 360 MG 24 hr capsule  Commonly known as: TIAZAC   360 mg, Oral, Daily, For heart rate      esomeprazole 20 MG capsule  Commonly known as: nexIUM   20 mg, Every Morning Before Breakfast      Fluticasone-Salmeterol 250-50 MCG/ACT DISKUS  Commonly known as: ADVAIR/WIXELA   1 puff, Inhalation, 2 Times Daily - RT, For asthma rinse mouth after use      Garlic 1000 MG capsule   1,000 mg, Daily      ibuprofen 400 MG tablet  Commonly known as: ADVIL,MOTRIN   400 mg, Every  6 Hours PRN      levothyroxine 50 MCG tablet  Commonly known as: SYNTHROID, LEVOTHROID   1 p.o. daily before breakfast and do not take on Sundays      multivitamin tablet  Generic drug: multivitamin   1 tablet, Daily      omeprazole 20 MG capsule  Commonly known as: priLOSEC   20 mg, Daily      UP4 PROBIOTICS ADULT PO   Take  by mouth.      valsartan 160 MG tablet  Commonly known as: DIOVAN   One PO daily for BP      Vitamin D3 10 MCG (400 UNIT) capsule   1 capsule, Daily      vitamin E 1000 UNIT capsule   1,000 Units, Daily               Discharge instructions:    - Resume regular diet as tolerated.  - No lifting anything heavier than 15 pounds for 8 weeks postop.  You may resume all other activities as tolerated once your pain level allows.  - No driving while taking narcotic pain medications.  - You may resume showering, no tub bathing for two weeks while your incisions heal.  - Wash your incisions with soap and water daily in the shower, pat dry, and leave open to air.    Follow-up appointment: Follow up with Sabi Mars MD in the office in 2 weeks. Call for appointment at [phone].    CODE STATUS: Full code      Electronically signed by Sabi Mars MD at 01/21/25 2079

## 2025-01-21 NOTE — PLAN OF CARE
Goal Outcome Evaluation:  Plan of Care Reviewed With: patient        Progress: improving  Outcome Evaluation: VSS. Dr. Mars d/c'd TPN today. The current bag iss infusing at 50 cc per hour per advise of pharmacy. Pt. states she has very active bowel sounds. PICC line in ISHAN. No B/P's or sticks inb her LT. f/a. Probable for D/C tomorrow.

## 2025-01-21 NOTE — PAYOR COMM NOTE
"Jess Duque (64 y.o. Female)    PLEASE SEE ATTACHED FOR CLINICAL UPDATES    FAXED UPDATES ON 1/16 AND THESE ARE NEW UPDATES.  REF#[id number]   THANK YOU  PATITO NELSON RN/ DEPT   [facility]  PH: [phone]  FAX:  [phone]     Date of Birth   1960    Social Security Number       Address   [address] [place] [address]    Home Phone   [phone]    MRN   [id number]       [facility]    Marital Status                               Admission Date   1/7/25    Admission Type   Elective    Admitting Provider   Sabi Mars MD    Attending Provider   Sabi Mars MD    Department, Room/Bed   [address], 93/1       Discharge Date       Discharge Disposition       Discharge Destination                                 Attending Provider: Sabi Mars MD    Allergies: Morphine, Levaquin [Levofloxacin], Adhesive Tape, Ampicillin, Ceclor [Cefaclor], Ciprofloxacin, Codeine Phosphate, Erythromycin, Ketek [Telithromycin], Penicillins, Tetracyclines & Related, Ultram [Tramadol]    Isolation: None   Infection: None   Code Status: CPR    Ht: 163.8 cm (64.49\")   Wt: 70.5 kg (155 lb 6.8 oz)    Admission Cmt: None   Principal Problem: Tubulovillous adenoma of colon [D12.6]                   Active Insurance as of 1/7/2025       Primary Coverage       Payor Plan Insurance Group Employer/Plan Group    [facility] [employer] [employer] [facility] PPO [id number]       Payor Plan Address Payor Plan Phone Number Payor Plan Fax Number Effective Dates    [address] [phone]  10/1/2023 - None Entered    [facility] [id number]         Subscriber Name Subscriber Birth Date Member ID       HAILE DUQUE MARTHA 1960 [id number]                     Emergency Contacts        (Rel.) Home Phone Work Phone Mobile Phone    Haile Duque (Spouse) [phone] -- [phone]              [place]: NPI [id number]  Tax ID " [id number]  Medication Administration Report for Jess Razo as of 1/20/25 through 1/21/25     Legend:    Given Hold Not Given Due Canceled Entry Other Actions    Time Time (Time) Time Time-Action         Discontinued     Completed     Future     MAR Hold     Linked             Medications 01/20/25 01/21/25     albuterol sulfate HFA (PROVENTIL HFA;VENTOLIN HFA;PROAIR HFA) inhaler 2 puff  Dose: 2 puff  Freq: Every 4 Hours PRN Route: IN  PRN Reason: Wheezing  Start: 01/07/25 1826     Admin Instructions:   Include Respiratory Treatment Education   Shake well.          atorvastatin (LIPITOR) tablet 80 mg  Dose: 80 mg  Freq: Nightly Route: PO  Start: 01/07/25 2100     Admin Instructions:   Avoid grapefruit juice.      2046-Given            2100              bisoprolol (ZEBeta) tablet 10 mg  Dose: 10 mg  Freq: Daily Route: PO  Start: 01/08/25 0900     Admin Instructions:   Hold for SBP less than 100, DBP less than 60, or heart rate less than 50. If a dose is held, please contact the provider.  Caution: Look alike/sound alike drug alert      0839-Given            0900              brimonidine (ALPHAGAN) 0.2 % ophthalmic solution 1 drop  Dose: 1 drop  Freq: 2 Times Daily Route: Both Eyes  Start: 01/16/25 1330      0839-Given     2046-Given           0900 2100             budesonide-formoterol (SYMBICORT) 160-4.5 MCG/ACT inhaler 2 puff  Dose: 2 puff  Freq: 2 Times Daily - RT Route: IN  Start: 01/07/25 2130     Admin Instructions:    Shake well.  Rinse mouth after use, do not swallow water.  Send aerosols to pharmacy in ziplock bag for proper disposal.      0803-Given     (2040)-Not Given [C]           0847-Given     2130             calcium carbonate (oyster shell) tablet 500 mg  Dose: 500 mg  Freq: Daily Route: PO  Start: 01/08/25 0900      0840-Given            0903-Given              calcium carbonate (TUMS) chewable tablet 500 mg (200 mg elemental)  Dose: 2 tablet  Freq: 3 Times Daily PRN Route: PO  PRN Reasons:  Indigestion,Heartburn  Start: 01/16/25 1210     Admin Instructions:   One tablet contains 200 mg elemental calcium.  Take with food.          cetirizine (zyrTEC) tablet 10 mg  Dose: 10 mg  Freq: Daily Route: PO  Start: 01/08/25 0900      0840-Given            0900-Given              dextrose (D50W) (25 g/50 mL) IV injection 25 g  Dose: 25 g  Freq: Every 15 Minutes PRN Route: IV  PRN Reason: Low Blood Sugar  PRN Comment: Blood Sugar Less Than 70  Start: 01/07/25 1826     Admin Instructions:   Blood sugar less than 70; patient has IV access - Unresponsive, NPO or Unable To Safely Swallow          dextrose (GLUTOSE) oral gel 15 g  Dose: 15 g  Freq: Every 15 Minutes PRN Route: PO  PRN Reason: Low Blood Sugar  PRN Comment: Blood sugar less than 70  Start: 01/07/25 1826     Admin Instructions:   BS<70, Patient Alert, Is not NPO, Can safely swallow.          digoxin (LANOXIN) tablet 250 mcg  Dose: 250 mcg  Freq: Nightly Route: PO  Start: 01/07/25 2100     Admin Instructions:    Check and record heart rate.      2310-Given            2100              dilTIAZem CD (CARDIZEM CD) 24 hr capsule 360 mg  Dose: 360 mg  Freq: Every 24 Hours Scheduled Route: PO  Start: 01/08/25 0900     Admin Instructions:   Caution: Look alike/sound alike drug alert.   Swallow capsule whole. Do not crush, chew, or open capsule. Avoid grapefruit juice. Maximum simvastatin dose 10 mg while taking dilTIAZem.      0840-Given            0900              docusate sodium (COLACE) capsule 100 mg  Dose: 100 mg  Freq: 2 Times Daily Route: PO  Start: 01/07/25 2100     Admin Instructions:   Swallow whole.  Do not open, crush, or chew capsule.      0840-Given     2046-Given           (0900)-Not Given     2100             Enoxaparin Sodium (LOVENOX) syringe 40 mg  Dose: 40 mg  Freq: Daily Route: SC  Indications of Use: PROPHYLAXIS OF VENOUS THROMBOEMBOLISM  Start: 01/13/25 1300     Admin Instructions:   Give subcutaneous in abdomen only. Do not massage site  after injection.      0840-Given            0900-Given              FLUoxetine (PROzac) capsule 40 mg  Dose: 40 mg  Freq: Nightly Route: PO  Start: 01/07/25 2100     Admin Instructions:   Caution: Look alike/sound alike drug alert      2046-Given            2100              glucagon (GLUCAGEN) injection 1 mg  Dose: 1 mg  Freq: Every 15 Minutes PRN Route: IM  PRN Reason: Low Blood Sugar  PRN Comment: Blood Glucose Less Than 70  Start: 01/07/25 1826     Admin Instructions:   Blood Glucose Less Than 70 - Patient Without IV Access - Unresponsive, NPO or Unable To Safely Swallow  Reconstitute powder for injection by adding 1 mL of -supplied sterile diluent or sterile water for injection to a vial containing 1 mg of the drug, to provide solutions containing 1 mg/mL. Shake vial gently to dissolve.          ibuprofen (ADVIL,MOTRIN) tablet 400 mg  Dose: 400 mg  Freq: Every 6 Hours PRN Route: PO  PRN Reason: Mild Pain  Start: 01/07/25 1826     Admin Instructions:   Based on patient request - if ordered for moderate or severe pain, provider allows for administration of a medication prescribed for a lower pain scale.    Mucous membrane irritant. Do not crush or chew tablet or capsule unless administered through a feeding tube.  If given for pain, use the following pain scale:  Mild Pain = Pain Score of 1-3, CPOT 1-2  Moderate Pain = Pain Score of 4-6, CPOT 3-4  Severe Pain = Pain Score of 7-10, CPOT 5-8      0607-Given     2052-Given              insulin lispro (HUMALOG/ADMELOG) injection 2-7 Units  Dose: 2-7 Units  Freq: 4 Times Daily Before Meals & Nightly Route: SC  Start: 01/07/25 2100     Admin Instructions:   Correction Insulin - Low Dose - Total Insulin Dose Less Than 40 units/day (Lean, Elderly or Renal Patients)    Blood Glucose 150-199 mg/dL - 2 units  Blood Glucose 200-249 mg/dL - 3 units  Blood Glucose 250-299 mg/dL - 4 units  Blood Glucose 300-349 mg/dL - 5 units  Blood Glucose 350-400 mg/dL - 6  units  Blood Glucose Greater Than 400 mg/dL - 7 units & Call Provider   Caution: Look alike/sound alike drug alert      0835-Given     1231-Given     1800-Given     (2230)-Not Given         (0830)-Not Given     1130     1730 2100           latanoprost (XALATAN) 0.005 % ophthalmic solution 1 drop  Dose: 1 drop  Freq: Nightly Route: Both Eyes  Start: 01/16/25 2100 2046-Given            2100              levothyroxine (SYNTHROID, LEVOTHROID) tablet 50 mcg  Dose: 50 mcg  Freq: Every Early Morning Route: PO  Start: 01/08/25 0600     Admin Instructions:   Take on empty stomach.      0518-Given            0602-Given              metFORMIN ER (GLUCOPHAGE-XR) 24 hr tablet 500 mg  Dose: 500 mg  Freq: Nightly Route: PO  Start: 01/07/25 2100     Admin Instructions:   Do not crush or chew the capsules or tablets. The drug may not work as designed if the capsule or tablet is crushed or chewed. Swallow whole.  Caution: Look alike/sound alike drug alert. Do not crush, split, or chew.      2046-Given 2100              ondansetron (ZOFRAN) injection 4 mg  Dose: 4 mg  Freq: Every 6 Hours PRN Route: IV  PRN Reasons: Nausea,Vomiting  Start: 01/07/25 1826     Admin Instructions:   If BOTH ondansetron (ZOFRAN) and promethazine (PHENERGAN) are ordered use ondansetron first and THEN promethazine IF ondansetron is ineffective.          oxyCODONE-acetaminophen (PERCOCET) 5-325 MG per tablet 1 tablet  Dose: 1 tablet  Freq: Every 4 Hours PRN Route: PO  PRN Reason: Moderate Pain  Start: 01/07/25 1826     Admin Instructions:   Based on patient request - if ordered for moderate or severe pain, provider allows for administration of a medication prescribed for a lower pain scale.  [KEV]    Do not exceed 4 grams of acetaminophen in a 24 hr period. Max dose of 2gm for AST/ALT greater than 120 units/L        If given for pain, use the following pain scale:   Mild Pain = Pain Score of 1-3, CPOT 1-2  Moderate Pain = Pain Score of 4-6,  "CPOT 3-4  Severe Pain = Pain Score of 7-10, CPOT 5-8          pantoprazole (PROTONIX) EC tablet 40 mg  Dose: 40 mg  Freq: Every Early Morning Route: PO  Start: 01/08/25 0600     Admin Instructions:   Do not crush or chew the capsules or tablets. The drug may not work as designed if the capsule or tablet is crushed or chewed. Swallow whole.  Swallow whole; do not crush, split, or chew.      0518-Given            0602-Given              Phosphorus Replacement - Follow Nurse / BPA Driven Protocol  Freq: As Needed Route: XX  PRN Reason: Other  Start: 01/17/25 1619     Admin Instructions:   Open Order & Select \"[facility] Electrolyte Replacement Protocol Algorithm\" to View Details          prochlorperazine (COMPAZINE) injection 5 mg  Dose: 5 mg  Freq: Every 6 Hours PRN Route: IV  PRN Reasons: Nausea,Vomiting  Start: 01/09/25 1251     Admin Instructions:   \"If multiple N/V medications ordered, use in the following order: Ondansetron, Prochlorperazine, Promethazine. Use PO unless patient refuses or patient unable to swallow.\"          simethicone (MYLICON) chewable tablet 80 mg  Dose: 80 mg  Freq: 4 Times Daily PRN Route: PO  PRN Reason: Flatulence  PRN Comment: Gas pains  Start: 01/16/25 1210          sodium chloride 0.9 % flush 10 mL  Dose: 10 mL  Freq: Every 12 Hours Scheduled Route: IV  Start: 01/17/25 2100     Admin Instructions:   Lumen #2      0841-Given     2047-Given           0902-Given     2100             sodium chloride 0.9 % flush 10 mL  Dose: 10 mL  Freq: Every 12 Hours Scheduled Route: IV  Start: 01/17/25 2100     Admin Instructions:   Lumen #1      (0841)-Not Given [C]     2048-Given           0902-Given     2100             sodium chloride 0.9 % flush 10 mL  Dose: 10 mL  Freq: As Needed Route: IV  PRN Reason: Line Care  PRN Comment: After Medication Administration  Start: 01/17/25 1737          sodium chloride 0.9 % flush 10 mL  Dose: 10 mL  Freq: As Needed Route: IV  PRN Reason: Line Care  Start: 01/07/25 " 1826          sodium chloride 0.9 % flush 20 mL  Dose: 20 mL  Freq: As Needed Route: IV  PRN Reason: Line Care  PRN Comment: After Blood Draws or Blood Product Administration  Start: 01/17/25 1737          sodium chloride 0.9 % infusion 40 mL  Dose: 40 mL  Freq: As Needed Route: IV  PRN Reason: Line Care  Start: 01/17/25 1737     Admin Instructions:   Following administration of an IV intermittent medication, flush line with 40mL NS at 100mL/hr.          valsartan (DIOVAN) tablet 160 mg  Dose: 160 mg  Freq: Daily Route: PO  Start: 01/08/25 0900     Admin Instructions:   Hold for SBP less than 100, DBP less than 60, or heart rate less than 50. If a dose is held, please contact the provider.      0843-Given            0900              vitamin E capsule 1,000 Units  Dose: 1,000 Units  Freq: Daily Route: PO  Start: 01/08/25 0900      0843-Given            0900-Given              Completed Medications  Medications 01/20/25 01/21/25      alvimopan (ENTEREG) capsule 12 mg  Dose: 12 mg  Freq: Once Route: PO  Start: 01/07/25 1138 End: 01/07/25 1227     Admin Instructions:   Contraindicated in end stage renal failure or severe hepatic impairment.   Administer 30 minutes to 5 hours prior to surgery  Inpatient use only, max 15 total doses (pre and post op).  Discontinue after first BM or when narcotics have been discontinued.     Order specific questions:   Is this medication for a partial large or small bowel resection with primary anastomosis? Yes  Has the patient recieved daily opioids for the past 7 days? No  Have you received education on the benefits and risks of alvimopan (Entereg)? Yes          ceFAZolin 2000 mg IVPB in 100 mL NS (MBP)  Dose: 2,000 mg  Freq: Once Route: IV  Indications of Use: PERIOPERATIVE PHARMACOPROPHYLAXIS  Start: 01/07/25 1323 End: 01/07/25 1405     Admin Instructions:   Caution: Look alike/sound alike drug alert          famotidine (PEPCID) injection 20 mg  Dose: 20 mg  Freq: Once Route:  IV  Start: 01/07/25 1144 End: 01/07/25 1239     Admin Instructions:   Give IV push over 2 minutes.          HYDROcodone-acetaminophen (NORCO) 5-325 MG per tablet 1 tablet  Dose: 1 tablet  Freq: Once As Needed Route: PO  PRN Reason: Moderate Pain  Start: 01/07/25 1621 End: 01/07/25 1725     Admin Instructions:   Based on patient request - if ordered for moderate or severe pain, provider allows for administration of a medication prescribed for a lower pain scale.  [KEV]    Do not exceed 4 grams of acetaminophen in a 24 hr period. Max dose of 2gm for AST/ALT greater than 120 units/L        If given for pain, use the following pain scale:   Mild Pain = Pain Score of 1-3, CPOT 1-2  Moderate Pain = Pain Score of 4-6, CPOT 3-4  Severe Pain = Pain Score of 7-10, CPOT 5-8          iopamidol (ISOVUE-300) 61 % injection 100 mL  Dose: 100 mL  Freq: Once in Imaging Route: IV  Start: 01/16/25 1945 End: 01/16/25 1901          metroNIDAZOLE (FLAGYL) IVPB 500 mg  Dose: 500 mg  Freq: Once Route: IV  Indications of Use: PERIOPERATIVE PHARMACOPROPHYLAXIS  Start: 01/07/25 1323 End: 01/07/25 1405     Admin Instructions:   Caution: Look alike/sound alike drug alert.  Do not refrigerate.          midazolam (VERSED) injection 1 mg  Dose: 1 mg  Freq: Every 5 Minutes PRN Route: IV  PRN Comment: Anxiety prophylaxis, Pre-op comfort  Start: 01/07/25 1141 End: 01/07/25 1302     Admin Instructions:   May repeat dose in 5 minutes one time then contact provider for additional orders.            potassium phosphate 15 mmol in 0.9% normal saline 250 mL IVPB  Dose: 15 mmol  Freq: Once Route: IV  Start: 01/17/25 1715 End: 01/18/25 0148          sodium chloride 0.9 % infusion  Freq: Continuous PRN  Start: 01/07/25 1451 End: 01/07/25 1626          Discontinued Medications  Medications 01/20/25 01/21/25      acetaminophen (TYLENOL) tablet 1,000 mg  Dose: 1,000 mg  Freq: Every 6 Hours Route: PO  Start: 01/07/25 1136 End: 01/07/25 1643     Admin  Instructions:   If given for fever, use fever parameter: fever greater than 100.4 °F  Based on patient request - if ordered for moderate or severe pain, provider allows for administration of a medication prescribed for a lower pain scale.    Do not exceed 4 grams of acetaminophen in a 24 hr period. Max dose of 2gm for AST/ALT greater than 120 units/L.    If given for pain, use the following pain scale:   Mild Pain = Pain Score of 1-3, CPOT 1-2  Moderate Pain = Pain Score of 4-6, CPOT 3-4  Severe Pain = Pain Score of 7-10, CPOT 5-8          Adult Central 2-in-1 TPN  Rate: 80 mL/hr  Freq: Continuous TPN Route: IV  Start: 01/20/25 1800 End: 01/20/25 1405     Admin Instructions:   Use a 1.2 micron filter.     Order specific questions:   Indication Prolonged Paralytic Ileus          Adult Central 2-in-1 TPN  Rate: 100 mL/hr  Freq: Continuous TPN Route: IV  Start: 01/19/25 1800 End: 01/20/25 1846     Admin Instructions:   Use a 1.2 micron filter.     Order specific questions:   Indication Prolonged Paralytic Ileus          Adult Central 2-in-1 TPN  Rate: 85 mL/hr  Freq: Continuous TPN Route: IV  Start: 01/18/25 1800 End: 01/19/25 1818     Admin Instructions:   Use a 1.2 micron filter.     Order specific questions:   Indication Prolonged Paralytic Ileus          Adult Central 2-in-1 TPN  Rate: 75 mL/hr  Freq: Continuous TPN Route: IV  Start: 01/17/25 1800 End: 01/18/25 1740     Admin Instructions:   Use a 1.2 micron filter.     Order specific questions:   Indication Prolonged Paralytic Ileus          alvimopan (ENTEREG) capsule 12 mg  Dose: 12 mg  Freq: 2 Times Daily Route: PO  Start: 01/07/25 2100 End: 01/08/25 0132     Admin Instructions:   Contraindicated in end stage renal failure or severe hepatic impairment.   Administer 30 minutes to 5 hours prior to surgery  Inpatient use only, max 15 total doses (pre and post op).  Discontinue after first BM or when narcotics have been discontinued.     Order specific questions:    Is this medication for a partial large or small bowel resection with primary anastomosis? Yes  Has the patient recieved daily opioids for the past 7 days? No  Have you received education on the benefits and risks of alvimopan (Entereg)? Yes          Atropine Sulfate (PF) injection 0.4 mg  Dose: 0.4 mg  Freq: Once As Needed Route: IV  PRN Reason: Bradycardia  PRN Comment: symptomatic bradycardia (hypotension, dizziness, confusion). Notify attending anesthesiologist.  Start: 01/07/25 1621 End: 01/07/25 1820          bupivacaine-EPINEPHrine PF (MARCAINE w/EPI) 0.5% -[id number] injection  Freq: As Needed  Start: 01/07/25 1451 End: 01/07/25 1630          dextrose 5 % and sodium chloride 0.45 % with KCl 10 mEq/L infusion  Rate: 100 mL/hr Dose: 100 mL/hr  Freq: Continuous Route: IV  Start: 01/11/25 1945 End: 01/12/25 1659          dextrose 5 % and sodium chloride 0.45 % with KCl 20 mEq/L infusion  Rate: 50 mL/hr Dose: 50 mL/hr  Freq: Continuous Route: IV  Start: 01/17/25 1800 End: 01/18/25 0801          dextrose 5 % and sodium chloride 0.45 % with KCl 20 mEq/L infusion  Rate: 75 mL/hr Dose: 75 mL/hr  Freq: Continuous Route: IV  Start: 01/16/25 1915 End: 01/17/25 1421          dextrose 5 % and sodium chloride 0.45 % with KCl 20 mEq/L infusion  Rate: 75 mL/hr Dose: 75 mL/hr  Freq: Continuous Route: IV  Start: 01/07/25 1915 End: 01/08/25 1650          diphenhydrAMINE (BENADRYL) injection 12.5 mg  Dose: 12.5 mg  Freq: Every 15 Minutes PRN Route: IV  PRN Reason: Itching  PRN Comment: May repeat x 1  Start: 01/07/25 1621 End: 01/07/25 1820     Admin Instructions:   Caution: Look alike/sound alike drug alert. This med may be ordered in other forms and routes. Before giving verify the last time the drug was given by any route/form.          ePHEDrine injection 5 mg  Dose: 5 mg  Freq: Once As Needed Route: IV  PRN Comment: symptomatic hypotension - Notify attending anesthesiologist if this needs to be given  Start: 01/07/25 5222  End: 01/07/25 1820     Admin Instructions:   Caution: Look alike/sound alike drug alert   Dilute with NS to 5-10 mg/mL.  Central line preferred, if unavailable use large bore IV access with frequent nurse monitoring of IV site.          Fat Emul Fish Oil/Plant Based (SMOFLIPID) 20 % emulsion 100 mL  Dose: 100 mL  Freq: Every 24 Hours Scheduled (TPN) Route: IV  Start: 01/18/25 1800 End: 01/20/25 1405     Admin Instructions:   Use a 1.2 micron filter          fentaNYL citrate (PF) (SUBLIMAZE) injection 25 mcg  Dose: 25 mcg  Freq: Every 5 Minutes PRN Route: IV  PRN Reason: Severe Pain  Start: 01/07/25 1621 End: 01/07/25 1820     Admin Instructions:   Maximum total dose of fentanyl is 200 mcg.  If given for pain, use the following pain scale:  Mild Pain = Pain Score of 1-3, CPOT 1-2  Moderate Pain = Pain Score of 4-6, CPOT 3-4  Severe Pain = Pain Score of 7-10, CPOT 5-8          flumazenil (ROMAZICON) injection 0.2 mg  Dose: 0.2 mg  Freq: As Needed Route: IV  PRN Comment: for benzodiazepine induced unresponsiveness or sedation  Start: 01/07/25 1621 End: 01/07/25 1820     Admin Instructions:   Notify Anesthesia if given  ** give IV over 15-30 seconds **          hydrALAZINE (APRESOLINE) injection 5 mg  Dose: 5 mg  Freq: Every 10 Minutes PRN Route: IV  PRN Reason: High Blood Pressure  PRN Comment: for systolic blood pressure greater than 180 mmHg or diastolic blood pressure greater than 105 mmHg  Start: 01/07/25 1621 End: 01/07/25 1820     Admin Instructions:   Up to 20 mg. If labetalol and hydralazine are both ordered, use labetalol first.  Caution: Look alike/sound alike drug alert          HYDROcodone-acetaminophen (NORCO) 7.5-325 MG per tablet 1 tablet  Dose: 1 tablet  Freq: Every 4 Hours PRN Route: PO  PRN Reason: Severe Pain  Start: 01/07/25 1621 End: 01/07/25 1820     Admin Instructions:   Based on patient request - if ordered for moderate or severe pain, provider allows for administration of a medication  prescribed for a lower pain scale.  [KEV]    Do not exceed 4 grams of acetaminophen in a 24 hr period. Max dose of 2gm for AST/ALT greater than 120 units/L        If given for pain, use the following pain scale:   Mild Pain = Pain Score of 1-3, CPOT 1-2  Moderate Pain = Pain Score of 4-6, CPOT 3-4  Severe Pain = Pain Score of 7-10, CPOT 5-8          HYDROmorphone (DILAUDID) injection 0.25 mg  Dose: 0.25 mg  Freq: Every 5 Minutes PRN Route: IV  PRN Reason: Moderate Pain  Start: 01/07/25 1621 End: 01/07/25 1820     Admin Instructions:   Maximum total dose of hydromorphone is 2 mg.  If given for pain, use the following pain scale:  Mild Pain = Pain Score of 1-3, CPOT 1-2  Moderate Pain = Pain Score of 4-6, CPOT 3-4  Severe Pain = Pain Score of 7-10, CPOT 5-8           HYDROmorphone (DILAUDID) injection 0.5 mg  Dose: 0.5 mg  Freq: Every 2 Hours PRN Route: IV  PRN Reason: Severe Pain  Start: 01/07/25 1826 End: 01/17/25 1209     Admin Instructions:   Based on patient request - if ordered for moderate or severe pain, provider allows for administration of a medication prescribed for a lower pain scale.  If given for pain, use the following pain scale:  Mild Pain = Pain Score of 1-3, CPOT 1-2  Moderate Pain = Pain Score of 4-6, CPOT 3-4  Severe Pain = Pain Score of 7-10, CPOT 5-8          And   naloxone (NARCAN) injection 0.4 mg  Dose: 0.4 mg  Freq: Every 5 Minutes PRN Route: IV  PRN Reason: Respiratory Depression  Start: 01/07/25 1826 End: 01/17/25 1209     Admin Instructions:   If respiratory rate is less than 8 breaths/minute or patient is difficult to arouse stop any narcotics and contact physician.   Administer slow IV push. Repeat as ordered until patient's respiratory rate is greater than 12 breaths/minute.          ipratropium-albuterol (DUO-NEB) nebulizer solution 3 mL  Dose: 3 mL  Freq: Once As Needed Route: NEBULIZATION  PRN Reasons: Wheezing,Shortness of Air  PRN Comment: bronchospasm  Start: 01/07/25 5688 End:  01/07/25 1820     Admin Instructions:   Notify Anesthesia if minineb is given          labetalol (NORMODYNE,TRANDATE) injection 5 mg  Dose: 5 mg  Freq: Every 5 Minutes PRN Route: IV  PRN Reason: High Blood Pressure  PRN Comment: for systolic blood pressure greater than 180 mmHg or diastolic blood pressure greater than 105 mmHg  Start: 01/07/25 1621 End: 01/07/25 1820     Admin Instructions:   Hold for heart rate less than 60.    If labetalol and hydralazine are both ordered, use labetalol first. Maximum dose of labetalol is 20mg IV while in PACU, notify anesthesiologist for further orders if needed.  Give IV Push over 2 minutes.          lactated ringers infusion  Rate: 9 mL/hr Dose: 9 mL/hr  Freq: Continuous Route: IV  Start: 01/07/25 1144 End: 01/07/25 1826          lidocaine (XYLOCAINE) 1 % injection 0.5 mL  Dose: 0.5 mL  Freq: Once As Needed Route: ID  PRN Comment: IV Start  Start: 01/07/25 1141 End: 01/07/25 1643          naloxone (NARCAN) injection 0.2 mg  Dose: 0.2 mg  Freq: As Needed Route: IV  PRN Reasons: Opioid Reversal,Respiratory Depression  PRN Comment: unresponsiveness, decrease oxygen saturation  Indications of Use: ACUTE RESPIRATORY FAILURE,OPIOID-INDUCED RESPIRATORY DEPRESSION  Start: 01/07/25 1621 End: 01/07/25 1820     Admin Instructions:   Notify Anesthesia if given          ondansetron (ZOFRAN) injection 4 mg  Dose: 4 mg  Freq: Once As Needed Route: IV  PRN Reasons: Nausea,Vomiting  Indications of Use: POSTOPERATIVE NAUSEA AND VOMITING  Start: 01/07/25 1621 End: 01/07/25 1820     Admin Instructions:   If BOTH ondansetron (ZOFRAN) and promethazine (PHENERGAN) are ordered use ondansetron first and THEN promethazine IF ondansetron is ineffective.          Pharmacy to Dose TPN  Freq: Continuous PRN Route: XX  PRN Reason: Consult  Start: 01/17/25 1204 End: 01/20/25 1405     Order specific questions:   Indication Prolonged Paralytic Ileus  Location of venous access Central  Catheter position  confirmed by x-ray No  Is patient volume restricted No           promethazine (PHENERGAN) suppository 25 mg  Dose: 25 mg  Freq: Once As Needed Route: RE  PRN Reasons: Nausea,Vomiting  Start: 01/07/25 1621 End: 01/07/25 1820     Admin Instructions:   If BOTH ondansetron (ZOFRAN) and promethazine (PHENERGAN) are ordered use ondansetron first and THEN promethazine IF ondansetron is ineffective.          Or   promethazine (PHENERGAN) tablet 25 mg  Dose: 25 mg  Freq: Once As Needed Route: PO  PRN Reasons: Nausea,Vomiting  Start: 01/07/25 1621 End: 01/07/25 1820     Admin Instructions:   If BOTH ondansetron (ZOFRAN) and promethazine (PHENERGAN) are ordered use ondansetron first and THEN promethazine IF ondansetron is ineffective.            sodium chloride (NS) irrigation solution  Freq: As Needed  Start: 01/07/25 1451 End: 01/07/25 1630          sodium chloride 0.9 % flush 10 mL  Dose: 10 mL  Freq: As Needed Route: IV  PRN Reason: Line Care  PRN Comment: After Medication Administration  Start: 01/17/25 1737 End: 01/20/25 1128          sodium chloride 0.9 % flush 10 mL  Dose: 10 mL  Freq: Every 12 Hours Scheduled Route: IV  Start: 01/17/25 2100 End: 01/20/25 1128     Admin Instructions:   Lumen #2      (0842)-Not Given [C]               sodium chloride 0.9 % flush 10 mL  Dose: 10 mL  Freq: Every 12 Hours Scheduled Route: IV  Start: 01/17/25 2100 End: 01/20/25 1128     Admin Instructions:   Lumen #1      (0842)-Not Given [C]               sodium chloride 0.9 % flush 10 mL  Dose: 10 mL  Freq: Every 12 Hours Scheduled Route: IV  Start: 01/07/25 2100 End: 01/20/25 1128      (0842)-Not Given [C]               sodium chloride 0.9 % flush 20 mL  Dose: 20 mL  Freq: As Needed Route: IV  PRN Reason: Line Care  PRN Comment: After Blood Draws or Blood Product Administration  Start: 01/17/25 1737 End: 01/20/25 1128          sodium chloride 0.9 % flush 3 mL  Dose: 3 mL  Freq: Every 12 Hours Scheduled Route: IV  Start: 01/07/25 1144  "End: 01/07/25 1643          sodium chloride 0.9 % flush 3-10 mL  Dose: 3-10 mL  Freq: As Needed Route: IV  PRN Reason: Line Care  Start: 01/07/25 1141 End: 01/07/25 1643          sodium chloride 0.9 % infusion  Rate: 125 mL/hr Dose: 125 mL/hr  Freq: Continuous Route: IV  Start: 01/12/25 1745 End: 01/14/25 1249          sodium chloride 0.9 % infusion 40 mL  Dose: 40 mL  Freq: As Needed Route: IV  PRN Reason: Line Care  Start: 01/17/25 1737 End: 01/20/25 1128     Admin Instructions:   Following administration of an IV intermittent medication, flush line with 40mL NS at 100mL/hr.                         Physician Progress Notes (last 48 hours)        Sabi Mars MD at 01/20/25 1405          General Surgery  Progress Note    CC: Follow-up endoscopically unresectable tubulovillous adenoma    POD#13 laparoscopic right colon resection    S: She has had multiple bowel movements daily since I saw her last on Friday.  Her abdominal distention has resolved and she denies any abdominal pain.  She has been tolerating a full liquid diet, but has been nervous to eat most of the full liquids due to her underlying diabetes.    O:/62 (BP Location: Left leg, Patient Position: Lying)   Pulse 76   Temp 97.4 °F (36.3 °C) (Oral)   Resp 16   Ht 163.8 cm (64.49\")   Wt 70.5 kg (155 lb 6.8 oz)   LMP  (LMP Unknown)   SpO2 97%   BMI 26.28 kg/m²     Intake & Output:  UOP: Void x9 yesterday  BM x7 yesterday    GENERAL: Resting in bed comfortably, in no acute distress  HEENT: normocephalic, atraumatic, moist mucous membranes, clear sclerae   CHEST: clear to auscultation, no wheezes, rales or rhonchi, symmetric air entry  CARDIAC: regular rate and rhythm    ABDOMEN: Soft, nondistended, incisions clean/dry/intact with glue, nontender today  EXTREMITIES: no cyanosis, clubbing, or edema   SKIN: Warm and moist, no rashes    LABS  Results from last 7 days   Lab Units 01/18/25  0512 01/17/25  0537 01/15/25  0459 01/14/25  0609   WBC " 10*3/mm3 10.70 15.67* 12.42* 11.27*   HEMOGLOBIN g/dL 9.3* 9.8* 9.6* 10.4*   HEMATOCRIT % 28.6* 28.5* 28.5* 31.6*   PLATELETS 10*3/mm3 340 341 345 326   MONOCYTES % %  --   --   --  9.1   EOSINOPHIL % %  --   --   --  1.0     Results from last 7 days   Lab Units 01/20/25  0517 01/19/25  0427 01/18/25  0512   SODIUM mmol/L 134* 136 131*   POTASSIUM mmol/L 4.9 4.4 3.5   CHLORIDE mmol/L 98 102 101   CO2 mmol/L 27.0 25.0 22.7   BUN mg/dL 7* 5* 6*   CREATININE mg/dL 0.57 0.58 0.55*   CALCIUM mg/dL 8.6 8.4* 8.4*   BILIRUBIN mg/dL 0.2 0.2 0.3   ALK PHOS U/L 94 88 96   ALT (SGPT) U/L 32 27 27   AST (SGOT) U/L 27 24 24   GLUCOSE mg/dL 158* 193* 203*         A/P: 64 y.o. female POD#13 laparoscopic right colon resection for endoscopically unresectable tubulovillous adenoma, complicated by an expected postoperative adynamic ileus    Advance to diabetic diet and allow her TPN bag today to run out.  I will discontinue the TPN after today's bag.  She will likely be stable for discharge to home tomorrow.    Sabi Mars MD  General, Robotic, and Endoscopic Surgery  [facility]    [address], [address]  [place] [address]  P: [phone]  F: [phone]       Electronically signed by Sabi Mars MD at 01/20/25 5147       Mejia Chamberlain MD at 01/19/25 1229          Has had no further nausea or vomiting.  Tolerated clear liquids.  Abdominal distention seems to have resolved.    Remains afebrile with stable vital signs  Labs reviewed, no problems there  Abdomen is soft and does not seem particularly distended    Advance to full liquids  Likely regular food tomorrow if tolerates  Likely DC TPN tomorrow if tolerates    Electronically signed by Mejia Chamberlain MD at 01/19/25 1230       Consult Notes (last 48 hours)  Notes from 01/19/25 1005 through 01/21/25 1005   No notes of this type exist for this encounter.

## 2025-01-22 ENCOUNTER — TRANSITIONAL CARE MANAGEMENT TELEPHONE ENCOUNTER (OUTPATIENT)
Dept: CALL CENTER | Facility: HOSPITAL | Age: 65
End: 2025-01-22
Payer: COMMERCIAL

## 2025-01-22 NOTE — CASE MANAGEMENT/SOCIAL WORK
Case Management Discharge Note      Final Note: Home         Selected Continued Care - Discharged on 1/21/2025 Admission date: 1/7/2025 - Discharge disposition: Home or Self Care      Destination    No services have been selected for the patient.                Durable Medical Equipment    No services have been selected for the patient.                Dialysis/Infusion    No services have been selected for the patient.                Home Medical Care    No services have been selected for the patient.                Therapy    No services have been selected for the patient.                Community Resources    No services have been selected for the patient.                Community & DME    No services have been selected for the patient.                         Final Discharge Disposition Code: 01 - home or self-care

## 2025-01-22 NOTE — OUTREACH NOTE
Call Center TCM Note      Flowsheet Row Responses   [facility] patient discharged from? [facility]   Does the patient have one of the following disease processes/diagnoses(primary or secondary)? General Surgery   TCM attempt successful? Yes   Call start time 1510   Call end time 1511   Discharge diagnosis Tubulovillous adenoma of colon, Laparoscopic right colon resection   Meds reviewed with patient/caregiver? Yes   Is the patient having any side effects they believe may be caused by any medication additions or changes? No   Does the patient have all medications related to this admission filled (includes all antibiotics, pain medications, etc.) Yes   Prescription comments Pt is not taking any pain medications   Is the patient taking all medications as directed (includes completed medication regime)? Yes   Does the patient have an appointment with their PCP within 7-14 days of discharge? Yes   Has home health visited the patient within 72 hours of discharge? N/A   Psychosocial issues? No   Did the patient receive a copy of their discharge instructions? Yes   Nursing interventions Reviewed instructions with patient   What is the patient's perception of their health status since discharge? Improving   Nursing interventions Nurse provided patient education   Is the patient /caregiver able to teach back basic post-op care? Continue use of incentive spirometry at least 1 week post discharge, Take showers only when approved by MD-sponge bathe until then, Do not remove steri-strips, Lifting as instructed by MD in discharge instructions, No tub bath, swimming, or hot tub until instructed by MD, Practice 'cough and deep breath', Drive as instructed by MD in discharge instructions, Keep incision areas clean,dry and protected   Is the patient/caregiver able to teach back signs and symptoms of incisional infection? Increased redness, swelling or pain at the incisonal site, Pus or odor from incision, Fever, Increased  drainage or bleeding, Incisional warmth   Is the patient/caregiver able to teach back steps to recovery at home? Set small, achievable goals for return to baseline health, Practice good oral hygiene, Eat a well-balance diet, Rest and rebuild strength, gradually increase activity, Weigh daily, Make a list of questions for surgeon's appointment   Is the patient/caregiver able to teach back the hierarchy of who to call/visit for symptoms/problems? PCP, Specialist, Home health nurse, Urgent Care, ED, 911 Yes   TCM call completed? Yes   Wrap up additional comments D/C DX: Tubulovillous adenoma of colon, Laparoscopic right colon resection - pt doing very well, no pain medications needed. Surgical site good. TCM APPT with PCP CRISTELA Cobos is 01/29/2025   Call end time 1511            Nay Mahmood MA    1/22/2025, 15:13 EST

## 2025-01-29 ENCOUNTER — OFFICE VISIT (OUTPATIENT)
Dept: FAMILY MEDICINE CLINIC | Facility: CLINIC | Age: 65
End: 2025-01-29
Payer: COMMERCIAL

## 2025-01-29 VITALS
TEMPERATURE: 98 F | SYSTOLIC BLOOD PRESSURE: 114 MMHG | WEIGHT: 145 LBS | HEIGHT: 64 IN | BODY MASS INDEX: 24.75 KG/M2 | HEART RATE: 68 BPM | RESPIRATION RATE: 16 BRPM | DIASTOLIC BLOOD PRESSURE: 58 MMHG | OXYGEN SATURATION: 95 %

## 2025-01-29 DIAGNOSIS — Z85.3 HISTORY OF BREAST CANCER: ICD-10-CM

## 2025-01-29 DIAGNOSIS — Z09 HOSPITAL DISCHARGE FOLLOW-UP: ICD-10-CM

## 2025-01-29 DIAGNOSIS — K75.81 NASH (NONALCOHOLIC STEATOHEPATITIS): ICD-10-CM

## 2025-01-29 DIAGNOSIS — E55.9 VITAMIN D DEFICIENCY: ICD-10-CM

## 2025-01-29 DIAGNOSIS — I10 ESSENTIAL HYPERTENSION: ICD-10-CM

## 2025-01-29 DIAGNOSIS — E11.65 CONTROLLED TYPE 2 DIABETES MELLITUS WITH HYPERGLYCEMIA, WITHOUT LONG-TERM CURRENT USE OF INSULIN: Primary | ICD-10-CM

## 2025-01-29 DIAGNOSIS — K21.9 GASTROESOPHAGEAL REFLUX DISEASE WITHOUT ESOPHAGITIS: ICD-10-CM

## 2025-01-29 DIAGNOSIS — E11.9 CONTROLLED TYPE 2 DIABETES MELLITUS WITHOUT COMPLICATION, WITHOUT LONG-TERM CURRENT USE OF INSULIN: ICD-10-CM

## 2025-01-29 DIAGNOSIS — R21 RASH: ICD-10-CM

## 2025-01-29 DIAGNOSIS — F33.41 RECURRENT MAJOR DEPRESSIVE DISORDER, IN PARTIAL REMISSION: ICD-10-CM

## 2025-01-29 DIAGNOSIS — F41.1 GENERALIZED ANXIETY DISORDER: ICD-10-CM

## 2025-01-29 PROCEDURE — 99214 OFFICE O/P EST MOD 30 MIN: CPT | Performed by: PHYSICIAN ASSISTANT

## 2025-01-29 RX ORDER — VALSARTAN 160 MG/1
TABLET ORAL
Qty: 90 TABLET | Refills: 1 | Status: SHIPPED | OUTPATIENT
Start: 2025-01-29

## 2025-01-29 RX ORDER — NYSTATIN AND TRIAMCINOLONE ACETONIDE 100000; 1 [USP'U]/G; MG/G
1 OINTMENT TOPICAL 2 TIMES DAILY
Qty: 60 G | Refills: 1 | Status: SHIPPED | OUTPATIENT
Start: 2025-01-29

## 2025-01-29 RX ORDER — VALSARTAN 80 MG/1
80 TABLET ORAL DAILY
Qty: 30 TABLET | Refills: 1 | Status: SHIPPED | OUTPATIENT
Start: 2025-01-29

## 2025-01-29 RX ORDER — FLUCONAZOLE 150 MG/1
150 TABLET ORAL ONCE
Qty: 1 TABLET | Refills: 2 | Status: SHIPPED | OUTPATIENT
Start: 2025-01-29 | End: 2025-01-29

## 2025-01-29 NOTE — PROGRESS NOTES
"Subjective   Jess Razo is a 64 y.o. female.   ..Jess Razo 64 y.o. female /58   Pulse 68   Temp 98 °F (36.7 °C) (Temporal)   Resp 16   Ht 163.8 cm (64.49\")   Wt 65.8 kg (145 lb)   LMP  (LMP Unknown)   SpO2 95%   BMI 24.51 kg/m²  who presents today for hospital follow-up she has a history of   Patient Active Problem List   Diagnosis    Asthma    Paroxysmal tachycardia    Osteoarthritis, multiple sites    Malignant neoplasm of female breast    Lymphedema    Hyperlipidemia    Generalized anxiety disorder    Esophageal reflux    Diabetes type 2, controlled    History of breast cancer    History of cardiomyopathy    Vertigo    Cardiomyopathy    Inappropriate sinus tachycardia    Lymphedema of upper extremity    Tobacco use disorder    Positive colorectal cancer screening using Cologuard test    Encounter to discuss colonoscopy results    Osteoarthritis    Recurrent major depressive disorder, in partial remission    Essential hypertension    HARTLEY (nonalcoholic steatohepatitis)    History of adenomatous polyp of colon    Family history of colon cancer    Screening for colon cancer    Tubulovillous adenoma of colon    Adynamic ileus   .        Jess Razo 64 y.o. female presents today for hosptial follow up.  she was treated 1/7/25-1/24/25 with a laparoscopic right colon resection for a tubulovillous adenoma of the ascending colon that was endoscopically unresectable and was complicated by a postoperative adynamic ileus.  I reviewed all of the labs and diagnostic testing and noted:  incidental findings of cholelithiasis on XR abdomen KUB and moderate abdominal aortic atherosclerotic changes without aneurysmal dilation. KUB and CT confirmed presence of ileus. Pathology showed 1.8 cm tubulovillous adenoma of the ascending colon with clear margins.    Sees [facility]---has appt April  Also note 20-pack-year history smoking stopped 2019    Also sees [facility] noting visit from 6-19-24  " [place]  1. (Stage IIA)[id number] intermediate grade, infiltrating ductal carcinoma, estrogen and progesterone receptor positive, HER2/salome negative. The patient underwent a breast conserving surgical procedure November 2006.Four cycles of doxorubicin/cyclophosphamide followed by four cycles of paclitaxel 15 March 2007. She completed five years of tamoxifen in May 2012. At that time she was switched to letrozole. Trial of exemestane started November 22, 2013 due to persistent hair thinning. Tamoxifen and substituted per patient request in May 2014. Completed 2017.      EKG 1/1/25---  Sinus rhythm  Prolonged AK interval  Minimal ST depression, diffuse leads    The patient's medications were changed: while in the hospital, she was started on IV TPN which was discontinued prior to discharge. She was discharged home with Percocet prn pain.  Current outpatient and discharge medications have been reconciled for the patient.  Reviewed by: Olamide Cobos PA-C    she does have a follow up appointment with a specialist: Sabi Mars MD--general surgery on 2-6-25.     Had DEXA 10/12/24--osteopenia.  IMPRESSION:  1.  Osteopenia. No statistically significant interval change.  2.  The 10-year FRAX (World Health Organization) fracture risk for a  major osteoporotic fracture is 19% and for a hip fracture is 2.4%.  ---wait on Rx    Mammo and low dose CT chest for lung cancer screening ordered at last visit 9/12/24 but not done yet.      On Prozac for anxiety and depression--increased to 40 mg at last visit.--working well    Prior Notes  [employer]ardiance and [employer]oun[employer] worked     Sees [facility] for inappropriate sinus tachycardia and her mild cardiomyopathy just had visit 1-30-24 no meds were changed.    Liver labs negative for genetic cause per work-up with Dr. Valencia GI specialist---fatty liver!!  CT abdomen and pelvis on 1/16/2025 while she was inpatient noted normal liver--- incidental gallstones but no inflammation    She still  "getting colonoscopy every 6 months and due to prior polyp with Dr. Mars  Lab Results   Component Value Date    HGBA1C 6.40 (H) 12/31/2024         History of Present Illness    Since the last visit, she has overall felt  better but still is getting her strength back .  She has DMII well controlled on medication and will continue regimen, GERD controlled on PPI Rx, Hyperlipidemia with goals met with current Rx, Hypothyroidism well controlled on current medication and will continue Rx, Seasonal allergies and doing well on their medication , Asthma well controlled and not requiring rescue Albuterol > 2 times a week, Vitamin D deficiency and labs are at goal >30 ng/mL, and primary hypertension and with her hospitalization and this ileus she is lost 15 pounds and her blood pressures too low and has been staying low at home planning to lower her dose of valsartan noting that she is on diltiazem, digoxin, Zebeta for rate control and cardiomyopathy through her cardiologist .  she has been compliant with current medications have reviewed them.  The patient denies medication side effects.  Will refill medications. /58   Pulse 68   Temp 98 °F (36.7 °C) (Temporal)   Resp 16   Ht 163.8 cm (64.49\")   Wt 65.8 kg (145 lb)   LMP  (LMP Unknown)   SpO2 95%   BMI 24.51 kg/m² .  BMI is within normal parameters. No other follow-up for BMI required.    Systolic bp down---staying below 115---lost 15 lbs while she was inpatient on need to adjust down her valsartan to 80 mg for right now  Results for orders placed or performed during the hospital encounter of 01/07/25   POC Glucose Once    Collection Time: 01/07/25 11:37 AM    Specimen: Blood   Result Value Ref Range    Glucose 127 70 - 130 mg/dL   Tissue Pathology Exam    Collection Time: 01/07/25  4:08 PM    Specimen: Large Intestine, Right / Ascending Colon; Tissue   Result Value Ref Range    Case Report       Surgical Pathology Report                         Case: " "[id number]                                  Authorizing Provider:  Sabi Mars MD     Collected:           01/07/2025 04:08 PM          Ordering Location:     [facility]  Received:            01/07/2025 08:50 PM                                 MAIN OR                                                                      Pathologist:           Danette Duarte MD                                                    Specimen:    Large Intestine, Right / Ascending Colon, Right Colon attached Appendix and Terminal                Ileum                                                                                      Final Diagnosis       1.  Terminal ileum, cecum, appendix and ascending colon, right hemicolectomy:   -Tubulovillous adenoma of the ascending colon (1.8 cm).   -Benign unremarkable appendix.   -Benign viable proximal ileal and distal colonic margins of resection.   -14 benign lymph nodes (0/14).   -Negative for high-grade dysplasia and carcinoma.    SWM      Gross Description       1. Large Intestine, Right / Ascending Colon.  Received in formalin labeled \"right colon, attached appendix, and terminal ileum\" is a 12 cm segment of ileum with 16 cm of attached colon and an attached 5.5 x 0.8 cm vermiform intact appendix.  The serosal surfaces of the appendix, ileum and colon are smooth tan-pink.  There is a moderate amount of pericolic fat.  The tip of the appendix is amputated and bisected to reveal tan mucosa.  The lumen averages 0.2 cm in diameter and the wall thickness averages 0.2 cm.  The ileal mucosa is tan glistening with normal folds and the wall thickness averages 0.3 cm.  There appears to be black tattoo ink overlying the cecum.  Approximately 6.5 cm distal to the ileocecal valve is an ill-defined 1.8 x 1.0 x 0.2 cm superficial sessile centrally ulcerative polypoid lesion which comes to within 9 cm of the distal colonic mucosal margin.  The polypoid lesion appears to be " "within the ascending colon.  The lesion also comes to within 6 cm of the circumferential radial fat margin of the ascending colon and 8 cm of the mesenteric fat margin of the terminal ileum.  The remaining colonic mucosa is tan-pink and glistening with normal folds and the wall thickness averages 0.3 cm.  Following dissection of the pericolic fat reveals 5 possible lymph nodes ranging from 0.3 x 0.3 x 0.2 cm to 0.4 x 0.4 x 0.3 cm.    The ascending colon polyp is entirely submitted and representative sections are submitted as follows:    1A- appendix  1B- proximal ileal mucosal margin en face  1C- random ileum  1D- ileocecal valve  1E- random colon  1F- distal colonic mucosal margin en face  1G-1H- entire ascending colon polyp, bisected  1I- circumferential radial fat margin, ascending colon, perpendicular  1J- mesenteric fat margin of the terminal ileum, perpendicular  1K-5 possible lymph nodes in toto  1L-1U- random adipose tissue insert of additional lymph nodes    jap/uso/swm      Microscopic Description       Unless \"gross only\" is specified, the final diagnosis for each specimen is based on microscopic examination of tissue.     POC Glucose Once    Collection Time: 01/07/25  4:31 PM    Specimen: Blood   Result Value Ref Range    Glucose 147 (H) 70 - 130 mg/dL   POC Glucose Once    Collection Time: 01/07/25  8:55 PM    Specimen: Blood   Result Value Ref Range    Glucose 181 (H) 70 - 130 mg/dL   CBC (No Diff)    Collection Time: 01/08/25  6:20 AM    Specimen: Blood   Result Value Ref Range    WBC 12.13 (H) 3.40 - 10.80 10*3/mm3    RBC 3.52 (L) 3.77 - 5.28 10*6/mm3    Hemoglobin 11.2 (L) 12.0 - 15.9 g/dL    Hematocrit 32.3 (L) 34.0 - 46.6 %    MCV 91.8 79.0 - 97.0 fL    MCH 31.8 26.6 - 33.0 pg    MCHC 34.7 31.5 - 35.7 g/dL    RDW 12.0 (L) 12.3 - 15.4 %    RDW-SD 39.8 37.0 - 54.0 fl    MPV 9.6 6.0 - 12.0 fL    Platelets 195 140 - 450 10*3/mm3   Basic Metabolic Panel    Collection Time: 01/08/25  6:20 AM    " Specimen: Blood   Result Value Ref Range    Glucose 183 (H) 65 - 99 mg/dL    BUN 8 8 - 23 mg/dL    Creatinine 0.64 0.57 - 1.00 mg/dL    Sodium 129 (L) 136 - 145 mmol/L    Potassium 4.3 3.5 - 5.2 mmol/L    Chloride 99 98 - 107 mmol/L    CO2 18.7 (L) 22.0 - 29.0 mmol/L    Calcium 8.6 8.6 - 10.5 mg/dL    BUN/Creatinine Ratio 12.5 7.0 - 25.0    Anion Gap 11.3 5.0 - 15.0 mmol/L    eGFR 99.4 >60.0 mL/min/1.73   POC Glucose Once    Collection Time: 01/08/25  7:59 AM    Specimen: Blood   Result Value Ref Range    Glucose 168 (H) 70 - 130 mg/dL   POC Glucose Once    Collection Time: 01/08/25 11:49 AM    Specimen: Blood   Result Value Ref Range    Glucose 165 (H) 70 - 130 mg/dL   POC Glucose Once    Collection Time: 01/08/25  4:42 PM    Specimen: Blood   Result Value Ref Range    Glucose 132 (H) 70 - 130 mg/dL   POC Glucose Once    Collection Time: 01/08/25  9:52 PM    Specimen: Blood   Result Value Ref Range    Glucose 152 (H) 70 - 130 mg/dL   POC Glucose Once    Collection Time: 01/09/25  7:57 AM    Specimen: Blood   Result Value Ref Range    Glucose 131 (H) 70 - 130 mg/dL   POC Glucose Once    Collection Time: 01/09/25 11:48 AM    Specimen: Blood   Result Value Ref Range    Glucose 138 (H) 70 - 130 mg/dL   POC Glucose Once    Collection Time: 01/09/25  4:47 PM    Specimen: Blood   Result Value Ref Range    Glucose 170 (H) 70 - 130 mg/dL   POC Glucose Once    Collection Time: 01/09/25  9:31 PM    Specimen: Blood   Result Value Ref Range    Glucose 128 70 - 130 mg/dL   POC Glucose Once    Collection Time: 01/10/25  8:08 AM    Specimen: Blood   Result Value Ref Range    Glucose 144 (H) 70 - 130 mg/dL   POC Glucose Once    Collection Time: 01/10/25 11:39 AM    Specimen: Blood   Result Value Ref Range    Glucose 140 (H) 70 - 130 mg/dL   POC Glucose Once    Collection Time: 01/10/25  4:22 PM    Specimen: Blood   Result Value Ref Range    Glucose 117 70 - 130 mg/dL   POC Glucose Once    Collection Time: 01/10/25  9:09 PM     Specimen: Blood   Result Value Ref Range    Glucose 134 (H) 70 - 130 mg/dL   POC Glucose Once    Collection Time: 01/11/25  9:32 AM    Specimen: Blood   Result Value Ref Range    Glucose 151 (H) 70 - 130 mg/dL   POC Glucose Once    Collection Time: 01/11/25 12:18 PM    Specimen: Blood   Result Value Ref Range    Glucose 159 (H) 70 - 130 mg/dL   POC Glucose Once    Collection Time: 01/11/25  5:49 PM    Specimen: Blood   Result Value Ref Range    Glucose 154 (H) 70 - 130 mg/dL   POC Glucose Once    Collection Time: 01/11/25 10:02 PM    Specimen: Blood   Result Value Ref Range    Glucose 148 (H) 70 - 130 mg/dL   Basic Metabolic Panel    Collection Time: 01/12/25  4:24 AM    Specimen: Blood   Result Value Ref Range    Glucose 171 (H) 65 - 99 mg/dL    BUN 34 (H) 8 - 23 mg/dL    Creatinine 1.20 (H) 0.57 - 1.00 mg/dL    Sodium 127 (L) 136 - 145 mmol/L    Potassium 3.6 3.5 - 5.2 mmol/L    Chloride 90 (L) 98 - 107 mmol/L    CO2 20.3 (L) 22.0 - 29.0 mmol/L    Calcium 9.2 8.6 - 10.5 mg/dL    BUN/Creatinine Ratio 28.3 (H) 7.0 - 25.0    Anion Gap 16.7 (H) 5.0 - 15.0 mmol/L    eGFR 51.0 (L) >60.0 mL/min/1.73   CBC Auto Differential    Collection Time: 01/12/25  4:24 AM    Specimen: Blood   Result Value Ref Range    WBC 4.86 3.40 - 10.80 10*3/mm3    RBC 3.58 (L) 3.77 - 5.28 10*6/mm3    Hemoglobin 11.3 (L) 12.0 - 15.9 g/dL    Hematocrit 32.9 (L) 34.0 - 46.6 %    MCV 91.9 79.0 - 97.0 fL    MCH 31.6 26.6 - 33.0 pg    MCHC 34.3 31.5 - 35.7 g/dL    RDW 12.4 12.3 - 15.4 %    RDW-SD 41.3 37.0 - 54.0 fl    MPV 9.2 6.0 - 12.0 fL    Platelets 270 140 - 450 10*3/mm3    nRBC 0.0 0.0 - 0.2 /100 WBC   Manual Differential    Collection Time: 01/12/25  4:24 AM    Specimen: Blood   Result Value Ref Range    Neutrophil % 66.0 42.7 - 76.0 %    Lymphocyte % 21.6 19.6 - 45.3 %    Monocyte % 11.3 5.0 - 12.0 %    Eosinophil % 0.0 (L) 0.3 - 6.2 %    Basophil % 0.0 0.0 - 1.5 %    Myelocyte % 1.0 (H) 0.0 - 0.0 %    Neutrophils Absolute 3.21 1.70 - 7.00  10*3/mm3    Lymphocytes Absolute 1.05 0.70 - 3.10 10*3/mm3    Monocytes Absolute 0.55 0.10 - 0.90 10*3/mm3    Eosinophils Absolute 0.00 0.00 - 0.40 10*3/mm3    Basophils Absolute 0.00 0.00 - 0.20 10*3/mm3    RBC Morphology Normal Normal    WBC Morphology Normal Normal    Platelet Morphology Normal Normal   POC Glucose Once    Collection Time: 01/12/25  8:21 AM    Specimen: Blood   Result Value Ref Range    Glucose 155 (H) 70 - 130 mg/dL   POC Glucose Once    Collection Time: 01/12/25 12:21 PM    Specimen: Blood   Result Value Ref Range    Glucose 164 (H) 70 - 130 mg/dL   POC Glucose Once    Collection Time: 01/12/25  6:17 PM    Specimen: Blood   Result Value Ref Range    Glucose 164 (H) 70 - 130 mg/dL   POC Glucose Once    Collection Time: 01/12/25  9:47 PM    Specimen: Blood   Result Value Ref Range    Glucose 117 70 - 130 mg/dL   Basic Metabolic Panel    Collection Time: 01/13/25  5:34 AM    Specimen: Blood   Result Value Ref Range    Glucose 116 (H) 65 - 99 mg/dL    BUN 23 8 - 23 mg/dL    Creatinine 0.83 0.57 - 1.00 mg/dL    Sodium 130 (L) 136 - 145 mmol/L    Potassium 3.7 3.5 - 5.2 mmol/L    Chloride 98 98 - 107 mmol/L    CO2 18.6 (L) 22.0 - 29.0 mmol/L    Calcium 8.4 (L) 8.6 - 10.5 mg/dL    BUN/Creatinine Ratio 27.7 (H) 7.0 - 25.0    Anion Gap 13.4 5.0 - 15.0 mmol/L    eGFR 79.3 >60.0 mL/min/1.73   CBC Auto Differential    Collection Time: 01/13/25  5:34 AM    Specimen: Blood   Result Value Ref Range    WBC 8.91 3.40 - 10.80 10*3/mm3    RBC 3.29 (L) 3.77 - 5.28 10*6/mm3    Hemoglobin 10.3 (L) 12.0 - 15.9 g/dL    Hematocrit 29.7 (L) 34.0 - 46.6 %    MCV 90.3 79.0 - 97.0 fL    MCH 31.3 26.6 - 33.0 pg    MCHC 34.7 31.5 - 35.7 g/dL    RDW 12.3 12.3 - 15.4 %    RDW-SD 39.5 37.0 - 54.0 fl    MPV 9.4 6.0 - 12.0 fL    Platelets 305 140 - 450 10*3/mm3    nRBC 0.0 0.0 - 0.2 /100 WBC   Manual Differential    Collection Time: 01/13/25  5:34 AM    Specimen: Blood   Result Value Ref Range    Neutrophil % 78.8 (H) 42.7 - 76.0  %    Lymphocyte % 9.1 (L) 19.6 - 45.3 %    Monocyte % 11.1 5.0 - 12.0 %    Eosinophil % 1.0 0.3 - 6.2 %    Basophil % 0.0 0.0 - 1.5 %    Neutrophils Absolute 7.02 (H) 1.70 - 7.00 10*3/mm3    Lymphocytes Absolute 0.81 0.70 - 3.10 10*3/mm3    Monocytes Absolute 0.99 (H) 0.10 - 0.90 10*3/mm3    Eosinophils Absolute 0.09 0.00 - 0.40 10*3/mm3    Basophils Absolute 0.00 0.00 - 0.20 10*3/mm3    Anisocytosis Mod/2+ None Seen    WBC Morphology Normal Normal    Platelet Morphology Normal Normal   POC Glucose Once    Collection Time: 01/13/25  8:06 AM    Specimen: Blood   Result Value Ref Range    Glucose 111 70 - 130 mg/dL   POC Glucose Once    Collection Time: 01/13/25 10:40 AM    Specimen: Blood   Result Value Ref Range    Glucose 93 70 - 130 mg/dL   POC Glucose Once    Collection Time: 01/13/25  4:41 PM    Specimen: Blood   Result Value Ref Range    Glucose 107 70 - 130 mg/dL   POC Glucose Once    Collection Time: 01/13/25  9:10 PM    Specimen: Blood   Result Value Ref Range    Glucose 121 70 - 130 mg/dL   Basic Metabolic Panel    Collection Time: 01/14/25  6:09 AM    Specimen: Blood   Result Value Ref Range    Glucose 114 (H) 65 - 99 mg/dL    BUN 9 8 - 23 mg/dL    Creatinine 0.52 (L) 0.57 - 1.00 mg/dL    Sodium 131 (L) 136 - 145 mmol/L    Potassium 3.7 3.5 - 5.2 mmol/L    Chloride 102 98 - 107 mmol/L    CO2 17.0 (L) 22.0 - 29.0 mmol/L    Calcium 8.6 8.6 - 10.5 mg/dL    BUN/Creatinine Ratio 17.3 7.0 - 25.0    Anion Gap 12.0 5.0 - 15.0 mmol/L    eGFR 104.5 >60.0 mL/min/1.73   CBC Auto Differential    Collection Time: 01/14/25  6:09 AM    Specimen: Blood   Result Value Ref Range    WBC 11.27 (H) 3.40 - 10.80 10*3/mm3    RBC 3.41 (L) 3.77 - 5.28 10*6/mm3    Hemoglobin 10.4 (L) 12.0 - 15.9 g/dL    Hematocrit 31.6 (L) 34.0 - 46.6 %    MCV 92.7 79.0 - 97.0 fL    MCH 30.5 26.6 - 33.0 pg    MCHC 32.9 31.5 - 35.7 g/dL    RDW 13.0 12.3 - 15.4 %    RDW-SD 42.9 37.0 - 54.0 fl    MPV 9.5 6.0 - 12.0 fL    Platelets 326 140 - 450  10*3/mm3    nRBC 0.0 0.0 - 0.2 /100 WBC   Manual Differential    Collection Time: 01/14/25  6:09 AM    Specimen: Blood   Result Value Ref Range    Neutrophil % 78.8 (H) 42.7 - 76.0 %    Lymphocyte % 8.1 (L) 19.6 - 45.3 %    Monocyte % 9.1 5.0 - 12.0 %    Eosinophil % 1.0 0.3 - 6.2 %    Basophil % 1.0 0.0 - 1.5 %    Myelocyte % 2.0 (H) 0.0 - 0.0 %    Neutrophils Absolute 8.88 (H) 1.70 - 7.00 10*3/mm3    Lymphocytes Absolute 0.91 0.70 - 3.10 10*3/mm3    Monocytes Absolute 1.03 (H) 0.10 - 0.90 10*3/mm3    Eosinophils Absolute 0.11 0.00 - 0.40 10*3/mm3    Basophils Absolute 0.11 0.00 - 0.20 10*3/mm3    nRBC 1.0 (H) 0.0 - 0.2 /100 WBC    Poikilocytes Mod/2+ None Seen    WBC Morphology Normal Normal    Platelet Morphology Normal Normal   POC Glucose Once    Collection Time: 01/14/25  8:04 AM    Specimen: Blood   Result Value Ref Range    Glucose 116 70 - 130 mg/dL   POC Glucose Once    Collection Time: 01/14/25 11:42 AM    Specimen: Blood   Result Value Ref Range    Glucose 119 70 - 130 mg/dL   POC Glucose Once    Collection Time: 01/14/25  5:16 PM    Specimen: Blood   Result Value Ref Range    Glucose 113 70 - 130 mg/dL   POC Glucose Once    Collection Time: 01/14/25  9:57 PM    Specimen: Blood   Result Value Ref Range    Glucose 111 70 - 130 mg/dL   Basic Metabolic Panel    Collection Time: 01/15/25  4:59 AM    Specimen: Blood   Result Value Ref Range    Glucose 95 65 - 99 mg/dL    BUN 11 8 - 23 mg/dL    Creatinine 0.59 0.57 - 1.00 mg/dL    Sodium 134 (L) 136 - 145 mmol/L    Potassium 3.7 3.5 - 5.2 mmol/L    Chloride 100 98 - 107 mmol/L    CO2 19.9 (L) 22.0 - 29.0 mmol/L    Calcium 8.6 8.6 - 10.5 mg/dL    BUN/Creatinine Ratio 18.6 7.0 - 25.0    Anion Gap 14.1 5.0 - 15.0 mmol/L    eGFR 101.4 >60.0 mL/min/1.73   CBC Auto Differential    Collection Time: 01/15/25  4:59 AM    Specimen: Blood   Result Value Ref Range    WBC 12.42 (H) 3.40 - 10.80 10*3/mm3    RBC 3.09 (L) 3.77 - 5.28 10*6/mm3    Hemoglobin 9.6 (L) 12.0 - 15.9  g/dL    Hematocrit 28.5 (L) 34.0 - 46.6 %    MCV 92.2 79.0 - 97.0 fL    MCH 31.1 26.6 - 33.0 pg    MCHC 33.7 31.5 - 35.7 g/dL    RDW 12.7 12.3 - 15.4 %    RDW-SD 41.9 37.0 - 54.0 fl    MPV 8.9 6.0 - 12.0 fL    Platelets 345 140 - 450 10*3/mm3    Neutrophil % 73.8 42.7 - 76.0 %    Lymphocyte % 10.8 (L) 19.6 - 45.3 %    Monocyte % 12.9 (H) 5.0 - 12.0 %    Eosinophil % 0.5 0.3 - 6.2 %    Basophil % 0.4 0.0 - 1.5 %    Immature Grans % 1.6 (H) 0.0 - 0.5 %    Neutrophils, Absolute 9.17 (H) 1.70 - 7.00 10*3/mm3    Lymphocytes, Absolute 1.34 0.70 - 3.10 10*3/mm3    Monocytes, Absolute 1.60 (H) 0.10 - 0.90 10*3/mm3    Eosinophils, Absolute 0.06 0.00 - 0.40 10*3/mm3    Basophils, Absolute 0.05 0.00 - 0.20 10*3/mm3    Immature Grans, Absolute 0.20 (H) 0.00 - 0.05 10*3/mm3   POC Glucose Once    Collection Time: 01/15/25  8:30 AM    Specimen: Blood   Result Value Ref Range    Glucose 97 70 - 130 mg/dL   POC Glucose Once    Collection Time: 01/15/25 12:37 PM    Specimen: Blood   Result Value Ref Range    Glucose 96 70 - 130 mg/dL   POC Glucose Once    Collection Time: 01/15/25  5:14 PM    Specimen: Blood   Result Value Ref Range    Glucose 102 70 - 130 mg/dL   POC Glucose Once    Collection Time: 01/15/25 10:01 PM    Specimen: Blood   Result Value Ref Range    Glucose 105 70 - 130 mg/dL   POC Glucose Once    Collection Time: 01/16/25  8:34 AM    Specimen: Blood   Result Value Ref Range    Glucose 121 70 - 130 mg/dL   POC Glucose Once    Collection Time: 01/16/25 12:45 PM    Specimen: Blood   Result Value Ref Range    Glucose 119 70 - 130 mg/dL   POC Glucose Once    Collection Time: 01/16/25  4:52 PM    Specimen: Blood   Result Value Ref Range    Glucose 109 70 - 130 mg/dL   POC Glucose Once    Collection Time: 01/16/25  9:33 PM    Specimen: Blood   Result Value Ref Range    Glucose 125 70 - 130 mg/dL   Basic Metabolic Panel    Collection Time: 01/17/25  5:37 AM    Specimen: Blood   Result Value Ref Range    Glucose 137 (H) 65 -  99 mg/dL    BUN 14 8 - 23 mg/dL    Creatinine 0.59 0.57 - 1.00 mg/dL    Sodium 126 (L) 136 - 145 mmol/L    Potassium 3.5 3.5 - 5.2 mmol/L    Chloride 94 (L) 98 - 107 mmol/L    CO2 20.7 (L) 22.0 - 29.0 mmol/L    Calcium 8.5 (L) 8.6 - 10.5 mg/dL    BUN/Creatinine Ratio 23.7 7.0 - 25.0    Anion Gap 11.3 5.0 - 15.0 mmol/L    eGFR 101.4 >60.0 mL/min/1.73   CBC Auto Differential    Collection Time: 01/17/25  5:37 AM    Specimen: Blood   Result Value Ref Range    WBC 15.67 (H) 3.40 - 10.80 10*3/mm3    RBC 3.16 (L) 3.77 - 5.28 10*6/mm3    Hemoglobin 9.8 (L) 12.0 - 15.9 g/dL    Hematocrit 28.5 (L) 34.0 - 46.6 %    MCV 90.2 79.0 - 97.0 fL    MCH 31.0 26.6 - 33.0 pg    MCHC 34.4 31.5 - 35.7 g/dL    RDW 13.0 12.3 - 15.4 %    RDW-SD 42.9 37.0 - 54.0 fl    MPV 8.6 6.0 - 12.0 fL    Platelets 341 140 - 450 10*3/mm3    Neutrophil % 85.1 (H) 42.7 - 76.0 %    Lymphocyte % 7.1 (L) 19.6 - 45.3 %    Monocyte % 5.8 5.0 - 12.0 %    Eosinophil % 0.2 (L) 0.3 - 6.2 %    Basophil % 0.4 0.0 - 1.5 %    Immature Grans % 1.4 (H) 0.0 - 0.5 %    Neutrophils, Absolute 13.32 (H) 1.70 - 7.00 10*3/mm3    Lymphocytes, Absolute 1.12 0.70 - 3.10 10*3/mm3    Monocytes, Absolute 0.91 (H) 0.10 - 0.90 10*3/mm3    Eosinophils, Absolute 0.03 0.00 - 0.40 10*3/mm3    Basophils, Absolute 0.07 0.00 - 0.20 10*3/mm3    Immature Grans, Absolute 0.22 (H) 0.00 - 0.05 10*3/mm3    nRBC 0.0 0.0 - 0.2 /100 WBC   POC Glucose Once    Collection Time: 01/17/25  7:40 AM    Specimen: Blood   Result Value Ref Range    Glucose 150 (H) 70 - 130 mg/dL   POC Glucose Once    Collection Time: 01/17/25 12:06 PM    Specimen: Blood   Result Value Ref Range    Glucose 137 (H) 70 - 130 mg/dL   Phosphorus    Collection Time: 01/17/25  3:12 PM    Specimen: Blood   Result Value Ref Range    Phosphorus 2.0 (L) 2.5 - 4.5 mg/dL   Magnesium    Collection Time: 01/17/25  3:12 PM    Specimen: Blood   Result Value Ref Range    Magnesium 1.6 1.6 - 2.4 mg/dL   POC Glucose Once    Collection Time:  01/17/25  5:30 PM    Specimen: Blood   Result Value Ref Range    Glucose 119 70 - 130 mg/dL   POC Glucose Once    Collection Time: 01/17/25  8:15 PM    Specimen: Blood   Result Value Ref Range    Glucose 176 (H) 70 - 130 mg/dL   Phosphorus    Collection Time: 01/18/25  5:12 AM    Specimen: Arm, Right; Blood   Result Value Ref Range    Phosphorus 2.6 2.5 - 4.5 mg/dL   Triglycerides    Collection Time: 01/18/25  5:12 AM    Specimen: Arm, Right; Blood   Result Value Ref Range    Triglycerides 105 0 - 150 mg/dL   Comprehensive Metabolic Panel    Collection Time: 01/18/25  5:12 AM    Specimen: Arm, Right; Blood   Result Value Ref Range    Glucose 203 (H) 65 - 99 mg/dL    BUN 6 (L) 8 - 23 mg/dL    Creatinine 0.55 (L) 0.57 - 1.00 mg/dL    Sodium 131 (L) 136 - 145 mmol/L    Potassium 3.5 3.5 - 5.2 mmol/L    Chloride 101 98 - 107 mmol/L    CO2 22.7 22.0 - 29.0 mmol/L    Calcium 8.4 (L) 8.6 - 10.5 mg/dL    Total Protein 6.0 6.0 - 8.5 g/dL    Albumin 2.7 (L) 3.5 - 5.2 g/dL    ALT (SGPT) 27 1 - 33 U/L    AST (SGOT) 24 1 - 32 U/L    Alkaline Phosphatase 96 39 - 117 U/L    Total Bilirubin 0.3 0.0 - 1.2 mg/dL    Globulin 3.3 gm/dL    A/G Ratio 0.8 g/dL    BUN/Creatinine Ratio 10.9 7.0 - 25.0    Anion Gap 7.3 5.0 - 15.0 mmol/L    eGFR 103.1 >60.0 mL/min/1.73   Magnesium    Collection Time: 01/18/25  5:12 AM    Specimen: Arm, Right; Blood   Result Value Ref Range    Magnesium 1.9 1.6 - 2.4 mg/dL   CBC Auto Differential    Collection Time: 01/18/25  5:12 AM    Specimen: Arm, Right; Blood   Result Value Ref Range    WBC 10.70 3.40 - 10.80 10*3/mm3    RBC 3.10 (L) 3.77 - 5.28 10*6/mm3    Hemoglobin 9.3 (L) 12.0 - 15.9 g/dL    Hematocrit 28.6 (L) 34.0 - 46.6 %    MCV 92.3 79.0 - 97.0 fL    MCH 30.0 26.6 - 33.0 pg    MCHC 32.5 31.5 - 35.7 g/dL    RDW 13.1 12.3 - 15.4 %    RDW-SD 44.0 37.0 - 54.0 fl    MPV 8.9 6.0 - 12.0 fL    Platelets 340 140 - 450 10*3/mm3    Neutrophil % 75.9 42.7 - 76.0 %    Lymphocyte % 14.3 (L) 19.6 - 45.3 %     Monocyte % 6.0 5.0 - 12.0 %    Eosinophil % 1.1 0.3 - 6.2 %    Basophil % 0.3 0.0 - 1.5 %    Immature Grans % 2.4 (H) 0.0 - 0.5 %    Neutrophils, Absolute 8.12 (H) 1.70 - 7.00 10*3/mm3    Lymphocytes, Absolute 1.53 0.70 - 3.10 10*3/mm3    Monocytes, Absolute 0.64 0.10 - 0.90 10*3/mm3    Eosinophils, Absolute 0.12 0.00 - 0.40 10*3/mm3    Basophils, Absolute 0.03 0.00 - 0.20 10*3/mm3    Immature Grans, Absolute 0.26 (H) 0.00 - 0.05 10*3/mm3    nRBC 0.0 0.0 - 0.2 /100 WBC   POC Glucose Once    Collection Time: 01/18/25  5:53 AM    Specimen: Blood   Result Value Ref Range    Glucose 194 (H) 70 - 130 mg/dL   POC Glucose Once    Collection Time: 01/18/25  8:20 AM    Specimen: Blood   Result Value Ref Range    Glucose 180 (H) 70 - 130 mg/dL   POC Glucose Once    Collection Time: 01/18/25 11:39 AM    Specimen: Blood   Result Value Ref Range    Glucose 147 (H) 70 - 130 mg/dL   POC Glucose Once    Collection Time: 01/18/25  4:37 PM    Specimen: Blood   Result Value Ref Range    Glucose 157 (H) 70 - 130 mg/dL   POC Glucose Once    Collection Time: 01/18/25  9:09 PM    Specimen: Blood   Result Value Ref Range    Glucose 155 (H) 70 - 130 mg/dL   Comprehensive Metabolic Panel    Collection Time: 01/19/25  4:27 AM    Specimen: Blood   Result Value Ref Range    Glucose 193 (H) 65 - 99 mg/dL    BUN 5 (L) 8 - 23 mg/dL    Creatinine 0.58 0.57 - 1.00 mg/dL    Sodium 136 136 - 145 mmol/L    Potassium 4.4 3.5 - 5.2 mmol/L    Chloride 102 98 - 107 mmol/L    CO2 25.0 22.0 - 29.0 mmol/L    Calcium 8.4 (L) 8.6 - 10.5 mg/dL    Total Protein 6.0 6.0 - 8.5 g/dL    Albumin 2.4 (L) 3.5 - 5.2 g/dL    ALT (SGPT) 27 1 - 33 U/L    AST (SGOT) 24 1 - 32 U/L    Alkaline Phosphatase 88 39 - 117 U/L    Total Bilirubin 0.2 0.0 - 1.2 mg/dL    Globulin 3.6 gm/dL    A/G Ratio 0.7 g/dL    BUN/Creatinine Ratio 8.6 7.0 - 25.0    Anion Gap 9.0 5.0 - 15.0 mmol/L    eGFR 101.8 >60.0 mL/min/1.73   Magnesium    Collection Time: 01/19/25  4:27 AM    Specimen: Blood    Result Value Ref Range    Magnesium 1.8 1.6 - 2.4 mg/dL   Phosphorus    Collection Time: 01/19/25  4:27 AM    Specimen: Blood   Result Value Ref Range    Phosphorus 2.6 2.5 - 4.5 mg/dL   POC Glucose Once    Collection Time: 01/19/25  8:30 AM    Specimen: Blood   Result Value Ref Range    Glucose 163 (H) 70 - 130 mg/dL   POC Glucose Once    Collection Time: 01/19/25 12:13 PM    Specimen: Blood   Result Value Ref Range    Glucose 169 (H) 70 - 130 mg/dL   POC Glucose Once    Collection Time: 01/19/25  4:16 PM    Specimen: Blood   Result Value Ref Range    Glucose 138 (H) 70 - 130 mg/dL   POC Glucose Once    Collection Time: 01/19/25 10:46 PM    Specimen: Blood   Result Value Ref Range    Glucose 195 (H) 70 - 130 mg/dL   Comprehensive Metabolic Panel    Collection Time: 01/20/25  5:17 AM    Specimen: Blood   Result Value Ref Range    Glucose 158 (H) 65 - 99 mg/dL    BUN 7 (L) 8 - 23 mg/dL    Creatinine 0.57 0.57 - 1.00 mg/dL    Sodium 134 (L) 136 - 145 mmol/L    Potassium 4.9 3.5 - 5.2 mmol/L    Chloride 98 98 - 107 mmol/L    CO2 27.0 22.0 - 29.0 mmol/L    Calcium 8.6 8.6 - 10.5 mg/dL    Total Protein 6.0 6.0 - 8.5 g/dL    Albumin 3.1 (L) 3.5 - 5.2 g/dL    ALT (SGPT) 32 1 - 33 U/L    AST (SGOT) 27 1 - 32 U/L    Alkaline Phosphatase 94 39 - 117 U/L    Total Bilirubin 0.2 0.0 - 1.2 mg/dL    Globulin 2.9 gm/dL    A/G Ratio 1.1 g/dL    BUN/Creatinine Ratio 12.3 7.0 - 25.0    Anion Gap 9.0 5.0 - 15.0 mmol/L    eGFR 102.3 >60.0 mL/min/1.73   Magnesium    Collection Time: 01/20/25  5:17 AM    Specimen: Blood   Result Value Ref Range    Magnesium 2.0 1.6 - 2.4 mg/dL   Phosphorus    Collection Time: 01/20/25  5:17 AM    Specimen: Blood   Result Value Ref Range    Phosphorus 2.7 2.5 - 4.5 mg/dL   POC Glucose Once    Collection Time: 01/20/25  7:21 AM    Specimen: Blood   Result Value Ref Range    Glucose 181 (H) 70 - 130 mg/dL   POC Glucose Once    Collection Time: 01/20/25 11:51 AM    Specimen: Blood   Result Value Ref Range     Glucose 171 (H) 70 - 130 mg/dL   POC Glucose Once    Collection Time: 01/20/25  4:03 PM    Specimen: Blood   Result Value Ref Range    Glucose 151 (H) 70 - 130 mg/dL   POC Glucose Once    Collection Time: 01/20/25 10:29 PM    Specimen: Blood   Result Value Ref Range    Glucose 143 (H) 70 - 130 mg/dL   POC Glucose Once    Collection Time: 01/21/25  8:23 AM    Specimen: Blood   Result Value Ref Range    Glucose 101 70 - 130 mg/dL   POC Glucose Once    Collection Time: 01/21/25 12:19 PM    Specimen: Blood   Result Value Ref Range    Glucose 108 70 - 130 mg/dL     Last visit with cardiology was MARILYN Umang on 1/30/2024 and she does have follow-up appointment she is seen for mild cardiomyopathy, inappropriate sinus tachycardia, hypertension, hyperlipidemia   156 to 140 lbs  The following portions of the patient's history were reviewed and updated as appropriate: allergies, current medications, past family history, past medical history, past social history, past surgical history, and problem list.    Review of Systems   Constitutional:  Negative for fever.   HENT:  Negative for nosebleeds and trouble swallowing.    Eyes:  Negative for visual disturbance.   Respiratory:  Negative for choking and stridor.    Cardiovascular:  Negative for chest pain.   Gastrointestinal:  Positive for diarrhea. Negative for blood in stool.   Endocrine: Negative for polydipsia.   Genitourinary:  Negative for genital sores and hematuria.   Musculoskeletal:  Negative for joint swelling.   Skin:  Positive for rash. Negative for color change.   Allergic/Immunologic: Negative for immunocompromised state.   Neurological:  Negative for seizures, facial asymmetry and speech difficulty.   Hematological:  Negative for adenopathy.   Psychiatric/Behavioral:  Negative for behavioral problems, self-injury and suicidal ideas.        Objective   Physical Exam  Vitals and nursing note reviewed.   Constitutional:       General: She is not in acute distress.      Appearance: She is well-developed. She is not ill-appearing or toxic-appearing.   HENT:      Head: Normocephalic.      Right Ear: External ear normal.      Left Ear: External ear normal.      Nose: Nose normal.      Mouth/Throat:      Pharynx: Oropharynx is clear.   Eyes:      General: No scleral icterus.     Conjunctiva/sclera: Conjunctivae normal.      Pupils: Pupils are equal, round, and reactive to light.   Neck:      Thyroid: No thyromegaly.   Cardiovascular:      Rate and Rhythm: Normal rate and regular rhythm.      Heart sounds: Normal heart sounds. No murmur heard.  Pulmonary:      Effort: Pulmonary effort is normal. No respiratory distress.      Breath sounds: Normal breath sounds.   Musculoskeletal:         General: No deformity. Normal range of motion.      Cervical back: Normal range of motion and neck supple.   Skin:     General: Skin is warm and dry.      Findings: Rash present.      Comments: Excoriation redness skin thickening in her gluteal cleft into the buttock bilateral worse on the right side   Neurological:      General: No focal deficit present.      Mental Status: She is alert and oriented to person, place, and time. Mental status is at baseline.   Psychiatric:         Mood and Affect: Mood normal.         Behavior: Behavior normal.         Thought Content: Thought content normal.         Judgment: Judgment normal.         Assessment & Plan   Diagnoses and all orders for this visit:    1. Controlled type 2 diabetes mellitus with hyperglycemia, without long-term current use of insulin (Primary)    2. HARTLEY (nonalcoholic steatohepatitis)  -     Dulaglutide 0.75 MG/0.5ML solution auto-injector; Inject 0.75 mg under the skin into the appropriate area as directed 1 (One) Time Per Week. For DMII  Dispense: 2 mL; Refill: 5    3. Controlled type 2 diabetes mellitus without complication, without long-term current use of insulin  -     Dulaglutide 0.75 MG/0.5ML solution auto-injector; Inject 0.75 mg  under the skin into the appropriate area as directed 1 (One) Time Per Week. For DMII  Dispense: 2 mL; Refill: 5    4. Essential hypertension    5. Generalized anxiety disorder    6. Recurrent major depressive disorder, in partial remission    7. History of breast cancer    8. Gastroesophageal reflux disease without esophagitis    9. Vitamin D deficiency    10. Rash    Other orders  -     nystatin-triamcinolone (MYCOLOG) 696665-8.1 UNIT/GM-% ointment; Apply 1 Application topically to the appropriate area as directed 2 (Two) Times a Day. PRN  Dispense: 60 g; Refill: 1  -     fluconazole (Diflucan) 150 MG tablet; Take 1 tablet by mouth 1 (One) Time for 1 dose. One PO X 1 for yeast infection  Dispense: 1 tablet; Refill: 2  -     valsartan (DIOVAN) 160 MG tablet; One PO daily for BP  Dispense: 90 tablet; Refill: 1  -     valsartan (Diovan) 80 MG tablet; Take 1 tablet by mouth Daily. For blood pressure and on lower dose of 80 mg daily for now due to weight loss  Dispense: 30 tablet; Refill: 1        Get mammo and low dose CT chest.  Follow up with Sabi Mars MD--general surgery.  On Prozac for anxiety and depression---works well  Followed by Dr. Catalina Wolfe oncology for history of infiltrating ductal carcinoma  History of fatty liver disease with prior GI workup watching LFTs--consider liver US follow up at next appointment.    Hoda Beltran PA-C is my student also saw patient prior to or with me.   Still need mammo screen  Still want her to have low dose CT chest screen  Plan, Jess Razo, was seen today.  she was seen for HTN and add/adjust medication, DMII stable and refilled medications, GERD and will continue on PPI medication, Hyperlipidemia and will continue current medication, Hypothyroidism well controlled, continue medication, Seasonal allergies and is doing well on their medication , Asthma and is well controlled on medication.  , and Vitamin D deficiency and supplemented.  With her 50 pound weight  loss lowering her valsartan down to 80 mg her systolic blood pressure stable at 115.... She will watch her blood pressure at home and report to me..  I will put 260 mg on file I am concerned as she is home when she is able to eat more she may gain some of the 15 pounds back we may need to adjust the blood pressure Rx  Will start Mycolog for the skin breakdown in her gluteal cleft and give her a Diflucan tablet then can do barrier cream  I am getting her 2 weeks of Ozempic samples so she does not run out of diabetes medication my get the prior authorization with [employer] as the company in the lot number is [id number] with expiration date 5/31/2026 and the same expiration date on the other with lot number [id number]

## 2025-01-30 ENCOUNTER — READMISSION MANAGEMENT (OUTPATIENT)
Dept: CALL CENTER | Facility: HOSPITAL | Age: 65
End: 2025-01-30
Payer: COMMERCIAL

## 2025-01-30 NOTE — OUTREACH NOTE
General Surgery Week 2 Survey      Flowsheet Row Responses   [facility] patient discharged from? [place]   Does the patient have one of the following disease processes/diagnoses(primary or secondary)? General Surgery   Week 2 attempt successful? Yes   Call end time 1335   Discharge diagnosis Tubulovillous adenoma of colon, Laparoscopic right colon resection   Person spoke with today (if not patient) and relationship Haile Spouse   Does the patient have a follow up appointment scheduled with their surgeon? Yes   Comments 2/6/2025  2:30 PM  POST-OP 15 min [facility] GENERAL SURGERY Sabi Mars MD   Did the patient receive a copy of their discharge instructions? Yes   Nursing interventions Reviewed instructions with patient   What is the patient's perception of their health status since discharge? Improving   Nursing interventions Nurse provided patient education   Is the patient/caregiver able to teach back signs and symptoms of incisional infection? Increased redness, swelling or pain at the incisonal site, Pus or odor from incision, Fever, Increased drainage or bleeding, Incisional warmth   Is the patient/caregiver able to teach back steps to recovery at home? Set small, achievable goals for return to baseline health, Rest and rebuild strength, gradually increase activity   Is the patient/caregiver able to teach back the hierarchy of who to call/visit for symptoms/problems? PCP, Specialist, Home health nurse, Urgent Care, ED, 911 Yes   Week 2 call completed? Yes   Graduated Yes   Wrap up additional comments spouse states pt is doing well no concerns or questions noted   Call end time [id number]            Patricia JOE - Registered Nurse

## 2025-02-06 ENCOUNTER — OFFICE VISIT (OUTPATIENT)
Dept: SURGERY | Facility: CLINIC | Age: 65
End: 2025-02-06
Payer: COMMERCIAL

## 2025-02-06 VITALS
HEART RATE: 79 BPM | OXYGEN SATURATION: 96 % | WEIGHT: 145.4 LBS | BODY MASS INDEX: 24.82 KG/M2 | HEIGHT: 64 IN | SYSTOLIC BLOOD PRESSURE: 122 MMHG | DIASTOLIC BLOOD PRESSURE: 68 MMHG

## 2025-02-06 DIAGNOSIS — Z09 SURGICAL FOLLOWUP: Primary | ICD-10-CM

## 2025-02-06 PROCEDURE — 99024 POSTOP FOLLOW-UP VISIT: CPT | Performed by: SURGERY

## 2025-02-10 NOTE — PROGRESS NOTES
CHIEF COMPLAINT:   Chief Complaint   Patient presents with    Post-op     Laparoscopic right colon resection 1/7/25       HISTORY OF PRESENT ILLNESS:  This is a 64 y.o. female who presents for a post-operative visit after undergoing laparoscopic right colon resection on 1/7/2025 for an endoscopically unresectable tubulovillous adenoma.  She had a prolonged postoperative ileus causing her hospital stay to reach 14 days prior to discharge home.  Since returning home, her bowel movements have returned to normal but her energy level is still extremely low.  Her appetite is returned to normal per her  who is with her today.  She denies any fever, chills, melena, or hematochezia.    Pathology:   Terminal ileum, cecum, appendix and ascending colon, right hemicolectomy:               -Tubulovillous adenoma of the ascending colon (1.8 cm).               -Benign unremarkable appendix.               -Benign viable proximal ileal and distal colonic margins of resection.               -14 benign lymph nodes (0/14).               -Negative for high-grade dysplasia and carcinoma.    PHYSICAL EXAM:  Lungs: Clear  Heart: RRR  ABD: Incisions are healing well without any erythema or signs of infection.  Ext: no significant edema, calves nontender  BMI is within normal parameters. No other follow-up for BMI required.    A/P:  This is a 64 y.o. female patient who is S/P laparoscopic right colon resection on 1/7/2025    She is healing very well.  I anticipate her energy will continue to improve over the next 2 weeks.  I think she will be ready to return to work Monday 2/24 but should continue to avoid lifting anything heavier than 15 pounds until 3/4/2025 when she can resume all activities as tolerated.  I would like to see her back in 4 weeks time for recheck of her stamina/energy.    Sabi Mars MD  General, Robotic, and Endoscopic Surgery  [facility]    [address], [address]  [place]  [address]  P: [phone]  F: [phone]

## 2025-02-27 RX ORDER — ATORVASTATIN CALCIUM 80 MG/1
80 TABLET, FILM COATED ORAL DAILY
Qty: 90 TABLET | Refills: 0 | Status: SHIPPED | OUTPATIENT
Start: 2025-02-27

## 2025-03-02 RX ORDER — FLUOXETINE HYDROCHLORIDE 40 MG/1
40 CAPSULE ORAL NIGHTLY
Qty: 90 CAPSULE | Refills: 1 | Status: SHIPPED | OUTPATIENT
Start: 2025-03-02

## 2025-03-04 ENCOUNTER — OFFICE VISIT (OUTPATIENT)
Dept: SURGERY | Facility: CLINIC | Age: 65
End: 2025-03-04
Payer: COMMERCIAL

## 2025-03-04 VITALS
HEIGHT: 64 IN | OXYGEN SATURATION: 97 % | BODY MASS INDEX: 25.27 KG/M2 | SYSTOLIC BLOOD PRESSURE: 140 MMHG | HEART RATE: 90 BPM | DIASTOLIC BLOOD PRESSURE: 80 MMHG | WEIGHT: 148 LBS

## 2025-03-04 DIAGNOSIS — Z09 SURGICAL FOLLOWUP: Primary | ICD-10-CM

## 2025-03-04 PROCEDURE — 99024 POSTOP FOLLOW-UP VISIT: CPT | Performed by: SURGERY

## 2025-03-04 NOTE — PROGRESS NOTES
CHIEF COMPLAINT:   Chief Complaint   Patient presents with    Post-op     4 week f/u       HISTORY OF PRESENT ILLNESS:  This is a 64 y.o. female who presents for a post-operative visit after undergoing laparoscopic right colon resection on 1/7/2025 for an endoscopically unresectable tubulovillous adenoma.  She had a prolonged postoperative ileus causing her hospital stay to reach 14 days prior to discharge home.  Her appetite and bowel movement frequency have normalized but her energy level is still relatively low.  She has returned to work part-time but usually has to go home after a few hours of work due to fatigue.    Pathology:   Terminal ileum, cecum, appendix and ascending colon, right hemicolectomy:               -Tubulovillous adenoma of the ascending colon (1.8 cm).               -Benign unremarkable appendix.               -Benign viable proximal ileal and distal colonic margins of resection.               -14 benign lymph nodes (0/14).               -Negative for high-grade dysplasia and carcinoma.    PHYSICAL EXAM:  Lungs: Clear  Heart: RRR  ABD: Incisions are healing well without any erythema or signs of infection.  Ext: no significant edema, calves nontender  BMI is within normal parameters. No other follow-up for BMI required.    A/P:  This is a 64 y.o. female patient who is S/P laparoscopic right colon resection on 1/7/2025    She is healing very well Lasik than her persistent fatigue.  Because she was in the hospital for 14 days, I suspect her energy level will not rebound back to normal until nearly 14 weeks postop.  She is only 8 weeks postop today.  I encouraged her to gradually increase her activities as tolerated and to attempt some cardio exercising a few times weekly to build back her stamina.  She can see me back on an as-needed basis.    Sabi Mars MD  General, Robotic, and Endoscopic Surgery  [facility]    [address], [address]  [place] [address]  P:  [phone]  F: [phone]

## 2025-03-30 RX ORDER — VALSARTAN 80 MG/1
80 TABLET ORAL DAILY
Qty: 30 TABLET | Refills: 0 | Status: SHIPPED | OUTPATIENT
Start: 2025-03-30

## 2025-04-27 RX ORDER — NYSTATIN 100000 [USP'U]/ML
500000 SUSPENSION ORAL 4 TIMES DAILY
Qty: 280 ML | Refills: 1 | Status: SHIPPED | OUTPATIENT
Start: 2025-04-27

## 2025-05-07 RX ORDER — LEVOTHYROXINE SODIUM 50 UG/1
TABLET ORAL
Qty: 90 TABLET | Refills: 0 | Status: SHIPPED | OUTPATIENT
Start: 2025-05-07

## 2025-05-15 RX ORDER — VALSARTAN 80 MG/1
80 TABLET ORAL DAILY
Qty: 30 TABLET | Refills: 0 | Status: SHIPPED | OUTPATIENT
Start: 2025-05-15

## 2025-06-12 RX ORDER — ALBUTEROL SULFATE 90 UG/1
2 INHALANT RESPIRATORY (INHALATION) EVERY 4 HOURS PRN
Qty: 24 G | Refills: 3 | Status: SHIPPED | OUTPATIENT
Start: 2025-06-12

## 2025-06-23 ENCOUNTER — OFFICE VISIT (OUTPATIENT)
Dept: FAMILY MEDICINE CLINIC | Facility: CLINIC | Age: 65
End: 2025-06-23
Payer: COMMERCIAL

## 2025-06-23 VITALS
HEART RATE: 95 BPM | TEMPERATURE: 97.6 F | HEIGHT: 64 IN | BODY MASS INDEX: 26.5 KG/M2 | OXYGEN SATURATION: 97 % | SYSTOLIC BLOOD PRESSURE: 120 MMHG | DIASTOLIC BLOOD PRESSURE: 70 MMHG | WEIGHT: 155.2 LBS

## 2025-06-23 DIAGNOSIS — Z86.79 HISTORY OF CARDIOMYOPATHY: ICD-10-CM

## 2025-06-23 DIAGNOSIS — I10 ESSENTIAL HYPERTENSION: ICD-10-CM

## 2025-06-23 DIAGNOSIS — K21.9 GASTROESOPHAGEAL REFLUX DISEASE WITHOUT ESOPHAGITIS: ICD-10-CM

## 2025-06-23 DIAGNOSIS — E55.9 VITAMIN D DEFICIENCY: ICD-10-CM

## 2025-06-23 DIAGNOSIS — R10.2 SUPRAPUBIC ABDOMINAL PAIN: ICD-10-CM

## 2025-06-23 DIAGNOSIS — K75.81 NASH (NONALCOHOLIC STEATOHEPATITIS): ICD-10-CM

## 2025-06-23 DIAGNOSIS — R10.30 LOWER ABDOMINAL PAIN: Primary | ICD-10-CM

## 2025-06-23 DIAGNOSIS — F41.1 GENERALIZED ANXIETY DISORDER: ICD-10-CM

## 2025-06-23 DIAGNOSIS — E11.9 CONTROLLED TYPE 2 DIABETES MELLITUS WITHOUT COMPLICATION, WITHOUT LONG-TERM CURRENT USE OF INSULIN: ICD-10-CM

## 2025-06-23 DIAGNOSIS — E03.9 HYPOTHYROIDISM, ACQUIRED: ICD-10-CM

## 2025-06-23 DIAGNOSIS — R35.0 URINARY FREQUENCY: ICD-10-CM

## 2025-06-23 DIAGNOSIS — E78.2 MIXED HYPERLIPIDEMIA: Primary | ICD-10-CM

## 2025-06-23 LAB
BILIRUB BLD-MCNC: NEGATIVE MG/DL
CLARITY, POC: CLEAR
COLOR UR: YELLOW
EXPIRATION DATE: NORMAL
GLUCOSE UR STRIP-MCNC: NEGATIVE MG/DL
KETONES UR QL: NEGATIVE
LEUKOCYTE EST, POC: NEGATIVE
Lab: NORMAL
NITRITE UR-MCNC: NEGATIVE MG/ML
PH UR: 6 [PH] (ref 5–8)
PROT UR STRIP-MCNC: NEGATIVE MG/DL
RBC # UR STRIP: NEGATIVE /UL
SP GR UR: 1.01 (ref 1–1.03)
UROBILINOGEN UR QL: NORMAL

## 2025-06-23 PROCEDURE — 81003 URINALYSIS AUTO W/O SCOPE: CPT | Performed by: PHYSICIAN ASSISTANT

## 2025-06-23 PROCEDURE — 99214 OFFICE O/P EST MOD 30 MIN: CPT | Performed by: PHYSICIAN ASSISTANT

## 2025-06-23 RX ORDER — FLUOXETINE 10 MG/1
CAPSULE ORAL
Qty: 270 CAPSULE | Refills: 0 | OUTPATIENT
Start: 2025-06-23

## 2025-06-23 RX ORDER — PHENAZOPYRIDINE HYDROCHLORIDE 200 MG/1
TABLET, FILM COATED ORAL
Qty: 15 TABLET | Refills: 0 | Status: SHIPPED | OUTPATIENT
Start: 2025-06-23

## 2025-06-25 ENCOUNTER — APPOINTMENT (OUTPATIENT)
Dept: CT IMAGING | Facility: HOSPITAL | Age: 65
End: 2025-06-25
Payer: COMMERCIAL

## 2025-06-25 ENCOUNTER — HOSPITAL ENCOUNTER (OUTPATIENT)
Facility: HOSPITAL | Age: 65
Setting detail: OBSERVATION
LOS: 1 days | Discharge: HOME | End: 2025-06-27
Attending: EMERGENCY MEDICINE | Admitting: HOSPITALIST
Payer: COMMERCIAL

## 2025-06-25 DIAGNOSIS — I49.9 CARDIAC ARRHYTHMIA, UNSPECIFIED: ICD-10-CM

## 2025-06-25 DIAGNOSIS — R31.9 URINARY TRACT INFECTION WITH HEMATURIA, SITE UNSPECIFIED: ICD-10-CM

## 2025-06-25 DIAGNOSIS — R74.01 TRANSAMINITIS: ICD-10-CM

## 2025-06-25 DIAGNOSIS — E87.1 HYPONATREMIA: Primary | ICD-10-CM

## 2025-06-25 DIAGNOSIS — N39.0 URINARY TRACT INFECTION WITH HEMATURIA, SITE UNSPECIFIED: ICD-10-CM

## 2025-06-25 DIAGNOSIS — K63.9 SIGMOID THICKENING: ICD-10-CM

## 2025-06-25 DIAGNOSIS — E80.6 HYPERBILIRUBINEMIA: ICD-10-CM

## 2025-06-25 PROBLEM — N17.9 AKI (ACUTE KIDNEY INJURY): Status: ACTIVE | Noted: 2025-06-25

## 2025-06-25 PROBLEM — K57.92 DIVERTICULITIS: Status: ACTIVE | Noted: 2025-06-25

## 2025-06-25 PROBLEM — A41.9 SEPSIS WITHOUT ACUTE ORGAN DYSFUNCTION: Status: ACTIVE | Noted: 2025-06-25

## 2025-06-25 PROBLEM — E11.65 TYPE 2 DIABETES MELLITUS WITH HYPERGLYCEMIA, WITHOUT LONG-TERM CURRENT USE OF INSULIN: Status: ACTIVE | Noted: 2025-06-25

## 2025-06-25 LAB
ALBUMIN SERPL-MCNC: 3.8 G/DL (ref 3.5–5.2)
ALBUMIN/GLOB SERPL: 1.1 G/DL
ALP SERPL-CCNC: 121 U/L (ref 39–117)
ALT SERPL W P-5'-P-CCNC: 76 U/L (ref 1–33)
ANION GAP SERPL CALCULATED.3IONS-SCNC: 11.8 MMOL/L (ref 5–15)
ANION GAP SERPL CALCULATED.3IONS-SCNC: 12 MMOL/L (ref 5–15)
APPEARANCE UR: CLEAR
AST SERPL-CCNC: 102 U/L (ref 1–32)
BACTERIA #/AREA URNS HPF: NORMAL /[HPF]
BACTERIA UR CULT: NORMAL
BACTERIA UR CULT: NORMAL
BACTERIA UR QL AUTO: ABNORMAL /HPF
BASOPHILS # BLD MANUAL: 0 10*3/MM3 (ref 0–0.2)
BASOPHILS NFR BLD MANUAL: 0 % (ref 0–1.5)
BILIRUB SERPL-MCNC: 1.8 MG/DL (ref 0–1.2)
BILIRUB UR QL STRIP: ABNORMAL
BILIRUB UR QL STRIP: NEGATIVE
BUN SERPL-MCNC: 16 MG/DL (ref 8–23)
BUN SERPL-MCNC: 25.9 MG/DL (ref 8–23)
BUN/CREAT SERPL: 18.9 (ref 7–25)
BUN/CREAT SERPL: 21.1 (ref 7–25)
CALCIUM SPEC-SCNC: 8 MG/DL (ref 8.6–10.5)
CALCIUM SPEC-SCNC: 9.2 MG/DL (ref 8.6–10.5)
CASTS URNS QL MICRO: NORMAL /LPF
CHLORIDE SERPL-SCNC: 101 MMOL/L (ref 98–107)
CHLORIDE SERPL-SCNC: 96 MMOL/L (ref 98–107)
CLARITY UR: ABNORMAL
CO2 SERPL-SCNC: 19 MMOL/L (ref 22–29)
CO2 SERPL-SCNC: 21.2 MMOL/L (ref 22–29)
COLOR UR: ABNORMAL
COLOR UR: YELLOW
CREAT SERPL-MCNC: 0.76 MG/DL (ref 0.57–1)
CREAT SERPL-MCNC: 1.37 MG/DL (ref 0.57–1)
D-LACTATE SERPL-SCNC: 1.5 MMOL/L (ref 0.5–2)
D-LACTATE SERPL-SCNC: 2.2 MMOL/L (ref 0.5–2)
DEPRECATED RDW RBC AUTO: 45.4 FL (ref 37–54)
DEPRECATED RDW RBC AUTO: 48.5 FL (ref 37–54)
DIGOXIN SERPL-MCNC: 0.6 NG/ML (ref 0.6–1.2)
EGFRCR SERPLBLD CKD-EPI 2021: 43.5 ML/MIN/1.73
EGFRCR SERPLBLD CKD-EPI 2021: 88.2 ML/MIN/1.73
EOSINOPHIL # BLD MANUAL: 0.12 10*3/MM3 (ref 0–0.4)
EOSINOPHIL NFR BLD MANUAL: 2 % (ref 0.3–6.2)
EPI CELLS #/AREA URNS HPF: NORMAL /HPF (ref 0–10)
ERYTHROCYTE [DISTWIDTH] IN BLOOD BY AUTOMATED COUNT: 14.1 % (ref 12.3–15.4)
ERYTHROCYTE [DISTWIDTH] IN BLOOD BY AUTOMATED COUNT: 14.2 % (ref 12.3–15.4)
GLOBULIN UR ELPH-MCNC: 3.4 GM/DL
GLUCOSE BLDC GLUCOMTR-MCNC: 133 MG/DL (ref 70–130)
GLUCOSE SERPL-MCNC: 118 MG/DL (ref 65–99)
GLUCOSE SERPL-MCNC: 170 MG/DL (ref 65–99)
GLUCOSE UR QL STRIP: NEGATIVE
GLUCOSE UR STRIP-MCNC: ABNORMAL MG/DL
HCT VFR BLD AUTO: 29.1 % (ref 34–46.6)
HCT VFR BLD AUTO: 38.2 % (ref 34–46.6)
HGB BLD-MCNC: 10 G/DL (ref 12–15.9)
HGB BLD-MCNC: 12.2 G/DL (ref 12–15.9)
HGB UR QL STRIP.AUTO: NEGATIVE
HGB UR QL STRIP: NEGATIVE
HOLD SPECIMEN: NORMAL
HYALINE CASTS UR QL AUTO: ABNORMAL /LPF
KETONES UR QL STRIP: NEGATIVE
KETONES UR QL STRIP: NEGATIVE
LEUKOCYTE ESTERASE UR QL STRIP.AUTO: ABNORMAL
LEUKOCYTE ESTERASE UR QL STRIP: NEGATIVE
LIPASE SERPL-CCNC: 24 U/L (ref 13–60)
LYMPHOCYTES # BLD MANUAL: 0.4 10*3/MM3 (ref 0.7–3.1)
LYMPHOCYTES NFR BLD MANUAL: 0 % (ref 5–12)
MCH RBC QN AUTO: 29.8 PG (ref 26.6–33)
MCH RBC QN AUTO: 30.5 PG (ref 26.6–33)
MCHC RBC AUTO-ENTMCNC: 31.9 G/DL (ref 31.5–35.7)
MCHC RBC AUTO-ENTMCNC: 34.4 G/DL (ref 31.5–35.7)
MCV RBC AUTO: 88.7 FL (ref 79–97)
MCV RBC AUTO: 93.2 FL (ref 79–97)
MICRO URNS: NORMAL
MICRO URNS: NORMAL
MONOCYTES # BLD: 0 10*3/MM3 (ref 0.1–0.9)
NEUTROPHILS # BLD AUTO: 5.26 10*3/MM3 (ref 1.7–7)
NEUTROPHILS NFR BLD MANUAL: 91 % (ref 42.7–76)
NITRITE UR QL STRIP: NEGATIVE
NITRITE UR QL STRIP: POSITIVE
PH UR STRIP.AUTO: <=5 [PH] (ref 5–8)
PH UR STRIP: 6.5 [PH] (ref 5–7.5)
PLAT MORPH BLD: NORMAL
PLATELET # BLD AUTO: 145 10*3/MM3 (ref 140–450)
PLATELET # BLD AUTO: 191 10*3/MM3 (ref 140–450)
PMV BLD AUTO: 9.4 FL (ref 6–12)
PMV BLD AUTO: 9.5 FL (ref 6–12)
POTASSIUM SERPL-SCNC: 3.6 MMOL/L (ref 3.5–5.2)
POTASSIUM SERPL-SCNC: 3.7 MMOL/L (ref 3.5–5.2)
PROT SERPL-MCNC: 7.2 G/DL (ref 6–8.5)
PROT UR QL STRIP: ABNORMAL
PROT UR QL STRIP: NEGATIVE
RBC # BLD AUTO: 3.28 10*6/MM3 (ref 3.77–5.28)
RBC # BLD AUTO: 4.1 10*6/MM3 (ref 3.77–5.28)
RBC # UR STRIP: ABNORMAL /HPF
RBC #/AREA URNS HPF: NORMAL /HPF (ref 0–2)
RBC MORPH BLD: NORMAL
REF LAB TEST METHOD: ABNORMAL
SODIUM SERPL-SCNC: 129 MMOL/L (ref 136–145)
SODIUM SERPL-SCNC: 132 MMOL/L (ref 136–145)
SP GR UR STRIP: 1.01 (ref 1–1.03)
SP GR UR STRIP: 1.01 (ref 1–1.03)
SQUAMOUS #/AREA URNS HPF: ABNORMAL /HPF
UROBILINOGEN UR QL STRIP: ABNORMAL
UROBILINOGEN UR STRIP-MCNC: 0.2 MG/DL (ref 0.2–1)
VARIANT LYMPHS NFR BLD MANUAL: 7 % (ref 19.6–45.3)
WBC # UR STRIP: ABNORMAL /HPF
WBC #/AREA URNS HPF: NORMAL /HPF (ref 0–5)
WBC MORPH BLD: NORMAL
WBC NRBC COR # BLD AUTO: 5.44 10*3/MM3 (ref 3.4–10.8)
WBC NRBC COR # BLD AUTO: 5.53 10*3/MM3 (ref 3.4–10.8)
WHOLE BLOOD HOLD COAG: NORMAL
WHOLE BLOOD HOLD SPECIMEN: NORMAL

## 2025-06-25 PROCEDURE — G0378 HOSPITAL OBSERVATION PER HR: HCPCS

## 2025-06-25 PROCEDURE — 74176 CT ABD & PELVIS W/O CONTRAST: CPT

## 2025-06-25 PROCEDURE — 99285 EMERGENCY DEPT VISIT HI MDM: CPT

## 2025-06-25 PROCEDURE — 99285 EMERGENCY DEPT VISIT HI MDM: CPT | Performed by: PHYSICIAN ASSISTANT

## 2025-06-25 PROCEDURE — 80053 COMPREHEN METABOLIC PANEL: CPT | Performed by: EMERGENCY MEDICINE

## 2025-06-25 PROCEDURE — 85025 COMPLETE CBC W/AUTO DIFF WBC: CPT | Performed by: EMERGENCY MEDICINE

## 2025-06-25 PROCEDURE — 96361 HYDRATE IV INFUSION ADD-ON: CPT

## 2025-06-25 PROCEDURE — 80162 ASSAY OF DIGOXIN TOTAL: CPT | Performed by: PHYSICIAN ASSISTANT

## 2025-06-25 PROCEDURE — 81001 URINALYSIS AUTO W/SCOPE: CPT | Performed by: EMERGENCY MEDICINE

## 2025-06-25 PROCEDURE — 83605 ASSAY OF LACTIC ACID: CPT | Performed by: EMERGENCY MEDICINE

## 2025-06-25 PROCEDURE — 87086 URINE CULTURE/COLONY COUNT: CPT

## 2025-06-25 PROCEDURE — 83690 ASSAY OF LIPASE: CPT | Performed by: EMERGENCY MEDICINE

## 2025-06-25 PROCEDURE — 25010000002 METRONIDAZOLE 500 MG/100ML SOLUTION: Performed by: PHYSICIAN ASSISTANT

## 2025-06-25 PROCEDURE — 85027 COMPLETE CBC AUTOMATED: CPT

## 2025-06-25 PROCEDURE — 25810000003 SODIUM CHLORIDE 0.9 % SOLUTION

## 2025-06-25 PROCEDURE — 85007 BL SMEAR W/DIFF WBC COUNT: CPT | Performed by: EMERGENCY MEDICINE

## 2025-06-25 PROCEDURE — 87040 BLOOD CULTURE FOR BACTERIA: CPT

## 2025-06-25 PROCEDURE — 36415 COLL VENOUS BLD VENIPUNCTURE: CPT

## 2025-06-25 PROCEDURE — 83036 HEMOGLOBIN GLYCOSYLATED A1C: CPT

## 2025-06-25 PROCEDURE — 25010000002 CEFTRIAXONE PER 250 MG: Performed by: PHYSICIAN ASSISTANT

## 2025-06-25 PROCEDURE — 82948 REAGENT STRIP/BLOOD GLUCOSE: CPT

## 2025-06-25 PROCEDURE — 25810000003 SODIUM CHLORIDE 0.9 % SOLUTION: Performed by: PHYSICIAN ASSISTANT

## 2025-06-25 PROCEDURE — 25010000002 KETOROLAC TROMETHAMINE PER 15 MG: Performed by: PHYSICIAN ASSISTANT

## 2025-06-25 PROCEDURE — 96374 THER/PROPH/DIAG INJ IV PUSH: CPT

## 2025-06-25 PROCEDURE — 25810000003 SODIUM CHLORIDE 0.9 % SOLUTION 250 ML FLEX CONT: Performed by: PHYSICIAN ASSISTANT

## 2025-06-25 RX ORDER — SODIUM CHLORIDE 0.9 % (FLUSH) 0.9 %
10 SYRINGE (ML) INJECTION AS NEEDED
Status: DISCONTINUED | OUTPATIENT
Start: 2025-06-25 | End: 2025-06-27 | Stop reason: HOSPADM

## 2025-06-25 RX ORDER — LATANOPROST 50 UG/ML
1 SOLUTION/ DROPS OPHTHALMIC NIGHTLY
Status: DISCONTINUED | OUTPATIENT
Start: 2025-06-25 | End: 2025-06-27 | Stop reason: HOSPADM

## 2025-06-25 RX ORDER — ACETAMINOPHEN 325 MG/1
650 TABLET ORAL EVERY 4 HOURS PRN
Status: DISCONTINUED | OUTPATIENT
Start: 2025-06-25 | End: 2025-06-27 | Stop reason: HOSPADM

## 2025-06-25 RX ORDER — SWAB
1 SWAB, NON-MEDICATED MISCELLANEOUS DAILY
Status: DISCONTINUED | OUTPATIENT
Start: 2025-06-26 | End: 2025-06-25

## 2025-06-25 RX ORDER — ONDANSETRON 2 MG/ML
4 INJECTION INTRAMUSCULAR; INTRAVENOUS EVERY 6 HOURS PRN
Status: DISCONTINUED | OUTPATIENT
Start: 2025-06-25 | End: 2025-06-27 | Stop reason: HOSPADM

## 2025-06-25 RX ORDER — METRONIDAZOLE 500 MG/100ML
500 INJECTION, SOLUTION INTRAVENOUS EVERY 8 HOURS
Status: DISCONTINUED | OUTPATIENT
Start: 2025-06-26 | End: 2025-06-27 | Stop reason: HOSPADM

## 2025-06-25 RX ORDER — IBUPROFEN 600 MG/1
1 TABLET ORAL
Status: DISCONTINUED | OUTPATIENT
Start: 2025-06-25 | End: 2025-06-27 | Stop reason: HOSPADM

## 2025-06-25 RX ORDER — ONDANSETRON 4 MG/1
4 TABLET, ORALLY DISINTEGRATING ORAL EVERY 6 HOURS PRN
Status: DISCONTINUED | OUTPATIENT
Start: 2025-06-25 | End: 2025-06-27 | Stop reason: HOSPADM

## 2025-06-25 RX ORDER — TIMOLOL MALEATE 5 MG/ML
1 SOLUTION/ DROPS OPHTHALMIC NIGHTLY
Status: DISCONTINUED | OUTPATIENT
Start: 2025-06-25 | End: 2025-06-27 | Stop reason: HOSPADM

## 2025-06-25 RX ORDER — ATORVASTATIN CALCIUM 80 MG/1
80 TABLET, FILM COATED ORAL DAILY
Status: DISCONTINUED | OUTPATIENT
Start: 2025-06-26 | End: 2025-06-27 | Stop reason: HOSPADM

## 2025-06-25 RX ORDER — PANTOPRAZOLE SODIUM 40 MG/1
40 TABLET, DELAYED RELEASE ORAL
Status: DISCONTINUED | OUTPATIENT
Start: 2025-06-26 | End: 2025-06-27 | Stop reason: HOSPADM

## 2025-06-25 RX ORDER — DEXTROSE MONOHYDRATE 25 G/50ML
25 INJECTION, SOLUTION INTRAVENOUS
Status: DISCONTINUED | OUTPATIENT
Start: 2025-06-25 | End: 2025-06-27 | Stop reason: HOSPADM

## 2025-06-25 RX ORDER — BRIMONIDINE TARTRATE 2 MG/ML
1 SOLUTION/ DROPS OPHTHALMIC NIGHTLY
Status: DISCONTINUED | OUTPATIENT
Start: 2025-06-26 | End: 2025-06-27 | Stop reason: HOSPADM

## 2025-06-25 RX ORDER — CETIRIZINE HYDROCHLORIDE 10 MG/1
10 TABLET ORAL DAILY
Status: DISCONTINUED | OUTPATIENT
Start: 2025-06-26 | End: 2025-06-27 | Stop reason: HOSPADM

## 2025-06-25 RX ORDER — BISOPROLOL FUMARATE 5 MG/1
10 TABLET, FILM COATED ORAL DAILY
Status: DISCONTINUED | OUTPATIENT
Start: 2025-06-26 | End: 2025-06-27 | Stop reason: HOSPADM

## 2025-06-25 RX ORDER — INSULIN LISPRO 100 [IU]/ML
2-7 INJECTION, SOLUTION INTRAVENOUS; SUBCUTANEOUS
Status: DISCONTINUED | OUTPATIENT
Start: 2025-06-25 | End: 2025-06-27 | Stop reason: HOSPADM

## 2025-06-25 RX ORDER — NITROGLYCERIN 0.4 MG/1
0.4 TABLET SUBLINGUAL
Status: DISCONTINUED | OUTPATIENT
Start: 2025-06-25 | End: 2025-06-27 | Stop reason: HOSPADM

## 2025-06-25 RX ORDER — VALSARTAN 80 MG/1
80 TABLET ORAL DAILY
Status: DISCONTINUED | OUTPATIENT
Start: 2025-06-26 | End: 2025-06-27 | Stop reason: HOSPADM

## 2025-06-25 RX ORDER — SODIUM CHLORIDE 9 MG/ML
100 INJECTION, SOLUTION INTRAVENOUS CONTINUOUS
Status: ACTIVE | OUTPATIENT
Start: 2025-06-25 | End: 2025-06-26

## 2025-06-25 RX ORDER — OMEGA-3S/DHA/EPA/FISH OIL/D3 300MG-1000
400 CAPSULE ORAL DAILY
Status: DISCONTINUED | OUTPATIENT
Start: 2025-06-26 | End: 2025-06-27 | Stop reason: HOSPADM

## 2025-06-25 RX ORDER — KETOROLAC TROMETHAMINE 15 MG/ML
15 INJECTION, SOLUTION INTRAMUSCULAR; INTRAVENOUS ONCE
Status: COMPLETED | OUTPATIENT
Start: 2025-06-25 | End: 2025-06-25

## 2025-06-25 RX ORDER — NICOTINE POLACRILEX 4 MG
15 LOZENGE BUCCAL
Status: DISCONTINUED | OUTPATIENT
Start: 2025-06-25 | End: 2025-06-27 | Stop reason: HOSPADM

## 2025-06-25 RX ORDER — METRONIDAZOLE 500 MG/100ML
500 INJECTION, SOLUTION INTRAVENOUS ONCE
Status: COMPLETED | OUTPATIENT
Start: 2025-06-25 | End: 2025-06-25

## 2025-06-25 RX ORDER — BUDESONIDE AND FORMOTEROL FUMARATE DIHYDRATE 160; 4.5 UG/1; UG/1
2 AEROSOL RESPIRATORY (INHALATION)
Status: DISCONTINUED | OUTPATIENT
Start: 2025-06-25 | End: 2025-06-27 | Stop reason: HOSPADM

## 2025-06-25 RX ORDER — LEVOTHYROXINE SODIUM 50 UG/1
50 TABLET ORAL
Status: DISCONTINUED | OUTPATIENT
Start: 2025-06-26 | End: 2025-06-27 | Stop reason: HOSPADM

## 2025-06-25 RX ORDER — DIGOXIN 125 MCG
125 TABLET ORAL NIGHTLY
Status: DISCONTINUED | OUTPATIENT
Start: 2025-06-25 | End: 2025-06-27 | Stop reason: HOSPADM

## 2025-06-25 RX ADMIN — DIGOXIN 125 MCG: 0.12 TABLET ORAL at 23:54

## 2025-06-25 RX ADMIN — SODIUM CHLORIDE 1000 ML: 9 INJECTION, SOLUTION INTRAVENOUS at 13:57

## 2025-06-25 RX ADMIN — KETOROLAC TROMETHAMINE 15 MG: 15 INJECTION INTRAMUSCULAR at 18:12

## 2025-06-25 RX ADMIN — TIMOLOL MALEATE 1 DROP: 5 SOLUTION/ DROPS OPHTHALMIC at 23:56

## 2025-06-25 RX ADMIN — METRONIDAZOLE 500 MG: 500 INJECTION, SOLUTION INTRAVENOUS at 18:03

## 2025-06-25 RX ADMIN — CEFTRIAXONE 2000 MG: 2 INJECTION, POWDER, FOR SOLUTION INTRAMUSCULAR; INTRAVENOUS at 17:00

## 2025-06-25 RX ADMIN — ACETAMINOPHEN 650 MG: 325 TABLET, FILM COATED ORAL at 21:34

## 2025-06-25 RX ADMIN — SODIUM CHLORIDE 100 ML/HR: 9 INJECTION, SOLUTION INTRAVENOUS at 22:51

## 2025-06-25 RX ADMIN — SODIUM CHLORIDE 1000 ML: 9 INJECTION, SOLUTION INTRAVENOUS at 14:39

## 2025-06-26 LAB
GLUCOSE BLDC GLUCOMTR-MCNC: 105 MG/DL (ref 70–130)
GLUCOSE BLDC GLUCOMTR-MCNC: 118 MG/DL (ref 70–130)
GLUCOSE BLDC GLUCOMTR-MCNC: 172 MG/DL (ref 70–130)
GLUCOSE BLDC GLUCOMTR-MCNC: 99 MG/DL (ref 70–130)
HBA1C MFR BLD: 6.3 % (ref 4.8–5.6)
QT INTERVAL: 377 MS
QTC INTERVAL: 442 MS

## 2025-06-26 PROCEDURE — 96367 TX/PROPH/DG ADDL SEQ IV INF: CPT

## 2025-06-26 PROCEDURE — 25010000002 CEFTRIAXONE PER 250 MG: Performed by: HOSPITALIST

## 2025-06-26 PROCEDURE — 82948 REAGENT STRIP/BLOOD GLUCOSE: CPT

## 2025-06-26 PROCEDURE — 63710000001 INSULIN LISPRO (HUMAN) PER 5 UNITS

## 2025-06-26 PROCEDURE — 96361 HYDRATE IV INFUSION ADD-ON: CPT

## 2025-06-26 PROCEDURE — 96365 THER/PROPH/DIAG IV INF INIT: CPT

## 2025-06-26 PROCEDURE — 94761 N-INVAS EAR/PLS OXIMETRY MLT: CPT

## 2025-06-26 PROCEDURE — 25810000003 SODIUM CHLORIDE 0.9 % SOLUTION: Performed by: HOSPITALIST

## 2025-06-26 PROCEDURE — 94640 AIRWAY INHALATION TREATMENT: CPT

## 2025-06-26 PROCEDURE — 93010 ELECTROCARDIOGRAM REPORT: CPT | Performed by: INTERNAL MEDICINE

## 2025-06-26 PROCEDURE — 93005 ELECTROCARDIOGRAM TRACING: CPT | Performed by: HOSPITALIST

## 2025-06-26 PROCEDURE — 25010000002 METRONIDAZOLE 500 MG/100ML SOLUTION

## 2025-06-26 PROCEDURE — G0378 HOSPITAL OBSERVATION PER HR: HCPCS

## 2025-06-26 PROCEDURE — 94799 UNLISTED PULMONARY SVC/PX: CPT

## 2025-06-26 RX ORDER — SODIUM CHLORIDE 9 MG/ML
100 INJECTION, SOLUTION INTRAVENOUS CONTINUOUS
Status: ACTIVE | OUTPATIENT
Start: 2025-06-26 | End: 2025-06-26

## 2025-06-26 RX ADMIN — CEFTRIAXONE SODIUM 2000 MG: 2 INJECTION, POWDER, FOR SOLUTION INTRAMUSCULAR; INTRAVENOUS at 16:07

## 2025-06-26 RX ADMIN — METRONIDAZOLE 500 MG: 500 INJECTION, SOLUTION INTRAVENOUS at 17:48

## 2025-06-26 RX ADMIN — METRONIDAZOLE 500 MG: 500 INJECTION, SOLUTION INTRAVENOUS at 02:13

## 2025-06-26 RX ADMIN — LEVOTHYROXINE SODIUM 50 MCG: 50 TABLET ORAL at 06:26

## 2025-06-26 RX ADMIN — ACETAMINOPHEN 650 MG: 325 TABLET, FILM COATED ORAL at 17:48

## 2025-06-26 RX ADMIN — METRONIDAZOLE 500 MG: 500 INJECTION, SOLUTION INTRAVENOUS at 09:49

## 2025-06-26 RX ADMIN — SODIUM CHLORIDE 100 ML/HR: 9 INJECTION, SOLUTION INTRAVENOUS at 12:00

## 2025-06-26 RX ADMIN — ACETAMINOPHEN 650 MG: 325 TABLET, FILM COATED ORAL at 13:10

## 2025-06-26 RX ADMIN — LATANOPROST 1 DROP: 50 SOLUTION/ DROPS OPHTHALMIC at 20:40

## 2025-06-26 RX ADMIN — TIMOLOL MALEATE 1 DROP: 5 SOLUTION/ DROPS OPHTHALMIC at 20:40

## 2025-06-26 RX ADMIN — BUDESONIDE AND FORMOTEROL FUMARATE DIHYDRATE 2 PUFF: 160; 4.5 AEROSOL RESPIRATORY (INHALATION) at 08:36

## 2025-06-26 RX ADMIN — DIGOXIN 125 MCG: 0.12 TABLET ORAL at 20:40

## 2025-06-26 RX ADMIN — LATANOPROST 1 DROP: 50 SOLUTION/ DROPS OPHTHALMIC at 00:22

## 2025-06-26 RX ADMIN — INSULIN LISPRO 2 UNITS: 100 INJECTION, SOLUTION INTRAVENOUS; SUBCUTANEOUS at 20:40

## 2025-06-26 RX ADMIN — FLUOXETINE 60 MG: 20 CAPSULE ORAL at 08:07

## 2025-06-26 RX ADMIN — CHOLECALCIFEROL (VITAMIN D3) 10 MCG (400 UNIT) TABLET 400 UNITS: at 08:07

## 2025-06-26 RX ADMIN — CETIRIZINE HYDROCHLORIDE 10 MG: 10 TABLET, FILM COATED ORAL at 08:07

## 2025-06-26 RX ADMIN — BRIMONIDINE TARTRATE 1 DROP: 2 SOLUTION/ DROPS OPHTHALMIC at 20:40

## 2025-06-26 RX ADMIN — BUDESONIDE AND FORMOTEROL FUMARATE DIHYDRATE 2 PUFF: 160; 4.5 AEROSOL RESPIRATORY (INHALATION) at 20:26

## 2025-06-26 RX ADMIN — BISOPROLOL FUMARATE 10 MG: 5 TABLET ORAL at 08:07

## 2025-06-26 RX ADMIN — PANTOPRAZOLE SODIUM 40 MG: 40 TABLET, DELAYED RELEASE ORAL at 06:26

## 2025-06-26 RX ADMIN — VALSARTAN 80 MG: 80 TABLET ORAL at 08:07

## 2025-06-26 RX ADMIN — ACETAMINOPHEN 650 MG: 325 TABLET, FILM COATED ORAL at 06:38

## 2025-06-26 NOTE — PROGRESS NOTES
[facility] Clinical Pharmacy Services: Ceftriaxone    Pt Name: Jess Razo   : 1960    Indication: Urinary Tract Infection    Relevant clinical data and objective history reviewed:    Lab Results   Component Value Date    WBC 5.53 2025     Temp Readings from Last 3 Encounters:   25 (!) 100.8 °F (38.2 °C) (Oral)   25 97.6 °F (36.4 °C) (Temporal)   06/15/25 97.5 °F (36.4 °C) (Temporal)      Assessment/Plan  Will order ceftriaxone 2g IV q24h given BMI, indication, blood cultures ordered    Pharmacy will continue to follow daily while on piperacillin-tazobactam and adjust as needed. Thank you for this consult.    Paloma Navarro, PharmD  Clinical Pharmacist

## 2025-06-26 NOTE — PROGRESS NOTES
Name: Jess Razo ADMIT: 2025   : 1960  PCP: Olamide Cobos PA-C    MRN: [id number] LOS: 1 days   AGE/SEX: 64 y.o. female  ROOM: [facility]     Subjective   Subjective   Having some abdominal discomfort.  Tolerating liquids without any difficulty.       Objective   Objective   Vital Signs  Temp:  [97.8 °F (36.6 °C)-101.3 °F (38.5 °C)] 100 °F (37.8 °C)  Heart Rate:  [] 89  Resp:  [18] 18  BP: (104-155)/() 149/77  Body mass index is 27.4 kg/m².    Physical Exam  Vitals and nursing note reviewed.   Constitutional:       Appearance: She is not toxic-appearing.   Cardiovascular:      Rate and Rhythm: Normal rate and regular rhythm.   Pulmonary:      Effort: Pulmonary effort is normal.      Breath sounds: Normal breath sounds.   Abdominal:      General: Bowel sounds are normal.      Palpations: Abdomen is soft.      Tenderness: There is abdominal tenderness. There is no guarding.   Musculoskeletal:         General: No swelling.   Skin:     General: Skin is warm and dry.   Neurological:      Mental Status: She is alert. Mental status is at baseline.         Results Review       I reviewed the patient's new clinical results.  Results from last 7 days   Lab Units 25  1317   WBC 10*3/mm3 5.44 5.53   HEMOGLOBIN g/dL 10.0* 12.2   PLATELETS 10*3/mm3 145 191     Results from last 7 days   Lab Units 259 25  1317   SODIUM mmol/L 132* 129*   POTASSIUM mmol/L 3.7 3.6   CHLORIDE mmol/L 101 96*   CO2 mmol/L 19.0* 21.2*   BUN mg/dL 16.0 25.9*   CREATININE mg/dL 0.76 1.37*   GLUCOSE mg/dL 118* 170*   EGFR mL/min/1.73 88.2 43.5*   ALBUMIN g/dL  --  3.8   BILIRUBIN mg/dL  --  1.8*   ALK PHOS U/L  --  121*   AST (SGOT) U/L  --  102*   ALT (SGPT) U/L  --  76*         I have personally reviewed all medications:  Scheduled Medications  [Held by provider] atorvastatin, 80 mg, Oral, Daily  bisoprolol, 10 mg, Oral, Daily  brimonidine, 1 drop, Both Eyes, Nightly   And  timolol, 1  drop, Both Eyes, Nightly  budesonide-formoterol, 2 puff, Inhalation, BID - RT  cefTRIAXone, 2,000 mg, Intravenous, Q24H  cetirizine, 10 mg, Oral, Daily  cholecalciferol, 400 Units, Oral, Daily  digoxin, 125 mcg, Oral, Nightly  FLUoxetine, 60 mg, Oral, Daily  insulin lispro, 2-7 Units, Subcutaneous, 4x Daily AC & at Bedtime  latanoprost, 1 drop, Both Eyes, Nightly  levothyroxine, 50 mcg, Oral, Q AM  metroNIDAZOLE, 500 mg, Intravenous, Q8H  pantoprazole, 40 mg, Oral, Q AM  valsartan, 80 mg, Oral, Daily    Infusions  sodium chloride, 100 mL/hr    Diet  Diet: Regular/House, Gastrointestinal; Fiber-Restricted; Fluid Consistency: Thin (IDDSI 0)    I have personally reviewed:  [x]  Laboratory   [x]  Microbiology   [x]  Radiology   [x]  EKG/Telemetry  [x]  Cardiology/Vascular   []  Pathology    []  Records         Assessment/Plan     Active Hospital Problems    Diagnosis  POA    **Diverticulitis [K57.92]  Yes    Hyponatremia [E87.1]  Yes    Transaminitis [R74.01]  Yes    AUGUSTIN (acute kidney injury) [N17.9]  Yes    Type 2 diabetes mellitus with hyperglycemia, without long-term current use of insulin [E11.65]  Yes    Acute UTI (urinary tract infection) [N39.0]  Yes    Sepsis without acute organ dysfunction [A41.9]  Yes      Resolved Hospital Problems   No resolved problems to display.       Ms. Razo is a 64 y.o. female with history of diabetes presenting with intermittent left lower quadrant pain radiating to the right along with fevers and chills concerning for sepsis and CT scan showing questionable sigmoid colitis versus diverticulitis as well as abnormal UA despite recent treatment for UTI.    Tmax 101.3  Heart rate into the 110s  BP stable  Hemoglobin 10 down from 12.2  Normal WBC  Sodium 132  Bicarb 19  Creatinine 0.76 down from 1.37    CT abdomen/pelvis 6/24 fatty infiltration of the liver, cholelithiasis, mild sigmoid colitis      Continue Rocephin and Flagyl targeting diverticulitis.  Still febrile last night but  feels abdominal pain improved.  Will see if she can tolerate a regular diet.  Follow-up cultures.    Creatinine and sodium improved with IV fluids.  Still seems dry.  Will give another liter of fluid today and reassess labs in AM.    Repeat LFTs in a.m.       SCDs for DVT prophylaxis.  Full code.  Discussed with patient and care team on multidisciplinary rounds.  Anticipate discharge home in 1-2 days.  Expected Discharge Date: 6/27/2025; Expected Discharge Time:       Amarjit Mueller MD  [facility]  06/26/25  11:30 EDT

## 2025-06-26 NOTE — PLAN OF CARE
Problem: Adult Inpatient Plan of Care  Goal: Plan of Care Review  Outcome: Progressing  Flowsheets (Taken 6/26/2025 0306)  Progress: no change  Outcome Evaluation: Pt got admitted to 4E from [facility] ER with for left lower abdominal pain, fever, UTI and also abnormal lab. UA at ER show UTI, pt is on IV Rocephin and IV Flagyl. Up on admission, pt had a fever 100.8F, [facility] paged for orders, PRN tylenol given, temp recheck 99.3F. IVF started. Blood culture pending. Family at bedside, plan of care on going.  Plan of Care Reviewed With: patient

## 2025-06-26 NOTE — PLAN OF CARE
Goal Outcome Evaluation:  Plan of Care Reviewed With: patient        Progress: no change  Outcome Evaluation: Pt temp 100F this am, down to 97.9F this afternoon. prn tylenol given for headache. IV abx and IVF cont'd. EKG done. Pt needs met and questions answered at this time. Will continue to monitor

## 2025-06-26 NOTE — CASE MANAGEMENT/SOCIAL WORK
Discharge Planning Assessment  [facility]     Patient Name: Jess Razo  MRN: [id number]  Today's Date: 6/26/2025    Admit Date: 6/25/2025    Plan: home with spouse   Discharge Needs Assessment       Row Name 06/26/25 0959       Living Environment    People in Home spouse    Current Living Arrangements home    Potentially Unsafe Housing Conditions none    Primary Care Provided by self    Provides Primary Care For no one    Family Caregiver if Needed spouse    Quality of Family Relationships helpful;involved       Resource/Environmental Concerns    Resource/Environmental Concerns none       Transition Planning    Patient/Family Anticipates Transition to home with family    Patient/Family Anticipated Services at Transition none    Transportation Anticipated family or friend will provide       Discharge Needs Assessment    Readmission Within the Last 30 Days no previous admission in last 30 days    Equipment Currently Used at Home none    Concerns to be Addressed no discharge needs identified;denies needs/concerns at this time    Equipment Needed After Discharge none    Provided Post Acute Provider List? Refused    Refused Provider List Comment declined need                   Discharge Plan       Row Name 06/26/25 1000       Plan    Plan home with spouse    Patient/Family in Agreement with Plan yes    Plan Comments Spoke with pt  and spouse. Confirmed facesheet correct. Explained role of CCP. Pt reports she lives with her spouse. She is IADLs no DME Used and still drives. Pt has no HH/SNF history. Her PCP is DIONY Sampson and uses [facility] no issues. Pt plans home with family at d/c. Denies needs. CCP to follow.                       Demographic Summary    No documentation.                  Functional Status    No documentation.                  Psychosocial    No documentation.                  Abuse/Neglect    No documentation.                  Legal    No documentation.                  Substance Abuse    No  documentation.                  Patient Forms    No documentation.                     ALEX Ram

## 2025-06-26 NOTE — H&P
Patient Name:  Jess Razo  YOB: 1960  MRN:  [id number]  Admit Date:  6/25/2025  Patient Care Team:  Olamide Cobos PA-C as PCP - General (Family Medicine)  William Arnold MD as Consulting Physician (Hematology and Oncology)  Leroy Trivedi MD as Consulting Physician (Cardiology)  Nay Fine APRN as Nurse Practitioner (Gynecology)  Sara Starks MD as Consulting Physician (Orthopedic Surgery)  Marly Valencia MD as Consulting Physician (Gastroenterology)  Sabi Mars MD as Consulting Physician (General Surgery)  Tesfaye Mariee MD as Consulting Physician (Dermatology)      Subjective   History Present Illness     Chief Complaint   Patient presents with    Abdominal Pain    Vomiting    Nausea       Ms. Razo is a 64 y.o. female with history of breast cancer, diabetes, hypertension, and recent right hemicolectomy done in January for large unresectable polyp with prolonged ileus afterward.  She presented to [facility] ED with left lower quadrant pain and fevers.  She first had some dysuria and was diagnosed with UTI on 6/15.  She was given Macrobid and completed that full course.  She was still having the symptoms mentioned above so she went to her primary care provider on the 23rd and they checked a UA which was normal.  They did talk about doing a CT at that time due to the abdominal pain.  The pain is mostly in the left lower quadrant and does radiate to the right.  Yesterday she started to have some fevers and chills, some higher than 102.  She went to the [facility] ED this afternoon and CT was done which did show some gallstones as well as some questionable sigmoid colitis.  She was started on ceftriaxone and Flagyl and was admitted to our facility.  She has had some nausea and 1 bout of emesis over the course of the illness.  She really does not have any dysuria but UA at the ED was abnormal again though I note there are numerous squamous  cells in it.  She is still taking Pyridium.  Stools have mostly been normal though they were loose on 1 occasion.  She does not think she could be dehydrated because she says she and her friend have been challenging themselves to see who could drink the most water.      Review of Systems   Constitutional:  Positive for chills and fever. Negative for fatigue.   HENT:  Negative for congestion and trouble swallowing.    Eyes:  Negative for visual disturbance.   Respiratory:  Negative for cough, chest tightness and shortness of breath.    Cardiovascular:  Negative for chest pain, palpitations and leg swelling.   Gastrointestinal:  Positive for abdominal pain and nausea. Negative for abdominal distention, constipation, diarrhea and vomiting.   Genitourinary:  Negative for difficulty urinating, dysuria and frequency.   Musculoskeletal:  Negative for arthralgias.   Skin:  Negative for rash.   Neurological:  Negative for dizziness and headaches.   Psychiatric/Behavioral:  Negative for confusion.         Personal History     Past Medical History:   Diagnosis Date    Allergic     Anxiety     Asthma     Breast cancer     Colon polyp     Took out    Diabetes mellitus, type 2     Esophageal reflux     Eye exam, routine 05/2015    Fatty liver     Fibrocystic breast     Generalized anxiety disorder     Glaucoma     HL (hearing loss) right ear feels full    Hyperlipidemia     Hypertension, essential     Hypothyroidism (acquired)     Irritable bowel syndrome     Lactose intolerance     Liver disease     Lymphedema     Osteoarthritis, multiple sites     Osteopenia     Pap smear, as part of routine gynecological examination 03/2015    normal, Dr. Nay Fine    Paroxysmal tachycardia     Positive colorectal cancer screening using Cologuard test     Visual impairment      Past Surgical History:   Procedure Laterality Date    BLADDER SURGERY      w/ hysterectomy    BREAST BIOPSY Left     BREAST CYST ASPIRATION      BREAST LUMPECTOMY  Left     COLON RESECTION N/A 01/07/2025    Procedure: Laparoscopic right colon resection;  Surgeon: Sabi Mars MD;  Location: [facility] MAIN OR;  Service: General;  Laterality: N/A;    COLONOSCOPY      COLONOSCOPY N/A 06/25/2020    Procedure: COLONOSCOPY TO CECUM WITH HOT SNARE AND COLD BIOPSY POLYPECTOMIES;  Surgeon: Sabi Mars MD;  Location:  [facility];  Service: General;  Laterality: N/A;  pre: positive cologuard  post: diverticulosis and polyps    COLONOSCOPY N/A 10/26/2023    Procedure: COLONOSCOPY to cecum with hot snare polypectomies and cold biopsy polypectomy with tattoo;  Surgeon: Sabi Mars MD;  Location: [facility] ENDOSCOPY;  Service: General;  Laterality: N/A;  Pre: hx polyps  post: polyps, diverticulosis    COLONOSCOPY  05/02/2024    COLONOSCOPY N/A 05/02/2024    Procedure: COLONOSCOPY with cold biopsy polypectomies and clip placement x1;  Surgeon: Sabi Mars MD;  Location: [facility] ENDOSCOPY;  Service: General;  Laterality: N/A;  preop-hx of colon polyps; family hx of colon cancer  postop-diverticulosis; polyps    COLONOSCOPY N/A 11/14/2024    Procedure: COLONOSCOPY into cecum with cold bx polypectomies;  Surgeon: Sabi Mars MD;  [facility];  Service: General;  Laterality: N/A;  pre: persoanl hx of colon polyps, family hx of colon cancer   post: diverticulosis, polyps    DIAGNOSTIC LAPAROSCOPY      due to endometriosis    EYE SURGERY      HYSTERECTOMY      MAMMO BREAST SPECIMEN UNILATERAL  03/2014    normal    SEPTOPLASTY      TONSILLECTOMY      TUBAL ABDOMINAL LIGATION      VAGINAL HYSTERECTOMY       Family History   Problem Relation Age of Onset    Stroke Mother     Breast cancer Mother     Liver cancer Mother     Hearing loss Mother     Arthritis Mother     Cancer Mother     Glaucoma Father     Heart disease Father     Macular degeneration Father     Stroke Father     Pancreatic cancer Father     Colon polyps Father     Vision loss Father          i also have it    Cancer Father     Breast cancer Sister     Anxiety disorder Sister     Depression Sister     Depression Maternal Aunt     Skin cancer Neg Hx     Malig Hyperthermia Neg Hx      Social History     Tobacco Use    Smoking status: Former     Current packs/day: 4.00     Types: Cigarettes     Passive exposure: Never    Smokeless tobacco: Never   Vaping Use    Vaping status: Never Used   Substance Use Topics    Alcohol use: Yes     Comment: Occasionally    Drug use: No     No current facility-administered medications on file prior to encounter.     Current Outpatient Medications on File Prior to Encounter   Medication Sig Dispense Refill    atorvastatin (LIPITOR) 80 MG tablet TAKE 1 TABLET BY MOUTH ONCE DAILY FOR CHOLESTEROL 90 tablet 0    bimatoprost (LUMIGAN) 0.03 % ophthalmic drops INSTILL 1 DROP INTO EACH EYE AT BEDTIME      bisoprolol (ZEBeta) 10 MG tablet Take 1 tablet by mouth Daily.      calcium carbonate (OS-YOON) 1250 (500 CA) MG tablet Take 1 tablet by mouth Daily.      Cholecalciferol (VITAMIN D3) 400 UNITS capsule Take 1 capsule by mouth Daily.      Combigan 0.2-0.5 % ophthalmic solution Administer 1 drop to both eyes Every Night.      digoxin (LANOXIN) 250 MCG tablet Take 1 tablet by mouth Every Night. (Patient taking differently: Take 0.5 tablets by mouth Every Night.)      esomeprazole (nexIUM) 20 MG capsule Take 1 capsule by mouth Every Morning Before Breakfast.      FLUoxetine (PROzac) 20 MG capsule Take 3 capsules by mouth Daily. For anxiety, new dose (Patient taking differently: Take 50 mg by mouth Daily. For anxiety, new dose) 270 capsule 3    Fluticasone-Salmeterol (ADVAIR/WIXELA) 250-50 MCG/ACT DISKUS Inhale 1 puff 2 (Two) Times a Day. For asthma rinse mouth after use 180 each 3    Garlic 1000 MG capsule Take 1 capsule by mouth Daily. HOLD PER MD INSTR      ibuprofen (ADVIL,MOTRIN) 400 MG tablet Take 1 tablet by mouth Every 6 (Six) Hours As Needed for Mild Pain. HOLD PER MD  INSTR      levothyroxine (SYNTHROID, LEVOTHROID) 50 MCG tablet TAKE 1 TABLET BY MOUTH BEFORE BREAKFAST. DO NOT TAKE ON SUNDAYS. 90 tablet 0    metFORMIN ER (GLUCOPHAGE-XR) 500 MG 24 hr tablet Take 1 tablet by mouth Daily. With food for type 2 diabetes (Patient taking differently: Take 1 tablet by mouth Every Night. With food for type 2 diabetes) 90 tablet 3    Multiple Vitamin (MULTIVITAMIN) tablet Take 1 tablet by mouth Daily. HOLD PER MD INSTR      omeprazole (priLOSEC) 20 MG capsule Take 1 capsule by mouth Daily.      Probiotic Product (UP4 PROBIOTICS ADULT PO) Take  by mouth.      valsartan (Diovan) 80 MG tablet Take 1 tablet by mouth Daily. For blood pressure and on lower dose of 80 mg daily for now due to weight loss 30 tablet 0    vitamin E 1000 UNIT capsule Take 1 capsule by mouth Daily. HOLD PER MD INSTR      albuterol sulfate  (90 Base) MCG/ACT inhaler Inhale 2 puffs Every 4 (Four) Hours As Needed for Wheezing. 24 g 3    cetirizine (zyrTEC) 10 MG tablet Take 1 tablet by mouth Daily.      Dulaglutide 0.75 MG/0.5ML solution auto-injector Inject 0.75 mg under the skin into the appropriate area as directed 1 (One) Time Per Week. For DMII 2 mL 5    nystatin-triamcinolone (MYCOLOG) 023762-5.1 UNIT/GM-% ointment Apply 1 Application topically to the appropriate area as directed 2 (Two) Times a Day. PRN 60 g 1    phenazopyridine (Pyridium) 200 MG tablet One PO TID PC PRN urinary pressure 15 tablet 0     Allergies   Allergen Reactions    Morphine Anxiety and Confusion    Levaquin [Levofloxacin] Myalgia    Adhesive Tape Rash    Ampicillin Hives and Rash    Ceclor [Cefaclor] Hives and Rash    Ciprofloxacin Hives and Rash    Codeine Phosphate Rash    Erythromycin Rash     Can take Biaxin and zithromax    Ketek [Telithromycin] Rash    Penicillins Rash     ..Beta lactam allergy details  Antibiotic reaction: other (RED FACE)  Age at reaction: adult  Dose to reaction time: unknown  Reason for antibiotic:  unknown  Epinephrine required for reaction?: no  Tolerated antibiotics: other (I HAVEN'T TAKEN ANY OF IT IN YEARS.)       Tetracyclines & Related Rash    Ultram [Tramadol] Rash       Objective    Objective     Vital Signs  Temp:  [97.8 °F (36.6 °C)-101.3 °F (38.5 °C)] 100.8 °F (38.2 °C)  Heart Rate:  [] 113  Resp:  [18] 18  BP: (104-155)/() 144/67  SpO2:  [92 %-96 %] 95 %  on   ;   Device (Oxygen Therapy): room air  Body mass index is 27.4 kg/m².    Physical Exam  Vitals reviewed.   Constitutional:       General: She is not in acute distress.     Appearance: She is not ill-appearing.   HENT:      Head: Normocephalic and atraumatic.      Mouth/Throat:      Mouth: Mucous membranes are moist.   Eyes:      General: No scleral icterus.  Neck:      Vascular: No JVD.   Cardiovascular:      Rate and Rhythm: Normal rate and regular rhythm.      Pulses: Normal pulses.      Heart sounds: Normal heart sounds. No murmur heard.  Pulmonary:      Effort: Pulmonary effort is normal. No respiratory distress.      Breath sounds: Normal breath sounds.   Abdominal:      General: There is no distension.      Palpations: Abdomen is soft.      Tenderness: There is no abdominal tenderness.   Musculoskeletal:         General: No swelling or tenderness.   Skin:     General: Skin is warm and dry.      Coloration: Skin is not jaundiced.      Findings: No rash.   Neurological:      Mental Status: She is alert and oriented to person, place, and time. Mental status is at baseline.   Psychiatric:         Mood and Affect: Mood normal.         Behavior: Behavior normal.         Results Review:  I reviewed the patient's new clinical results.  I reviewed the patient's new imaging results and agree with the interpretation.  I reviewed the patient's other test results and agree with the interpretation  I personally viewed and interpreted the patient's EKG/Telemetry data      Lab Results (last 24 hours)       Procedure Component Value Units  Date/Time    CBC & Differential [[id number]]  (Normal) Collected: 06/25/25 1317    Specimen: Blood Updated: 06/25/25 1342    Narrative:      The following orders were created for panel order CBC & Differential.  Procedure                               Abnormality         Status                     ---------                               -----------         ------                     CBC Auto Differential[[id number]]        Normal              Final result                 Please view results for these tests on the individual orders.    Comprehensive Metabolic Panel [[id number]]  (Abnormal) Collected: 06/25/25 1317    Specimen: Blood Updated: 06/25/25 1338     Glucose 170 mg/dL      BUN 25.9 mg/dL      Creatinine 1.37 mg/dL      Sodium 129 mmol/L      Potassium 3.6 mmol/L      Chloride 96 mmol/L      CO2 21.2 mmol/L      Calcium 9.2 mg/dL      Total Protein 7.2 g/dL      Albumin 3.8 g/dL      ALT (SGPT) 76 U/L      AST (SGOT) 102 U/L      Alkaline Phosphatase 121 U/L      Total Bilirubin 1.8 mg/dL      Globulin 3.4 gm/dL      A/G Ratio 1.1 g/dL      BUN/Creatinine Ratio 18.9     Anion Gap 11.8 mmol/L      eGFR 43.5 mL/min/1.73     Narrative:      GFR Categories in Chronic Kidney Disease (CKD)              GFR Category          GFR (mL/min/1.73)    Interpretation  G1                    90 or greater        Normal or high (1)  G2                    60-89                Mild decrease (1)  G3a                   45-59                Mild to moderate decrease  G3b                   30-44                Moderate to severe decrease  G4                    15-29                Severe decrease  G5                    14 or less           Kidney failure    (1)In the absence of evidence of kidney disease, neither GFR category G1 or G2 fulfill the criteria for CKD.    eGFR calculation 2021 CKD-EPI creatinine equation, which does not include race as a factor    Lipase [[id number]]  (Normal) Collected: 06/25/25 1317    Specimen: Blood  Updated: 06/25/25 1338     Lipase 24 U/L     Lactic Acid, Plasma [[id number]]  (Abnormal) Collected: 06/25/25 1317    Specimen: Blood Updated: 06/25/25 1354     Lactate 2.2 mmol/L     CBC Auto Differential [[id number]]  (Normal) Collected: 06/25/25 1317    Specimen: Blood Updated: 06/25/25 1342     WBC 5.53 10*3/mm3      RBC 4.10 10*6/mm3      Hemoglobin 12.2 g/dL      Hematocrit 38.2 %      MCV 93.2 fL      MCH 29.8 pg      MCHC 31.9 g/dL      RDW 14.1 %      RDW-SD 48.5 fl      MPV 9.5 fL      Platelets 191 10*3/mm3     Manual Differential [[id number]]  (Abnormal) Collected: 06/25/25 1317    Specimen: Blood Updated: 06/25/25 1528     Neutrophil % 91.0 %      Lymphocyte % 7.0 %      Monocyte % 0.0 %      Eosinophil % 2.0 %      Basophil % 0.0 %      Neutrophils Absolute 5.26 10*3/mm3      Lymphocytes Absolute 0.40 10*3/mm3      Monocytes Absolute 0.00 10*3/mm3      Eosinophils Absolute 0.12 10*3/mm3      Basophils Absolute 0.00 10*3/mm3      RBC Morphology Normal     WBC Morphology Normal     Platelet Morphology Normal    Digoxin Level [[id number]]  (Normal) Collected: 06/25/25 1317    Specimen: Blood Updated: 06/25/25 1519     Digoxin 0.60 ng/mL     Hemoglobin A1c [[id number]] Collected: 06/25/25 1317    Specimen: Blood Updated: 06/25/25 2223    Urinalysis With Microscopic If Indicated (No Culture) - Urine, Clean Catch [[id number]]  (Abnormal) Collected: 06/25/25 1359    Specimen: Urine, Clean Catch Updated: 06/25/25 1408     Color, UA [place]     Appearance, UA Slightly Cloudy     pH, UA <=5.0     Specific Gravity, UA 1.015     Glucose,  mg/dL (Trace)     Ketones, UA Negative     Bilirubin, UA Small (1+)     Blood, UA Negative     Protein, UA 30 mg/dL (1+)     Leuk Esterase, UA Trace     Nitrite, UA Positive     Urobilinogen, UA 2.0 E.U./dL    Urinalysis, Microscopic Only - Urine, Clean Catch [[id number]]  (Abnormal) Collected: 06/25/25 1359    Specimen: Urine, Clean Catch Updated: 06/25/25 1541     RBC, UA 0-2  /HPF      WBC, UA 11-20 /HPF      Bacteria, UA 3+ /HPF      Squamous Epithelial Cells, UA 21-30 /HPF      Hyaline Casts, UA 3-6 /LPF      Methodology Manual Light Microscopy    [place] Urine Culture Tube - Urine, Clean Catch [[id number]] Collected: 06/25/25 1359    Specimen: Urine, Clean Catch Updated: 06/25/25 1500     Extra Tube Hold for add-ons.     Comment: Auto resulted.       Urine Culture - Urine, Urine, Clean Catch [[id number]] Collected: 06/25/25 1359    Specimen: Urine, Clean Catch Updated: 06/25/25 2145    STAT Lactic Acid, Reflex [[id number]]  (Normal) Collected: 06/25/25 1632    Specimen: Blood Updated: 06/25/25 1653     Lactate 1.5 mmol/L     POC Glucose Once [[id number]]  (Abnormal) Collected: 06/25/25 2219    Specimen: Blood Updated: 06/25/25 2221     Glucose 133 mg/dL     Blood Culture - Blood, Hand, Right [[id number]] Collected: 06/25/25 2239    Specimen: Blood from Hand, Right Updated: 06/25/25 2249            Imaging Results (Last 24 Hours)       Procedure Component Value Units Date/Time    CT Abdomen Pelvis Without Contrast - Preliminary [[id number]] Collected: 06/25/25 1429     Updated: 06/25/25 1429    This result has not been signed. Information might be incomplete.      Narrative:      CT OF THE ABDOMEN AND PELVIS WITHOUT CONTRAST 06/25/2025     HISTORY: Abdominal pain.     Spiral images were obtained from the lung bases to the symphysis pubis.  No intravenous or oral contrast was given.     There is fatty infiltration of the liver. Several gallstones are seen  layering dependently. No gallbladder inflammatory changes are seen. The  spleen, pancreas, adrenals and kidneys appear unremarkable. There is  some aortoiliac calcification. There is colonic diverticulosis. There is  mild wall thickening of the sigmoid colon but this may be related to  incomplete distention. No surrounding inflammatory changes are seen. No  free air or free fluid is seen.     Lumbar degenerative changes are seen.        Impression:      1. Fatty infiltration of the liver.  2. Cholelithiasis. No gallbladder inflammatory changes are seen.  3. Colonic diverticulosis. There is some mild wall thickening of the  sigmoid colon which may be related to incomplete distention. There could  conceivably be an element of mild sigmoid colitis in the proper clinical  setting. Please correlate.  4. No free air or free fluid is seen.     Radiation dose reduction techniques were utilized, including automated  exposure control and exposure modulation based on body size.                         Telemetry Scan   Final Result           Assessment/Plan     Active Hospital Problems    Diagnosis  POA    **Diverticulitis [K57.92]  Yes    Hyponatremia [E87.1]  Yes    Transaminitis [R74.01]  Yes    AUGUSTIN (acute kidney injury) [N17.9]  Yes    Type 2 diabetes mellitus with hyperglycemia, without long-term current use of insulin [E11.65]  Yes    Acute UTI (urinary tract infection) [N39.0]  Yes    Sepsis without acute organ dysfunction [A41.9]  Yes      Resolved Hospital Problems   No resolved problems to display.       Ms. Razo is a 64 y.o. female with history of diabetes presenting with intermittent left lower quadrant pain radiating to the right along with fevers and chills concerning for sepsis and CT scan showing questionable sigmoid colitis versus diverticulitis as well as abnormal UA despite recent treatment for UTI    Exact cause of the abdominal pain uncertain.  At this point based on the location and the CT I would have to lean toward diverticulitis though her abdominal exam is very benign.  Certainly will cover for that with antibiotics.  Doubt she has ongoing UTI given the obvious contamination of the urine specimen and the negative specimen she had on the 23rd although certainly the ABX mentioned above would cover for it.  Hyponatremia and mild AUGUSTIN noted.  Continue gentle hydration  For diabetes with just use correctional insulin for now.  Hold  metformin  Reviewed and reconciled home meds.  No obvious culprit toward hyponatremia other than Prozac.  I suspect will resolve with hydration but would hold Prozac if it does not,  Will need to monitor the liver enzymes closely.  Character of her abdominal pain and location is not consistent with gallbladder disease though there are stones noted on CT.  LFTs were normal in January when she was here last.  If numbers continue to increase could consider HIDA scan to evaluate further    VTE Prophylaxis - SCDs.  Code Status - Full code.       Joao Engel MD  [facility]  06/25/25  22:49 EDT

## 2025-06-27 ENCOUNTER — APPOINTMENT (OUTPATIENT)
Dept: CARDIOLOGY | Facility: HOSPITAL | Age: 65
End: 2025-06-27
Payer: COMMERCIAL

## 2025-06-27 ENCOUNTER — READMISSION MANAGEMENT (OUTPATIENT)
Dept: CALL CENTER | Facility: HOSPITAL | Age: 65
End: 2025-06-27
Payer: COMMERCIAL

## 2025-06-27 VITALS
OXYGEN SATURATION: 98 % | BODY MASS INDEX: 27.25 KG/M2 | DIASTOLIC BLOOD PRESSURE: 85 MMHG | WEIGHT: 159.61 LBS | HEART RATE: 85 BPM | SYSTOLIC BLOOD PRESSURE: 151 MMHG | HEIGHT: 64 IN | TEMPERATURE: 98.6 F | RESPIRATION RATE: 18 BRPM

## 2025-06-27 PROBLEM — N17.9 AKI (ACUTE KIDNEY INJURY): Status: RESOLVED | Noted: 2025-06-25 | Resolved: 2025-06-27

## 2025-06-27 LAB
ALBUMIN SERPL-MCNC: 3.3 G/DL (ref 3.5–5.2)
ALBUMIN/GLOB SERPL: 1.1 G/DL
ALP SERPL-CCNC: 147 U/L (ref 39–117)
ALT SERPL W P-5'-P-CCNC: 50 U/L (ref 1–33)
ANION GAP SERPL CALCULATED.3IONS-SCNC: 9.9 MMOL/L (ref 5–15)
AST SERPL-CCNC: 46 U/L (ref 1–32)
BACTERIA SPEC AEROBE CULT: NORMAL
BILIRUB SERPL-MCNC: 0.8 MG/DL (ref 0–1.2)
BUN SERPL-MCNC: 7 MG/DL (ref 8–23)
BUN/CREAT SERPL: 12.5 (ref 7–25)
CALCIUM SPEC-SCNC: 8.4 MG/DL (ref 8.6–10.5)
CHLORIDE SERPL-SCNC: 105 MMOL/L (ref 98–107)
CO2 SERPL-SCNC: 21.1 MMOL/L (ref 22–29)
CREAT SERPL-MCNC: 0.56 MG/DL (ref 0.57–1)
DEPRECATED RDW RBC AUTO: 49 FL (ref 37–54)
EGFRCR SERPLBLD CKD-EPI 2021: 102.7 ML/MIN/1.73
ERYTHROCYTE [DISTWIDTH] IN BLOOD BY AUTOMATED COUNT: 14.6 % (ref 12.3–15.4)
GLOBULIN UR ELPH-MCNC: 3 GM/DL
GLUCOSE BLDC GLUCOMTR-MCNC: 109 MG/DL (ref 70–130)
GLUCOSE BLDC GLUCOMTR-MCNC: 152 MG/DL (ref 70–130)
GLUCOSE SERPL-MCNC: 97 MG/DL (ref 65–99)
HCT VFR BLD AUTO: 31.3 % (ref 34–46.6)
HGB BLD-MCNC: 10.3 G/DL (ref 12–15.9)
MAGNESIUM SERPL-MCNC: 1.7 MG/DL (ref 1.6–2.4)
MCH RBC QN AUTO: 30 PG (ref 26.6–33)
MCHC RBC AUTO-ENTMCNC: 32.9 G/DL (ref 31.5–35.7)
MCV RBC AUTO: 91.3 FL (ref 79–97)
PHOSPHATE SERPL-MCNC: 1.9 MG/DL (ref 2.5–4.5)
PLATELET # BLD AUTO: 175 10*3/MM3 (ref 140–450)
PMV BLD AUTO: 9.4 FL (ref 6–12)
POTASSIUM SERPL-SCNC: 3.9 MMOL/L (ref 3.5–5.2)
PROT SERPL-MCNC: 6.3 G/DL (ref 6–8.5)
RBC # BLD AUTO: 3.43 10*6/MM3 (ref 3.77–5.28)
SODIUM SERPL-SCNC: 136 MMOL/L (ref 136–145)
WBC NRBC COR # BLD AUTO: 4.49 10*3/MM3 (ref 3.4–10.8)

## 2025-06-27 PROCEDURE — 93246 EXT ECG>7D<15D RECORDING: CPT

## 2025-06-27 PROCEDURE — 94799 UNLISTED PULMONARY SVC/PX: CPT

## 2025-06-27 PROCEDURE — 99214 OFFICE O/P EST MOD 30 MIN: CPT | Performed by: NURSE PRACTITIONER

## 2025-06-27 PROCEDURE — G0378 HOSPITAL OBSERVATION PER HR: HCPCS

## 2025-06-27 PROCEDURE — 85027 COMPLETE CBC AUTOMATED: CPT | Performed by: HOSPITALIST

## 2025-06-27 PROCEDURE — 25010000002 METRONIDAZOLE 500 MG/100ML SOLUTION

## 2025-06-27 PROCEDURE — 82948 REAGENT STRIP/BLOOD GLUCOSE: CPT

## 2025-06-27 PROCEDURE — 84100 ASSAY OF PHOSPHORUS: CPT | Performed by: HOSPITALIST

## 2025-06-27 PROCEDURE — 80053 COMPREHEN METABOLIC PANEL: CPT | Performed by: HOSPITALIST

## 2025-06-27 PROCEDURE — 94761 N-INVAS EAR/PLS OXIMETRY MLT: CPT

## 2025-06-27 PROCEDURE — 94664 DEMO&/EVAL PT USE INHALER: CPT

## 2025-06-27 PROCEDURE — 83735 ASSAY OF MAGNESIUM: CPT | Performed by: HOSPITALIST

## 2025-06-27 RX ORDER — CEFDINIR 300 MG/1
300 CAPSULE ORAL 2 TIMES DAILY
Qty: 14 CAPSULE | Refills: 0 | Status: SHIPPED | OUTPATIENT
Start: 2025-06-27 | End: 2025-07-04

## 2025-06-27 RX ORDER — LOPERAMIDE HYDROCHLORIDE 2 MG/1
4 CAPSULE ORAL ONCE
Status: COMPLETED | OUTPATIENT
Start: 2025-06-27 | End: 2025-06-27

## 2025-06-27 RX ORDER — METRONIDAZOLE 500 MG/1
500 TABLET ORAL 3 TIMES DAILY
Qty: 21 TABLET | Refills: 0 | Status: SHIPPED | OUTPATIENT
Start: 2025-06-27 | End: 2025-07-04

## 2025-06-27 RX ADMIN — CETIRIZINE HYDROCHLORIDE 10 MG: 10 TABLET, FILM COATED ORAL at 08:13

## 2025-06-27 RX ADMIN — METRONIDAZOLE 500 MG: 500 INJECTION, SOLUTION INTRAVENOUS at 09:54

## 2025-06-27 RX ADMIN — CHOLECALCIFEROL (VITAMIN D3) 10 MCG (400 UNIT) TABLET 400 UNITS: at 08:13

## 2025-06-27 RX ADMIN — BISOPROLOL FUMARATE 10 MG: 5 TABLET ORAL at 08:13

## 2025-06-27 RX ADMIN — ACETAMINOPHEN 650 MG: 325 TABLET, FILM COATED ORAL at 06:15

## 2025-06-27 RX ADMIN — PANTOPRAZOLE SODIUM 40 MG: 40 TABLET, DELAYED RELEASE ORAL at 06:15

## 2025-06-27 RX ADMIN — BUDESONIDE AND FORMOTEROL FUMARATE DIHYDRATE 2 PUFF: 160; 4.5 AEROSOL RESPIRATORY (INHALATION) at 09:40

## 2025-06-27 RX ADMIN — VALSARTAN 80 MG: 80 TABLET ORAL at 08:13

## 2025-06-27 RX ADMIN — LOPERAMIDE HYDROCHLORIDE 4 MG: 2 CAPSULE ORAL at 11:41

## 2025-06-27 RX ADMIN — LEVOTHYROXINE SODIUM 50 MCG: 50 TABLET ORAL at 06:15

## 2025-06-27 RX ADMIN — FLUOXETINE 60 MG: 20 CAPSULE ORAL at 08:13

## 2025-06-27 RX ADMIN — METRONIDAZOLE 500 MG: 500 INJECTION, SOLUTION INTRAVENOUS at 02:17

## 2025-06-27 RX ADMIN — Medication 2 PACKET: at 09:54

## 2025-06-27 NOTE — PLAN OF CARE
Goal Outcome Evaluation:  Plan of Care Reviewed With: patient        Progress: improving  Outcome Evaluation: pt stable. Pt being discharged.

## 2025-06-27 NOTE — OUTREACH NOTE
Interval History: Overnight no issues, no acute events. This am lying comfortably in bed, endorses continued pain in B LE but weakness has improved significantly and he is able to walk to the bathroom and back. Says his urine is still dark but not red.     Scheduled Meds:   Continuous Infusions:   sodium chloride 0.9% 250 mL/hr at 08/09/17 0630     PRN Meds:    Review of Systems  Objective:     Vital Signs (Most Recent):  Temp: 97.2 °F (36.2 °C) (08/09/17 0742)  Pulse: 70 (08/09/17 0742)  Resp: 18 (08/09/17 0742)  BP: (!) 113/51 (08/09/17 0742)  SpO2: 100 % (08/09/17 0742) Vital Signs (24h Range):  Temp:  [97.2 °F (36.2 °C)-98.6 °F (37 °C)] 97.2 °F (36.2 °C)  Pulse:  [52-80] 70  Resp:  [18-20] 18  SpO2:  [97 %-100 %] 100 %  BP: (113-187)/(51-96) 113/51     Patient Vitals for the past 72 hrs (Last 3 readings):   Weight   08/08/17 1325 97.5 kg (215 lb)     Body mass index is 27.6 kg/m².    Intake/Output - Last 3 Shifts       08/07 0700 - 08/08 0659 08/08 0700 - 08/09 0659 08/09 0700 - 08/10 0659    P.O.  2800     I.V. (mL/kg)  1786 (18.3)     Total Intake(mL/kg)  4586 (47)     Urine (mL/kg/hr)  5400     Total Output   5400      Net   -814             Urine Occurrence  5 x           Lines/Drains/Airways     Peripheral Intravenous Line                 Peripheral IV - Single Lumen 08/08/17 1403 Right Antecubital less than 1 day                Physical Exam   Constitutional: He is oriented to person, place, and time. He appears well-developed and well-nourished. No distress.   Laying comfortably in bed, in no distress   HENT:   Head: Normocephalic and atraumatic.   Eyes: EOM are normal. Right eye exhibits no discharge. Left eye exhibits no discharge.   Neck: Normal range of motion. Neck supple.   Cardiovascular: Normal rate, regular rhythm, normal heart sounds and intact distal pulses.    Pulmonary/Chest: Effort normal and breath sounds normal. No respiratory distress.   Abdominal: Soft. Bowel sounds are normal. He  Prep Survey      Flowsheet Row Responses   [facility] patient discharged from? [place]   Is LACE score < 7 ? Yes   Eligibility [facility]   Date of Admission 06/25/25   Date of Discharge 06/27/25   Discharge diagnosis Diverticulitis  Sepsis without acute organ dysfunction   Does the patient have one of the following disease processes/diagnoses(primary or secondary)? Sepsis   Prep survey completed? Yes            Patricia JOE - Registered Nurse           exhibits no distension. There is no tenderness. There is no guarding.   Musculoskeletal: Normal range of motion. He exhibits no edema.   Neurological: He is alert and oriented to person, place, and time.   Skin: Skin is warm. Capillary refill takes less than 2 seconds. He is not diaphoretic.       Significant Labs:  No results for input(s): POCTGLUCOSE in the last 48 hours.    BMP:   Recent Labs  Lab 08/09/17  0031 08/09/17  0521 08/09/17  0801   GLU 99 103 92    142 143   K 4.2 5.0 4.9    110 111*   CO2 24 27 24   BUN 11 11 10   CREATININE 0.9 0.9 0.9   CALCIUM 8.7 8.9 8.8   MG 2.2 2.1 2.1     CBC:   Recent Labs  Lab 08/08/17  1359   WBC 16.82*   HGB 14.7   HCT 41.4        C  CK: 2135 --> 4228 --> 4706 --> 5159 --> 4501 --> 3871    EKG: Sinus rhythm with sinus arrhythmia, intraventricular conduction delay, possible RVH.

## 2025-06-27 NOTE — PLAN OF CARE
Goal Outcome Evaluation:  Plan of Care Reviewed With: patient        Progress: improving  Outcome Evaluation: vss. no c/o pain. ivfs dc. iv abx. cardiology to see today. family at bedside. resting well throughout shift.         Problem: Adult Inpatient Plan of Care  Goal: Plan of Care Review  Outcome: Progressing  Flowsheets (Taken 6/27/2025 0412)  Progress: improving  Outcome Evaluation: vss. no c/o pain. ivfs dc. iv abx. cardiology to see today. family at bedside. resting well throughout shift.  Plan of Care Reviewed With: patient  Goal: Patient-Specific Goal (Individualized)  Outcome: Progressing  Goal: Absence of Hospital-Acquired Illness or Injury  Outcome: Progressing  Intervention: Identify and Manage Fall Risk  Recent Flowsheet Documentation  Taken 6/27/2025 0408 by Sabina Vazquez RN  Safety Promotion/Fall Prevention: safety round/check completed  Taken 6/27/2025 0217 by Sabina Vazquez RN  Safety Promotion/Fall Prevention: safety round/check completed  Taken 6/27/2025 0023 by Sabina Vazquez RN  Safety Promotion/Fall Prevention: safety round/check completed  Taken 6/26/2025 2200 by Sabina Vazquez RN  Safety Promotion/Fall Prevention: safety round/check completed  Taken 6/26/2025 2040 by Sabina Vazquez RN  Safety Promotion/Fall Prevention: safety round/check completed  Intervention: Prevent Skin Injury  Recent Flowsheet Documentation  Taken 6/26/2025 2040 by Sabina Vazquez RN  Body Position: position changed independently  Intervention: Prevent and Manage VTE (Venous Thromboembolism) Risk  Recent Flowsheet Documentation  Taken 6/26/2025 2040 by Sabina Vazquez RN  VTE Prevention/Management:   bilateral   SCDs (sequential compression devices) off   patient refused intervention  Goal: Optimal Comfort and Wellbeing  Outcome: Progressing  Intervention: Provide Person-Centered Care  Recent Flowsheet Documentation  Taken 6/26/2025 2040 by Sabina Vazquez RN  Trust  Relationship/Rapport:   care explained   choices provided   emotional support provided   empathic listening provided  Goal: Readiness for Transition of Care  Outcome: Progressing     Problem: Comorbidity Management  Goal: Maintenance of Asthma Control  Outcome: Progressing  Intervention: Maintain Asthma Symptom Control  Recent Flowsheet Documentation  Taken 6/27/2025 0217 by Sabina Vazquez, RN  Medication Review/Management: medications reviewed  Taken 6/26/2025 2040 by Sabina Vazquez, RN  Medication Review/Management: medications reviewed  Goal: Blood Glucose Level Within Target Range  Outcome: Progressing  Intervention: Monitor and Manage Glycemia  Recent Flowsheet Documentation  Taken 6/27/2025 0217 by Sabina Vazquez, RN  Medication Review/Management: medications reviewed  Taken 6/26/2025 2040 by Sabina Vazquez, RN  Medication Review/Management: medications reviewed     Problem: Fall Injury Risk  Goal: Absence of Fall and Fall-Related Injury  Outcome: Progressing  Intervention: Identify and Manage Contributors  Recent Flowsheet Documentation  Taken 6/27/2025 0217 by Sabina Vazquez, RN  Medication Review/Management: medications reviewed  Taken 6/26/2025 2040 by Sabina Vazquez, RN  Medication Review/Management: medications reviewed  Intervention: Promote Injury-Free Environment  Recent Flowsheet Documentation  Taken 6/27/2025 0408 by Sabina Vazquez, RN  Safety Promotion/Fall Prevention: safety round/check completed  Taken 6/27/2025 0217 by Sabina Vazquez, RN  Safety Promotion/Fall Prevention: safety round/check completed  Taken 6/27/2025 0023 by Sabina Vazquez, RN  Safety Promotion/Fall Prevention: safety round/check completed  Taken 6/26/2025 2200 by Sabina Vazquez, RN  Safety Promotion/Fall Prevention: safety round/check completed  Taken 6/26/2025 2040 by Sabina Vazquez, RN  Safety Promotion/Fall Prevention: safety round/check completed

## 2025-06-27 NOTE — PROGRESS NOTES
Name: Jess Razo ADMIT: 2025   : 1960  PCP: Olamide Cobos PA-C    MRN: [id number] LOS: 1 days   AGE/SEX: 64 y.o. female  ROOM: [facility]     Subjective   Subjective   Complaining of diarrhea otherwise feeling better.  No further abdominal pain.  No chest pain or palpitations.       Objective   Objective   Vital Signs  Temp:  [97.3 °F (36.3 °C)-98.6 °F (37 °C)] 98.6 °F (37 °C)  Heart Rate:  [69-87] 79  Resp:  [18] 18  BP: (113-151)/(67-85) 151/85  Body mass index is 27.4 kg/m².    Physical Exam  Vitals and nursing note reviewed.   Constitutional:       Appearance: She is not toxic-appearing.   Cardiovascular:      Rate and Rhythm: Normal rate and regular rhythm.   Pulmonary:      Effort: Pulmonary effort is normal.      Breath sounds: Normal breath sounds.   Abdominal:      General: Bowel sounds are normal.      Palpations: Abdomen is soft.      Tenderness: There is abdominal tenderness. There is no guarding.   Musculoskeletal:         General: No swelling.   Skin:     General: Skin is warm and dry.   Neurological:      Mental Status: She is alert. Mental status is at baseline.         Results Review       I reviewed the patient's new clinical results.  Results from last 7 days   Lab Units 25  0556 25  1317   WBC 10*3/mm3 4.49 5.44 5.53   HEMOGLOBIN g/dL 10.3* 10.0* 12.2   PLATELETS 10*3/mm3 175 145 191     Results from last 7 days   Lab Units 25  0556 25  1317   SODIUM mmol/L 136 132* 129*   POTASSIUM mmol/L 3.9 3.7 3.6   CHLORIDE mmol/L 105 101 96*   CO2 mmol/L 21.1* 19.0* 21.2*   BUN mg/dL 7.0* 16.0 25.9*   CREATININE mg/dL 0.56* 0.76 1.37*   GLUCOSE mg/dL 97 118* 170*   EGFR mL/min/1.73 102.7 88.2 43.5*   ALBUMIN g/dL 3.3*  --  3.8   BILIRUBIN mg/dL 0.8  --  1.8*   ALK PHOS U/L 147*  --  121*   AST (SGOT) U/L 46*  --  102*   ALT (SGPT) U/L 50*  --  76*         I have personally reviewed all medications:  Scheduled Medications  [Held by  provider] atorvastatin, 80 mg, Oral, Daily  bisoprolol, 10 mg, Oral, Daily  brimonidine, 1 drop, Both Eyes, Nightly   And  timolol, 1 drop, Both Eyes, Nightly  budesonide-formoterol, 2 puff, Inhalation, BID - RT  cefTRIAXone, 2,000 mg, Intravenous, Q24H  cetirizine, 10 mg, Oral, Daily  cholecalciferol, 400 Units, Oral, Daily  digoxin, 125 mcg, Oral, Nightly  FLUoxetine, 60 mg, Oral, Daily  insulin lispro, 2-7 Units, Subcutaneous, 4x Daily AC & at Bedtime  latanoprost, 1 drop, Both Eyes, Nightly  levothyroxine, 50 mcg, Oral, Q AM  metroNIDAZOLE, 500 mg, Intravenous, Q8H  pantoprazole, 40 mg, Oral, Q AM  valsartan, 80 mg, Oral, Daily    Infusions     Diet  Diet: Regular/House, Gastrointestinal; Fiber-Restricted; Fluid Consistency: Thin (IDDSI 0)    I have personally reviewed:  [x]  Laboratory   [x]  Microbiology   [x]  Radiology   [x]  EKG/Telemetry  [x]  Cardiology/Vascular   []  Pathology    []  Records         Assessment/Plan     Active Hospital Problems    Diagnosis  POA    **Diverticulitis [K57.92]  Yes    Hyponatremia [E87.1]  Yes    Transaminitis [R74.01]  Yes    AUGUSTIN (acute kidney injury) [N17.9]  Yes    Type 2 diabetes mellitus with hyperglycemia, without long-term current use of insulin [E11.65]  Yes    Acute UTI (urinary tract infection) [N39.0]  Yes    Sepsis without acute organ dysfunction [A41.9]  Yes      Resolved Hospital Problems   No resolved problems to display.       Ms. Razo is a 64 y.o. female with history of diabetes presenting with intermittent left lower quadrant pain radiating to the right along with fevers and chills concerning for sepsis and CT scan showing questionable sigmoid colitis versus diverticulitis as well as abnormal UA despite recent treatment for UTI.    Afebrile x 24 hours  Vital signs stable  Normal O2 sats  CBC stable  Creatinine 0.56 down from 1.37 on admission  LFTs improving  Phosphorus 1.9  Urine culture mixed urogenital darion  Blood cultures negative      Abdominal pain  resolved.  Having diarrhea but tolerating a diet.  No nausea or vomiting.  Feels well enough to go home.    Back in sinus rhythm.  Cardiology ordering ZIO monitor for discharge and will follow-up with her normal cardiologist at [facility].    Gallstones felt to be asymptomatic       SCDs for DVT prophylaxis.  Full code.  Discussed with patient, spouse, and care team on multidisciplinary rounds.  Anticipate discharge home today  Expected Discharge Date: 6/27/2025; Expected Discharge Time:       Amarjit Mueller MD  [facility]  06/27/25  08:20 EDT

## 2025-06-27 NOTE — DISCHARGE SUMMARY
Patient Name: Jess Razo  : 1960  MRN: [id number]    Date of Admission: 2025  Date of Discharge:  2025  Primary Care Physician: Olamide Cobos PA-C      Chief Complaint:   Abdominal Pain, Vomiting, and Nausea      Discharge Diagnoses     Active Hospital Problems    Diagnosis  POA    **Diverticulitis [K57.92]  Yes    Hyponatremia [E87.1]  Yes    Transaminitis [R74.01]  Yes    Type 2 diabetes mellitus with hyperglycemia, without long-term current use of insulin [E11.65]  Yes    Acute UTI (urinary tract infection) [N39.0]  Yes    Sepsis without acute organ dysfunction [A41.9]  Yes      Resolved Hospital Problems    Diagnosis Date Resolved POA    AUGUSTIN (acute kidney injury) [N17.9] 2025 Yes        Hospital Course     Ms. Razo is a 64-year-old female with a history of diabetes, HTN and recent right hemicolectomy for colon cancer who presented with intermittent left lower quadrant abdominal pain radiating to the right, fevers, and chills. She had a recent UTI treated with a full course of Macrobid, but symptoms recurred. She reported recent dietary indiscretion and dehydration. CT abdomen and pelvis in the ED showed fatty liver, cholelithiasis, and mild sigmoid colitis. Labs were notable for leukocytosis, elevated creatinine, and mixed darion on urine culture. She was empirically treated with Rocephin and Flagyl for suspected diverticulitis. She remained febrile on hospital day 2 but had improving abdominal pain and tolerated advancement in her diet. Her creatinine and sodium improved with IV fluids. Cardiology was consulted due to EKG abnormalities showing possible non-conducted PACs and brief atrial flutter on telemetry. She was discharged in sinus rhythm with ZIO monitor arranged for outpatient follow-up. Symptoms improved and she was discharged on additional days of antibiotics with plans for low-fiber diet and follow-up with her PCP and cardiologist.       Day of Discharge      Subjective:  See today's progress note    Physical Exam:  Temp:  [97.3 °F (36.3 °C)-98.6 °F (37 °C)] 98.6 °F (37 °C)  Heart Rate:  [69-87] 85  Resp:  [18] 18  BP: (113-151)/(67-85) 151/85  Body mass index is 27.4 kg/m².  Physical Exam    Consultants     Consult Orders (all) (From admission, onward)       Start     Ordered    06/27/25 1027  Inpatient Nutrition Consult  Once        Provider:  (Not yet assigned)    06/27/25 1026    06/26/25 1751  Inpatient Cardiology Consult  Once        Specialty:  Cardiology  Provider:  Vu Carpenter MD    06/26/25 1751    06/25/25 2342  Inpatient Case Management  Consult  Once        Provider:  (Not yet assigned)    06/25/25 2345            Procedures     Imaging Results (All)       Procedure Component Value Units Date/Time    CT Abdomen Pelvis Without Contrast [[id number]] Collected: 06/25/25 1429     Updated: 06/26/25 1403    Narrative:      CT OF THE ABDOMEN AND PELVIS WITHOUT CONTRAST 06/25/2025     HISTORY: Abdominal pain.     Spiral images were obtained from the lung bases to the symphysis pubis.  No intravenous or oral contrast was given.     There is fatty infiltration of the liver. Several gallstones are seen  layering dependently. No gallbladder inflammatory changes are seen. The  spleen, pancreas, adrenals and kidneys appear unremarkable. There is  some aortoiliac calcification. There is colonic diverticulosis. There is  mild wall thickening of the sigmoid colon but this may be related to  incomplete distention. No surrounding inflammatory changes are seen. No  free air or free fluid is seen.     Lumbar degenerative changes are seen.       Impression:      1. Fatty infiltration of the liver.  2. Cholelithiasis. No gallbladder inflammatory changes are seen.  3. Colonic diverticulosis. There is some mild wall thickening of the  sigmoid colon which may be related to incomplete distention. There could  conceivably be an element of mild sigmoid colitis  "in the proper clinical  setting. Please correlate.  4. No free air or free fluid is seen.            Pertinent Labs     Results from last 7 days   Lab Units 06/27/25  0556 06/25/25 2239 06/25/25  1317   WBC 10*3/mm3 4.49 5.44 5.53   HEMOGLOBIN g/dL 10.3* 10.0* 12.2   PLATELETS 10*3/mm3 175 145 191     Results from last 7 days   Lab Units 06/27/25  0556 06/25/25 2239 06/25/25  1317   SODIUM mmol/L 136 132* 129*   POTASSIUM mmol/L 3.9 3.7 3.6   CHLORIDE mmol/L 105 101 96*   CO2 mmol/L 21.1* 19.0* 21.2*   BUN mg/dL 7.0* 16.0 25.9*   CREATININE mg/dL 0.56* 0.76 1.37*   GLUCOSE mg/dL 97 118* 170*   EGFR mL/min/1.73 102.7 88.2 43.5*     Results from last 7 days   Lab Units 06/27/25  0556 06/25/25  1317   ALBUMIN g/dL 3.3* 3.8   BILIRUBIN mg/dL 0.8 1.8*   ALK PHOS U/L 147* 121*   AST (SGOT) U/L 46* 102*   ALT (SGPT) U/L 50* 76*     Results from last 7 days   Lab Units 06/27/25  0556 06/25/25 2239 06/25/25  1317   CALCIUM mg/dL 8.4* 8.0* 9.2   ALBUMIN g/dL 3.3*  --  3.8   MAGNESIUM mg/dL 1.7  --   --    PHOSPHORUS mg/dL 1.9*  --   --      Results from last 7 days   Lab Units 06/25/25  1317   LIPASE U/L 24     Results from last 7 days   Lab Units 06/25/25  1317   DIGOXIN LVL ng/mL 0.60           Invalid input(s): \"LDLCALC\"  Results from last 7 days   Lab Units 06/25/25  2351 06/25/25 2239 06/25/25  1359 06/23/25  1539   BLOODCX  No growth at 24 hours No growth at 24 hours  --   --    URINECX   --   --  25,000 CFU/mL Normal Urogenital Britta  --    URINE CULTURE   --   --   --  Final report         Test Results Pending at Discharge     Pending Results       Procedure [Order ID] Specimen - Date/Time    Holter Monitor - 72 Hour Up To 15 Days - In process [[id number]] Resulted: 06/27/25 1146     Updated: 06/27/25 1146    This result has not been signed. Information might be incomplete.      Phosphorus [[id number]]     Specimen: Blood               Discharge Details        Discharge Medications        New Medications        " Instructions Start Date   cefdinir 300 MG capsule  Commonly known as: OMNICEF   300 mg, Oral, 2 Times Daily      metroNIDAZOLE 500 MG tablet  Commonly known as: FLAGYL   500 mg, Oral, 3 Times Daily             Changes to Medications        Instructions Start Date   FLUoxetine 20 MG capsule  Commonly known as: PROzac  What changed: how much to take   60 mg, Oral, Daily, For anxiety, new dose      metFORMIN  MG 24 hr tablet  Commonly known as: GLUCOPHAGE-XR  What changed: when to take this   500 mg, Oral, Daily, With food for type 2 diabetes             Continue These Medications        Instructions Start Date   atorvastatin 80 MG tablet  Commonly known as: LIPITOR   80 mg, Oral, Daily, for cholesterol      bimatoprost 0.03 % ophthalmic drops  Commonly known as: LUMIGAN   INSTILL 1 DROP INTO EACH EYE AT BEDTIME      bisoprolol 10 MG tablet  Commonly known as: ZEBeta   10 mg, Daily      calcium carbonate 1250 (500 Ca) MG tablet  Commonly known as: OS-YOON   1 tablet, Daily      Combigan 0.2-0.5 % ophthalmic solution  Generic drug: brimonidine-timolol   Administer 1 drop to both eyes Every Night.      digoxin 250 MCG tablet  Commonly known as: LANOXIN   250 mcg, Nightly      esomeprazole 20 MG capsule  Commonly known as: nexIUM   20 mg, Every Morning Before Breakfast      Fluticasone-Salmeterol 250-50 MCG/ACT DISKUS  Commonly known as: ADVAIR/WIXELA   1 puff, Inhalation, 2 Times Daily - RT, For asthma rinse mouth after use      Garlic 1000 MG capsule   1,000 mg, Daily      ibuprofen 400 MG tablet  Commonly known as: ADVIL,MOTRIN   400 mg, Every 6 Hours PRN      levothyroxine 50 MCG tablet  Commonly known as: SYNTHROID, LEVOTHROID   TAKE 1 TABLET BY MOUTH BEFORE BREAKFAST. DO NOT TAKE ON SUNDAYS.      multivitamin tablet  Generic drug: multivitamin   1 tablet, Daily      omeprazole 20 MG capsule  Commonly known as: priLOSEC   20 mg, Daily      phenazopyridine 200 MG tablet  Commonly known as: Pyridium   One PO TID  PC PRN urinary pressure      UP4 PROBIOTICS ADULT PO   Take  by mouth.      valsartan 80 MG tablet  Commonly known as: Diovan   80 mg, Oral, Daily, For blood pressure and on lower dose of 80 mg daily for now due to weight loss      Vitamin D3 10 MCG (400 UNIT) capsule   1 capsule, Daily      vitamin E 1000 UNIT capsule   1,000 Units, Daily             Stop These Medications      albuterol sulfate  (90 Base) MCG/ACT inhaler  Commonly known as: PROVENTIL HFA;VENTOLIN HFA;PROAIR HFA     cetirizine 10 MG tablet  Commonly known as: zyrTEC     Dulaglutide 0.75 MG/0.5ML solution auto-injector     nystatin-triamcinolone 949582-6.1 UNIT/GM-% ointment  Commonly known as: MYCOLOG              Allergies   Allergen Reactions    Morphine Anxiety and Confusion    Levaquin [Levofloxacin] Myalgia    Adhesive Tape Rash    Ampicillin Hives and Rash    Ceclor [Cefaclor] Hives and Rash    Ciprofloxacin Hives and Rash    Codeine Phosphate Rash    Erythromycin Rash     Can take Biaxin and zithromax    Ketek [Telithromycin] Rash    Penicillins Rash     ..Beta lactam allergy details  Antibiotic reaction: other (RED FACE)  Age at reaction: adult  Dose to reaction time: unknown  Reason for antibiotic: unknown  Epinephrine required for reaction?: no  Tolerated antibiotics: other (I HAVEN'T TAKEN ANY OF IT IN YEARS.)       Tetracyclines & Related Rash    Ultram [Tramadol] Rash       Discharge Disposition:  Home or Self Care      Discharge Diet:  Diet Order   Procedures    Diet: Regular/House, Gastrointestinal; Fiber-Restricted; Fluid Consistency: Thin (IDDSI 0)       Discharge Activity:   Activity Instructions       Activity as Tolerated              CODE STATUS:    Code Status and Medical Interventions: CPR (Attempt to Resuscitate); Full   Ordered at: 06/25/25 2123     Code Status (Patient has no pulse and is not breathing):    CPR (Attempt to Resuscitate)     Medical Interventions (Patient has pulse or is breathing):    Full       No  future appointments.  Additional Instructions for the Follow-ups that You Need to Schedule       Discharge Follow-up with PCP   As directed       Currently Documented PCP:    Olamide Cobos PA-C    PCP Phone Number:    [phone]     Follow Up Details: 1 to 2 weeks (or sooner if problems)               Follow-up Information       Olamide Cobos PA-C .    Specialty: Family Medicine  Why: 1 to 2 weeks (or sooner if problems)  Contact information:  [address] [address]  [phone]                             Additional Instructions for the Follow-ups that You Need to Schedule       Discharge Follow-up with PCP   As directed       Currently Documented PCP:    Olamide Cobos PA-C    PCP Phone Number:    [phone]     Follow Up Details: 1 to 2 weeks (or sooner if problems)            Time Spent on Discharge:  Greater than 30 minutes      Amarjit Mueller MD  [facility]  06/27/25  11:23 EDT

## 2025-06-27 NOTE — CONSULTS
Electrophysiology Hospital Consult            Patient Name: Jess Razo  Age/Sex: 64 y.o. female  : 1960  MRN: [id number]    Date of Admission: 2025  Date of Encounter Visit: 25  Encounter Provider: MARILYN Swain  Referring Provider: Olamide Cobos PA-C  Place of Service: [facility] CARDIOLOGY  Patient Care Team:  Olamide Cobos PA-C as PCP - General (Family Medicine)  William Arnold MD as Consulting Physician (Hematology and Oncology)  Leroy Trivedi MD as Consulting Physician (Cardiology)  Nay Fine APRN as Nurse Practitioner (Gynecology)  Sara Starks MD as Consulting Physician (Orthopedic Surgery)  Marly Valencia MD as Consulting Physician (Gastroenterology)  Sabi Mars MD as Consulting Physician (General Surgery)  Tesfaye Mariee MD as Consulting Physician (Dermatology)      Subjective:   EP Consultation for: abnormal EKG    Chief Complaint: abdominal pain, nausea, vomiting    History of Present Illness:  Jess Razo is a 64 y.o. female with a past medical history of breast cancer (2006 s/p surgery, chemo, radiation), inappropriate sinus tachycardia, HTN, and mild cardiomyopathy who follows at [facility].    She takes digoxin and bisoprolol for her inappropriate sinus tachycardia.    She presented to the ED with LLQ pain and fever.      Recently diagnosed with UTI for which she was given Macrobid.  She has completed the course of antibiotics.    CT of her abdomen showed gallstones as well as possible sigmoid colitis.  She was admitted and started on ceftriaxone and Flagyl.    Part of her workup included an EKG, which was found to be abnormal.        EP was asked to evaluate.    Her EKG was labeled as intermittent 2:1 heart block, however upon review this more resembles sinus rhythm with non-conducted PACs.     On telemetry review, she did have a short episode of atrial flutter yesterday that she was unaware  of.  She denies a history of atrial fibrillation or atrial flutter.    She denies chest pain, palpitations, shortness of breath, dizziness, or syncope.     Past Medical History:  Past Medical History:   Diagnosis Date    Allergic     Anxiety     Asthma     Breast cancer     Colon polyp     Took out    Diabetes mellitus, type 2     Esophageal reflux     Eye exam, routine 05/2015    Fatty liver     Fibrocystic breast     Generalized anxiety disorder     Glaucoma     HL (hearing loss) right ear feels full    Hyperlipidemia     Hypertension, essential     Hypothyroidism (acquired)     Irritable bowel syndrome     Lactose intolerance     Liver disease     Lymphedema     Osteoarthritis, multiple sites     Osteopenia     Pap smear, as part of routine gynecological examination 03/2015    normal, Dr. aNy Fine    Paroxysmal tachycardia     Positive colorectal cancer screening using Cologuard test     Visual impairment        Past Surgical History:   Procedure Laterality Date    BLADDER SURGERY      w/ hysterectomy    BREAST BIOPSY Left     BREAST CYST ASPIRATION      BREAST LUMPECTOMY Left     COLON RESECTION N/A 01/07/2025    Procedure: Laparoscopic right colon resection;  Surgeon: Sabi Mars MD;  Location: [facility] MAIN OR;  Service: General;  Laterality: N/A;    COLONOSCOPY      COLONOSCOPY N/A 06/25/2020    Procedure: COLONOSCOPY TO CECUM WITH HOT SNARE AND COLD BIOPSY POLYPECTOMIES;  Surgeon: Sabi Mars MD;  Location: [facility] ENDOSCOPY;  Service: General;  Laterality: N/A;  pre: positive cologuard  post: diverticulosis and polyps    COLONOSCOPY N/A 10/26/2023    Procedure: COLONOSCOPY to cecum with hot snare polypectomies and cold biopsy polypectomy with tattoo;  Surgeon: Sabi Mars MD;  Location: [facility] ENDOSCOPY;  Service: General;  Laterality: N/A;  Pre: hx polyps  post: polyps, diverticulosis    COLONOSCOPY  05/02/2024    COLONOSCOPY N/A 05/02/2024    Procedure: COLONOSCOPY with cold biopsy  polypectomies and clip placement x1;  Surgeon: Sabi Mars MD;  Location: [facility] ENDOSCOPY;  Service: General;  Laterality: N/A;  preop-hx of colon polyps; family hx of colon cancer  postop-diverticulosis; polyps    COLONOSCOPY N/A 11/14/2024    Procedure: COLONOSCOPY into cecum with cold bx polypectomies;  Surgeon: Sabi Mars MD;  Location: [facility] ENDOSCOPY;  Service: General;  Laterality: N/A;  pre: persoanl hx of colon polyps, family hx of colon cancer   post: diverticulosis, polyps    DIAGNOSTIC LAPAROSCOPY      due to endometriosis    EYE SURGERY      HYSTERECTOMY      MAMMO BREAST SPECIMEN UNILATERAL  03/2014    normal    SEPTOPLASTY      TONSILLECTOMY      TUBAL ABDOMINAL LIGATION      VAGINAL HYSTERECTOMY         Home Medications:   Medications Prior to Admission   Medication Sig Dispense Refill Last Dose/Taking    atorvastatin (LIPITOR) 80 MG tablet TAKE 1 TABLET BY MOUTH ONCE DAILY FOR CHOLESTEROL 90 tablet 0 6/25/2025 Morning    bimatoprost (LUMIGAN) 0.03 % ophthalmic drops INSTILL 1 DROP INTO EACH EYE AT BEDTIME   6/24/2025 Evening    bisoprolol (ZEBeta) 10 MG tablet Take 1 tablet by mouth Daily.   6/25/2025 Morning    calcium carbonate (OS-YOON) 1250 (500 CA) MG tablet Take 1 tablet by mouth Daily.   6/24/2025 Evening    Cholecalciferol (VITAMIN D3) 400 UNITS capsule Take 1 capsule by mouth Daily.   6/24/2025 Evening    Combigan 0.2-0.5 % ophthalmic solution Administer 1 drop to both eyes Every Night.   6/25/2025 Morning    digoxin (LANOXIN) 250 MCG tablet Take 1 tablet by mouth Every Night. (Patient taking differently: Take 0.5 tablets by mouth Every Night.)   6/24/2025 Evening    esomeprazole (nexIUM) 20 MG capsule Take 1 capsule by mouth Every Morning Before Breakfast.   6/25/2025 Morning    FLUoxetine (PROzac) 20 MG capsule Take 3 capsules by mouth Daily. For anxiety, new dose (Patient taking differently: Take 50 mg by mouth Daily. For anxiety, new dose) 270 capsule 3 6/24/2025     Fluticasone-Salmeterol (ADVAIR/WIXELA) 250-50 MCG/ACT DISKUS Inhale 1 puff 2 (Two) Times a Day. For asthma rinse mouth after use 180 each 3 6/24/2025 Evening    Garlic 1000 MG capsule Take 1 capsule by mouth Daily. HOLD PER MD INSTR   6/24/2025 Evening    ibuprofen (ADVIL,MOTRIN) 400 MG tablet Take 1 tablet by mouth Every 6 (Six) Hours As Needed for Mild Pain. HOLD PER MD INSTR   6/24/2025 Evening    levothyroxine (SYNTHROID, LEVOTHROID) 50 MCG tablet TAKE 1 TABLET BY MOUTH BEFORE BREAKFAST. DO NOT TAKE ON SUNDAYS. 90 tablet 0 6/25/2025 Morning    metFORMIN ER (GLUCOPHAGE-XR) 500 MG 24 hr tablet Take 1 tablet by mouth Daily. With food for type 2 diabetes (Patient taking differently: Take 1 tablet by mouth Every Night. With food for type 2 diabetes) 90 tablet 3 Past Week    Multiple Vitamin (MULTIVITAMIN) tablet Take 1 tablet by mouth Daily. HOLD PER MD INSTR   6/25/2025 Morning    omeprazole (priLOSEC) 20 MG capsule Take 1 capsule by mouth Daily.   6/25/2025 Morning    Probiotic Product (UP4 PROBIOTICS ADULT PO) Take  by mouth.   6/25/2025 Morning    valsartan (Diovan) 80 MG tablet Take 1 tablet by mouth Daily. For blood pressure and on lower dose of 80 mg daily for now due to weight loss 30 tablet 0 6/25/2025 Morning    vitamin E 1000 UNIT capsule Take 1 capsule by mouth Daily. HOLD PER MD INSTR   6/24/2025 Evening    albuterol sulfate  (90 Base) MCG/ACT inhaler Inhale 2 puffs Every 4 (Four) Hours As Needed for Wheezing. 24 g 3 More than a month    cetirizine (zyrTEC) 10 MG tablet Take 1 tablet by mouth Daily.   Unknown    Dulaglutide 0.75 MG/0.5ML solution auto-injector Inject 0.75 mg under the skin into the appropriate area as directed 1 (One) Time Per Week. For DMII 2 mL 5 Unknown    nystatin-triamcinolone (MYCOLOG) 964434-9.1 UNIT/GM-% ointment Apply 1 Application topically to the appropriate area as directed 2 (Two) Times a Day. PRN 60 g 1 Unknown    phenazopyridine (Pyridium) 200 MG tablet One PO TID  PC PRN urinary pressure 15 tablet 0 Unknown       Allergies:  Allergies   Allergen Reactions    Morphine Anxiety and Confusion    Levaquin [Levofloxacin] Myalgia    Adhesive Tape Rash    Ampicillin Hives and Rash    Ceclor [Cefaclor] Hives and Rash    Ciprofloxacin Hives and Rash    Codeine Phosphate Rash    Erythromycin Rash     Can take Biaxin and zithromax    Ketek [Telithromycin] Rash    Penicillins Rash     ..Beta lactam allergy details  Antibiotic reaction: other (RED FACE)  Age at reaction: adult  Dose to reaction time: unknown  Reason for antibiotic: unknown  Epinephrine required for reaction?: no  Tolerated antibiotics: other (I HAVEN'T TAKEN ANY OF IT IN YEARS.)       Tetracyclines & Related Rash    Ultram [Tramadol] Rash       Past Social History:  Social History     Socioeconomic History    Marital status:    Tobacco Use    Smoking status: Former     Current packs/day: 4.00     Types: Cigarettes     Passive exposure: Never    Smokeless tobacco: Never   Vaping Use    Vaping status: Never Used   Substance and Sexual Activity    Alcohol use: Yes     Comment: Occasionally    Drug use: No    Sexual activity: Not Currently     Partners: Male     Birth control/protection: None, Hysterectomy       Past Family History:  Family History   Problem Relation Age of Onset    Stroke Mother     Breast cancer Mother     Liver cancer Mother     Hearing loss Mother     Arthritis Mother     Cancer Mother     Glaucoma Father     Heart disease Father     Macular degeneration Father     Stroke Father     Pancreatic cancer Father     Colon polyps Father     Vision loss Father         i also have it    Cancer Father     Breast cancer Sister     Anxiety disorder Sister     Depression Sister     Depression Maternal Aunt     Skin cancer Neg Hx     Malig Hyperthermia Neg Hx        Review of Systems: All systems reviewed. Pertinent positives identified in HPI. All other systems are negative.     14 point ROS was performed and  is negative except as outlined in HPI.     Objective:     Objective:  Vital Signs (last 24 hours)         06/26 0700  06/27 0659 06/27 0700 06/27 0943   Most Recent      Temp (°F) 97.3 -  100      98.6     98.6 (37) 06/27 0736    Heart Rate 69 -  89    79 -  85     85 06/27 0940    Resp   18      18     18 06/27 0940    /70 -  149/77      151/85     151/85 06/27 0736    SpO2 (%) 96 -  97    96 -  98     98 06/27 0940          Temp:  [97.3 °F (36.3 °C)-98.6 °F (37 °C)] 98.6 °F (37 °C)  Heart Rate:  [69-87] 85  Resp:  [18] 18  BP: (113-151)/(67-85) 151/85  Body mass index is 27.4 kg/m².        Physical Exam:     General Appearance: No acute distress, well developed and well nourished.   Respiratory: No signs of respiratory distress. Respiration rhythm and depth normal.   Cardiovascular:  Heart Rate and Rhythm: Normal, Heart Sounds: Normal S1 and S2. No S3 or S4 noted  Skin: Warm and dry.   Psychiatric: Patient alert and oriented to person, place, and time. Speech and behavior appropriate. Normal mood and affect.    Labs:   Lab Review:     Results from last 7 days   Lab Units 06/27/25  0556 06/25/25 2239 06/25/25  1317   SODIUM mmol/L 136 132* 129*   POTASSIUM mmol/L 3.9 3.7 3.6   CHLORIDE mmol/L 105 101 96*   CO2 mmol/L 21.1* 19.0* 21.2*   BUN mg/dL 7.0* 16.0 25.9*   CREATININE mg/dL 0.56* 0.76 1.37*   GLUCOSE mg/dL 97 118* 170*   CALCIUM mg/dL 8.4* 8.0* 9.2   AST (SGOT) U/L 46*  --  102*   ALT (SGPT) U/L 50*  --  76*         Results from last 7 days   Lab Units 06/27/25  0556   WBC 10*3/mm3 4.49   HEMOGLOBIN g/dL 10.3*   HEMATOCRIT % 31.3*   PLATELETS 10*3/mm3 175             Results from last 7 days   Lab Units 06/27/25  0556   MAGNESIUM mg/dL 1.7             Results from last 7 days   Lab Units 06/25/25  1317   DIGOXIN LVL ng/mL 0.60           EKG:                 I personally viewed and interpreted the patient's EKG/Telemetry tracings.    Assessment:       Diverticulitis    Hyponatremia    Transaminitis     AUGUSTIN (acute kidney injury)    Type 2 diabetes mellitus with hyperglycemia, without long-term current use of insulin    Acute UTI (urinary tract infection)    Sepsis without acute organ dysfunction        Plan:   We reviewed her EKG, this does not appear to be 2:1 heart block.  More resembles sinus with some non-conducted PACs.  She did have a short episode of atrial flutter noted on telemetry.  She was unaware of this.  She does not have a history of A-fib or a-flutter and is not on anticoagulation. Would not recommend starting anticoagulation at this time due to short duration of a-flutter.     She is currently in normal sinus rhythm.    We recommend discharging on a monitor to further evaluate AF burden.  She will need to follow-up with her regular cardiologist at [facility].      Thank you for allowing me to participate in the care of Jess BRUCE BarajasRazo. Feel free to contact me directly with any further questions or concerns.    MARILYN Sharma  [facility]  06/27/25  10:39 EDT

## 2025-06-27 NOTE — CASE MANAGEMENT/SOCIAL WORK
Case Management Discharge Note      Final Note: Home via family transport.    Provided Post Acute Provider List?: Refused  Refused Provider List Comment: declined need    Selected Continued Care - Discharged on 6/27/2025 Admission date: 6/25/2025 - Discharge disposition: Home or Self Care      Destination    No services have been selected for the patient.                Durable Medical Equipment    No services have been selected for the patient.                Dialysis/Infusion    No services have been selected for the patient.                Home Medical Care    No services have been selected for the patient.                Therapy    No services have been selected for the patient.                Community Resources    No services have been selected for the patient.                Community & DME    No services have been selected for the patient.                    Transportation Services  Transportation: Private Transportation  Private: Car    Final Discharge Disposition Code: 01 - home or self-care

## 2025-06-30 ENCOUNTER — TRANSITIONAL CARE MANAGEMENT TELEPHONE ENCOUNTER (OUTPATIENT)
Dept: CALL CENTER | Facility: HOSPITAL | Age: 65
End: 2025-06-30
Payer: COMMERCIAL

## 2025-06-30 LAB — BACTERIA SPEC AEROBE CULT: NORMAL

## 2025-06-30 NOTE — OUTREACH NOTE
Call Center TCM Note      Flowsheet Row Responses   [facility] patient discharged from? [place]   Does the patient have one of the following disease processes/diagnoses(primary or secondary)? Sepsis   TCM attempt successful? Yes   Call start time 1235   Call end time 1237   Discharge diagnosis Diverticulitis  Sepsis without acute organ dysfunction   Meds reviewed with patient/caregiver? Yes   Is the patient having any side effects they believe may be caused by any medication additions or changes? No   Does the patient have all medications related to this admission filled (includes all antibiotics, inhalers, nebulizers,steroids,etc.) Yes   Prescription comments New rx's Cefdinir, Metronidazole in place   Is the patient taking all medications as directed (includes completed medication regime)? Yes   Comments TCM APPT WITH PCP CRISTELA ARAMBULA IS 07/09/2025   Does the patient have an appointment with their PCP within 7-14 days of discharge? Yes   Has home health visited the patient within 72 hours of discharge? N/A   Psychosocial issues? No   Did the patient receive a copy of their discharge instructions? Yes   Nursing interventions Reviewed instructions with patient   What is the patient's perception of their health status since discharge? Improving   Nursing interventions Nurse provided patient education   Is the patient/caregiver able to teach back TIME? T emperature - higher or lower than normal, I nfection - may have signs and symptoms of an infection, M ental Decline - confused, sleepy, difficult to arouse, E xtremely Ill - severe pain, discomfort, shortness of breath   Nursing interventions Nurse provided reassurance to patient   Is patient/caregiver able to teach back steps to recovery at home? Record milestones and struggles in a journal, Set small, achievable goals for return to baseline health, Exercise as tolerated, Make a list of questions for PCP appoinment, Learn about sepsis(sepsis.org), Rest and  regain strength, Talk about feelings with family/friends, Eat a balanced diet   Is the patient/caregiver able to teach back signs and symptoms of worsening condition: Fever, Altered mental status(confusion/coma), Edema, Hyperthermia, High blood glucose without diabetes, Shortness of breath/rapid respiratory rate, Rapid heart rate (>90)   If the patient is a current smoker, are they able to teach back resources for cessation? Not a smoker   Is the patient/caregiver able to teach back the hierarchy of who to call/visit for symptoms/problems? PCP, Specialist, Home health nurse, Urgent Care, ED, 911 Yes   TCM call completed? Yes   Wrap up additional comments D/C DX: Diverticulitis Sepsis without acute organ dysfunction - Pt not 100% but is feeling better, back at work today. No questions. TCM APPT with PCP CRISTELA Cobos is 07/09/2025   Call end time [id number]            MICHELLE Pacheco Medical Assistant    6/30/2025, 12:40 EDT

## 2025-07-01 LAB — BACTERIA SPEC AEROBE CULT: NORMAL

## 2025-07-09 ENCOUNTER — OFFICE VISIT (OUTPATIENT)
Dept: FAMILY MEDICINE CLINIC | Facility: CLINIC | Age: 65
End: 2025-07-09
Payer: COMMERCIAL

## 2025-07-09 VITALS
SYSTOLIC BLOOD PRESSURE: 122 MMHG | WEIGHT: 154 LBS | RESPIRATION RATE: 16 BRPM | DIASTOLIC BLOOD PRESSURE: 66 MMHG | HEART RATE: 85 BPM | TEMPERATURE: 97.3 F | HEIGHT: 64 IN | BODY MASS INDEX: 26.29 KG/M2 | OXYGEN SATURATION: 97 %

## 2025-07-09 DIAGNOSIS — F33.41 RECURRENT MAJOR DEPRESSIVE DISORDER, IN PARTIAL REMISSION: ICD-10-CM

## 2025-07-09 DIAGNOSIS — I10 ESSENTIAL HYPERTENSION: ICD-10-CM

## 2025-07-09 DIAGNOSIS — E78.2 MIXED HYPERLIPIDEMIA: Primary | ICD-10-CM

## 2025-07-09 DIAGNOSIS — F41.1 GENERALIZED ANXIETY DISORDER: ICD-10-CM

## 2025-07-09 DIAGNOSIS — E55.9 VITAMIN D DEFICIENCY: ICD-10-CM

## 2025-07-09 DIAGNOSIS — E11.65 CONTROLLED TYPE 2 DIABETES MELLITUS WITH HYPERGLYCEMIA, WITHOUT LONG-TERM CURRENT USE OF INSULIN: ICD-10-CM

## 2025-07-09 DIAGNOSIS — E03.9 HYPOTHYROIDISM, ACQUIRED: ICD-10-CM

## 2025-07-09 DIAGNOSIS — Z86.79 HISTORY OF CARDIOMYOPATHY: ICD-10-CM

## 2025-07-09 DIAGNOSIS — K21.9 GASTROESOPHAGEAL REFLUX DISEASE WITHOUT ESOPHAGITIS: ICD-10-CM

## 2025-07-09 DIAGNOSIS — Z09 HOSPITAL DISCHARGE FOLLOW-UP: ICD-10-CM

## 2025-07-09 DIAGNOSIS — I89.0 LYMPHEDEMA OF UPPER EXTREMITY: ICD-10-CM

## 2025-07-09 PROCEDURE — 99214 OFFICE O/P EST MOD 30 MIN: CPT | Performed by: PHYSICIAN ASSISTANT

## 2025-07-09 NOTE — PROGRESS NOTES
"Subjective   Jess Razo is a 64 y.o. female.     Diverticulitis  Symptoms: no diaphoresis        Since the last visit, she has overall felt tired and fairly well.  She has Primary Hypertension and well controlled on current medication, DMII well controlled on medication and will continue regimen, GERD controlled on PPI Rx, Hyperlipidemia with goals met with current Rx, Hypothyroidism and must update labs to continue treatment, Asthma well controlled and not requiring rescue Albuterol > 2 times a week, and Vitamin D deficiency and will update labs for continued management.  she has been compliant with current medications have reviewed them.  The patient denies medication side effects.  Will refill medications. /66   Pulse 85   Temp 97.3 °F (36.3 °C) (Temporal)   Resp 16   Ht 162.6 cm (64\")   Wt 69.9 kg (154 lb)   LMP  (LMP Unknown)   SpO2 97%   BMI 26.43 kg/m² .      Also note 20-pack-year history smoking stopped 2019   Jess Razo 64 y.o. female presents today for hosptial follow up.  she was treated 6-25 to 6-26-25 for hyponatremia, transaminitis, diverticulitis, UTI.  I reviewed all of the labs and diagnostic testing and noted:  CT abd/pelvis 6-25-25 without contrast  IMPRESSION:  1. Fatty infiltration of the liver.  2. Cholelithiasis. No gallbladder inflammatory changes are seen.  3. Colonic diverticulosis. There is some mild wall thickening of the  sigmoid colon which may be related to incomplete distention. There could  conceivably be an element of mild sigmoid colitis in the proper clinical  setting. Please correlate.  4. No free air or free fluid is seen.  EKG   - ABNORMAL ECG -  Sinus rhythm  Intermittent 2:1 AV block m- new  Electronically Signed By: Christie Glasgow ([facility]) 2025-06-26 15:52:25  Date and Time of Study:2025-06-26 11:45:05  The patient's medications were not changed:  Current outpatient and discharge medications have been reconciled for the patient.  Reviewed by: Olamide CUETO " CRISTELA Cobos    she does not have a follow up appointment with a specialist:     Had DEXA 10/12/24--osteopenia.  IMPRESSION:  1.  Osteopenia. No statistically significant interval change.  2.  The 10-year FRAX ([employer] Health Organization) fracture risk for a  major osteoporotic fracture is 19% and for a hip fracture is 2.4%.  ---wait on Rx    Results for orders placed or performed during the hospital encounter of 06/25/25   Comprehensive Metabolic Panel    Collection Time: 06/25/25  1:17 PM    Specimen: Blood   Result Value Ref Range    Glucose 170 (H) 65 - 99 mg/dL    BUN 25.9 (H) 8.0 - 23.0 mg/dL    Creatinine 1.37 (H) 0.57 - 1.00 mg/dL    Sodium 129 (L) 136 - 145 mmol/L    Potassium 3.6 3.5 - 5.2 mmol/L    Chloride 96 (L) 98 - 107 mmol/L    CO2 21.2 (L) 22.0 - 29.0 mmol/L    Calcium 9.2 8.6 - 10.5 mg/dL    Total Protein 7.2 6.0 - 8.5 g/dL    Albumin 3.8 3.5 - 5.2 g/dL    ALT (SGPT) 76 (H) 1 - 33 U/L    AST (SGOT) 102 (H) 1 - 32 U/L    Alkaline Phosphatase 121 (H) 39 - 117 U/L    Total Bilirubin 1.8 (H) 0.0 - 1.2 mg/dL    Globulin 3.4 gm/dL    A/G Ratio 1.1 g/dL    BUN/Creatinine Ratio 18.9 7.0 - 25.0    Anion Gap 11.8 5.0 - 15.0 mmol/L    eGFR 43.5 (L) >60.0 mL/min/1.73   Lipase    Collection Time: 06/25/25  1:17 PM    Specimen: Blood   Result Value Ref Range    Lipase 24 13 - 60 U/L   Lactic Acid, Plasma    Collection Time: 06/25/25  1:17 PM    Specimen: Blood   Result Value Ref Range    Lactate 2.2 (C) 0.5 - 2.0 mmol/L   CBC Auto Differential    Collection Time: 06/25/25  1:17 PM    Specimen: Blood   Result Value Ref Range    WBC 5.53 3.40 - 10.80 10*3/mm3    RBC 4.10 3.77 - 5.28 10*6/mm3    Hemoglobin 12.2 12.0 - 15.9 g/dL    Hematocrit 38.2 34.0 - 46.6 %    MCV 93.2 79.0 - 97.0 fL    MCH 29.8 26.6 - 33.0 pg    MCHC 31.9 31.5 - 35.7 g/dL    RDW 14.1 12.3 - 15.4 %    RDW-SD 48.5 37.0 - 54.0 fl    MPV 9.5 6.0 - 12.0 fL    Platelets 191 140 - 450 10*3/mm3   Manual Differential    Collection Time: 06/25/25  1:17 PM     Specimen: Blood   Result Value Ref Range    Neutrophil % 91.0 (H) 42.7 - 76.0 %    Lymphocyte % 7.0 (L) 19.6 - 45.3 %    Monocyte % 0.0 (L) 5.0 - 12.0 %    Eosinophil % 2.0 0.3 - 6.2 %    Basophil % 0.0 0.0 - 1.5 %    Neutrophils Absolute 5.26 1.70 - 7.00 10*3/mm3    Lymphocytes Absolute 0.40 (L) 0.70 - 3.10 10*3/mm3    Monocytes Absolute 0.00 (L) 0.10 - 0.90 10*3/mm3    Eosinophils Absolute 0.12 0.00 - 0.40 10*3/mm3    Basophils Absolute 0.00 0.00 - 0.20 10*3/mm3    RBC Morphology Normal Normal    WBC Morphology Normal Normal    Platelet Morphology Normal Normal   Digoxin Level    Collection Time: 06/25/25  1:17 PM    Specimen: Blood   Result Value Ref Range    Digoxin 0.60 0.60 - 1.20 ng/mL   Hemoglobin A1c    Collection Time: 06/25/25  1:17 PM    Specimen: Blood   Result Value Ref Range    Hemoglobin A1C 6.30 (H) 4.80 - 5.60 %   Green Top (Gel)    Collection Time: 06/25/25  1:17 PM   Result Value Ref Range    Extra Tube Hold for add-ons.    Lavender Top    Collection Time: 06/25/25  1:17 PM   Result Value Ref Range    Extra Tube hold for add-on    Gold Top - SST    Collection Time: 06/25/25  1:17 PM   Result Value Ref Range    Extra Tube Hold for add-ons.    Light Blue Top    Collection Time: 06/25/25  1:17 PM   Result Value Ref Range    Extra Tube Hold for add-ons.    Urine Culture - Urine, Urine, Clean Catch    Collection Time: 06/25/25  1:59 PM    Specimen: Urine, Clean Catch   Result Value Ref Range    Urine Culture 25,000 CFU/mL Normal Urogenital Britta    Urinalysis With Microscopic If Indicated (No Culture) - Urine, Clean Catch    Collection Time: 06/25/25  1:59 PM    Specimen: Urine, Clean Catch   Result Value Ref Range    Color, UA Orange (A) Yellow, Straw    Appearance, UA Slightly Cloudy (A) Clear    pH, UA <=5.0 5.0 - 8.0    Specific Gravity, UA 1.015 1.005 - 1.030    Glucose,  mg/dL (Trace) (A) Negative    Ketones, UA Negative Negative    Bilirubin, UA Small (1+) (A) Negative    Blood, UA  Negative Negative    Protein, UA 30 mg/dL (1+) (A) Negative    Leuk Esterase, UA Trace (A) Negative    Nitrite, UA Positive (A) Negative    Urobilinogen, UA 2.0 E.U./dL (A) 0.2 - 1.0 E.U./dL   Urinalysis, Microscopic Only - Urine, Clean Catch    Collection Time: 06/25/25  1:59 PM    Specimen: Urine, Clean Catch   Result Value Ref Range    RBC, UA 0-2 None Seen, 0-2 /HPF    WBC, UA 11-20 (A) None Seen, 0-2 /HPF    Bacteria, UA 3+ (A) None Seen /HPF    Squamous Epithelial Cells, UA 21-30 (A) None Seen, 0-2 /HPF    Hyaline Casts, UA 3-6 None Seen /LPF    Methodology Manual Light Microscopy    [place] Urine Culture Tube - Urine, Clean Catch    Collection Time: 06/25/25  1:59 PM    Specimen: Urine, Clean Catch   Result Value Ref Range    Extra Tube Hold for add-ons.    STAT Lactic Acid, Reflex    Collection Time: 06/25/25  4:32 PM    Specimen: Blood   Result Value Ref Range    Lactate 1.5 0.5 - 2.0 mmol/L   POC Glucose Once    Collection Time: 06/25/25 10:19 PM    Specimen: Blood   Result Value Ref Range    Glucose 133 (H) 70 - 130 mg/dL   Blood Culture - Blood, Hand, Right    Collection Time: 06/25/25 10:39 PM    Specimen: Hand, Right; Blood   Result Value Ref Range    Blood Culture No growth at 5 days    Basic Metabolic Panel    Collection Time: 06/25/25 10:39 PM    Specimen: Blood   Result Value Ref Range    Glucose 118 (H) 65 - 99 mg/dL    BUN 16.0 8.0 - 23.0 mg/dL    Creatinine 0.76 0.57 - 1.00 mg/dL    Sodium 132 (L) 136 - 145 mmol/L    Potassium 3.7 3.5 - 5.2 mmol/L    Chloride 101 98 - 107 mmol/L    CO2 19.0 (L) 22.0 - 29.0 mmol/L    Calcium 8.0 (L) 8.6 - 10.5 mg/dL    BUN/Creatinine Ratio 21.1 7.0 - 25.0    Anion Gap 12.0 5.0 - 15.0 mmol/L    eGFR 88.2 >60.0 mL/min/1.73   CBC (No Diff)    Collection Time: 06/25/25 10:39 PM    Specimen: Blood   Result Value Ref Range    WBC 5.44 3.40 - 10.80 10*3/mm3    RBC 3.28 (L) 3.77 - 5.28 10*6/mm3    Hemoglobin 10.0 (L) 12.0 - 15.9 g/dL    Hematocrit 29.1 (L) 34.0 - 46.6 %     MCV 88.7 79.0 - 97.0 fL    MCH 30.5 26.6 - 33.0 pg    MCHC 34.4 31.5 - 35.7 g/dL    RDW 14.2 12.3 - 15.4 %    RDW-SD 45.4 37.0 - 54.0 fl    MPV 9.4 6.0 - 12.0 fL    Platelets 145 140 - 450 10*3/mm3   Blood Culture - Blood, Hand, Right    Collection Time: 06/25/25 11:51 PM    Specimen: Hand, Right; Blood   Result Value Ref Range    Blood Culture No growth at 5 days    POC Glucose Once    Collection Time: 06/26/25  7:37 AM    Specimen: Blood   Result Value Ref Range    Glucose 105 70 - 130 mg/dL   ECG 12 Lead Tachycardia    Collection Time: 06/26/25 11:45 AM   Result Value Ref Range    QT Interval 377 ms    QTC Interval 442 ms   POC Glucose Once    Collection Time: 06/26/25 11:48 AM    Specimen: Blood   Result Value Ref Range    Glucose 99 70 - 130 mg/dL   POC Glucose Once    Collection Time: 06/26/25  4:45 PM    Specimen: Blood   Result Value Ref Range    Glucose 118 70 - 130 mg/dL   POC Glucose Once    Collection Time: 06/26/25  8:32 PM    Specimen: Blood   Result Value Ref Range    Glucose 172 (H) 70 - 130 mg/dL   CBC (No Diff)    Collection Time: 06/27/25  5:56 AM    Specimen: Blood   Result Value Ref Range    WBC 4.49 3.40 - 10.80 10*3/mm3    RBC 3.43 (L) 3.77 - 5.28 10*6/mm3    Hemoglobin 10.3 (L) 12.0 - 15.9 g/dL    Hematocrit 31.3 (L) 34.0 - 46.6 %    MCV 91.3 79.0 - 97.0 fL    MCH 30.0 26.6 - 33.0 pg    MCHC 32.9 31.5 - 35.7 g/dL    RDW 14.6 12.3 - 15.4 %    RDW-SD 49.0 37.0 - 54.0 fl    MPV 9.4 6.0 - 12.0 fL    Platelets 175 140 - 450 10*3/mm3   Comprehensive Metabolic Panel    Collection Time: 06/27/25  5:56 AM    Specimen: Blood   Result Value Ref Range    Glucose 97 65 - 99 mg/dL    BUN 7.0 (L) 8.0 - 23.0 mg/dL    Creatinine 0.56 (L) 0.57 - 1.00 mg/dL    Sodium 136 136 - 145 mmol/L    Potassium 3.9 3.5 - 5.2 mmol/L    Chloride 105 98 - 107 mmol/L    CO2 21.1 (L) 22.0 - 29.0 mmol/L    Calcium 8.4 (L) 8.6 - 10.5 mg/dL    Total Protein 6.3 6.0 - 8.5 g/dL    Albumin 3.3 (L) 3.5 - 5.2 g/dL    ALT (SGPT) 50  (H) 1 - 33 U/L    AST (SGOT) 46 (H) 1 - 32 U/L    Alkaline Phosphatase 147 (H) 39 - 117 U/L    Total Bilirubin 0.8 0.0 - 1.2 mg/dL    Globulin 3.0 gm/dL    A/G Ratio 1.1 g/dL    BUN/Creatinine Ratio 12.5 7.0 - 25.0    Anion Gap 9.9 5.0 - 15.0 mmol/L    eGFR 102.7 >60.0 mL/min/1.73   Magnesium    Collection Time: 06/27/25  5:56 AM    Specimen: Blood   Result Value Ref Range    Magnesium 1.7 1.6 - 2.4 mg/dL   Phosphorus    Collection Time: 06/27/25  5:56 AM    Specimen: Blood   Result Value Ref Range    Phosphorus 1.9 (C) 2.5 - 4.5 mg/dL   POC Glucose Once    Collection Time: 06/27/25  7:46 AM    Specimen: Blood   Result Value Ref Range    Glucose 109 70 - 130 mg/dL   POC Glucose Once    Collection Time: 06/27/25 11:58 AM    Specimen: Blood   Result Value Ref Range    Glucose 152 (H) 70 - 130 mg/dL     Ms. Razo is a 64-year-old female with a history of diabetes, HTN and recent right hemicolectomy for colon cancer who presented with intermittent left lower quadrant abdominal pain radiating to the right, fevers, and chills. She had a recent UTI treated with a full course of Macrobid, but symptoms recurred. She reported recent dietary indiscretion and dehydration. CT abdomen and pelvis in the ED showed fatty liver, cholelithiasis, and mild sigmoid colitis. Labs were notable for leukocytosis, elevated creatinine, and mixed darion on urine culture. She was empirically treated with Rocephin and Flagyl for suspected diverticulitis. She remained febrile on hospital day 2 but had improving abdominal pain and tolerated advancement in her diet. Her creatinine and sodium improved with IV fluids. Cardiology was consulted due to EKG abnormalities showing possible non-conducted PACs and brief atrial flutter on telemetry. She was discharged in sinus rhythm with ZIO monitor arranged for outpatient follow-up. Symptoms improved and she was discharged on additional days of antibiotics with plans for low-fiber diet and follow-up with her  PCP and cardiologist.   She did finish the cefdinir and Flagyl oral prescriptions that were given at discharge and did take the probiotic that was recommended  Her vague abdominal pain is now resolved  Lab Results   Component Value Date    CHLPL 115 08/12/2024    TRIG 105 01/18/2025    HDL 34 (L) 08/12/2024    LDL 35 08/12/2024     Lab Results   Component Value Date    TSH 1.290 02/29/2024      Saw oncology 7-7-25 with Dr. Catalina Gonzalez  Plan note  (Stage IIA)[id number] intermediate grade, infiltrating ductal carcinoma, estrogen and progesterone receptor positive, HER2/salome negative.  The patient underwent a breast conserving surgical procedure November 2006.Four cycles of doxorubicin/cyclophosphamide followed by four cycles of paclitaxel 15 March 2007.  She completed five years of tamoxifen in May 2012.  At that time she was switched to letrozole.  Trial of exemestane started November 22, 2013 due to persistent hair thinning. Tamoxifen and substituted per patient request in May 2014.  Completed 2017.   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  Also they order her breast imaging last mammogram was 3/13/2025 benign    Last cardio visit DR Shyam Wolfe 5-21-25  Following up on her mild cardiomyopathy, inappropriate sinus tachycardia, hypertension noted at this visit blood pressure was elevated and that I had decreased her valsartan when she was postop follow-up with me due to low blood pressure and she increased valsartan back to 80 mg daily and send Rx also noted for the inappropriate sinus tachycardia to continue the dig in the Bisoprolol I do want to note that dig level was normal and checked when she was inpatient last month  EKG at that time was normal rhythm also no episodes of tachycardia since diltiazem was discontinued  Made order to follow-up in 6 months  I do need the cardiologist to be copied on the Holter monitor ROSA report   Had cardiac consult while she was inpatient on  6/27/2025  MARILYN Ayala and noted  Her EKG was labeled as intermittent 2:1 heart block, however upon review this more resembles sinus rhythm with non-conducted PACs.   On telemetry review, she did have a short episode of atrial flutter yesterday that she was unaware of.  She denies a history of atrial fibrillation or atrial flutter.  The following portions of the patient's history were reviewed and updated as appropriate: allergies, current medications, past family history, past medical history, past social history, past surgical history, and problem list.    Review of Systems   Constitutional:  Negative for diaphoresis.   HENT:  Negative for nosebleeds and trouble swallowing.    Eyes:  Negative for blurred vision and visual disturbance.   Respiratory:  Negative for choking.    Gastrointestinal:  Negative for blood in stool.   Allergic/Immunologic: Negative for immunocompromised state.   Neurological:  Negative for facial asymmetry and speech difficulty.   Psychiatric/Behavioral:  Negative for self-injury and suicidal ideas.        Objective   Physical Exam  Vitals and nursing note reviewed.   Constitutional:       General: She is not in acute distress.     Appearance: She is well-developed. She is not ill-appearing or toxic-appearing.   HENT:      Head: Normocephalic.      Right Ear: External ear normal.      Left Ear: External ear normal.      Nose: Nose normal.      Mouth/Throat:      Pharynx: Oropharynx is clear.   Eyes:      General: No scleral icterus.     Conjunctiva/sclera: Conjunctivae normal.      Pupils: Pupils are equal, round, and reactive to light.   Neck:      Thyroid: No thyromegaly.   Cardiovascular:      Rate and Rhythm: Normal rate and regular rhythm.      Heart sounds: Normal heart sounds. No murmur heard.  Pulmonary:      Effort: Pulmonary effort is normal. No respiratory distress.      Breath sounds: Normal breath sounds.   Musculoskeletal:         General: No deformity. Normal range of  motion.      Cervical back: Normal range of motion and neck supple.   Skin:     General: Skin is warm and dry.      Findings: No rash.   Neurological:      General: No focal deficit present.      Mental Status: She is alert and oriented to person, place, and time. Mental status is at baseline.   Psychiatric:         Mood and Affect: Mood normal.         Behavior: Behavior normal.         Thought Content: Thought content normal.         Judgment: Judgment normal.           Assessment & Plan   Diagnoses and all orders for this visit:    1. Mixed hyperlipidemia (Primary)  -     Comprehensive metabolic panel  -     Lipid panel  -     CBC and Differential  -     TSH  -     Magnesium  -     Phosphorus  -     Vitamin B12  -     Folate  -     Vitamin D,25-Hydroxy  -     Microalbumin / Creatinine Urine Ratio - Urine, Clean Catch  -     T4, Free  -     T3, Free    2. Hypothyroidism, acquired  -     Comprehensive metabolic panel  -     Lipid panel  -     CBC and Differential  -     TSH  -     Magnesium  -     Phosphorus  -     Vitamin B12  -     Folate  -     Vitamin D,25-Hydroxy  -     Microalbumin / Creatinine Urine Ratio - Urine, Clean Catch  -     T4, Free  -     T3, Free    3. Essential hypertension  -     Comprehensive metabolic panel  -     Lipid panel  -     CBC and Differential  -     TSH  -     Magnesium  -     Phosphorus  -     Vitamin B12  -     Folate  -     Vitamin D,25-Hydroxy  -     Microalbumin / Creatinine Urine Ratio - Urine, Clean Catch  -     T4, Free  -     T3, Free    4. Vitamin D deficiency  -     Comprehensive metabolic panel  -     Lipid panel  -     CBC and Differential  -     TSH  -     Magnesium  -     Phosphorus  -     Vitamin B12  -     Folate  -     Vitamin D,25-Hydroxy  -     Microalbumin / Creatinine Urine Ratio - Urine, Clean Catch  -     T4, Free  -     T3, Free    5. History of cardiomyopathy  -     Comprehensive metabolic panel  -     Lipid panel  -     CBC and Differential  -     TSH  -      Magnesium  -     Phosphorus  -     Vitamin B12  -     Folate  -     Vitamin D,25-Hydroxy  -     Microalbumin / Creatinine Urine Ratio - Urine, Clean Catch  -     T4, Free  -     T3, Free    6. Gastroesophageal reflux disease without esophagitis  -     Comprehensive metabolic panel  -     Lipid panel  -     CBC and Differential  -     TSH  -     Magnesium  -     Phosphorus  -     Vitamin B12  -     Folate  -     Vitamin D,25-Hydroxy  -     Microalbumin / Creatinine Urine Ratio - Urine, Clean Catch  -     T4, Free  -     T3, Free    7. Controlled type 2 diabetes mellitus with hyperglycemia, without long-term current use of insulin  -     Comprehensive metabolic panel  -     Lipid panel  -     CBC and Differential  -     TSH  -     Magnesium  -     Phosphorus  -     Vitamin B12  -     Folate  -     Vitamin D,25-Hydroxy  -     Microalbumin / Creatinine Urine Ratio - Urine, Clean Catch  -     T4, Free  -     T3, Free    8. Recurrent major depressive disorder, in partial remission  -     Comprehensive metabolic panel  -     Lipid panel  -     CBC and Differential  -     TSH  -     Magnesium  -     Phosphorus  -     Vitamin B12  -     Folate  -     Vitamin D,25-Hydroxy  -     Microalbumin / Creatinine Urine Ratio - Urine, Clean Catch  -     T4, Free  -     T3, Free    9. Lymphedema of upper extremity  -     Comprehensive metabolic panel  -     Lipid panel  -     CBC and Differential  -     TSH  -     Magnesium  -     Phosphorus  -     Vitamin B12  -     Folate  -     Vitamin D,25-Hydroxy  -     Microalbumin / Creatinine Urine Ratio - Urine, Clean Catch  -     T4, Free  -     T3, Free    10. Generalized anxiety disorder  -     Comprehensive metabolic panel  -     Lipid panel  -     CBC and Differential  -     TSH  -     Magnesium  -     Phosphorus  -     Vitamin B12  -     Folate  -     Vitamin D,25-Hydroxy  -     Microalbumin / Creatinine Urine Ratio - Urine, Clean Catch  -     T4, Free  -     T3, Free    11. Hospital  discharge follow-up  -     Comprehensive metabolic panel  -     Lipid panel  -     CBC and Differential  -     TSH  -     Magnesium  -     Phosphorus  -     Vitamin B12  -     Folate  -     Vitamin D,25-Hydroxy  -     Microalbumin / Creatinine Urine Ratio - Urine, Clean Catch  -     T4, Free  -     T3, Free          Lab Results   Component Value Date    HGBA1C 6.30 (H) 06/25/2025     Muscle update a thyroid labs and lipids and routine lab  She is having trigger finger in her 2nd and 3rd right fingers and is been to Ortho in the past we will have her see  and may need an injection  Will have her see cardiology if her ZIO monitor is a concern but also share results with her cardiologist  The abdominal vague pain left lower quadrant has resolved since she left the hospital  Hemoglobin A1c was done inpatient and goal met  Blood pressure is down noting that the cardiologist increased her in May back to 80 mg  Just had visit with oncology following up on her history of breast cancer  Plan, Jess Razo, was seen today.  she was seen for HTN and continue medication, DMII stable and refilled medications, GERD and will continue on PPI medication, Hypothyroidism and will need to update labs for continued treatment, Asthma and is well controlled on medication.  , and Vitamin D deficiency and will update labs .       Updating labs today I do want to follow-up on her hemoglobin and her blood count and also the liver enzymes that were mildly elevated during hospitalization likely from the infection

## 2025-07-10 ENCOUNTER — RESULTS FOLLOW-UP (OUTPATIENT)
Dept: FAMILY MEDICINE CLINIC | Facility: CLINIC | Age: 65
End: 2025-07-10
Payer: COMMERCIAL

## 2025-07-10 LAB
25(OH)D3+25(OH)D2 SERPL-MCNC: 53.7 NG/ML (ref 30–100)
ALBUMIN SERPL-MCNC: 4.3 G/DL (ref 3.9–4.9)
ALBUMIN/CREAT UR: 19 MG/G CREAT (ref 0–29)
ALP SERPL-CCNC: 112 IU/L (ref 44–121)
ALT SERPL-CCNC: 38 IU/L (ref 0–32)
AST SERPL-CCNC: 38 IU/L (ref 0–40)
BASOPHILS # BLD AUTO: 0.1 X10E3/UL (ref 0–0.2)
BASOPHILS NFR BLD AUTO: 1 %
BILIRUB SERPL-MCNC: 0.5 MG/DL (ref 0–1.2)
BUN SERPL-MCNC: 11 MG/DL (ref 8–27)
BUN/CREAT SERPL: 19 (ref 12–28)
CALCIUM SERPL-MCNC: 9.6 MG/DL (ref 8.7–10.3)
CHLORIDE SERPL-SCNC: 97 MMOL/L (ref 96–106)
CHOLEST SERPL-MCNC: 165 MG/DL (ref 100–199)
CO2 SERPL-SCNC: 23 MMOL/L (ref 20–29)
CREAT SERPL-MCNC: 0.59 MG/DL (ref 0.57–1)
CREAT UR-MCNC: 35.5 MG/DL
EGFRCR SERPLBLD CKD-EPI 2021: 101 ML/MIN/1.73
EOSINOPHIL # BLD AUTO: 0.1 X10E3/UL (ref 0–0.4)
EOSINOPHIL NFR BLD AUTO: 1 %
ERYTHROCYTE [DISTWIDTH] IN BLOOD BY AUTOMATED COUNT: 14.4 % (ref 11.7–15.4)
FOLATE SERPL-MCNC: >20 NG/ML
GLOBULIN SER CALC-MCNC: 3.1 G/DL (ref 1.5–4.5)
GLUCOSE SERPL-MCNC: 91 MG/DL (ref 70–99)
HCT VFR BLD AUTO: 38.9 % (ref 34–46.6)
HDLC SERPL-MCNC: 42 MG/DL
HGB BLD-MCNC: 12.2 G/DL (ref 11.1–15.9)
IMM GRANULOCYTES # BLD AUTO: 0 X10E3/UL (ref 0–0.1)
IMM GRANULOCYTES NFR BLD AUTO: 0 %
LDLC SERPL CALC-MCNC: 86 MG/DL (ref 0–99)
LYMPHOCYTES # BLD AUTO: 2.2 X10E3/UL (ref 0.7–3.1)
LYMPHOCYTES NFR BLD AUTO: 37 %
MAGNESIUM SERPL-MCNC: 1.9 MG/DL (ref 1.6–2.3)
MCH RBC QN AUTO: 29.8 PG (ref 26.6–33)
MCHC RBC AUTO-ENTMCNC: 31.4 G/DL (ref 31.5–35.7)
MCV RBC AUTO: 95 FL (ref 79–97)
MICROALBUMIN UR-MCNC: 6.6 UG/ML
MONOCYTES # BLD AUTO: 0.5 X10E3/UL (ref 0.1–0.9)
MONOCYTES NFR BLD AUTO: 9 %
NEUTROPHILS # BLD AUTO: 3.1 X10E3/UL (ref 1.4–7)
NEUTROPHILS NFR BLD AUTO: 52 %
PHOSPHATE SERPL-MCNC: 3.6 MG/DL (ref 3–4.3)
PLATELET # BLD AUTO: 413 X10E3/UL (ref 150–450)
POTASSIUM SERPL-SCNC: 4.7 MMOL/L (ref 3.5–5.2)
PROT SERPL-MCNC: 7.4 G/DL (ref 6–8.5)
RBC # BLD AUTO: 4.1 X10E6/UL (ref 3.77–5.28)
SODIUM SERPL-SCNC: 135 MMOL/L (ref 134–144)
T3FREE SERPL-MCNC: 3.2 PG/ML (ref 2–4.4)
T4 FREE SERPL-MCNC: 1.73 NG/DL (ref 0.82–1.77)
TRIGL SERPL-MCNC: 217 MG/DL (ref 0–149)
TSH SERPL DL<=0.005 MIU/L-ACNC: 1.13 UIU/ML (ref 0.45–4.5)
VIT B12 SERPL-MCNC: 642 PG/ML (ref 232–1245)
VLDLC SERPL CALC-MCNC: 37 MG/DL (ref 5–40)
WBC # BLD AUTO: 5.9 X10E3/UL (ref 3.4–10.8)

## 2025-07-23 LAB
CV ZIO AF AFL AVG BPM: 66 BPM
CV ZIO AF AFL BPM HIGH: 113 BPM
CV ZIO AF AFL BPM LOW: 43 BPM
CV ZIO AF AFL F EPI AVG BPM: 75 BPM
CV ZIO AF AFL F EPI BPM HIGH: 113 BPM
CV ZIO AF AFL F EPI BPM LOW: 56 BPM
CV ZIO AF AFL F EPI DT: NORMAL
CV ZIO AF AFL L EPI AVG BPM: 61 BPM
CV ZIO AF AFL L EPI BPM HIGH: 100 BPM
CV ZIO AF AFL L EPI BPM LOW: 46 BPM
CV ZIO AF AFL L EPI DUR: 5493.8
CV ZIO AF AFL L EPI END: NORMAL
CV ZIO AF AFL L EPI START: NORMAL
CV ZIO AF AFL PERCENT: 2 %
CV ZIO AF AFL S EPI AVG BPM: 63 BPM
CV ZIO AF AFL S EPI BPM HIGH: 102 BPM
CV ZIO AF AFL S EPI BPM LOW: 43 BPM
CV ZIO AF AFL S EPI DT: NORMAL
CV ZIO BASELINE AVG BPM: 84 BPM
CV ZIO BASELINE BPM HIGH: 156 BPM
CV ZIO BASELINE BPM LOW: 40 BPM
CV ZIO DEVICE ANALYSIS TIME: NORMAL
CV ZIO ECT SVE COUNT: 1069 EPISODES
CV ZIO ECT SVE CPLT COUNT: 23 EPISODES
CV ZIO ECT SVE CPLT FREQ: NORMAL
CV ZIO ECT SVE FREQ: NORMAL
CV ZIO ECT SVE TPLT COUNT: 1 EPISODES
CV ZIO ECT SVE TPLT FREQ: NORMAL
CV ZIO ECT VE COUNT: 1240 EPISODES
CV ZIO ECT VE CPLT COUNT: 6 EPISODES
CV ZIO ECT VE CPLT FREQ: NORMAL
CV ZIO ECT VE FREQ: NORMAL
CV ZIO ECT VE TPLT COUNT: 1 EPISODES
CV ZIO ECT VE TPLT FREQ: NORMAL
CV ZIO ECTOPIC SVE COUPLET RAW PERCENT: 0 %
CV ZIO ECTOPIC SVE ISOLATED PERCENT: 0.06 %
CV ZIO ECTOPIC SVE TRIPLET RAW PERCENT: 0 %
CV ZIO ECTOPIC VE COUPLET RAW PERCENT: 0 %
CV ZIO ECTOPIC VE ISOLATED PERCENT: 0.07 %
CV ZIO ECTOPIC VE TRIPLET RAW PERCENT: 0 %
CV ZIO ENROLLMENT END: NORMAL
CV ZIO ENROLLMENT START: NORMAL
CV ZIO IRREG TYPE: NORMAL
CV ZIO PATIENT EVENTS DIARIES: 0
CV ZIO PATIENT EVENTS TRIGGERS: 0
CV ZIO PAUSE COUNT: 0
CV ZIO PRESCRIPTION STATUS: NORMAL
CV ZIO SVT COUNT: 0
CV ZIO TOTAL  ENROLLMENT PERIOD: NORMAL
CV ZIO VT AVG BPM: 140 BPM
CV ZIO VT BPM HIGH: 156 BPM
CV ZIO VT BPM LOW: 121 BPM
CV ZIO VT COUNT: 1
CV ZIO VT F EPI AVG BPM: 140
CV ZIO VT F EPI BEATS: 12 BEATS
CV ZIO VT F EPI BPM HIGH: 156
CV ZIO VT F EPI BPM LOW: 121
CV ZIO VT F EPI DUR: 5.5 SEC
CV ZIO VT F EPI END: NORMAL
CV ZIO VT F EPI START: NORMAL
CV ZIO VT L EPI AVG BPM: 140
CV ZIO VT L EPI BEATS: 12 BEATS
CV ZIO VT L EPI BPM HIGH: 156 BPM
CV ZIO VT L EPI BPM LOW: 121 BPM
CV ZIO VT L EPI DUR: 5.5
CV ZIO VT L EPI END: NORMAL
CV ZIO VT L EPI START: NORMAL

## 2025-07-24 NOTE — TELEPHONE ENCOUNTER
We got results this morning. Attempted to call x1. ELZBIETAM. She did have some atrial flutter, longest episode lasted about an hour and a half.    Wouldn't recommend starting anticoagulation for this short of an episode.    Would recommend following up with her regular cardiologist. We can fax her Zio report to them.

## 2025-08-03 RX ORDER — ATORVASTATIN CALCIUM 80 MG/1
80 TABLET, FILM COATED ORAL DAILY
Qty: 90 TABLET | Refills: 0 | Status: SHIPPED | OUTPATIENT
Start: 2025-08-03

## 2025-08-04 RX ORDER — LEVOTHYROXINE SODIUM 50 UG/1
TABLET ORAL
Qty: 90 TABLET | Refills: 0 | Status: SHIPPED | OUTPATIENT
Start: 2025-08-04

## 2025-08-20 RX ORDER — METFORMIN HYDROCHLORIDE 500 MG/1
TABLET, EXTENDED RELEASE ORAL
Qty: 90 TABLET | Refills: 1 | Status: SHIPPED | OUTPATIENT
Start: 2025-08-20

## (undated) DEVICE — ADAPT CLN BIOGUARD AIR/H2O DISP

## (undated) DEVICE — SENSR O2 OXIMAX FNGR A/ 18IN NONSTR

## (undated) DEVICE — SINGLE-USE BIOPSY FORCEPS: Brand: RADIAL JAW 4

## (undated) DEVICE — ANTIBACTERIAL UNDYED BRAIDED (POLYGLACTIN 910), SYNTHETIC ABSORBABLE SUTURE: Brand: COATED VICRYL

## (undated) DEVICE — CANN O2 ETCO2 FITS ALL CONN CO2 SMPL A/ 7IN DISP LF

## (undated) DEVICE — SUT PDS 3/0 SH 27IN DYED Z316H

## (undated) DEVICE — GOWN ,SIRUS,NONREINFORCED SMALL: Brand: MEDLINE

## (undated) DEVICE — MSK PROC CURAPLEX O2 2/ADAPT 7FT

## (undated) DEVICE — SUT SILK 3/0 SH CR8 18IN C013D

## (undated) DEVICE — KT ORCA ORCAPOD DISP STRL

## (undated) DEVICE — ENDOPATH XCEL UNIVERSAL TROCAR STABLILITY SLEEVES: Brand: ENDOPATH XCEL

## (undated) DEVICE — GOWN,SIRUS,NON REINFRCD,LARGE,SET IN SL: Brand: MEDLINE

## (undated) DEVICE — THE TORRENT IRRIGATION SCOPE CONNECTOR IS USED WITH THE TORRENT IRRIGATION TUBING TO PROVIDE IRRIGATION FLUIDS SUCH AS STERILE WATER DURING GASTROINTESTINAL ENDOSCOPIC PROCEDURES WHEN USED IN CONJUNCTION WITH AN IRRIGATION PUMP (OR ELECTROSURGICAL UNIT).: Brand: TORRENT

## (undated) DEVICE — GLV SURG BIOGEL LTX PF 6

## (undated) DEVICE — SUT PDS 0 CT1 36IN Z346H

## (undated) DEVICE — ERBE NESSY®PLATE 170 SPLIT; 168CM²; CABLE 3M: Brand: ERBE

## (undated) DEVICE — 1LYRTR 16FR10ML100%SIL UMS SNP: Brand: MEDLINE INDUSTRIES, INC.

## (undated) DEVICE — SUT SILK 0 TIES 18IN SA66G

## (undated) DEVICE — GLV SURG SENSICARE POLYISPRN W/ALOE PF LF 6.5 GRN STRL

## (undated) DEVICE — TUBING, SUCTION, 1/4" X 10', STRAIGHT: Brand: MEDLINE

## (undated) DEVICE — PENCL SMOKE/EVAC MEGADYNE TELESCP 10FT

## (undated) DEVICE — LOU LAP SIGMOID COLON: Brand: MEDLINE INDUSTRIES, INC.

## (undated) DEVICE — THE SINGLE USE ETRAP – POLYP TRAP IS USED FOR SUCTION RETRIEVAL OF ENDOSCOPICALLY REMOVED POLYPS.: Brand: ETRAP

## (undated) DEVICE — SUT SILK 3/0 TIES 18IN A184H

## (undated) DEVICE — SUT SILK 0 SH 30IN K834H

## (undated) DEVICE — SYR LL TP 10ML STRL

## (undated) DEVICE — SUT SILK 2/0 TIES 18IN A185H

## (undated) DEVICE — ENDOCUT SCISSOR TIP, DISPOSABLE: Brand: RENEW

## (undated) DEVICE — APPL CHLORAPREP HI/LITE 26ML ORNG

## (undated) DEVICE — SNAR POLYP SENSATION STDOVL 27 240 BX40

## (undated) DEVICE — ADHS SKIN SURG TISS VISC PREMIERPRO EXOFIN HI/VISC 1ML

## (undated) DEVICE — THE CARR-LOCKE INJECTION NEEDLE IS A SINGLE USE, DISPOSABLE, FLEXIBLE SHEATH INJECTION NEEDLE USED FOR THE INJECTION OF VARIOUS TYPES OF MEDIA THROUGH FLEXIBLE ENDOSCOPES.

## (undated) DEVICE — ENDOPATH XCEL BLUNT TIP TROCARS WITH SMOOTH SLEEVES: Brand: ENDOPATH XCEL

## (undated) DEVICE — SUT VIC 0 UR6 27IN VCP603H

## (undated) DEVICE — NDL HYPO PRECISIONGLIDE REG 25G 1 1/2

## (undated) DEVICE — LAPAROVUE VISIBILITY SYSTEM LAPAROSCOPIC SOLUTIONS: Brand: LAPAROVUE

## (undated) DEVICE — ENDOPATH XCEL BLADELESS TROCARS WITH STABILITY SLEEVES: Brand: ENDOPATH XCEL

## (undated) DEVICE — Device: Brand: BLACK EYE